# Patient Record
Sex: FEMALE | Race: BLACK OR AFRICAN AMERICAN | Employment: OTHER | URBAN - METROPOLITAN AREA
[De-identification: names, ages, dates, MRNs, and addresses within clinical notes are randomized per-mention and may not be internally consistent; named-entity substitution may affect disease eponyms.]

---

## 2018-11-16 ENCOUNTER — APPOINTMENT (OUTPATIENT)
Dept: CT IMAGING | Age: 83
End: 2018-11-16
Payer: MEDICARE

## 2018-11-16 ENCOUNTER — HOSPITAL ENCOUNTER (EMERGENCY)
Age: 83
Discharge: HOME | End: 2018-11-16
Attending: EMERGENCY MEDICINE
Payer: MEDICARE

## 2018-11-16 VITALS
RESPIRATION RATE: 20 BRPM | BODY MASS INDEX: 35.5 KG/M2 | DIASTOLIC BLOOD PRESSURE: 50 MMHG | OXYGEN SATURATION: 94 % | WEIGHT: 188 LBS | HEIGHT: 61 IN | SYSTOLIC BLOOD PRESSURE: 143 MMHG | HEART RATE: 85 BPM | TEMPERATURE: 97.9 F

## 2018-11-16 DIAGNOSIS — B02.9 HERPES ZOSTER WITHOUT COMPLICATION: ICD-10-CM

## 2018-11-16 DIAGNOSIS — N30.00 ACUTE CYSTITIS WITHOUT HEMATURIA: Primary | ICD-10-CM

## 2018-11-16 LAB
ALBUMIN SERPL-MCNC: 3.7 G/DL (ref 3.5–5.2)
ALP BLD-CCNC: 65 U/L (ref 35–104)
ALT SERPL-CCNC: 23 U/L (ref 0–32)
ANION GAP SERPL CALCULATED.3IONS-SCNC: 13 MMOL/L (ref 7–16)
AST SERPL-CCNC: 36 U/L (ref 0–31)
BACTERIA: ABNORMAL /HPF
BASOPHILS ABSOLUTE: 0.04 E9/L (ref 0–0.2)
BASOPHILS RELATIVE PERCENT: 0.5 % (ref 0–2)
BILIRUB SERPL-MCNC: 0.4 MG/DL (ref 0–1.2)
BILIRUBIN URINE: NEGATIVE
BLOOD, URINE: NEGATIVE
BUN BLDV-MCNC: 11 MG/DL (ref 8–23)
CALCIUM SERPL-MCNC: 9 MG/DL (ref 8.6–10.2)
CHLORIDE BLD-SCNC: 101 MMOL/L (ref 98–107)
CLARITY: CLEAR
CO2: 26 MMOL/L (ref 22–29)
COLOR: YELLOW
CREAT SERPL-MCNC: 1.2 MG/DL (ref 0.5–1)
EOSINOPHILS ABSOLUTE: 0.04 E9/L (ref 0.05–0.5)
EOSINOPHILS RELATIVE PERCENT: 0.5 % (ref 0–6)
GFR AFRICAN AMERICAN: 52
GFR NON-AFRICAN AMERICAN: 52 ML/MIN/1.73
GLUCOSE BLD-MCNC: 113 MG/DL (ref 74–99)
GLUCOSE URINE: NEGATIVE MG/DL
HCT VFR BLD CALC: 40.5 % (ref 34–48)
HEMOGLOBIN: 12.8 G/DL (ref 11.5–15.5)
IMMATURE GRANULOCYTES #: 0.17 E9/L
IMMATURE GRANULOCYTES %: 2.2 % (ref 0–5)
INFLUENZA A BY PCR: NOT DETECTED
INFLUENZA B BY PCR: NOT DETECTED
KETONES, URINE: ABNORMAL MG/DL
LACTIC ACID: 1.8 MMOL/L (ref 0.5–2.2)
LEUKOCYTE ESTERASE, URINE: ABNORMAL
LIPASE: 22 U/L (ref 13–60)
LYMPHOCYTES ABSOLUTE: 2.46 E9/L (ref 1.5–4)
LYMPHOCYTES RELATIVE PERCENT: 32 % (ref 20–42)
MCH RBC QN AUTO: 28.8 PG (ref 26–35)
MCHC RBC AUTO-ENTMCNC: 31.6 % (ref 32–34.5)
MCV RBC AUTO: 91.2 FL (ref 80–99.9)
MONOCYTES ABSOLUTE: 0.77 E9/L (ref 0.1–0.95)
MONOCYTES RELATIVE PERCENT: 10 % (ref 2–12)
NEUTROPHILS ABSOLUTE: 4.2 E9/L (ref 1.8–7.3)
NEUTROPHILS RELATIVE PERCENT: 54.8 % (ref 43–80)
NITRITE, URINE: POSITIVE
PDW BLD-RTO: 15.1 FL (ref 11.5–15)
PH UA: 7 (ref 5–9)
PLATELET # BLD: 240 E9/L (ref 130–450)
PMV BLD AUTO: 9.4 FL (ref 7–12)
POTASSIUM SERPL-SCNC: 4.7 MMOL/L (ref 3.5–5)
PROTEIN UA: NEGATIVE MG/DL
RBC # BLD: 4.44 E12/L (ref 3.5–5.5)
RBC UA: ABNORMAL /HPF (ref 0–2)
SODIUM BLD-SCNC: 140 MMOL/L (ref 132–146)
SPECIFIC GRAVITY UA: 1.01 (ref 1–1.03)
TOTAL PROTEIN: 6.8 G/DL (ref 6.4–8.3)
TROPONIN: <0.01 NG/ML (ref 0–0.03)
UROBILINOGEN, URINE: 0.2 E.U./DL
WBC # BLD: 7.7 E9/L (ref 4.5–11.5)
WBC UA: ABNORMAL /HPF (ref 0–5)

## 2018-11-16 PROCEDURE — 6360000004 HC RX CONTRAST MEDICATION: Performed by: RADIOLOGY

## 2018-11-16 PROCEDURE — 83605 ASSAY OF LACTIC ACID: CPT

## 2018-11-16 PROCEDURE — 2580000003 HC RX 258: Performed by: EMERGENCY MEDICINE

## 2018-11-16 PROCEDURE — 99284 EMERGENCY DEPT VISIT MOD MDM: CPT

## 2018-11-16 PROCEDURE — 80053 COMPREHEN METABOLIC PANEL: CPT

## 2018-11-16 PROCEDURE — 85025 COMPLETE CBC W/AUTO DIFF WBC: CPT

## 2018-11-16 PROCEDURE — 74177 CT ABD & PELVIS W/CONTRAST: CPT

## 2018-11-16 PROCEDURE — 6360000002 HC RX W HCPCS: Performed by: EMERGENCY MEDICINE

## 2018-11-16 PROCEDURE — 96365 THER/PROPH/DIAG IV INF INIT: CPT

## 2018-11-16 PROCEDURE — 83690 ASSAY OF LIPASE: CPT

## 2018-11-16 PROCEDURE — 81001 URINALYSIS AUTO W/SCOPE: CPT

## 2018-11-16 PROCEDURE — 84484 ASSAY OF TROPONIN QUANT: CPT

## 2018-11-16 PROCEDURE — 87502 INFLUENZA DNA AMP PROBE: CPT

## 2018-11-16 PROCEDURE — 96375 TX/PRO/DX INJ NEW DRUG ADDON: CPT

## 2018-11-16 RX ORDER — MORPHINE SULFATE 2 MG/ML
2 INJECTION, SOLUTION INTRAMUSCULAR; INTRAVENOUS ONCE
Status: COMPLETED | OUTPATIENT
Start: 2018-11-16 | End: 2018-11-16

## 2018-11-16 RX ORDER — 0.9 % SODIUM CHLORIDE 0.9 %
1000 INTRAVENOUS SOLUTION INTRAVENOUS ONCE
Status: COMPLETED | OUTPATIENT
Start: 2018-11-16 | End: 2018-11-16

## 2018-11-16 RX ORDER — SODIUM CHLORIDE 0.9 % (FLUSH) 0.9 %
10 SYRINGE (ML) INJECTION PRN
Status: DISCONTINUED | OUTPATIENT
Start: 2018-11-16 | End: 2018-11-16 | Stop reason: HOSPADM

## 2018-11-16 RX ORDER — CEFDINIR 300 MG/1
300 CAPSULE ORAL 2 TIMES DAILY
Qty: 20 CAPSULE | Refills: 0 | Status: SHIPPED | OUTPATIENT
Start: 2018-11-16 | End: 2018-11-26

## 2018-11-16 RX ORDER — ONDANSETRON 4 MG/1
4 TABLET, ORALLY DISINTEGRATING ORAL EVERY 8 HOURS PRN
Qty: 30 TABLET | Refills: 0 | Status: ON HOLD | OUTPATIENT
Start: 2018-11-16 | End: 2020-01-24 | Stop reason: HOSPADM

## 2018-11-16 RX ADMIN — IOPAMIDOL 110 ML: 755 INJECTION, SOLUTION INTRAVENOUS at 09:44

## 2018-11-16 RX ADMIN — CEFTRIAXONE SODIUM 2 G: 2 INJECTION, POWDER, FOR SOLUTION INTRAMUSCULAR; INTRAVENOUS at 11:36

## 2018-11-16 RX ADMIN — MORPHINE SULFATE 2 MG: 2 INJECTION, SOLUTION INTRAMUSCULAR; INTRAVENOUS at 08:35

## 2018-11-16 RX ADMIN — SODIUM CHLORIDE 1000 ML: 9 INJECTION, SOLUTION INTRAVENOUS at 08:34

## 2018-11-16 ASSESSMENT — PAIN SCALES - GENERAL
PAINLEVEL_OUTOF10: 8
PAINLEVEL_OUTOF10: 10

## 2018-11-16 ASSESSMENT — PAIN DESCRIPTION - FREQUENCY: FREQUENCY: CONTINUOUS

## 2018-11-16 ASSESSMENT — ENCOUNTER SYMPTOMS
BACK PAIN: 0
VOMITING: 0
ABDOMINAL PAIN: 1
BLOOD IN STOOL: 0
COUGH: 0
NAUSEA: 0
SHORTNESS OF BREATH: 0

## 2018-11-16 ASSESSMENT — PAIN DESCRIPTION - DESCRIPTORS: DESCRIPTORS: ACHING

## 2018-11-16 ASSESSMENT — PAIN DESCRIPTION - LOCATION: LOCATION: GENERALIZED

## 2018-11-16 ASSESSMENT — PAIN DESCRIPTION - PAIN TYPE: TYPE: ACUTE PAIN

## 2020-01-17 ENCOUNTER — APPOINTMENT (OUTPATIENT)
Dept: CT IMAGING | Age: 85
DRG: 871 | End: 2020-01-17
Payer: MEDICARE

## 2020-01-17 ENCOUNTER — APPOINTMENT (OUTPATIENT)
Dept: GENERAL RADIOLOGY | Age: 85
DRG: 871 | End: 2020-01-17
Payer: MEDICARE

## 2020-01-17 ENCOUNTER — HOSPITAL ENCOUNTER (INPATIENT)
Age: 85
LOS: 7 days | Discharge: HOME HEALTH | DRG: 871 | End: 2020-01-24
Attending: EMERGENCY MEDICINE | Admitting: INTERNAL MEDICINE
Payer: MEDICARE

## 2020-01-17 PROBLEM — K35.80 ACUTE APPENDICITIS: Status: ACTIVE | Noted: 2020-01-17

## 2020-01-17 LAB
ABO/RH: NORMAL
ALBUMIN SERPL-MCNC: 4 G/DL (ref 3.5–5.2)
ALP BLD-CCNC: 79 U/L (ref 35–104)
ALT SERPL-CCNC: 25 U/L (ref 0–32)
AMMONIA: 24.2 UMOL/L (ref 11–51)
ANION GAP SERPL CALCULATED.3IONS-SCNC: 17 MMOL/L (ref 7–16)
ANISOCYTOSIS: ABNORMAL
ANTIBODY SCREEN: NORMAL
AST SERPL-CCNC: 46 U/L (ref 0–31)
BACTERIA: ABNORMAL /HPF
BASOPHILS ABSOLUTE: 0.03 E9/L (ref 0–0.2)
BASOPHILS RELATIVE PERCENT: 0.2 % (ref 0–2)
BILIRUB SERPL-MCNC: 0.4 MG/DL (ref 0–1.2)
BILIRUBIN URINE: NEGATIVE
BLOOD, URINE: ABNORMAL
BUN BLDV-MCNC: 21 MG/DL (ref 8–23)
BURR CELLS: ABNORMAL
CALCIUM SERPL-MCNC: 10 MG/DL (ref 8.6–10.2)
CHLORIDE BLD-SCNC: 94 MMOL/L (ref 98–107)
CLARITY: CLEAR
CO2: 24 MMOL/L (ref 22–29)
COLOR: YELLOW
CREAT SERPL-MCNC: 0.9 MG/DL (ref 0.5–1)
DOHLE BODIES: ABNORMAL
EKG ATRIAL RATE: 127 BPM
EKG Q-T INTERVAL: 438 MS
EKG QRS DURATION: 68 MS
EKG QTC CALCULATION (BAZETT): 611 MS
EKG R AXIS: -23 DEGREES
EKG T AXIS: 33 DEGREES
EKG VENTRICULAR RATE: 117 BPM
EOSINOPHILS ABSOLUTE: 0.05 E9/L (ref 0.05–0.5)
EOSINOPHILS RELATIVE PERCENT: 0.4 % (ref 0–6)
GFR AFRICAN AMERICAN: >60
GFR NON-AFRICAN AMERICAN: >60 ML/MIN/1.73
GLUCOSE BLD-MCNC: 207 MG/DL (ref 74–99)
GLUCOSE URINE: 100 MG/DL
HCT VFR BLD CALC: 42.7 % (ref 34–48)
HEMOGLOBIN: 13.4 G/DL (ref 11.5–15.5)
IMMATURE GRANULOCYTES #: 0.15 E9/L
IMMATURE GRANULOCYTES %: 1.2 % (ref 0–5)
INFLUENZA A BY PCR: NOT DETECTED
INFLUENZA B BY PCR: NOT DETECTED
INR BLD: 1
KETONES, URINE: ABNORMAL MG/DL
LACTIC ACID, SEPSIS: 4.2 MMOL/L (ref 0.5–1.9)
LEUKOCYTE ESTERASE, URINE: NEGATIVE
LIPASE: 28 U/L (ref 13–60)
LYMPHOCYTES ABSOLUTE: 0.51 E9/L (ref 1.5–4)
LYMPHOCYTES RELATIVE PERCENT: 4.2 % (ref 20–42)
MCH RBC QN AUTO: 28.8 PG (ref 26–35)
MCHC RBC AUTO-ENTMCNC: 31.4 % (ref 32–34.5)
MCV RBC AUTO: 91.8 FL (ref 80–99.9)
METER GLUCOSE: 186 MG/DL (ref 74–99)
METER GLUCOSE: 197 MG/DL (ref 74–99)
METER GLUCOSE: 239 MG/DL (ref 74–99)
MONOCYTES ABSOLUTE: 0.42 E9/L (ref 0.1–0.95)
MONOCYTES RELATIVE PERCENT: 3.4 % (ref 2–12)
NEUTROPHILS ABSOLUTE: 11.12 E9/L (ref 1.8–7.3)
NEUTROPHILS RELATIVE PERCENT: 90.6 % (ref 43–80)
NITRITE, URINE: POSITIVE
PDW BLD-RTO: 14.8 FL (ref 11.5–15)
PH UA: 6.5 (ref 5–9)
PLATELET # BLD: 390 E9/L (ref 130–450)
PMV BLD AUTO: 10.4 FL (ref 7–12)
POIKILOCYTES: ABNORMAL
POLYCHROMASIA: ABNORMAL
POTASSIUM REFLEX MAGNESIUM: 5.6 MMOL/L (ref 3.5–5)
POTASSIUM SERPL-SCNC: 3.8 MMOL/L (ref 3.5–5)
PRO-BNP: 673 PG/ML (ref 0–450)
PROTEIN UA: ABNORMAL MG/DL
PROTHROMBIN TIME: 12.2 SEC (ref 9.3–12.4)
RBC # BLD: 4.65 E12/L (ref 3.5–5.5)
RBC UA: ABNORMAL /HPF (ref 0–2)
REASON FOR REJECTION: NORMAL
REJECTED TEST: NORMAL
SCHISTOCYTES: ABNORMAL
SODIUM BLD-SCNC: 135 MMOL/L (ref 132–146)
SPECIFIC GRAVITY UA: 1.01 (ref 1–1.03)
TOTAL PROTEIN: 8.1 G/DL (ref 6.4–8.3)
TOXIC GRANULATION: ABNORMAL
TROPONIN: <0.01 NG/ML (ref 0–0.03)
TSH SERPL DL<=0.05 MIU/L-ACNC: 1.81 UIU/ML (ref 0.27–4.2)
UROBILINOGEN, URINE: 0.2 E.U./DL
VACUOLATED NEUTROPHILS: ABNORMAL
WBC # BLD: 12.3 E9/L (ref 4.5–11.5)
WBC UA: ABNORMAL /HPF (ref 0–5)

## 2020-01-17 PROCEDURE — 80053 COMPREHEN METABOLIC PANEL: CPT

## 2020-01-17 PROCEDURE — 82962 GLUCOSE BLOOD TEST: CPT

## 2020-01-17 PROCEDURE — 2580000003 HC RX 258: Performed by: STUDENT IN AN ORGANIZED HEALTH CARE EDUCATION/TRAINING PROGRAM

## 2020-01-17 PROCEDURE — 99285 EMERGENCY DEPT VISIT HI MDM: CPT

## 2020-01-17 PROCEDURE — 87502 INFLUENZA DNA AMP PROBE: CPT

## 2020-01-17 PROCEDURE — 87040 BLOOD CULTURE FOR BACTERIA: CPT

## 2020-01-17 PROCEDURE — 93005 ELECTROCARDIOGRAM TRACING: CPT | Performed by: STUDENT IN AN ORGANIZED HEALTH CARE EDUCATION/TRAINING PROGRAM

## 2020-01-17 PROCEDURE — 87186 SC STD MICRODIL/AGAR DIL: CPT

## 2020-01-17 PROCEDURE — 85610 PROTHROMBIN TIME: CPT

## 2020-01-17 PROCEDURE — 96365 THER/PROPH/DIAG IV INF INIT: CPT

## 2020-01-17 PROCEDURE — 87088 URINE BACTERIA CULTURE: CPT

## 2020-01-17 PROCEDURE — 83690 ASSAY OF LIPASE: CPT

## 2020-01-17 PROCEDURE — 86850 RBC ANTIBODY SCREEN: CPT

## 2020-01-17 PROCEDURE — 70450 CT HEAD/BRAIN W/O DYE: CPT

## 2020-01-17 PROCEDURE — 86900 BLOOD TYPING SEROLOGIC ABO: CPT

## 2020-01-17 PROCEDURE — 6360000004 HC RX CONTRAST MEDICATION: Performed by: RADIOLOGY

## 2020-01-17 PROCEDURE — 2000000000 HC ICU R&B

## 2020-01-17 PROCEDURE — 84484 ASSAY OF TROPONIN QUANT: CPT

## 2020-01-17 PROCEDURE — 86901 BLOOD TYPING SEROLOGIC RH(D): CPT

## 2020-01-17 PROCEDURE — 2580000003 HC RX 258: Performed by: EMERGENCY MEDICINE

## 2020-01-17 PROCEDURE — 71046 X-RAY EXAM CHEST 2 VIEWS: CPT

## 2020-01-17 PROCEDURE — 6370000000 HC RX 637 (ALT 250 FOR IP): Performed by: EMERGENCY MEDICINE

## 2020-01-17 PROCEDURE — 83880 ASSAY OF NATRIURETIC PEPTIDE: CPT

## 2020-01-17 PROCEDURE — 74177 CT ABD & PELVIS W/CONTRAST: CPT

## 2020-01-17 PROCEDURE — 84132 ASSAY OF SERUM POTASSIUM: CPT

## 2020-01-17 PROCEDURE — 82140 ASSAY OF AMMONIA: CPT

## 2020-01-17 PROCEDURE — 6360000002 HC RX W HCPCS: Performed by: EMERGENCY MEDICINE

## 2020-01-17 PROCEDURE — 83605 ASSAY OF LACTIC ACID: CPT

## 2020-01-17 PROCEDURE — 85025 COMPLETE CBC W/AUTO DIFF WBC: CPT

## 2020-01-17 PROCEDURE — 36415 COLL VENOUS BLD VENIPUNCTURE: CPT

## 2020-01-17 PROCEDURE — 84443 ASSAY THYROID STIM HORMONE: CPT

## 2020-01-17 PROCEDURE — 81001 URINALYSIS AUTO W/SCOPE: CPT

## 2020-01-17 RX ORDER — 0.9 % SODIUM CHLORIDE 0.9 %
600 INTRAVENOUS SOLUTION INTRAVENOUS ONCE
Status: COMPLETED | OUTPATIENT
Start: 2020-01-17 | End: 2020-01-17

## 2020-01-17 RX ORDER — ACETAMINOPHEN 650 MG/1
650 SUPPOSITORY RECTAL ONCE
Status: COMPLETED | OUTPATIENT
Start: 2020-01-17 | End: 2020-01-17

## 2020-01-17 RX ORDER — 0.9 % SODIUM CHLORIDE 0.9 %
500 INTRAVENOUS SOLUTION INTRAVENOUS ONCE
Status: COMPLETED | OUTPATIENT
Start: 2020-01-17 | End: 2020-01-17

## 2020-01-17 RX ORDER — DEXTROSE AND SODIUM CHLORIDE 5; .45 G/100ML; G/100ML
INJECTION, SOLUTION INTRAVENOUS CONTINUOUS
Status: DISCONTINUED | OUTPATIENT
Start: 2020-01-17 | End: 2020-01-22

## 2020-01-17 RX ORDER — 0.9 % SODIUM CHLORIDE 0.9 %
1000 INTRAVENOUS SOLUTION INTRAVENOUS ONCE
Status: COMPLETED | OUTPATIENT
Start: 2020-01-17 | End: 2020-01-17

## 2020-01-17 RX ORDER — 0.9 % SODIUM CHLORIDE 0.9 %
1000 INTRAVENOUS SOLUTION INTRAVENOUS ONCE
Status: DISCONTINUED | OUTPATIENT
Start: 2020-01-17 | End: 2020-01-17

## 2020-01-17 RX ORDER — 0.9 % SODIUM CHLORIDE 0.9 %
1000 INTRAVENOUS SOLUTION INTRAVENOUS ONCE
Status: DISCONTINUED | OUTPATIENT
Start: 2020-01-17 | End: 2020-01-18

## 2020-01-17 RX ORDER — SODIUM CHLORIDE 9 MG/ML
INJECTION, SOLUTION INTRAVENOUS EVERY 8 HOURS
Status: DISCONTINUED | OUTPATIENT
Start: 2020-01-18 | End: 2020-01-24 | Stop reason: HOSPADM

## 2020-01-17 RX ADMIN — SODIUM CHLORIDE 1000 ML: 9 INJECTION, SOLUTION INTRAVENOUS at 17:02

## 2020-01-17 RX ADMIN — SODIUM CHLORIDE 600 ML: 9 INJECTION, SOLUTION INTRAVENOUS at 18:40

## 2020-01-17 RX ADMIN — PIPERACILLIN SODIUM AND TAZOBACTAM SODIUM 4.5 G: 4; .5 INJECTION, POWDER, LYOPHILIZED, FOR SOLUTION INTRAVENOUS at 18:40

## 2020-01-17 RX ADMIN — DEXTROSE AND SODIUM CHLORIDE 50 ML/HR: 5; 450 INJECTION, SOLUTION INTRAVENOUS at 23:00

## 2020-01-17 RX ADMIN — ACETAMINOPHEN 650 MG: 650 SUPPOSITORY RECTAL at 17:03

## 2020-01-17 RX ADMIN — SODIUM CHLORIDE 500 ML: 9 INJECTION, SOLUTION INTRAVENOUS at 15:00

## 2020-01-17 RX ADMIN — IOPAMIDOL 110 ML: 755 INJECTION, SOLUTION INTRAVENOUS at 15:43

## 2020-01-17 RX ADMIN — SODIUM CHLORIDE 1000 ML: 9 INJECTION, SOLUTION INTRAVENOUS at 17:52

## 2020-01-17 ASSESSMENT — PAIN SCALES - GENERAL
PAINLEVEL_OUTOF10: 9
PAINLEVEL_OUTOF10: 8
PAINLEVEL_OUTOF10: 8
PAINLEVEL_OUTOF10: 7

## 2020-01-17 ASSESSMENT — PAIN DESCRIPTION - LOCATION
LOCATION: ABDOMEN
LOCATION: ABDOMEN

## 2020-01-17 ASSESSMENT — PAIN DESCRIPTION - PAIN TYPE
TYPE: ACUTE PAIN
TYPE: ACUTE PAIN

## 2020-01-17 ASSESSMENT — PAIN SCALES - WONG BAKER: WONGBAKER_NUMERICALRESPONSE: 4

## 2020-01-17 ASSESSMENT — PAIN DESCRIPTION - ORIENTATION
ORIENTATION: LOWER
ORIENTATION: LOWER

## 2020-01-17 NOTE — ED PROVIDER NOTES
Heather Hicks is a 80 y.o. female with a PMHx significant for DM, HLD, HTN who presents for evaluation of nausea, vomiting, abdominal pain.  is at the bedside helping to provide history. The patient states that she woke up with some discomfort and had vomiting. Upon further questioning she is not answering questions appropriately.  states that the patient went to sleep at [id number] and that time was her last known well.  also notes that the patient had some diarrhea. The history is provided by the patient, the spouse and medical records. Review of Systems   Unable to perform ROS: Mental status change        Physical Exam  Vitals signs and nursing note reviewed. Constitutional:       Appearance: Normal appearance. She is well-developed. She is obese. HENT:      Head: Normocephalic and atraumatic. Right Ear: External ear normal.      Left Ear: External ear normal.   Eyes:      General:         Right eye: No discharge. Left eye: No discharge. Extraocular Movements: Extraocular movements intact. Conjunctiva/sclera: Conjunctivae normal.   Neck:      Musculoskeletal: Normal range of motion and neck supple. Cardiovascular:      Rate and Rhythm: Normal rate and regular rhythm. Heart sounds: Normal heart sounds. Pulmonary:      Effort: Pulmonary effort is normal.      Breath sounds: Normal breath sounds. Abdominal:      General: Bowel sounds are normal. There is no distension. Palpations: Abdomen is soft. There is no mass. Tenderness: There is tenderness in the right upper quadrant and right lower quadrant. There is no guarding or rebound. Hernia: No hernia is present. Musculoskeletal: Normal range of motion. General: No deformity. Skin:     General: Skin is warm. Findings: No rash. Neurological:      General: No focal deficit present. Mental Status: She is alert. She is disoriented. Cranial Nerves:  No changes; prolonged QT interval  Comparison: changes compared to previous EKG    ED Course as of Jan 17 2005 Fri Jan 17, 2020   1637 Was called to reevaluate the patient. She currently is not acting as she was prior. She is not following commands. [BB]   1640 [facility] [place] [facility] [facility]. Discussed patients presentation and clinical course. [BB]      ED Course User Index  [BB] Francisco Hawkins,        --------------------------------------------- PAST HISTORY ---------------------------------------------  Past Medical History:  has a past medical history of Arthritis, Diabetes mellitus ([address] Hyperlipidemia, and Hypertension. Past Surgical History:  has a past surgical history that includes Cholecystectomy; Hysterectomy; and Carpal tunnel release. Social History:  reports that she has never smoked. She has never used smokeless tobacco. She reports that she does not drink alcohol. Family History: family history is not on file. The patients home medications have been reviewed. Allergies: Patient has no known allergies.     -------------------------------------------------- RESULTS -------------------------------------------------    Lab  Results for orders placed or performed during the hospital encounter of 01/17/20   Rapid influenza A/B antigens   Result Value Ref Range    Influenza A by PCR Not Detected Not Detected    Influenza B by PCR Not Detected Not Detected   CBC Auto Differential   Result Value Ref Range    WBC 12.3 (H) 4.5 - 11.5 E9/L    RBC 4.65 3.50 - 5.50 E12/L    Hemoglobin 13.4 11.5 - 15.5 g/dL    Hematocrit 42.7 34.0 - 48.0 %    MCV 91.8 80.0 - 99.9 fL    MCH 28.8 26.0 - 35.0 pg    MCHC 31.4 (L) 32.0 - 34.5 %    RDW 14.8 11.5 - 15.0 fL    Platelets 253 115 - 987 E9/L    MPV 10.4 7.0 - 12.0 fL    Neutrophils % 90.6 (H) 43.0 - 80.0 %    Immature Granulocytes % 1.2 0.0 - 5.0 %    Lymphocytes % 4.2 (L) 20.0 - 42.0 %    Monocytes % 3.4 2.0 - 12.0 % uIU/mL   SPECIMEN REJECTION   Result Value Ref Range    Rejected Test NH3 LACT     Reason for Rejection see below    Ammonia   Result Value Ref Range    Ammonia 24.2 11.0 - 51.0 umol/L   Lactate, Sepsis   Result Value Ref Range    Lactic Acid, Sepsis 4.2 (HH) 0.5 - 1.9 mmol/L   Potassium   Result Value Ref Range    Potassium 3.8 3.5 - 5.0 mmol/L   SPECIMEN REJECTION   Result Value Ref Range    Rejected Test LACIDS     Reason for Rejection see below    Protime-INR   Result Value Ref Range    Protime 12.2 9.3 - 12.4 sec    INR 1.0    Microscopic Urinalysis   Result Value Ref Range    WBC, UA 0-1 0 - 5 /HPF    RBC, UA NONE 0 - 2 /HPF    Bacteria, UA MANY (A) /HPF   POCT Glucose   Result Value Ref Range    Meter Glucose 197 (H) 74 - 99 mg/dL   POCT Glucose   Result Value Ref Range    Meter Glucose 239 (H) 74 - 99 mg/dL   POCT Glucose   Result Value Ref Range    Meter Glucose 186 (H) 74 - 99 mg/dL   EKG 12 Lead   Result Value Ref Range    Ventricular Rate 117 BPM    Atrial Rate 127 BPM    QRS Duration 68 ms    Q-T Interval 438 ms    QTc Calculation (Bazett) 611 ms    R Axis -23 degrees    T Axis 33 degrees       Radiology  CT Head WO Contrast   Final Result   No indication for an acute intracranial process. CT ABDOMEN PELVIS W IV CONTRAST Additional Contrast? None   Final Result   Dilated fluid-filled small bowel loops likely ileus/enteritis. There   is no bowel obstruction. Somewhat thickened and hazy appearance of the appendix without   significant inflammatory changes in the right lower quadrant. Under   proper clinical settings, early acute appendicitis is considered. The above findings were telephoned to the ED physician. ALERT:  THIS IS AN ABNORMAL REPORT            XR CHEST STANDARD (2 VW)   Final Result   Borderline cardiomegaly.  Nothing else active.              ------------------------- NURSING NOTES AND VITALS REVIEWED ---------------------------  Date / Time Roomed:  1/17/2020  1:17 PM  ED Bed Assignment:  17/17    The nursing notes within the ED encounter and vital signs as below have been reviewed. Patient Vitals for the past 24 hrs:   BP Temp Temp src Pulse Resp SpO2 Height Weight   01/17/20 2000 (!) 98/53 -- -- 100 29 92 % -- --   01/17/20 1915 (!) 123/58 -- -- 107 (!) 34 94 % -- --   01/17/20 1838 (!) 123/58 -- -- 110 23 92 % -- --   01/17/20 1753 -- 100.2 °F (37.9 °C) Rectal -- -- -- -- --   01/17/20 1657 (!) 184/89 104.9 °F (40.5 °C) Rectal 114 30 91 % -- --   01/17/20 1314 (!) 142/92 98.5 °F (36.9 °C) Oral 114 16 91 % 5' 1\" (1.549 m) 188 lb (85.3 kg)       Oxygen Saturation Interpretation: Normal      ------------------------------------------ PROGRESS NOTES ------------------------------------------  Re-evaluation(s):  Time: 3:21 PM.  Patients symptoms show no change  Repeat physical examination is not changed    Time: 5:10 PM.  Patients symptoms show no change  Repeat physical examination is not changed    I have spoken with the patient and discussed todays results, in addition to providing specific details for the plan of care and counseling regarding the diagnosis and prognosis. Their questions are answered at this time and they are agreeable with the plan.      --------------------------------- ADDITIONAL PROVIDER NOTES ---------------------------------  Consultations:  Spoke with Dr. Jessica Hernandez,  They will provide consultation. Dr. Jessica Hernandez saw patient in the ED and does not believe patient is a candidate for surgical intervention at this time. He will continue to follow patient for treatment and changes in her condition. Spoke with Dr. Harry Bowie,  They will provide consultation. Spoke with Dr. Odilia Camacho,  They will admit this patient.     This patient's ED course included: a personal history and physicial examination, multiple bedside re-evaluations, IV medications, cardiac monitoring, continuous pulse oximetry and complex medical decision making and emergency management    This patient has been closely monitored during their ED course. Please note that the withdrawal or failure to initiate urgent interventions for this patient would likely result in a life threatening deterioration or permanent disability. Accordingly this patient received 60 minutes of critical care time, excluding separately billable procedures. Clinical Impression  1. Acute appendicitis, unspecified acute appendicitis type    2. Altered mental status, unspecified altered mental status type    3. Sepsis, due to unspecified organism, unspecified whether acute organ dysfunction present ([facility] [address]    4. Acute cystitis without hematuria          Disposition  Patient's disposition: Admit to CCU/ICU  Patient's condition is serious.          Susanna Fernandes DO  Resident  01/17/20 2589

## 2020-01-17 NOTE — ED NOTES
Bed:  [place] [address]  Expected date:   Expected time:   Means of arrival:   Comments:  Echo Winn RN  01/17/20 7554

## 2020-01-18 LAB
ANION GAP SERPL CALCULATED.3IONS-SCNC: 13 MMOL/L (ref 7–16)
BUN BLDV-MCNC: 19 MG/DL (ref 8–23)
CALCIUM SERPL-MCNC: 8.4 MG/DL (ref 8.6–10.2)
CHLORIDE BLD-SCNC: 103 MMOL/L (ref 98–107)
CO2: 22 MMOL/L (ref 22–29)
CREAT SERPL-MCNC: 1.1 MG/DL (ref 0.5–1)
GFR AFRICAN AMERICAN: 57
GFR NON-AFRICAN AMERICAN: 57 ML/MIN/1.73
GLUCOSE BLD-MCNC: 142 MG/DL (ref 74–99)
HCT VFR BLD CALC: 35 % (ref 34–48)
HEMOGLOBIN: 11 G/DL (ref 11.5–15.5)
LACTIC ACID: 1.7 MMOL/L (ref 0.5–2.2)
MCH RBC QN AUTO: 29.1 PG (ref 26–35)
MCHC RBC AUTO-ENTMCNC: 31.4 % (ref 32–34.5)
MCV RBC AUTO: 92.6 FL (ref 80–99.9)
METER GLUCOSE: 131 MG/DL (ref 74–99)
METER GLUCOSE: 135 MG/DL (ref 74–99)
METER GLUCOSE: 139 MG/DL (ref 74–99)
METER GLUCOSE: 141 MG/DL (ref 74–99)
METER GLUCOSE: 144 MG/DL (ref 74–99)
PDW BLD-RTO: 14.4 FL (ref 11.5–15)
PLATELET # BLD: 247 E9/L (ref 130–450)
PMV BLD AUTO: 9.6 FL (ref 7–12)
POTASSIUM SERPL-SCNC: 3.7 MMOL/L (ref 3.5–5)
RBC # BLD: 3.78 E12/L (ref 3.5–5.5)
SODIUM BLD-SCNC: 138 MMOL/L (ref 132–146)
WBC # BLD: 8.4 E9/L (ref 4.5–11.5)

## 2020-01-18 PROCEDURE — 82962 GLUCOSE BLOOD TEST: CPT

## 2020-01-18 PROCEDURE — 85027 COMPLETE CBC AUTOMATED: CPT

## 2020-01-18 PROCEDURE — 2580000003 HC RX 258: Performed by: SURGERY

## 2020-01-18 PROCEDURE — 6360000002 HC RX W HCPCS: Performed by: SURGERY

## 2020-01-18 PROCEDURE — 6360000002 HC RX W HCPCS: Performed by: INTERNAL MEDICINE

## 2020-01-18 PROCEDURE — 80048 BASIC METABOLIC PNL TOTAL CA: CPT

## 2020-01-18 PROCEDURE — 87081 CULTURE SCREEN ONLY: CPT

## 2020-01-18 PROCEDURE — 36415 COLL VENOUS BLD VENIPUNCTURE: CPT

## 2020-01-18 PROCEDURE — 83605 ASSAY OF LACTIC ACID: CPT

## 2020-01-18 PROCEDURE — 2060000000 HC ICU INTERMEDIATE R&B

## 2020-01-18 PROCEDURE — 2580000003 HC RX 258: Performed by: INTERNAL MEDICINE

## 2020-01-18 PROCEDURE — 6360000002 HC RX W HCPCS: Performed by: STUDENT IN AN ORGANIZED HEALTH CARE EDUCATION/TRAINING PROGRAM

## 2020-01-18 RX ORDER — DEXTROSE MONOHYDRATE 50 MG/ML
100 INJECTION, SOLUTION INTRAVENOUS PRN
Status: DISCONTINUED | OUTPATIENT
Start: 2020-01-18 | End: 2020-01-24 | Stop reason: HOSPADM

## 2020-01-18 RX ORDER — FENTANYL CITRATE 50 UG/ML
25 INJECTION, SOLUTION INTRAMUSCULAR; INTRAVENOUS EVERY 4 HOURS PRN
Status: DISCONTINUED | OUTPATIENT
Start: 2020-01-18 | End: 2020-01-24 | Stop reason: HOSPADM

## 2020-01-18 RX ORDER — DEXTROSE MONOHYDRATE 25 G/50ML
12.5 INJECTION, SOLUTION INTRAVENOUS PRN
Status: DISCONTINUED | OUTPATIENT
Start: 2020-01-18 | End: 2020-01-24 | Stop reason: HOSPADM

## 2020-01-18 RX ORDER — NICOTINE POLACRILEX 4 MG
15 LOZENGE BUCCAL PRN
Status: DISCONTINUED | OUTPATIENT
Start: 2020-01-18 | End: 2020-01-24 | Stop reason: HOSPADM

## 2020-01-18 RX ADMIN — PIPERACILLIN AND TAZOBACTAM 3.38 G: 3; .375 INJECTION, POWDER, FOR SOLUTION INTRAVENOUS at 11:02

## 2020-01-18 RX ADMIN — PIPERACILLIN AND TAZOBACTAM 3.38 G: 3; .375 INJECTION, POWDER, FOR SOLUTION INTRAVENOUS at 18:27

## 2020-01-18 RX ADMIN — SODIUM CHLORIDE 12.5 ML/HR: 9 INJECTION, SOLUTION INTRAVENOUS at 06:49

## 2020-01-18 RX ADMIN — SODIUM CHLORIDE: 9 INJECTION, SOLUTION INTRAVENOUS at 15:58

## 2020-01-18 RX ADMIN — FENTANYL CITRATE 25 MCG: 50 INJECTION, SOLUTION INTRAMUSCULAR; INTRAVENOUS at 00:51

## 2020-01-18 RX ADMIN — SODIUM CHLORIDE: 9 INJECTION, SOLUTION INTRAVENOUS at 23:00

## 2020-01-18 RX ADMIN — PIPERACILLIN AND TAZOBACTAM 3.38 G: 3; .375 INJECTION, POWDER, FOR SOLUTION INTRAVENOUS at 02:37

## 2020-01-18 ASSESSMENT — PAIN SCALES - GENERAL
PAINLEVEL_OUTOF10: 4
PAINLEVEL_OUTOF10: 0
PAINLEVEL_OUTOF10: 8
PAINLEVEL_OUTOF10: 4
PAINLEVEL_OUTOF10: 4
PAINLEVEL_OUTOF10: 5
PAINLEVEL_OUTOF10: 8
PAINLEVEL_OUTOF10: 0

## 2020-01-18 ASSESSMENT — PAIN DESCRIPTION - DESCRIPTORS: DESCRIPTORS: CONSTANT

## 2020-01-18 ASSESSMENT — PAIN DESCRIPTION - PAIN TYPE
TYPE: ACUTE PAIN
TYPE: ACUTE PAIN

## 2020-01-18 ASSESSMENT — PAIN DESCRIPTION - LOCATION
LOCATION: ABDOMEN

## 2020-01-18 ASSESSMENT — PAIN DESCRIPTION - ORIENTATION: ORIENTATION: LOWER

## 2020-01-18 NOTE — PROGRESS NOTES
Pt transferring out of ICu this am;  Pt denies any nausea/emesis and reports pain is less  Not hungry.     VSS Afebrile  Abd- soft/distended/BS+/ No peritoneal findnings  Lungs- CTA  Neuro- improved over last night; answering questions/obeying commands  Labs: 138/3.7            8.4/ 11.o  Imp;  Neuro status change           History of problems with anesthesia           Appendicitis  Plan: continue antibiotics/ clinical exam/ recheck labs

## 2020-01-18 NOTE — CONSULTS
Critical Care Admit/Consult Note         Patient Aida Sanford   MRN -  [id number]   [facility]   - 1932      Date of Admission -  2020  1:17 PM  Date of evaluation -  2020/021-A   Hospital Day - 0            ADMIT/CONSULT DETAILS     Reason for Admit/Consult   Acute appendicitis    Consulting Brenda Cortez MD  Primary Care Physician - Nasir Eid MD         HPI   The patient is a 80 y.o. female with significant past medical history of diabetes mellitus, HLD, HTN that was admitted to the ICU for monitoring of acute appendicitis. Patient had come into the emergency department for new onset nausea, vomiting abdominal pain. Patient's  and stated that the patient woke up and was very confused and disoriented throughout the day. She was complaining of lower abdominal pain and had several episodes of vomiting. Last known well was 11 PM the previous night. In the ED patient was confused and disoriented. CT of the head was unremarkable, CT abdomen and pelvis was consistent with acute appendicitis. WBC 12.3, lactate 4.2. Dental surgery consulted for acute appendicitis. Patient started on Zosyn. Due to patient's borderline hypotension she was admitted to the ICU for monitoring until further decision made on surgery.     Past Medical History         Diagnosis Date    Arthritis     Diabetes mellitus ([address]     Hyperlipidemia     Hypertension         Past Surgical History           Procedure Laterality Date    CARPAL TUNNEL RELEASE      CHOLECYSTECTOMY      HYSTERECTOMY             Current Medications   Current Medications    [START ON 2020] piperacillin-tazobactam  3.375 g Intravenous Q8H    sodium chloride  1,000 mL Intravenous Once       IV Drips/Infusions   [START ON 2020] sodium chloride      dextrose 5 % and 0.45 % NaCl 50 mL/hr (20 2300)     Home Medications  Medications Prior to Admission: ondansetron (ZOFRAN ODT) 4 MG disintegrating tablet, Take 1 tablet by mouth every 8 hours as needed for Nausea or Vomiting  pantoprazole (PROTONIX) 20 MG tablet, Take 1 tablet by mouth daily    Diet/Nutrition   Diet NPO Effective Now    Allergies   Patient has no known allergies. Social History   Tobacco   reports that she has never smoked. She has never used smokeless tobacco.    Alcohol     reports no history of alcohol use. Occupational history :    Family History   No family history on file. ROS     REVIEW OF SYSTEMS:  Review of systems not obtained due to patient factors - mental status    Lines and Devices   Bilateral peripheral IVs    Mechanical Ventilation Data   VENT SETTINGS (Comprehensive)     Additional Respiratory  Assessments  Pulse: 87  Resp: 25  SpO2: 94 %  Oral Care: Other (Comment)(water swab)    ABG  No results found for: PH, PCO2, PO2, HCO3, O2SAT  No results found for: IFIO2, MODE, SETTIDVOL, SETPEEP        Vitals    height is 5' 1\" (1.549 m) and weight is 182 lb 1.6 oz (82.6 kg). Her oral temperature is 99.5 °F (37.5 °C). Her blood pressure is 112/54 (abnormal) and her pulse is 87. Her respiration is 25 and oxygen saturation is 94%.        Temperature Range: Temp: 99.5 °F (37.5 °C) Temp  Av.6 °F (38.1 °C)  Min: 98.5 °F (36.9 °C)  Max: 104.9 °F (40.5 °C)  BP Range:  Systolic (99NOD), OVK:699 , Min:98 , MCT:057     Diastolic (77AFU), RXP:83, Min:53, Max:92    Pulse Range: Pulse  Av.5  Min: 87  Max: 114  Respiration Range: Resp  Av.6  Min: 16  Max: 34  Current Pulse Ox[de-identified]  SpO2: 94 %  24HR Pulse Ox Range:  SpO2  Av.7 %  Min: 91 %  Max: 94 %  Oxygen Amount and Delivery:        I/O (24 Hours)    Patient Vitals for the past 8 hrs:   BP Temp Temp src Pulse Resp SpO2 Height Weight   20 2310 -- -- -- 87 -- -- -- --   20 2300 (!) 112/54 -- -- 89 25 -- -- --   20 (!) 103/54 -- -- 96 27 -- -- --   20 (!) 103/54 -- -- 96 (!) 33 94 % -- --   20 -- -- -- 99 -- -- -- --   01/17/20 2152 131/62 99.5 °F (37.5 °C) Oral 96 20 94 % 5' 1\" (1.549 m) 182 lb 1.6 oz (82.6 kg)   01/17/20 2132 (!) 99/58 -- -- 99 16 94 % -- --   01/17/20 2004 (!) 98/53 99.9 °F (37.7 °C) Rectal 99 18 92 % -- --   01/17/20 2000 (!) 98/53 -- -- 100 29 92 % -- --   01/17/20 1915 (!) 123/58 -- -- 107 (!) 34 94 % -- --   01/17/20 1838 (!) 123/58 -- -- 110 23 92 % -- --   01/17/20 1753 -- 100.2 °F (37.9 °C) Rectal -- -- -- -- --   01/17/20 1657 (!) 184/89 104.9 °F (40.5 °C) Rectal 114 30 91 % -- --       Intake/Output Summary (Last 24 hours) at 1/17/2020 2343  Last data filed at 1/17/2020 2010  Gross per 24 hour   Intake 2700 ml   Output --   Net 2700 ml     I/O last 3 completed shifts:   In: 2700 [IV Piggyback:2700]  Out: -    Date 01/17/20 0000 - 01/17/20 2359   Shift 3748-8205 6465-6422 2354-5918 24 Hour Total   INTAKE   IV Piggyback   2700(32.7) 2700(32.7)   Shift Total(mL/kg)   2875(18.5) 2700(32.7)   OUTPUT   Shift Total(mL/kg)       Weight (kg)  85.3 82.6 82.6     Patient Vitals for the past 96 hrs (Last 3 readings):   Weight   01/17/20 2152 182 lb 1.6 oz (82.6 kg)   01/17/20 1314 188 lb (85.3 kg)         Drains/Tubes Outputs  Ramsey catheter  Exam         PHYSICAL EXAM:  CONSTITUTIONAL: Awake and alert following commands, oriented only to self  EYES: PERRLA, no scleral icterus  ENT:  normocepalic, without obvious abnormality, atraumatic  NECK:  supple, symmetrical, trachea midline and skin normal  LUNGS: Clear to auscultation bilaterally, no rhonchi, rales  CARDIOVASCULAR: Normal sinus rhythm, no murmurs noted  ABDOMEN: Soft, no rigidity noted, moderate tenderness palpation right lower quadrant  MUSCULOSKELETAL:  there is no redness, warmth, or swelling of the joints, no pitting edema lower extremities  NEUROLOGIC: Awake, alert and oriented only to self, no focal deficit  SKIN:  no bruising or bleeding, normal skin color, texture, turgor, no redness, warmth, or swelling and no collapsed colon with a slightly thickened fluid-filled rectum. The appendix is prominent measuring 8 mm with some haziness without significant inflammatory changes in the right lower quadrant. Dilated fluid-filled small bowel loops likely ileus/enteritis. There is no bowel obstruction. Somewhat thickened and hazy appearance of the appendix without significant inflammatory changes in the right lower quadrant. Under proper clinical settings, early acute appendicitis is considered. The above findings were telephoned to the ED physician. ALERT:  THIS IS AN ABNORMAL REPORT       SYSTEMS ASSESSMENT    Neuro   Altered mental status  -Awake and following commands however new change in mental status  -CT head unremarkable, showing no acute change  -Likely secondary to infection  -Monitor    Respiratory   No acute respiratory distress  Satting well on room air   continuous pulse oximetry monitoring  Wean oxygen as tolerated.  Keep O2 sat above 90-92%    Cardiovascular   Hypotension  -Likely secondary to infection  -Improved with fluid  -Started on D5, half-normal saline at 50 cc/h  -Hold home antihypertensive medications    Gastrointestinal   Acute appendicitis  -NPO  -Zosyn every 8 hours  -General surgery following  -Currently not a surgical candidate per Dr. Dino Angela, continuing to follow and monitor for changes  -Lactate elevated at 4.2  -Trend lactate    Renal   Cr normal  Daily BMP  Monitor electrolytes and replete as necessary     Infectious Disease   Acute appendicitis  -CT scan showed acute appendicitis  -Zosyn every 8 h  -Continue to monitor with serial abdominal exams    Hematology/Oncology   H/H stable  Leukocytosis  -Secondary to acute appendicitis  Daily CBC    Endocrine   Monitor blood glucose  Low-dose sliding scale insulin    Social/Spiritual/DNR/Other   Full code    Discussed case with Dr. Farrell Links     Electronically signed by Raymond Bates DO PGY2 on 1/18/2020 at 12:12 AM

## 2020-01-18 NOTE — PLAN OF CARE
Intensive Care Daily Quality Rounding Checklist      ICU Team Members: bedside nurse, charge nurse, respiratory therapist, Kourtney Rogers, [facility]    ICU Day #:  2    Intubation Date: N/A    Ventilator Day #:     Central Line Insertion Date: N/A        Day #:      Arterial Line Insertion Date: N/A      Day #:     DVT Prophylaxis:    GI Prophylaxis:    Ramsey Catheter Insertion Date: Jan 17 2020       Day #: 2      Continued need (if yes, reason documented and discussed with physician):     Skin Issues/ Wounds and ordered treatment discussed on rounds:NA    Goals/ Plans for the Day: wean oxygen

## 2020-01-18 NOTE — CONSULTS
[address] [place] [address] [facility]  Consultation Note     Admit Date: 1/17/2020  1:17 PM    Reason for Consult:   Appendicitis    Attending Physician:  Capo Rock MD    HISTORY OF PRESENT ILLNESS:             The history is obtained from extensive review of available past medical records. The patient is a 80 y.o. female who presents to the ED on 1/17/2019 with nausea, vomiting, diarrhea and abdominal pain. According to her  there was some altered mentation and in the ED she was not answering questions appropriately. She was tachycardic and eventually had a temperature of 104.9 °F.  Blood pressure was elevated and then dropped to 98/53. A CT of the abdomen and pelvis showed a thickened and hazy appearance of the appendix. She was seen in consultation by surgery who suggested she was not a candidate for surgery. Lactic acid was 4.2. White count was 12.3 and normalized. She was treated with Zosyn. The patient was admitted to the ICU treated by pulmonary critical care. Today ID was asked to see her in consultation. The patient was transferred out of the ICU. The patient was able to answer questions and she is says that she lives at home with her  and indeed was having the above symptoms. She says the abdominal pain has actually improved and says she is no longer nauseous.     Past Medical History:        Diagnosis Date    Arthritis     Diabetes mellitus ([address]     Hyperlipidemia     Hypertension      Past Surgical History:        Procedure Laterality Date    CARPAL TUNNEL RELEASE      CHOLECYSTECTOMY      HYSTERECTOMY       Current Medications:   Scheduled Meds:   insulin lispro  0-6 Units Subcutaneous TID WC    insulin lispro  0-3 Units Subcutaneous Nightly    piperacillin-tazobactam  3.375 g Intravenous Q8H     Continuous Infusions:   dextrose      sodium chloride 12.5 mL/hr (01/18/20 0649)    dextrose 5 % and 0.45 % NaCl 50 mL/hr (01/17/20 2300)     PRN Meds:fentanNYL, glucose, dextrose, glucagon (rDNA), dextrose    Allergies:  Patient has no known allergies. Social History:   Social History     Socioeconomic History    Marital status:      Spouse name: Not on file    Number of children: Not on file    Years of education: Not on file    Highest education level: Not on file   Occupational History    Not on file   Social Needs    Financial resource strain: Not on file    Food insecurity:     Worry: Not on file     Inability: Not on file    Transportation needs:     Medical: Not on file     Non-medical: Not on file   Tobacco Use    Smoking status: Never Smoker    Smokeless tobacco: Never Used   Substance and Sexual Activity    Alcohol use: No    Drug use: Not on file    Sexual activity: Not on file   Lifestyle    Physical activity:     Days per week: Not on file     Minutes per session: Not on file    Stress: Not on file   Relationships    Social connections:     Talks on phone: Not on file     Gets together: Not on file     Attends Zoroastrianism service: Not on file     Active member of club or organization: Not on file     Attends meetings of clubs or organizations: Not on file     Relationship status: Not on file    Intimate partner violence:     Fear of current or ex partner: Not on file     Emotionally abused: Not on file     Physically abused: Not on file     Forced sexual activity: Not on file   Other Topics Concern    Not on file   Social History Narrative    Not on file      Pets: Dog  Travel: No  The patient lives at home with her     Family History:   No family history on file. . Otherwise non-pertinent to the chief complaint.     REVIEW OF SYSTEMS:    Constitutional: Negative for fevers, chills, diaphoresis  Neurologic: Altered mentation improved  Psychiatric: Negative  Rheumatologic: No rheumatologic conditions  Endocrine: Negative  Hematologic: No easy bleeding or bruising  Immunologic: Negative  ENT: No rhinorrhea or epistaxis. No sore throat. Dry mouth. Respiratory: Denies dyspnea  Cardiovascular: No chest pain  GI: As in the HPI  : No dysuria  Musculoskeletal: Negative  Skin: No rashes. PHYSICAL EXAM:    Vitals:   BP (!) 143/100   Pulse 87   Temp 99.1 °F (37.3 °C) (Oral)   Resp 26   Ht 5' 1\" (1.549 m)   Wt 182 lb 1.6 oz (82.6 kg)   SpO2 100%   BMI 34.41 kg/m²   Constitutional: The patient was asleep. She is arousable and is able to answer questions. She is oriented to person, place and partly in time  Skin: Warm and dry. No rashes were noted. Multiple seborrheic keratitis noted. HEENT: Eyes show round, and reactive pupils. No jaundice. Moist mucous membranes, no ulcerations, no thrush. Dentures. Neck: Supple to movements. No lymphadenopathy. Chest: No use of accessory muscles to breathe. Symmetrical expansion. Auscultation reveals no wheezing, crackles, or rhonchi. Cardiovascular: S1 and S2 are rhythmic and regular. No murmurs appreciated. Abdomen: Positive bowel sounds to auscultation. Nondistended. Nontympanitic. The abdomen is tender, mostly over the left (yes, left) lower quadrant. No rebound tenderness. Extremities: No clubbing, no cyanosis, no edema.   Lines: peripheral      CBC+dif:  Recent Labs     01/17/20  1416 01/18/20  0550   WBC 12.3* 8.4   HGB 13.4 11.0*   HCT 42.7 35.0   MCV 91.8 92.6    247   NEUTROABS 11.12*  --      No results found for: CRP   No results found for: CRPHS  No results found for: SEDRATE  Lab Results   Component Value Date    ALT 25 01/17/2020    AST 46 (H) 01/17/2020    ALKPHOS 79 01/17/2020    BILITOT 0.4 01/17/2020     Lab Results   Component Value Date     01/18/2020    K 3.7 01/18/2020    K 5.6 01/17/2020     01/18/2020    CO2 22 01/18/2020    BUN 19 01/18/2020    CREATININE 1.1 01/18/2020    GFRAA 57 01/18/2020    LABGLOM 57 01/18/2020    GLUCOSE 142 01/18/2020    PROT 8.1 01/17/2020    LABALBU 4.0 01/17/2020    CALCIUM 8.4 01/18/2020

## 2020-01-18 NOTE — PROGRESS NOTES
Critical Care Team - Daily Progress Note         Date and time: 2020 5:58 PM  Patient's name:  Ramakrishna Ni  Medical Record Number: [id number]  Patient's account/billing number: [de-identified]  Patient's YOB: 1932  Age: 80 y.o. Date of Admission: 2020  1:17 PM  Length of stay during current admission: 1      Primary Care Physician: Chucky Francis MD  ICU Attending Physician: Dr. Herrera December    Code Status: No Order    Reason for ICU admission: Apendicitis       SUBJECTIVE:     OVERNIGHT EVENTS:       Improved with antibiotics and hydration. Now awake and alert. Abdominal symptoms nearly completely gone.       CURRENT VENTILATION STATUS:     [] Ventilator  [] BIPAP  [x] Nasal Cannula [] Room Air      IF INTUBATED, ET TUBE MARKING AT LOWER LIP:       cms    SECRETIONS Amount:  [] Small [] Moderate  [] Large  [x] None  Color:     [] White [] Colored  [] Bloody    SEDATION:  RAAS Score:  [] Propofol gtt  [] Versed gtt  [] Ativan gtt   [x] No Sedation    PARALYZED:  [x] No    [] Yes      VASOPRESSORS:  [x] No    [] Yes    If yes -   [] Levophed       [] Dopamine     [] Vasopressin       [] Dobutamine  [] Phenylephrine         [] Epinephrine    CENTRAL LINES:     [x] No   [] Yes   (Date of Insertion:   )           If yes -     [] Right IJ     [] Left IJ [] Right Femoral [] Left Femoral                   [] Right Subclavian [] Left Subclavian       JONAS'S CATHETER:   [] No   [x] Yes  (Date of Insertion:   )     URINE OUTPUT:            [x] Good   [] Low              [] Anuric      OBJECTIVE:     VITAL SIGNS:  /61   Pulse 73   Temp 98.5 °F (36.9 °C) (Oral)   Resp 18   Ht 5' 1\" (1.549 m)   Wt 182 lb 1.6 oz (82.6 kg)   SpO2 96%   BMI 34.41 kg/m²   Tmax over 24 hours:  Temp (24hrs), Av °F (37.2 °C), Min:98.4 °F (36.9 °C), Max:99.9 °F (37.7 °C)      Patient Vitals for the past 6 hrs:   BP Temp Temp src Pulse Resp SpO2   20 1219 134/61 98.5 °F (36.9 °C) Oral 73 18 96 % 01/17/20  1416 01/18/20  0550   WBC 12.3* 8.4   HGB 13.4 11.0*   HCT 42.7 35.0    247        Last 3 Blood Glucose:   Recent Labs     01/17/20  1416 01/18/20  0550   GLUCOSE 207* 142*        PT/INR:    Lab Results   Component Value Date    PROTIME 12.2 01/17/2020    INR 1.0 01/17/2020     PTT:  No results found for: APTT, PTT    Comprehensive Metabolic Profile:   Recent Labs     01/17/20  1416 01/17/20  1527 01/18/20  0550     --  138   K 5.6* 3.8 3.7   CL 94*  --  103   CO2 24  --  22   BUN 21  --  19   CREATININE 0.9  --  1.1*   GLUCOSE 207*  --  142*   CALCIUM 10.0  --  8.4*   PROT 8.1  --   --    LABALBU 4.0  --   --    BILITOT 0.4  --   --    ALKPHOS 79  --   --    AST 46*  --   --    ALT 25  --   --       Magnesium: No results found for: MG  Phosphorus: No results found for: PHOS  Ionized Calcium: No results found for: CAION     Urinalysis:     Troponin:   Recent Labs     01/17/20 1416   TROPONINI <0.01       Microbiology:    Cultures during this admission:     Blood cultures:                 [] None drawn      [] Negative             []  Positive (Details:  )  Urine Culture:                   [] None drawn      [] Negative             []  Positive (Details:  )  Sputum Culture:               [] None drawn       [] Negative             []  Positive (Details:  )   Endotracheal aspirate:     [] None drawn       [] Negative             []  Positive (Details:  )     Other pertinent Labs:       Radiology/Imaging:     Chest Xray (1/18/2020):        ASSESSMENT:     Active Problems:    Acute appendicitis  Resolved Problems:    * No resolved hospital problems. *      Additional assessment:    ·         PLAN:     WEAN PER PROTOCOL:  [] No   [] Yes  [x] N/A    DISCONTINUE ANY LABS:   [x] No   [] Yes    ICU PROPHYLAXIS:  Stress ulcer:  [x] PPI Agent  [] L8Msmmk [] Sucralfate  [] Other:  VTE:   [x] Enoxaparin  [] Unfract.  Heparin Subcut  [] EPC Cuffs    NUTRITION:  [x] NPO [] Tube Feeding (Specify: ) [] TPN  [] PO (Diet: Diet NPO Effective Now)    HOME MEDICATIONS RECONCILED: [] No  [x] Yes    INSULIN DRIP:   [x] No   [] Yes    CONSULTATION NEEDED:  [x] No   [] Yes    FAMILY UPDATED:    [] No   [x] Yes    TRANSFER OUT OF ICU:   [] No   [x] Yes    ADDITIONAL PLAN:  1. Antibiotics per ID  2. IVF  3. Monitor abdominal exam serially  4. OK for transfer to [facility]    CAITY Pedraza. C. P                  1/18/2020, 5:58 PM

## 2020-01-19 PROBLEM — I10 HYPERTENSION: Status: ACTIVE | Noted: 2020-01-19

## 2020-01-19 LAB
ANION GAP SERPL CALCULATED.3IONS-SCNC: 12 MMOL/L (ref 7–16)
BUN BLDV-MCNC: 14 MG/DL (ref 8–23)
C-REACTIVE PROTEIN: 11.6 MG/DL (ref 0–0.4)
CALCIUM SERPL-MCNC: 8.2 MG/DL (ref 8.6–10.2)
CHLORIDE BLD-SCNC: 102 MMOL/L (ref 98–107)
CO2: 24 MMOL/L (ref 22–29)
CREAT SERPL-MCNC: 1.2 MG/DL (ref 0.5–1)
GFR AFRICAN AMERICAN: 51
GFR NON-AFRICAN AMERICAN: 51 ML/MIN/1.73
GLUCOSE BLD-MCNC: 137 MG/DL (ref 74–99)
METER GLUCOSE: 123 MG/DL (ref 74–99)
METER GLUCOSE: 127 MG/DL (ref 74–99)
METER GLUCOSE: 145 MG/DL (ref 74–99)
METER GLUCOSE: 149 MG/DL (ref 74–99)
MRSA CULTURE ONLY: NORMAL
ORGANISM: ABNORMAL
POTASSIUM SERPL-SCNC: 3.1 MMOL/L (ref 3.5–5)
SEDIMENTATION RATE, ERYTHROCYTE: 44 MM/HR (ref 0–20)
SODIUM BLD-SCNC: 138 MMOL/L (ref 132–146)
URINE CULTURE, ROUTINE: ABNORMAL

## 2020-01-19 PROCEDURE — 6370000000 HC RX 637 (ALT 250 FOR IP): Performed by: INTERNAL MEDICINE

## 2020-01-19 PROCEDURE — 85651 RBC SED RATE NONAUTOMATED: CPT

## 2020-01-19 PROCEDURE — 2060000000 HC ICU INTERMEDIATE R&B

## 2020-01-19 PROCEDURE — 82962 GLUCOSE BLOOD TEST: CPT

## 2020-01-19 PROCEDURE — 6360000002 HC RX W HCPCS: Performed by: INTERNAL MEDICINE

## 2020-01-19 PROCEDURE — 2580000003 HC RX 258: Performed by: INTERNAL MEDICINE

## 2020-01-19 PROCEDURE — 36415 COLL VENOUS BLD VENIPUNCTURE: CPT

## 2020-01-19 PROCEDURE — 86140 C-REACTIVE PROTEIN: CPT

## 2020-01-19 PROCEDURE — 80048 BASIC METABOLIC PNL TOTAL CA: CPT

## 2020-01-19 RX ORDER — POTASSIUM CHLORIDE 20 MEQ/1
20 TABLET, EXTENDED RELEASE ORAL 2 TIMES DAILY WITH MEALS
Status: DISCONTINUED | OUTPATIENT
Start: 2020-01-19 | End: 2020-01-24 | Stop reason: HOSPADM

## 2020-01-19 RX ADMIN — PIPERACILLIN AND TAZOBACTAM 3.38 G: 3; .375 INJECTION, POWDER, FOR SOLUTION INTRAVENOUS at 11:12

## 2020-01-19 RX ADMIN — POTASSIUM CHLORIDE 20 MEQ: 20 TABLET, EXTENDED RELEASE ORAL at 11:15

## 2020-01-19 RX ADMIN — POTASSIUM CHLORIDE 20 MEQ: 20 TABLET, EXTENDED RELEASE ORAL at 18:20

## 2020-01-19 RX ADMIN — SODIUM CHLORIDE: 9 INJECTION, SOLUTION INTRAVENOUS at 07:30

## 2020-01-19 RX ADMIN — SODIUM CHLORIDE: 9 INJECTION, SOLUTION INTRAVENOUS at 15:05

## 2020-01-19 RX ADMIN — PIPERACILLIN AND TAZOBACTAM 3.38 G: 3; .375 INJECTION, POWDER, FOR SOLUTION INTRAVENOUS at 18:20

## 2020-01-19 RX ADMIN — PIPERACILLIN AND TAZOBACTAM 3.38 G: 3; .375 INJECTION, POWDER, FOR SOLUTION INTRAVENOUS at 02:40

## 2020-01-19 RX ADMIN — SODIUM CHLORIDE: 9 INJECTION, SOLUTION INTRAVENOUS at 23:27

## 2020-01-19 RX ADMIN — INSULIN LISPRO 1 UNITS: 100 INJECTION, SOLUTION INTRAVENOUS; SUBCUTANEOUS at 11:54

## 2020-01-19 RX ADMIN — DEXTROSE AND SODIUM CHLORIDE: 5; 450 INJECTION, SOLUTION INTRAVENOUS at 11:53

## 2020-01-19 ASSESSMENT — PAIN SCALES - GENERAL
PAINLEVEL_OUTOF10: 0
PAINLEVEL_OUTOF10: 0

## 2020-01-19 NOTE — PROGRESS NOTES
[facility] Quality Flow/Interdisciplinary Rounds Progress Note        Quality Flow Rounds held on January 19, 2020    Disciplines Attending:  Bedside Nurse, ,  and Nursing Unit Leadership    Sol Moffett was admitted on 1/17/2020  1:17 PM    Anticipated Discharge Date:  Expected Discharge Date: 01/24/20    Disposition:    Bert Score:  Bert Scale Score: 20    Readmission Risk              Risk of Unplanned Readmission:        10           Discussed patient goal for the day, patient clinical progression, and barriers to discharge.   The following Goal(s) of the Day/Commitment(s) have been identified:  Other  Check blood cultures, monitor labs       Lilliam Ruiz  January 19, 2020

## 2020-01-19 NOTE — PROGRESS NOTES
[address] [place] [address] [facility]  Progress Note    SUBJECTIVE:  Chief Complaint   Patient presents with    Emesis     starting this am    Abdominal Pain     lower abdomen     The patient is feeling much better today. She does not recall me seeing her yesterday. She has minimal abdominal pain, mostly in the left lower quadrant. No nausea, vomiting or diarrhea. Tolerating antibiotics. Review of systems:  As stated above in the chief complaint, otherwise negative. Medications:  Scheduled Meds:   potassium chloride  20 mEq Oral BID WC    insulin lispro  0-6 Units Subcutaneous TID WC    insulin lispro  0-3 Units Subcutaneous Nightly    piperacillin-tazobactam  3.375 g Intravenous Q8H     Continuous Infusions:   dextrose      sodium chloride 12.5 mL/hr at 20 0730    dextrose 5 % and 0.45 % NaCl 50 mL/hr at 20 1153     PRN Meds:fentanNYL, glucose, dextrose, glucagon (rDNA), dextrose    OBJECTIVE:  /61   Pulse 85   Temp 98.5 °F (36.9 °C) (Oral)   Resp 18   Ht 5' 1\" (1.549 m)   Wt 186 lb 6.4 oz (84.6 kg)   SpO2 96%   BMI 35.22 kg/m²   Temp  Av °F (37.2 °C)  Min: 98.5 °F (36.9 °C)  Max: 99.5 °F (37.5 °C)  Constitutional: The patient is awake, alert, and much better oriented. No distress. Smiling and very pleasant. Extended family present. Skin: Warm and dry. No rashes were noted. HEENT: Round and reactive pupils. Moist mucous membranes. No ulcerations or thrush. Neck: Supple to movements. Chest: No use of accessory muscles to breathe. Symmetrical expansion. No wheezing, crackles or rhonchi. Cardiovascular: S1 and S2 are rhythmic and regular. No murmurs appreciated. Abdomen: Positive bowel sounds to auscultation. Inconsistent tenderness on palpation. It is soft. There is no rebound tenderness. Extremities: No edema.   Lines: peripheral    Laboratory and Tests Review:  Lab Results   Component Value Date    WBC 8.4 2020    WBC 12.3 (H)

## 2020-01-19 NOTE — PROGRESS NOTES
01/18/2020    MCHC 31.4 01/18/2020    RDW 14.4 01/18/2020    LYMPHOPCT 4.2 01/17/2020    MONOPCT 3.4 01/17/2020    BASOPCT 0.2 01/17/2020    MONOSABS 0.42 01/17/2020    LYMPHSABS 0.51 01/17/2020    EOSABS 0.05 01/17/2020    BASOSABS 0.03 01/17/2020     CMP:    Lab Results   Component Value Date     01/19/2020    K 3.1 01/19/2020    K 5.6 01/17/2020     01/19/2020    CO2 24 01/19/2020    BUN 14 01/19/2020    CREATININE 1.2 01/19/2020    GFRAA 51 01/19/2020    LABGLOM 51 01/19/2020    GLUCOSE 137 01/19/2020    PROT 8.1 01/17/2020    LABALBU 4.0 01/17/2020    CALCIUM 8.2 01/19/2020    BILITOT 0.4 01/17/2020    ALKPHOS 79 01/17/2020    AST 46 01/17/2020    ALT 25 01/17/2020     BMP:  Hepatic Function Panel:  Ionized Calcium:  No results found for: IONCA  Magnesium:  No results found for: MG  Phosphorus:  No results found for: PHOS  LDH:  No results found for: LDH  Uric Acid:  No components found for: URIC  PT/INR:    Lab Results   Component Value Date    PROTIME 12.2 01/17/2020    INR 1.0 01/17/2020     Warfarin PT/INR:  No components found for: PTPATWAR, PTINRWAR  PTT:  No results found for: APTT[APTT  Troponin:    Lab Results   Component Value Date    TROPONINI <0.01 01/17/2020     Last 3 Troponin:    Lab Results   Component Value Date    TROPONINI <0.01 01/17/2020    TROPONINI <0.01 11/16/2018    TROPONINI <0.01 07/17/2017     Urine Culture:  No components found for: CURINE  Blood Culture:  No components found for: CBLOOD, CFUNGUSBL  Blood Culture from [facility]:  No components found for: CBLOODLN  Stool Culture:  No components found for: CSTOOL  Sputum Culture:  No components found for: CSPUTUM  Sputum Culture for AFB:  No components found for: CAFBSM  Throat Culture:  No components found for: CTHROAT    Vancomycin:  Peak:  No components found for: VANCOPOST   Trough:  No components found for: VANCOPRE  Digoxin:  No components found for: DIG  Radiology Review:  none  Other diagnostic procedures: none    Current Inpatient Medications    Current Facility-Administered Medications: insulin lispro (HUMALOG) injection vial 0-6 Units, 0-6 Units, Subcutaneous, TID WC  insulin lispro (HUMALOG) injection vial 0-3 Units, 0-3 Units, Subcutaneous, Nightly  fentaNYL (SUBLIMAZE) injection 25 mcg, 25 mcg, Intravenous, Q4H PRN  glucose (GLUTOSE) 40 % oral gel 15 g, 15 g, Oral, PRN  dextrose 50 % IV solution, 12.5 g, Intravenous, PRN  glucagon (rDNA) injection 1 mg, 1 mg, Intramuscular, PRN  dextrose 5 % solution, 100 mL/hr, Intravenous, PRN  piperacillin-tazobactam (ZOSYN) 3.375 g in dextrose 5 % 50 mL IVPB extended infusion (mini-bag), 3.375 g, Intravenous, Q8H **AND** 0.9 % sodium chloride infusion, , Intravenous, Q8H  dextrose 5 % and 0.45 % sodium chloride infusion, , Intravenous, Continuous    ASSESSMENT AND PLAN    80 y.o. female with mental status change/ problems with anesthesia/ appendicitis. So far successfully treated with antibiotics. Continue same Rx; Begin PO, and encourage ambulation.       Ravinder Herrera 1/19/202010:27 AM

## 2020-01-19 NOTE — PROGRESS NOTES
Examined pt wit dtr at bedside. Subjective: The patient is awake and alert. No problems overnight. Denies chest pain, angina, and dyspnea. Denies abdominal pain. Tolerating diet. No nausea or vomiting. Objective:    BP (!) 143/62   Pulse 88   Temp 99.5 °F (37.5 °C) (Oral)   Resp 18   Ht 5' 1\" (1.549 m)   Wt 186 lb 6.4 oz (84.6 kg)   SpO2 98%   BMI 35.22 kg/m²     Heart:  RRR, no murmurs, gallops, or rubs. Lungs:  CTA bilaterally, no wheeze, rales or rhonchi  Abd: bowel sounds present, minimal tender RLQ, nondistended, no masses  Extrem:  No clubbing, cyanosis, or edema  Neuro:  CNs, Sensory, Cerebellar intact    CBC:   Lab Results   Component Value Date    WBC 8.4 01/18/2020    RBC 3.78 01/18/2020    HGB 11.0 01/18/2020    HCT 35.0 01/18/2020    MCV 92.6 01/18/2020    MCH 29.1 01/18/2020    MCHC 31.4 01/18/2020    RDW 14.4 01/18/2020     01/18/2020    MPV 9.6 01/18/2020     BMP:    Lab Results   Component Value Date     01/18/2020    K 3.7 01/18/2020    K 5.6 01/17/2020     01/18/2020    CO2 22 01/18/2020    BUN 19 01/18/2020    LABALBU 4.0 01/17/2020    CREATININE 1.1 01/18/2020    CALCIUM 8.4 01/18/2020    GFRAA 57 01/18/2020    LABGLOM 57 01/18/2020    GLUCOSE 142 01/18/2020        Assessment:  VEry satisfactory. DM control very good. Patient Active Problem List   Diagnosis    Acute appendicitis       Plan:  Present tx. ATBs continue.           Julia Hoyt  8:58 AM  1/19/2020

## 2020-01-20 PROBLEM — A41.9 SEPSIS ASSOCIATED HYPOTENSION (HCC): Status: ACTIVE | Noted: 2020-01-20

## 2020-01-20 PROBLEM — G93.41 METABOLIC ENCEPHALOPATHY: Status: ACTIVE | Noted: 2020-01-20

## 2020-01-20 PROBLEM — I95.9 SEPSIS ASSOCIATED HYPOTENSION (HCC): Status: ACTIVE | Noted: 2020-01-20

## 2020-01-20 LAB
ANION GAP SERPL CALCULATED.3IONS-SCNC: 12 MMOL/L (ref 7–16)
BUN BLDV-MCNC: 9 MG/DL (ref 8–23)
CALCIUM SERPL-MCNC: 7.9 MG/DL (ref 8.6–10.2)
CHLORIDE BLD-SCNC: 102 MMOL/L (ref 98–107)
CO2: 22 MMOL/L (ref 22–29)
CREAT SERPL-MCNC: 1.1 MG/DL (ref 0.5–1)
GFR AFRICAN AMERICAN: 57
GFR NON-AFRICAN AMERICAN: 57 ML/MIN/1.73
GLUCOSE BLD-MCNC: 136 MG/DL (ref 74–99)
HCT VFR BLD CALC: 31.1 % (ref 34–48)
HEMOGLOBIN: 9.6 G/DL (ref 11.5–15.5)
MCH RBC QN AUTO: 28.7 PG (ref 26–35)
MCHC RBC AUTO-ENTMCNC: 30.9 % (ref 32–34.5)
MCV RBC AUTO: 93.1 FL (ref 80–99.9)
METER GLUCOSE: 124 MG/DL (ref 74–99)
METER GLUCOSE: 131 MG/DL (ref 74–99)
METER GLUCOSE: 136 MG/DL (ref 74–99)
METER GLUCOSE: 163 MG/DL (ref 74–99)
PDW BLD-RTO: 14.1 FL (ref 11.5–15)
PLATELET # BLD: 199 E9/L (ref 130–450)
PMV BLD AUTO: 9.1 FL (ref 7–12)
POTASSIUM SERPL-SCNC: 3.2 MMOL/L (ref 3.5–5)
RBC # BLD: 3.34 E12/L (ref 3.5–5.5)
SODIUM BLD-SCNC: 136 MMOL/L (ref 132–146)
WBC # BLD: 5.8 E9/L (ref 4.5–11.5)

## 2020-01-20 PROCEDURE — 97161 PT EVAL LOW COMPLEX 20 MIN: CPT

## 2020-01-20 PROCEDURE — 2060000000 HC ICU INTERMEDIATE R&B

## 2020-01-20 PROCEDURE — 80048 BASIC METABOLIC PNL TOTAL CA: CPT

## 2020-01-20 PROCEDURE — 6370000000 HC RX 637 (ALT 250 FOR IP): Performed by: INTERNAL MEDICINE

## 2020-01-20 PROCEDURE — 2580000003 HC RX 258: Performed by: INTERNAL MEDICINE

## 2020-01-20 PROCEDURE — 6360000002 HC RX W HCPCS: Performed by: INTERNAL MEDICINE

## 2020-01-20 PROCEDURE — 82962 GLUCOSE BLOOD TEST: CPT

## 2020-01-20 PROCEDURE — 36415 COLL VENOUS BLD VENIPUNCTURE: CPT

## 2020-01-20 PROCEDURE — 97165 OT EVAL LOW COMPLEX 30 MIN: CPT

## 2020-01-20 PROCEDURE — 85027 COMPLETE CBC AUTOMATED: CPT

## 2020-01-20 PROCEDURE — 02HV33Z INSERTION OF INFUSION DEVICE INTO SUPERIOR VENA CAVA, PERCUTANEOUS APPROACH: ICD-10-PCS | Performed by: SPECIALIST

## 2020-01-20 RX ORDER — HEPARIN SODIUM (PORCINE) LOCK FLUSH IV SOLN 100 UNIT/ML 100 UNIT/ML
3 SOLUTION INTRAVENOUS EVERY 12 HOURS SCHEDULED
Status: DISCONTINUED | OUTPATIENT
Start: 2020-01-20 | End: 2020-01-24 | Stop reason: HOSPADM

## 2020-01-20 RX ORDER — HEPARIN SODIUM (PORCINE) LOCK FLUSH IV SOLN 100 UNIT/ML 100 UNIT/ML
3 SOLUTION INTRAVENOUS PRN
Status: DISCONTINUED | OUTPATIENT
Start: 2020-01-20 | End: 2020-01-24 | Stop reason: HOSPADM

## 2020-01-20 RX ORDER — SODIUM CHLORIDE 0.9 % (FLUSH) 0.9 %
10 SYRINGE (ML) INJECTION PRN
Status: DISCONTINUED | OUTPATIENT
Start: 2020-01-20 | End: 2020-01-24 | Stop reason: HOSPADM

## 2020-01-20 RX ORDER — LIDOCAINE HYDROCHLORIDE 10 MG/ML
5 INJECTION, SOLUTION EPIDURAL; INFILTRATION; INTRACAUDAL; PERINEURAL ONCE
Status: COMPLETED | OUTPATIENT
Start: 2020-01-20 | End: 2020-01-21

## 2020-01-20 RX ORDER — PIPERACILLIN SODIUM, TAZOBACTAM SODIUM 3; .375 G/15ML; G/15ML
3.38 INJECTION, POWDER, LYOPHILIZED, FOR SOLUTION INTRAVENOUS EVERY 8 HOURS
Qty: 303.75 G | Refills: 1 | Status: SHIPPED | OUTPATIENT
Start: 2020-01-20 | End: 2020-01-27

## 2020-01-20 RX ADMIN — PIPERACILLIN AND TAZOBACTAM 3.38 G: 3; .375 INJECTION, POWDER, FOR SOLUTION INTRAVENOUS at 02:20

## 2020-01-20 RX ADMIN — PIPERACILLIN AND TAZOBACTAM 3.38 G: 3; .375 INJECTION, POWDER, FOR SOLUTION INTRAVENOUS at 18:06

## 2020-01-20 RX ADMIN — SODIUM CHLORIDE: 9 INJECTION, SOLUTION INTRAVENOUS at 14:30

## 2020-01-20 RX ADMIN — POTASSIUM CHLORIDE 20 MEQ: 20 TABLET, EXTENDED RELEASE ORAL at 10:34

## 2020-01-20 RX ADMIN — ANORECTAL OINTMENT: 15.7; .44; 24; 20.6 OINTMENT TOPICAL at 22:20

## 2020-01-20 RX ADMIN — INSULIN LISPRO 1 UNITS: 100 INJECTION, SOLUTION INTRAVENOUS; SUBCUTANEOUS at 12:32

## 2020-01-20 RX ADMIN — POTASSIUM CHLORIDE 20 MEQ: 20 TABLET, EXTENDED RELEASE ORAL at 18:05

## 2020-01-20 RX ADMIN — SODIUM CHLORIDE: 9 INJECTION, SOLUTION INTRAVENOUS at 22:19

## 2020-01-20 RX ADMIN — PIPERACILLIN AND TAZOBACTAM 3.38 G: 3; .375 INJECTION, POWDER, FOR SOLUTION INTRAVENOUS at 10:35

## 2020-01-20 RX ADMIN — ANORECTAL OINTMENT: 15.7; .44; 24; 20.6 OINTMENT TOPICAL at 18:05

## 2020-01-20 ASSESSMENT — PAIN SCALES - GENERAL: PAINLEVEL_OUTOF10: 0

## 2020-01-20 NOTE — PROGRESS NOTES
[address] [place] [address] [facility]  Progress Note    SUBJECTIVE:  Chief Complaint   Patient presents with    Emesis     starting this am    Abdominal Pain     lower abdomen     The patient is feeling much better today. She does not recall me seeing her yesterday. She has minimal abdominal pain, mostly in the left lower quadrant. No nausea, vomiting or diarrhea. Tolerating antibiotics. Review of systems:  As stated above in the chief complaint, otherwise negative. Medications:  Scheduled Meds:   menthol-zinc oxide   Topical BID    potassium chloride  20 mEq Oral BID WC    insulin lispro  0-6 Units Subcutaneous TID WC    insulin lispro  0-3 Units Subcutaneous Nightly    piperacillin-tazobactam  3.375 g Intravenous Q8H     Continuous Infusions:   dextrose      sodium chloride 12.5 mL/hr at 20 2327    dextrose 5 % and 0.45 % NaCl 50 mL/hr at 20 1153     PRN Meds:fentanNYL, glucose, dextrose, glucagon (rDNA), dextrose    OBJECTIVE:  BP (!) 188/77   Pulse 86   Temp 99.6 °F (37.6 °C) (Oral)   Resp 18   Ht 5' 1\" (1.549 m)   Wt 186 lb 8 oz (84.6 kg)   SpO2 95%   BMI 35.24 kg/m²   Temp  Av.8 °F (37.1 °C)  Min: 98.3 °F (36.8 °C)  Max: 99.6 °F (37.6 °C)  Constitutional: The patient is awake, alert, and much better oriented. No distress. Smiling and very pleasant. Extended family present. Skin: Warm and dry. No rashes were noted. HEENT: Round and reactive pupils. Moist mucous membranes. No ulcerations or thrush. Neck: Supple to movements. Chest: No use of accessory muscles to breathe. Symmetrical expansion. No wheezing, crackles or rhonchi. Cardiovascular: S1 and S2 are rhythmic and regular. No murmurs appreciated. Abdomen: Positive bowel sounds to auscultation. Inconsistent tenderness on palpation. It is soft. There is no rebound tenderness. Extremities: No edema.   Lines: peripheral    Laboratory and Tests Review:  Lab Results   Component Value Date    WBC 5.8 01/20/2020    WBC 8.4 01/18/2020    WBC 12.3 (H) 01/17/2020    HGB 9.6 (L) 01/20/2020    HCT 31.1 (L) 01/20/2020    MCV 93.1 01/20/2020     01/20/2020     Lab Results   Component Value Date    NEUTROABS 11.12 (H) 01/17/2020    NEUTROABS 4.20 11/16/2018    NEUTROABS 5.39 07/17/2017     No results found for: [facility]  Lab Results   Component Value Date    ALT 25 01/17/2020    AST 46 (H) 01/17/2020    ALKPHOS 79 01/17/2020    BILITOT 0.4 01/17/2020     Lab Results   Component Value Date     01/20/2020    K 3.2 01/20/2020    K 5.6 01/17/2020     01/20/2020    CO2 22 01/20/2020    BUN 9 01/20/2020    CREATININE 1.1 01/20/2020    CREATININE 1.2 01/19/2020    CREATININE 1.1 01/18/2020    GFRAA 57 01/20/2020    LABGLOM 57 01/20/2020    GLUCOSE 136 01/20/2020    PROT 8.1 01/17/2020    LABALBU 4.0 01/17/2020    CALCIUM 7.9 01/20/2020    BILITOT 0.4 01/17/2020    ALKPHOS 79 01/17/2020    AST 46 01/17/2020    ALT 25 01/17/2020     Lab Results   Component Value Date    CRP 11.6 (H) 01/19/2020     Lab Results   Component Value Date    SEDRATE 44 (H) 01/19/2020     Radiology:      Microbiology:   Urine culture 1/17/2020 >100K E. coli (resistant to Cefazolin and Ceftriaxone)  Blood cultures 1/17/2020: Negative so far  Nares screen MRSA: Negative  Influenza by PCR: Negative    ASSESSMENT:  · Acute appendicitis, resolving clinically  · Sepsis associated to appendicitis, resolved  · Leukocytosis associated to appendicitis, resolving    PLAN:  · Continue Zosyn for at least another week. Reconciled  · Repeat CRP and ESR  · PICC for IV antibiotics    Informed Consent for PICC:  I explained the risks, benefits, alternative options, and potential complications associated with insertion of Peripherally Inserted Central Catheter (PICC) with the patient prior to the procedure.     Electronically signed by Valente Booker MD on 1/20/2020 at 5:31 PM     Valente Booker  5:31 PM  1/20/2020

## 2020-01-20 NOTE — CONSULTS
[address] [address]                                  CONSULTATION    PATIENT NAME: Lolly Hernandez                       :        1932  MED REC NO:   [id number]                            ROOM:       0617  ACCOUNT NO:   [de-identified]                           ADMIT DATE: 2020  PROVIDER:     Enedelia Mancini MD    CONSULT DATE:  2020    CHIEF COMPLAINT:  \"My abdomen hurts. \"    HISTORY OF PRESENT ILLNESS:  The patient is an 61-year-old female who  presented to the emergency room today with the above complaint. The  above complaint and history are obtained by the family because the  patient is mumbling her words and is unable to be understood at this  point in time. The patient has had a change in neuro status according  to the  and the niece and the niece's . This started  about noon today. She presented to the emergency room with this  complaint as well as abdominal pain. A CAT scan was obtained by the  emergency room which shows questionable early appendicitis with multiple  loops of small bowel containing fluid. A surgical consult was obtained. The family reports that the patient has had difficulties with anesthesia  in the past, and is even coded according to the . The patient  does refuse this story, but says that she has had difficulty in waking  up from the anesthesia. PAST MEDICAL HISTORY:  Significant for diabetes, hyperlipidemia,  hypertension, arthritis. PAST SURGICAL HISTORY:  Includes cholecystectomy, hysterectomy, carpal  tunnel. SOCIAL HISTORY:  The patient has never smoked. She has never used  smokeless tobacco.    FAMILY HISTORY:  Not on file. MEDICATIONS:  Reviewed. ALLERGIES:  No known drug allergies. REVIEW OF SYSTEMS:  Per the  and niece, there has been no nausea,  vomiting, fevers, chills, weight loss, shortness of breath, chest pain.

## 2020-01-20 NOTE — PROGRESS NOTES
Occupational Therapy  OCCUPATIONAL THERAPY INITIAL EVALUATION      Date:2020  Patient Name: Heather Hicks  MRN: [id number]  : 1932  Room: [address] [place] [address]    Evaluating OT: Naomi Amin OTR/L [id number]    AM-PAC Daily Activity Raw Score:     Recommended Adaptive Equipment: TBD    Diagnosis: acute appendicits. Pt presents to ED from home with nausea, vomiting and abdominal pain. Pertinent Medical History: HTN, DM, arthritis   Precautions:  Falls     Home Living: Pt lives with  in a single story home with 2 steps no HR on entry. Bathroom setup: tub/shower combo with shower chair, standard commode     Prior Level of Function: Mod I with toileting, grooming and UB dressing,  assists with LB dressing, bathing and all IADLs; completed functional mobility with no AD in home, cane out of home. Pt's daughter reports pt only goes short distances. Driving: No    Pain Level: abdominal pain and B LE pain with movement    Cognition: A&O: Pt is alert and conversing. Slow processing, confusion observed. Daughter tends to answer for patient.     Problem solving:  Poor   Judgement/safety:  Poor   Direction followin step instruction with min to mod cues for follow through     Functional Assessment:   Initial Eval Status  Date: 20 Treatment session:  Short Term Goals  Treatment frequency: 2-5x/wk PRN x1-3 wks   Feeding Set up     Grooming Mod A  Set up   UB Dressing Mod A  Management of hospital gown  Set up   LB Dressing Dependent  Management of socks  Mod A    Bathing Max A  Min A   Toileting Max A  SBA   Bed Mobility  Supine to sit: NT seated in armchair on entry     Functional Transfers STS: Min A  SBA   Functional Mobility Min A with [employer]  Small steps forward/backward  Unable to complete further mobility due to reported B LE pain  SBA during ADLs   Balance Sitting: fair    Standing: fair minus at [employer]     Activity Tolerance poor  standing héctor x5-6 min with fair balance during self care tasks Strength ROM Additional Info:    RUE  Proximally: 3-/5  Distally: 3+/5 WFL elbow to digits. Shoulder approx 30 degrees flexion PROM to 60 degrees then active resistance from patient fair  and FMC/dexterity noted during ADL tasks     LUE Proximally: 3-/5  Distally: 3+/5 WFL elbow to digits. Shoulder approx 30 degrees flexion PROM to 60 degrees then active resistance from patient fair  and FMC/dexterity noted during ADL tasks         Hearing: [place]  Vision: WFL   Sensation:  No c/o numbness or tingling   Tone: WFL   Edema: B LEs                            Comments/Treatment: Patient educated on adapted techniques for completion of ADL, safe functional transfers and mobility. Patient required cues for follow through with proper hand/foot placement, pacing, safety, attention, sequencing and technique in functional transfers, functional mobility and LB dressing in preparation for maximum independence in all self care tasks. Eval Complexity: Low    Assessment of current deficits   Functional mobility [x]  ADLs [x] Strength [x]  Cognition []  Functional transfers  [x] IADLs [x] Safety Awareness [x]  Endurance [x]  Fine Motor Coordination [] Balance [x] Vision/perception [] Sensation []   Gross Motor Coordination [] ROM [] Delirium []                  Motor Control []    Plan of Care:   ADL retraining [x]   Equipment needs [x]   Neuromuscular re-education [x] Energy Conservation Techniques [x]  Functional Transfer training [x] Patient and/or Family Education [x]  Functional Mobility training [x]  Environmental Modifications [x]  Cognitive re-training []   Compensatory techniques for ADLs [x]  Splinting Needs []   Positioning to improve overall function [x]   Therapeutic Activity [x]  Therapeutic Exercise  [x]  Visual/Perceptual: []    Delirium prevention/treatment  []   Other:  []    Rehab Potential: Good for established goals     Patient / Family Goal: To get better.       Patient and/or family were instructed on functional diagnosis, prognosis/goals and OT plan of care. Pt and daughter verbalized good understanding. Upon arrival, patient seated in armchair. At end of session, patient seated in armchair with call light and phone within reach, all lines and tubes intact. Pt would benefit from continued skilled OT to increase safety and independence with completion of ADL/IADL tasks for functional independence and quality of life.      Low Evaluation + 0 timed treatment minutes    Evaluation time includes thorough review of current medical information, gathering information on past medical history/social history and prior level of function, completion of standardized testing/informal observation of tasks, assessment of data, and development of POC/Goals    Jeferson Welch OTR/L  [id number]

## 2020-01-20 NOTE — PROGRESS NOTES
GENERAL SURGERY  DAILY PROGRESS NOTE  1/20/2020    Subjective:  Feeling a little better today. Having mild nausea without vomiting. Tolerated full liquid diet. Passing flatus, had loose BM overnight.     Objective:  /64   Pulse 85   Temp 98.3 °F (36.8 °C) (Oral)   Resp 18   Ht 5' 1\" (1.549 m)   Wt 186 lb 8 oz (84.6 kg)   SpO2 98%   BMI 35.24 kg/m²     GENERAL:  Laying in bed, awake, alert, cooperative, no apparent distress  HEAD: Normocephalic, atraumatic  EYES: No sclera icterus, pupils equal  LUNGS:  No increased work of breathing  CARDIOVASCULAR:  Regular rate  ABDOMEN:  Soft, mild RLQ TTP non-distended  EXTREMITIES: No edema or swelling  SKIN: Warm and dry    Assessment/Plan:  80 y.o. female with appendicitis, mental status change/ problems with anesthesia    She is clinically improving  ID following, on Zosyn  Final cultures pending  Encourage OOB and ambulation    Electronically signed by Dyana Mcneal DO on 1/20/2020 at 6:07 AM

## 2020-01-20 NOTE — PROGRESS NOTES
Physical Therapy    Facility/Department: [facility]  Initial Assessment    NAME: Katherine Fermin  : 1932  MRN: [id number]    Date of Service: 2020            Patient Diagnosis(es): The primary encounter diagnosis was Acute appendicitis, unspecified acute appendicitis type. Diagnoses of Altered mental status, unspecified altered mental status type, Sepsis, due to unspecified organism, unspecified whether acute organ dysfunction present [facility]), and Acute cystitis without hematuria were also pertinent to this visit. has a past medical history of Arthritis, Diabetes mellitus ([address] Hyperlipidemia, and Hypertension. has a past surgical history that includes Cholecystectomy; Hysterectomy; and Carpal tunnel release. Evaluating Therapist: Jerry Sewell PT        Room #:  440   DIAGNOSIS:  Acute appendicitis   PRECAUTIONS: falls     Social:  Pt lives with spouse  in a  1 floor plan  2  steps and no rails to enter. Prior to admission pt walked with no AD, short distances only. Pt has a cane      Initial Evaluation  Date:  2020 Treatment      Short Term/ Long Term   Goals   Was pt agreeable to Eval/treatment? yes      Does pt have pain?  abd and B LE pain      Bed Mobility  Rolling:  NT   Supine to sit:  NT   Sit to supine:  NT    Scooting: min assist in sit  Pt in recliner upon arrival    SBA    Transfers Sit to stand:  Min assist   Stand to sit: min assist   Stand pivot: NT    SBA    Ambulation     4  feet forward and backward with ww min assist    25 feet with  ww  with  SBA        Stair negotiation: ascended and descended NT   2  steps with  0-1  rail with  SBA    LE ROM  WFL      LE strength  3+ to 4-/ 5      AM- PAC RAW score  14/ 24            Pt is alert .  Some confusion noted     Balance:  CGA/Min assist , fall risk   Endurance: poor -pt  limited by reported by pain   Chair alarm:  No, daughter present      ASSESSMENT  Pt displays functional ability as noted in the objective portion of this evaluation. Comments/Treatment:   Pt unable to walk further than 4 feet and required encouragement to do that. Pt left in recliner with LEs elevated, call light in reach, and daughter present        Examination of body systems Decreased   Functional mobility  x   ROM    Strength x   Safety Awareness x   Cognition    Endurance x   Sensation    Balance x   Vision/Visual Deficits    Coordination x       Patient education  Pt educated on  Fall risk, importance of mobility     Patient response to education:   Pt verbalized understanding Pt demonstrated skill Pt requires further education in this area   x  x     Rehab potential is Good for reaching above PT goals. Pts/ family goals   1. None stated     Patient and or family understand(s) diagnosis, prognosis, and plan of care. -  Yes ( daughter)     PLAN  PT care will be provided in accordance with the objectives noted above. Whenever appropriate, clear delegation orders will be provided for nursing staff. Exercises and functional mobility practice will be used as well as appropriate assistive devices or modalities to obtain goals. Patient and family education will also be administered as needed. Frequency of treatments will be 2-5x/week x  5 days.     Time in: 0905  Time out: [address] [facility] number:  PT [id number]

## 2020-01-21 LAB
ANION GAP SERPL CALCULATED.3IONS-SCNC: 13 MMOL/L (ref 7–16)
BUN BLDV-MCNC: 4 MG/DL (ref 8–23)
C-REACTIVE PROTEIN: 6.9 MG/DL (ref 0–0.4)
CALCIUM SERPL-MCNC: 8.2 MG/DL (ref 8.6–10.2)
CHLORIDE BLD-SCNC: 103 MMOL/L (ref 98–107)
CO2: 23 MMOL/L (ref 22–29)
CREAT SERPL-MCNC: 1 MG/DL (ref 0.5–1)
GFR AFRICAN AMERICAN: >60
GFR NON-AFRICAN AMERICAN: >60 ML/MIN/1.73
GLUCOSE BLD-MCNC: 128 MG/DL (ref 74–99)
HCT VFR BLD CALC: 31.8 % (ref 34–48)
HEMOGLOBIN: 9.9 G/DL (ref 11.5–15.5)
MCH RBC QN AUTO: 28.8 PG (ref 26–35)
MCHC RBC AUTO-ENTMCNC: 31.1 % (ref 32–34.5)
MCV RBC AUTO: 92.4 FL (ref 80–99.9)
METER GLUCOSE: 107 MG/DL (ref 74–99)
METER GLUCOSE: 111 MG/DL (ref 74–99)
METER GLUCOSE: 114 MG/DL (ref 74–99)
METER GLUCOSE: 124 MG/DL (ref 74–99)
PDW BLD-RTO: 14.3 FL (ref 11.5–15)
PLATELET # BLD: 214 E9/L (ref 130–450)
PMV BLD AUTO: 9.6 FL (ref 7–12)
POTASSIUM SERPL-SCNC: 3.5 MMOL/L (ref 3.5–5)
RBC # BLD: 3.44 E12/L (ref 3.5–5.5)
SODIUM BLD-SCNC: 139 MMOL/L (ref 132–146)
WBC # BLD: 5.2 E9/L (ref 4.5–11.5)

## 2020-01-21 PROCEDURE — C1751 CATH, INF, PER/CENT/MIDLINE: HCPCS

## 2020-01-21 PROCEDURE — 6370000000 HC RX 637 (ALT 250 FOR IP): Performed by: INTERNAL MEDICINE

## 2020-01-21 PROCEDURE — 36415 COLL VENOUS BLD VENIPUNCTURE: CPT

## 2020-01-21 PROCEDURE — 36569 INSJ PICC 5 YR+ W/O IMAGING: CPT

## 2020-01-21 PROCEDURE — 85027 COMPLETE CBC AUTOMATED: CPT

## 2020-01-21 PROCEDURE — 2060000000 HC ICU INTERMEDIATE R&B

## 2020-01-21 PROCEDURE — 2580000003 HC RX 258: Performed by: INTERNAL MEDICINE

## 2020-01-21 PROCEDURE — 86140 C-REACTIVE PROTEIN: CPT

## 2020-01-21 PROCEDURE — 80048 BASIC METABOLIC PNL TOTAL CA: CPT

## 2020-01-21 PROCEDURE — 2500000003 HC RX 250 WO HCPCS: Performed by: SPECIALIST

## 2020-01-21 PROCEDURE — 6360000002 HC RX W HCPCS: Performed by: INTERNAL MEDICINE

## 2020-01-21 PROCEDURE — 6360000002 HC RX W HCPCS: Performed by: SPECIALIST

## 2020-01-21 PROCEDURE — 76937 US GUIDE VASCULAR ACCESS: CPT

## 2020-01-21 PROCEDURE — 82962 GLUCOSE BLOOD TEST: CPT

## 2020-01-21 PROCEDURE — 97530 THERAPEUTIC ACTIVITIES: CPT

## 2020-01-21 RX ORDER — METOPROLOL SUCCINATE 25 MG/1
25 TABLET, EXTENDED RELEASE ORAL DAILY
Qty: 30 TABLET | Refills: 3 | Status: CANCELLED | OUTPATIENT
Start: 2020-01-22

## 2020-01-21 RX ORDER — METOPROLOL SUCCINATE 25 MG/1
25 TABLET, EXTENDED RELEASE ORAL DAILY
Status: DISCONTINUED | OUTPATIENT
Start: 2020-01-21 | End: 2020-01-24 | Stop reason: HOSPADM

## 2020-01-21 RX ADMIN — SODIUM CHLORIDE: 9 INJECTION, SOLUTION INTRAVENOUS at 23:58

## 2020-01-21 RX ADMIN — SODIUM CHLORIDE: 9 INJECTION, SOLUTION INTRAVENOUS at 14:45

## 2020-01-21 RX ADMIN — PIPERACILLIN AND TAZOBACTAM 3.38 G: 3; .375 INJECTION, POWDER, FOR SOLUTION INTRAVENOUS at 18:07

## 2020-01-21 RX ADMIN — LIDOCAINE HYDROCHLORIDE 2.5 ML: 10 INJECTION, SOLUTION EPIDURAL; INFILTRATION; INTRACAUDAL; PERINEURAL at 12:20

## 2020-01-21 RX ADMIN — ANORECTAL OINTMENT: 15.7; .44; 24; 20.6 OINTMENT TOPICAL at 09:32

## 2020-01-21 RX ADMIN — DEXTROSE AND SODIUM CHLORIDE: 5; 450 INJECTION, SOLUTION INTRAVENOUS at 17:10

## 2020-01-21 RX ADMIN — ANORECTAL OINTMENT: 15.7; .44; 24; 20.6 OINTMENT TOPICAL at 21:28

## 2020-01-21 RX ADMIN — PIPERACILLIN AND TAZOBACTAM 3.38 G: 3; .375 INJECTION, POWDER, FOR SOLUTION INTRAVENOUS at 02:10

## 2020-01-21 RX ADMIN — PIPERACILLIN AND TAZOBACTAM 3.38 G: 3; .375 INJECTION, POWDER, FOR SOLUTION INTRAVENOUS at 10:26

## 2020-01-21 RX ADMIN — SODIUM CHLORIDE: 9 INJECTION, SOLUTION INTRAVENOUS at 05:50

## 2020-01-21 RX ADMIN — POTASSIUM CHLORIDE 20 MEQ: 20 TABLET, EXTENDED RELEASE ORAL at 16:34

## 2020-01-21 RX ADMIN — Medication 300 UNITS: at 21:29

## 2020-01-21 RX ADMIN — POTASSIUM CHLORIDE 20 MEQ: 20 TABLET, EXTENDED RELEASE ORAL at 09:31

## 2020-01-21 RX ADMIN — FENTANYL CITRATE 25 MCG: 50 INJECTION, SOLUTION INTRAMUSCULAR; INTRAVENOUS at 21:27

## 2020-01-21 RX ADMIN — METOPROLOL SUCCINATE 25 MG: 25 TABLET, FILM COATED, EXTENDED RELEASE ORAL at 09:32

## 2020-01-21 ASSESSMENT — PAIN SCALES - GENERAL
PAINLEVEL_OUTOF10: 0
PAINLEVEL_OUTOF10: 7
PAINLEVEL_OUTOF10: 0
PAINLEVEL_OUTOF10: 0

## 2020-01-21 ASSESSMENT — PAIN SCALES - WONG BAKER: WONGBAKER_NUMERICALRESPONSE: 0

## 2020-01-21 NOTE — CARE COORDINATION
BETTIE & JOYCE met with pt & daughter Melisa Loya) at bedside to discuss transition of care. Per QFR---plan is PICC placement & IV zosyn for at least another week. Daughter & pt decline consideration of rehab stay. Home care list provided by JOYCE & daughter chose [facility]. BETTIE spoke with Maximus Jackson @ [facility]. Provided information. Maximus Jackson stated they will run benefit for cost to pt & return call to CM. Await call back. The Plan for Transition of Care is related to the following treatment goals: [facility]    The Patient and/or patient representative (daughter) was provided with a choice of provider and agrees   with the discharge plan. [x] Yes [] No    Freedom of choice list was provided with basic dialogue that supports the patient's individualized plan of care/goals, treatment preferences and shares the quality data associated with the providers.  [x] Yes [] No

## 2020-01-21 NOTE — PROGRESS NOTES
GENERAL SURGERY  DAILY PROGRESS NOTE  1/21/2020    Subjective:  Feeling better today. No nausea or vomiting. Tolerated low fiber diet. Passing flatus, had BM x2 yesterday    Objective:  BP (!) 142/65   Pulse 84   Temp 98.3 °F (36.8 °C) (Oral)   Resp 18   Ht 5' 1\" (1.549 m)   Wt 190 lb 3.2 oz (86.3 kg)   SpO2 95%   BMI 35.94 kg/m²     GENERAL:  Laying in bed, awake, alert, cooperative, no apparent distress  HEAD: Normocephalic, atraumatic  EYES: No sclera icterus, pupils equal  LUNGS:  No increased work of breathing  CARDIOVASCULAR:  Regular rate  ABDOMEN:  Soft, mild RLQ TTP non-distended  EXTREMITIES: No edema or swelling  SKIN: Warm and dry    Assessment/Plan:  80 y.o. female with appendicitis, mental status change/ problems with anesthesia    She is clinically improving  ID following, on Zosyn, rec continue x at least 1 week. Needs PICC  Final cultures pending  Low fiber diet  Encourage OOB and ambulation    Electronically signed by Dyana Mcneal DO on 1/21/2020 at 10:22 AM    Attending Note:    Patient seen/examined 1/21/2020. Agree with above assessment and plan. Pain improving. Tolerating diet. Cont abx.

## 2020-01-21 NOTE — PROGRESS NOTES
[address] [place] [address] [facility]  Progress Note    SUBJECTIVE:  Chief Complaint   Patient presents with    Emesis     starting this am    Abdominal Pain     lower abdomen     The patient has minimal pain over the right lower quadrant. Tolerating antibiotics. Eating well. Review of systems:  As stated above in the chief complaint, otherwise negative. Medications:  Scheduled Meds:   metoprolol succinate  25 mg Oral Daily    menthol-zinc oxide   Topical BID    heparin flush  3 mL Intravenous 2 times per day    potassium chloride  20 mEq Oral BID WC    insulin lispro  0-6 Units Subcutaneous TID WC    insulin lispro  0-3 Units Subcutaneous Nightly    piperacillin-tazobactam  3.375 g Intravenous Q8H     Continuous Infusions:   dextrose      sodium chloride 12.5 mL/hr at 20 1445    dextrose 5 % and 0.45 % NaCl 50 mL/hr at 20 1710     PRN Meds:sodium chloride flush, heparin flush, fentanNYL, glucose, dextrose, glucagon (rDNA), dextrose    OBJECTIVE:  BP (!) 145/71   Pulse 80   Temp 99.2 °F (37.3 °C) (Oral)   Resp 18   Ht 5' 1\" (1.549 m)   Wt 190 lb 3.2 oz (86.3 kg)   SpO2 95%   BMI 35.94 kg/m²   Temp  Av.1 °F (37.3 °C)  Min: 98.3 °F (36.8 °C)  Max: 99.9 °F (37.7 °C)  Constitutional: The patient is awake, alert and lying in bed. Daughter present. Skin: Warm and dry. No rashes were noted. HEENT: Round and reactive pupils. Moist mucous membranes. No ulcerations or thrush. Neck: Supple to movements. Chest: Good breath sounds. No crackles. Cardiovascular: Heart sounds are rhythmic and regular. Abdomen: Positive bowel sounds to auscultation. Inconsistent tenderness on palpation. It is soft. There is no rebound tenderness. Extremities: No edema.   Lines: peripheral    Laboratory and Tests Review:  Lab Results   Component Value Date    WBC 5.2 2020    WBC 5.8 2020    WBC 8.4 2020    HGB 9.9 (L) 2020    HCT 31.8 (L) 2020 MCV 92.4 01/21/2020     01/21/2020     Lab Results   Component Value Date    NEUTROABS 11.12 (H) 01/17/2020    NEUTROABS 4.20 11/16/2018    NEUTROABS 5.39 07/17/2017     No results found for: CRP  Lab Results   Component Value Date    ALT 25 01/17/2020    AST 46 (H) 01/17/2020    ALKPHOS 79 01/17/2020    BILITOT 0.4 01/17/2020     Lab Results   Component Value Date     01/21/2020    K 3.5 01/21/2020    K 5.6 01/17/2020     01/21/2020    CO2 23 01/21/2020    BUN 4 01/21/2020    CREATININE 1.0 01/21/2020    CREATININE 1.1 01/20/2020    CREATININE 1.2 01/19/2020    GFRAA >60 01/21/2020    LABGLOM >60 01/21/2020    GLUCOSE 128 01/21/2020    PROT 8.1 01/17/2020    LABALBU 4.0 01/17/2020    CALCIUM 8.2 01/21/2020    BILITOT 0.4 01/17/2020    ALKPHOS 79 01/17/2020    AST 46 01/17/2020    ALT 25 01/17/2020     Lab Results   Component Value Date    CRP 6.9 (H) 01/21/2020    CRP 11.6 (H) 01/19/2020     Lab Results   Component Value Date    SEDRATE 44 (H) 01/19/2020     Radiology:      Microbiology:   Urine culture 1/17/2020 >100K E. coli (resistant to Cefazolin and Ceftriaxone)  Blood cultures 1/17/2020: Negative so far  Nares screen MRSA: Negative  Influenza by PCR: Negative    ASSESSMENT:  · Acute appendicitis, resolving clinically  · Sepsis associated to appendicitis, resolved  · Leukocytosis associated to appendicitis, resolving. CRP down    PLAN:  · Continue Zosyn for at least another week. Reconciled  · The patient can be discharged from ID standpoint. Spoke with daughter.   Instructed to call the office and make a follow-up appointment as soon as they get home    Marlyse Keepers  6:50 PM  1/21/2020

## 2020-01-21 NOTE — HOME CARE
[id number] [facility] IV PRICING:  ORDERED THERAPY AT TIME OF QUOTE: Myles Camacho 3.375GM IV Q8H  COVERAGE: COPAY FOR DRUG; NO COVERAGE FOR PER BAMBI  TOTAL PT RESPONSIBILITY: $387.56 PER WEEK       Eugene Butterfield LPN, [facility]

## 2020-01-22 ENCOUNTER — APPOINTMENT (OUTPATIENT)
Dept: CT IMAGING | Age: 85
DRG: 871 | End: 2020-01-22
Payer: MEDICARE

## 2020-01-22 LAB
ANION GAP SERPL CALCULATED.3IONS-SCNC: 10 MMOL/L (ref 7–16)
BLOOD CULTURE, ROUTINE: NORMAL
BUN BLDV-MCNC: 6 MG/DL (ref 8–23)
CALCIUM SERPL-MCNC: 7.9 MG/DL (ref 8.6–10.2)
CHLORIDE BLD-SCNC: 105 MMOL/L (ref 98–107)
CO2: 20 MMOL/L (ref 22–29)
CREAT SERPL-MCNC: 1 MG/DL (ref 0.5–1)
CULTURE, BLOOD 2: NORMAL
GFR AFRICAN AMERICAN: >60
GFR NON-AFRICAN AMERICAN: >60 ML/MIN/1.73
GLUCOSE BLD-MCNC: 112 MG/DL (ref 74–99)
HCT VFR BLD CALC: 29.6 % (ref 34–48)
HEMOGLOBIN: 9.2 G/DL (ref 11.5–15.5)
MCH RBC QN AUTO: 28.6 PG (ref 26–35)
MCHC RBC AUTO-ENTMCNC: 31.1 % (ref 32–34.5)
MCV RBC AUTO: 91.9 FL (ref 80–99.9)
METER GLUCOSE: 129 MG/DL (ref 74–99)
METER GLUCOSE: 130 MG/DL (ref 74–99)
PDW BLD-RTO: 14 FL (ref 11.5–15)
PLATELET # BLD: 200 E9/L (ref 130–450)
PMV BLD AUTO: 9.5 FL (ref 7–12)
POTASSIUM SERPL-SCNC: 4.1 MMOL/L (ref 3.5–5)
RBC # BLD: 3.22 E12/L (ref 3.5–5.5)
SEDIMENTATION RATE, ERYTHROCYTE: 70 MM/HR (ref 0–20)
SODIUM BLD-SCNC: 135 MMOL/L (ref 132–146)
WBC # BLD: 6.1 E9/L (ref 4.5–11.5)

## 2020-01-22 PROCEDURE — 6360000002 HC RX W HCPCS: Performed by: INTERNAL MEDICINE

## 2020-01-22 PROCEDURE — 6360000004 HC RX CONTRAST MEDICATION: Performed by: RADIOLOGY

## 2020-01-22 PROCEDURE — 80048 BASIC METABOLIC PNL TOTAL CA: CPT

## 2020-01-22 PROCEDURE — 36415 COLL VENOUS BLD VENIPUNCTURE: CPT

## 2020-01-22 PROCEDURE — 2580000003 HC RX 258: Performed by: INTERNAL MEDICINE

## 2020-01-22 PROCEDURE — 85651 RBC SED RATE NONAUTOMATED: CPT

## 2020-01-22 PROCEDURE — 85027 COMPLETE CBC AUTOMATED: CPT

## 2020-01-22 PROCEDURE — 74177 CT ABD & PELVIS W/CONTRAST: CPT

## 2020-01-22 PROCEDURE — 82962 GLUCOSE BLOOD TEST: CPT

## 2020-01-22 PROCEDURE — 6370000000 HC RX 637 (ALT 250 FOR IP): Performed by: INTERNAL MEDICINE

## 2020-01-22 PROCEDURE — 6360000002 HC RX W HCPCS: Performed by: SPECIALIST

## 2020-01-22 PROCEDURE — 2060000000 HC ICU INTERMEDIATE R&B

## 2020-01-22 RX ORDER — ONDANSETRON 2 MG/ML
4 INJECTION INTRAMUSCULAR; INTRAVENOUS EVERY 6 HOURS PRN
Status: DISCONTINUED | OUTPATIENT
Start: 2020-01-22 | End: 2020-01-24 | Stop reason: HOSPADM

## 2020-01-22 RX ORDER — ACETAMINOPHEN 325 MG/1
650 TABLET ORAL EVERY 4 HOURS PRN
Status: DISCONTINUED | OUTPATIENT
Start: 2020-01-22 | End: 2020-01-24 | Stop reason: HOSPADM

## 2020-01-22 RX ORDER — ONDANSETRON 4 MG/1
4 TABLET, ORALLY DISINTEGRATING ORAL EVERY 8 HOURS PRN
Status: DISCONTINUED | OUTPATIENT
Start: 2020-01-22 | End: 2020-01-24 | Stop reason: HOSPADM

## 2020-01-22 RX ADMIN — POTASSIUM CHLORIDE 20 MEQ: 20 TABLET, EXTENDED RELEASE ORAL at 16:46

## 2020-01-22 RX ADMIN — METOPROLOL SUCCINATE 25 MG: 25 TABLET, FILM COATED, EXTENDED RELEASE ORAL at 08:04

## 2020-01-22 RX ADMIN — POTASSIUM CHLORIDE 20 MEQ: 20 TABLET, EXTENDED RELEASE ORAL at 08:04

## 2020-01-22 RX ADMIN — ACETAMINOPHEN 650 MG: 325 TABLET ORAL at 17:53

## 2020-01-22 RX ADMIN — ACETAMINOPHEN 650 MG: 325 TABLET ORAL at 01:12

## 2020-01-22 RX ADMIN — SODIUM CHLORIDE: 9 INJECTION, SOLUTION INTRAVENOUS at 07:34

## 2020-01-22 RX ADMIN — SODIUM CHLORIDE: 9 INJECTION, SOLUTION INTRAVENOUS at 15:11

## 2020-01-22 RX ADMIN — ANORECTAL OINTMENT: 15.7; .44; 24; 20.6 OINTMENT TOPICAL at 21:53

## 2020-01-22 RX ADMIN — PIPERACILLIN AND TAZOBACTAM 3.38 G: 3; .375 INJECTION, POWDER, FOR SOLUTION INTRAVENOUS at 11:04

## 2020-01-22 RX ADMIN — PIPERACILLIN AND TAZOBACTAM 3.38 G: 3; .375 INJECTION, POWDER, FOR SOLUTION INTRAVENOUS at 02:08

## 2020-01-22 RX ADMIN — Medication 300 UNITS: at 21:52

## 2020-01-22 RX ADMIN — PIPERACILLIN AND TAZOBACTAM 3.38 G: 3; .375 INJECTION, POWDER, FOR SOLUTION INTRAVENOUS at 17:53

## 2020-01-22 RX ADMIN — SODIUM CHLORIDE: 9 INJECTION, SOLUTION INTRAVENOUS at 21:59

## 2020-01-22 RX ADMIN — IOPAMIDOL 110 ML: 755 INJECTION, SOLUTION INTRAVENOUS at 13:40

## 2020-01-22 RX ADMIN — ANORECTAL OINTMENT: 15.7; .44; 24; 20.6 OINTMENT TOPICAL at 08:05

## 2020-01-22 RX ADMIN — ONDANSETRON 4 MG: 2 INJECTION INTRAMUSCULAR; INTRAVENOUS at 01:12

## 2020-01-22 ASSESSMENT — PAIN SCALES - GENERAL
PAINLEVEL_OUTOF10: 3
PAINLEVEL_OUTOF10: 0

## 2020-01-22 ASSESSMENT — PAIN DESCRIPTION - DESCRIPTORS: DESCRIPTORS: ACHING;DISCOMFORT

## 2020-01-22 ASSESSMENT — PAIN DESCRIPTION - ORIENTATION: ORIENTATION: RIGHT;LOWER

## 2020-01-22 ASSESSMENT — PAIN DESCRIPTION - PAIN TYPE: TYPE: ACUTE PAIN

## 2020-01-22 ASSESSMENT — PAIN - FUNCTIONAL ASSESSMENT: PAIN_FUNCTIONAL_ASSESSMENT: ACTIVITIES ARE NOT PREVENTED

## 2020-01-22 ASSESSMENT — PAIN DESCRIPTION - FREQUENCY: FREQUENCY: INTERMITTENT

## 2020-01-22 ASSESSMENT — PAIN DESCRIPTION - ONSET: ONSET: GRADUAL

## 2020-01-22 ASSESSMENT — PAIN DESCRIPTION - LOCATION: LOCATION: ABDOMEN

## 2020-01-22 NOTE — PROGRESS NOTES
Call placed to Dr. Trudy Mane regarding 2nd reading of elevated temp after tylenol po and ice packs applied. Daughter at bedside, reporting that pt is disoriented. Pt easy to arouse.

## 2020-01-22 NOTE — PROGRESS NOTES
Physical Therapy    Pt refused rx this PM, states too fatigued from testing. Will follow on another date/time.     Shin Pratt PTA [id number]

## 2020-01-22 NOTE — PROGRESS NOTES
Subjective: The patient is awake and alert. Denies chest pain, angina, and dyspnea. Denies abdominal pain. Tolerating diet--requesting Ensure. Nausea last PM.    Objective:  PICC line in.    BP (!) 146/67   Pulse 78   Temp 99.3 °F (37.4 °C) (Oral)   Resp 18   Ht 5' 1\" (1.549 m)   Wt 191 lb 9.6 oz (86.9 kg)   SpO2 95%   BMI 36.20 kg/m²     Heart:  RRR, no murmurs, gallops, or rubs. Lungs:  CTA bilaterally, no wheeze, rales or rhonchi  Abd: bowel sounds present, nontender, nondistended, no masses  Extrem:  No clubbing, cyanosis, or edema  Oral mucosa nl  Trach midline    CBC:   Lab Results   Component Value Date    WBC 6.1 01/22/2020    RBC 3.22 01/22/2020    HGB 9.2 01/22/2020    HCT 29.6 01/22/2020    MCV 91.9 01/22/2020    MCH 28.6 01/22/2020    MCHC 31.1 01/22/2020    RDW 14.0 01/22/2020     01/22/2020    MPV 9.5 01/22/2020     BMP:    Lab Results   Component Value Date     01/22/2020    K 4.1 01/22/2020    K 5.6 01/17/2020     01/22/2020    CO2 20 01/22/2020    BUN 6 01/22/2020    LABALBU 4.0 01/17/2020    CREATININE 1.0 01/22/2020    CALCIUM 7.9 01/22/2020    GFRAA >60 01/22/2020    LABGLOM >60 01/22/2020    GLUCOSE 112 01/22/2020        Assessment:  Temp spike last PM.  Relieved by Tylenol. Patient Active Problem List   Diagnosis    Acute appendicitis    Hypertension    Type II diabetes mellitus, uncontrolled ([address]    Metabolic encephalopathy    Sepsis associated hypotension ([address]       Plan: Will check with ID if they want any other eval re brief temp spike early this AM before I SHER [place]  6:32 AM  1/22/2020

## 2020-01-22 NOTE — PROGRESS NOTES
Call placed to Dr. Genny Hunt to report overnight spike in temp of 102.2. Awaiting return call.   Electronically signed by Ty Liriano RN on 1/22/2020 at 8:13 AM

## 2020-01-23 LAB
ANION GAP SERPL CALCULATED.3IONS-SCNC: 13 MMOL/L (ref 7–16)
BUN BLDV-MCNC: 6 MG/DL (ref 8–23)
CALCIUM SERPL-MCNC: 8.3 MG/DL (ref 8.6–10.2)
CHLORIDE BLD-SCNC: 102 MMOL/L (ref 98–107)
CO2: 21 MMOL/L (ref 22–29)
CREAT SERPL-MCNC: 1 MG/DL (ref 0.5–1)
GFR AFRICAN AMERICAN: >60
GFR NON-AFRICAN AMERICAN: >60 ML/MIN/1.73
GLUCOSE BLD-MCNC: 101 MG/DL (ref 74–99)
HCT VFR BLD CALC: 32.2 % (ref 34–48)
HEMOGLOBIN: 10 G/DL (ref 11.5–15.5)
MCH RBC QN AUTO: 28.5 PG (ref 26–35)
MCHC RBC AUTO-ENTMCNC: 31.1 % (ref 32–34.5)
MCV RBC AUTO: 91.7 FL (ref 80–99.9)
METER GLUCOSE: 115 MG/DL (ref 74–99)
METER GLUCOSE: 121 MG/DL (ref 74–99)
PDW BLD-RTO: 14.2 FL (ref 11.5–15)
PLATELET # BLD: 238 E9/L (ref 130–450)
PMV BLD AUTO: 9.8 FL (ref 7–12)
POTASSIUM SERPL-SCNC: 4.4 MMOL/L (ref 3.5–5)
RBC # BLD: 3.51 E12/L (ref 3.5–5.5)
SODIUM BLD-SCNC: 136 MMOL/L (ref 132–146)
WBC # BLD: 8.7 E9/L (ref 4.5–11.5)

## 2020-01-23 PROCEDURE — 2060000000 HC ICU INTERMEDIATE R&B

## 2020-01-23 PROCEDURE — 80048 BASIC METABOLIC PNL TOTAL CA: CPT

## 2020-01-23 PROCEDURE — 85027 COMPLETE CBC AUTOMATED: CPT

## 2020-01-23 PROCEDURE — 36415 COLL VENOUS BLD VENIPUNCTURE: CPT

## 2020-01-23 PROCEDURE — 6360000002 HC RX W HCPCS: Performed by: INTERNAL MEDICINE

## 2020-01-23 PROCEDURE — 82962 GLUCOSE BLOOD TEST: CPT

## 2020-01-23 PROCEDURE — 6370000000 HC RX 637 (ALT 250 FOR IP): Performed by: INTERNAL MEDICINE

## 2020-01-23 PROCEDURE — 2580000003 HC RX 258: Performed by: INTERNAL MEDICINE

## 2020-01-23 PROCEDURE — 97530 THERAPEUTIC ACTIVITIES: CPT

## 2020-01-23 PROCEDURE — 6360000002 HC RX W HCPCS: Performed by: SPECIALIST

## 2020-01-23 RX ADMIN — PIPERACILLIN AND TAZOBACTAM 3.38 G: 3; .375 INJECTION, POWDER, FOR SOLUTION INTRAVENOUS at 17:45

## 2020-01-23 RX ADMIN — SODIUM CHLORIDE: 9 INJECTION, SOLUTION INTRAVENOUS at 23:49

## 2020-01-23 RX ADMIN — METOPROLOL SUCCINATE 25 MG: 25 TABLET, FILM COATED, EXTENDED RELEASE ORAL at 08:57

## 2020-01-23 RX ADMIN — Medication 300 UNITS: at 21:28

## 2020-01-23 RX ADMIN — ANORECTAL OINTMENT: 15.7; .44; 24; 20.6 OINTMENT TOPICAL at 08:58

## 2020-01-23 RX ADMIN — ACETAMINOPHEN 650 MG: 325 TABLET ORAL at 21:36

## 2020-01-23 RX ADMIN — SODIUM CHLORIDE: 9 INJECTION, SOLUTION INTRAVENOUS at 16:14

## 2020-01-23 RX ADMIN — POTASSIUM CHLORIDE 20 MEQ: 20 TABLET, EXTENDED RELEASE ORAL at 16:27

## 2020-01-23 RX ADMIN — PIPERACILLIN AND TAZOBACTAM 3.38 G: 3; .375 INJECTION, POWDER, FOR SOLUTION INTRAVENOUS at 02:40

## 2020-01-23 RX ADMIN — PIPERACILLIN AND TAZOBACTAM 3.38 G: 3; .375 INJECTION, POWDER, FOR SOLUTION INTRAVENOUS at 11:25

## 2020-01-23 RX ADMIN — SODIUM CHLORIDE: 9 INJECTION, SOLUTION INTRAVENOUS at 06:40

## 2020-01-23 RX ADMIN — POTASSIUM CHLORIDE 20 MEQ: 20 TABLET, EXTENDED RELEASE ORAL at 08:57

## 2020-01-23 RX ADMIN — ANORECTAL OINTMENT: 15.7; .44; 24; 20.6 OINTMENT TOPICAL at 21:29

## 2020-01-23 RX ADMIN — Medication 300 UNITS: at 08:57

## 2020-01-23 RX ADMIN — ACETAMINOPHEN 650 MG: 325 TABLET ORAL at 05:50

## 2020-01-23 ASSESSMENT — PAIN SCALES - GENERAL
PAINLEVEL_OUTOF10: 0
PAINLEVEL_OUTOF10: 5
PAINLEVEL_OUTOF10: 0

## 2020-01-23 ASSESSMENT — PAIN DESCRIPTION - PAIN TYPE: TYPE: ACUTE PAIN

## 2020-01-23 ASSESSMENT — PAIN DESCRIPTION - ONSET: ONSET: GRADUAL

## 2020-01-23 ASSESSMENT — PAIN - FUNCTIONAL ASSESSMENT: PAIN_FUNCTIONAL_ASSESSMENT: PREVENTS OR INTERFERES SOME ACTIVE ACTIVITIES AND ADLS

## 2020-01-23 ASSESSMENT — PAIN DESCRIPTION - FREQUENCY: FREQUENCY: INTERMITTENT

## 2020-01-23 ASSESSMENT — PAIN DESCRIPTION - DESCRIPTORS: DESCRIPTORS: ACHING;DISCOMFORT;SORE

## 2020-01-23 ASSESSMENT — PAIN DESCRIPTION - ORIENTATION: ORIENTATION: RIGHT;LEFT;LOWER

## 2020-01-23 ASSESSMENT — PAIN DESCRIPTION - LOCATION: LOCATION: GENERALIZED

## 2020-01-23 ASSESSMENT — PAIN DESCRIPTION - PROGRESSION: CLINICAL_PROGRESSION: NOT CHANGED

## 2020-01-23 NOTE — PROGRESS NOTES
Examined pt with dtr at bedside. Subjective: The patient is awake and alert. .  Denies chest pain, angina, and dyspnea. Denies abdominal pain. Tolerating diet. No nausea or vomiting. Objective:  Temp spike last PM with associated debilitation. Now, feels better. Yesterday's CT MUCH improved. BP (!) 159/58   Pulse 70   Temp 99.6 °F (37.6 °C) (Oral)   Resp 20   Ht 5' 1\" (1.549 m)   Wt 186 lb 3.2 oz (84.5 kg)   SpO2 94%   BMI 35.18 kg/m²     Heart:  RRR, no murmurs, gallops, or rubs.   Lungs:  CTA bilaterally, no wheeze, rales or rhonchi  Abd: bowel sounds present, nontender, nondistended, no masses  Extrem:  No clubbing, cyanosis, or edema  Oral mucosa nl  Trach mdiline    CBC:   Lab Results   Component Value Date    WBC 8.7 01/23/2020    RBC 3.51 01/23/2020    HGB 10.0 01/23/2020    HCT 32.2 01/23/2020    MCV 91.7 01/23/2020    MCH 28.5 01/23/2020    MCHC 31.1 01/23/2020    RDW 14.2 01/23/2020     01/23/2020    MPV 9.8 01/23/2020     CMP:    Lab Results   Component Value Date     01/22/2020    K 4.1 01/22/2020    K 5.6 01/17/2020     01/22/2020    CO2 20 01/22/2020    BUN 6 01/22/2020    CREATININE 1.0 01/22/2020    GFRAA >60 01/22/2020    LABGLOM >60 01/22/2020    GLUCOSE 112 01/22/2020    PROT 8.1 01/17/2020    LABALBU 4.0 01/17/2020    CALCIUM 7.9 01/22/2020    BILITOT 0.4 01/17/2020    ALKPHOS 79 01/17/2020    AST 46 01/17/2020    ALT 25 01/17/2020     BMP:    Lab Results   Component Value Date     01/22/2020    K 4.1 01/22/2020    K 5.6 01/17/2020     01/22/2020    CO2 20 01/22/2020    BUN 6 01/22/2020    LABALBU 4.0 01/17/2020    CREATININE 1.0 01/22/2020    CALCIUM 7.9 01/22/2020    GFRAA >60 01/22/2020    LABGLOM >60 01/22/2020    GLUCOSE 112 01/22/2020        Assessment:    Patient Active Problem List   Diagnosis    Acute appendicitis    Hypertension    Type II diabetes mellitus, uncontrolled ([facility] [address] [facility]    Metabolic encephalopathy    Sepsis associated

## 2020-01-23 NOTE — PROGRESS NOTES
Physical Therapy    [facility]  [facility]    NAME: Brina Holder  : 1932  MRN: [id number]    Date of Service: 2020            Patient Diagnosis(es): The primary encounter diagnosis was Acute appendicitis, unspecified acute appendicitis type. Diagnoses of Altered mental status, unspecified altered mental status type, Sepsis, due to unspecified organism, unspecified whether acute organ dysfunction present [facility]), and Acute cystitis without hematuria were also pertinent to this visit. has a past medical history of Arthritis, Diabetes mellitus ([address] Hyperlipidemia, and Hypertension. has a past surgical history that includes Cholecystectomy; Hysterectomy; Carpal tunnel release; and Insert Picc Cath, 5/> Yrs (2020). Evaluating Therapist: Danilo Hernandez PT        Room #:  006   DIAGNOSIS:  Acute appendicitis   PRECAUTIONS: falls     Social:  Pt lives with spouse  in a  1 floor plan  2  steps and no rails to enter. Prior to admission pt walked with no AD, short distances only. Pt has a cane      Initial Evaluation  Date:  2020 Treatment  2020    Short Term/ Long Term   Goals   Was pt agreeable to Eval/treatment? yes  yes    Does pt have pain?  abd and B LE pain  No complaints    Bed Mobility  Rolling:  NT   Supine to sit:  NT   Sit to supine:  NT    Scooting: min assist in sit  Pt in recliner upon arrival  Rolling: Mod A  Supine to sit: Mod A  Scooting:  Mod A to EOB  SBA    Transfers Sit to stand:  Min assist   Stand to sit: min assist   Stand pivot: NT  Sit to stand: Min A  Stand to sit: Min A  Stand Pivot: NT  SBA    Ambulation     4  feet forward and backward with ww min assist  20 feet x 1 using [facility] for support Min A for balance  25 feet with  ww  with  SBA        Stair negotiation: ascended and descended NT  NT 2  steps with  0-1  rail with  SBA    LE ROM  WFL      LE strength  3+ to 4-/ 5      AM- PAC RAW score            Balance: poor

## 2020-01-23 NOTE — PROGRESS NOTES
GENERAL SURGERY  DAILY PROGRESS NOTE  1/23/2020    Subjective:   No nausea or vomiting. Tolerated low fiber diet. Passing flatus. Afebrile overnight. Objective:  BP (!) 132/50   Pulse 71   Temp 98.2 °F (36.8 °C) (Oral)   Resp 20   Ht 5' 1\" (1.549 m)   Wt 186 lb 3.2 oz (84.5 kg)   SpO2 95%   BMI 35.18 kg/m²     GENERAL:  Laying in bed, awake, alert, cooperative, no apparent distress  HEAD: Normocephalic, atraumatic  EYES: No sclera icterus, pupils equal  LUNGS:  No increased work of breathing  CARDIOVASCULAR:  Regular rate  ABDOMEN:  Soft, non-distended, non-tender  EXTREMITIES: No edema or swelling  SKIN: Warm and dry    Assessment/Plan:  80 y.o. female with appendicitis, mental status change/ problems with anesthesia    Benign abdominal exam  Repeat CT improved and showing bladder thickening  Suspect fever 2/2 UTI. Low fiber diet  Encourage OOB and ambulation  Ok for discharge from surgery standpoint when ok for DC by primary and other consultants    Electronically signed by Arnoldo Hamlin on 1/23/2020 at 11:17 AM     Attending Note:    Patient seen/examined 1/23/2020. Agree with above assessment and plan. Appendicitis resolved on repeat CT. Will sign off, available as needed.

## 2020-01-23 NOTE — PROGRESS NOTES
Occupational Therapy  OT BEDSIDE TREATMENT NOTE      Date:2020  Patient Name: Saint Goon  MRN: [id number]  : 1932  Room: [address] [place], [place] [address]     Evaluating OT: Junito Zuleta OTR/L [id number]     AM-PAC Daily Activity Raw Score: 15/24     Recommended Adaptive Equipment: TBD     Diagnosis: acute appendicits.  Pt presents to ED from home with nausea, vomiting and abdominal pain.     Pertinent Medical History: HTN, DM, arthritis   [address] Living: Pt lives with  in a single story home with 2 steps no HR on entry. Bathroom setup: tub/shower combo with shower chair, standard commode      Prior Level of Function: Mod I with toileting, grooming and UB dressing,  assists with LB dressing, bathing and all IADLs; completed functional mobility with no AD in home, cane out of home. Pt's daughter reports pt only goes short distances.   Driving: No     Pain Level: No c/o pain     Cognition: A&O: Pt alert and conversing with fair ability to follow commands.     Functional Assessment:    Initial Eval Status  Date: 20 Treatment session: 20 Short Term Goals  Treatment frequency: 2-5x/wk PRN x1-3 wks   Feeding Set up       Grooming Mod A   Set up   UB Dressing Mod A  Management of hospital gown   Set up   LB Dressing Dependent  Management of socks Max A to don brief Mod A    Bathing Max A   Min A   Toileting Max A   SBA   Bed Mobility  Supine to sit: NT seated in armchair on entry Supine to sit: Mod A     Functional Transfers STS: Min A STS: Min A from EOB  SBA   Functional Mobility Min A with [employer]  Small steps forward/backward  Unable to complete further mobility due to reported B LE pain Min A using ww in room SBA during ADLs   Balance Sitting: fair     Standing: fair minus at [employer] Sitting: SBA     Standing: Min A      Activity Tolerance poor Poor+ standing héctor x5-6 min with fair balance during self care tasks         B UE were [facility] during tasks.     Comments: Upon arrival pt was supine

## 2020-01-24 VITALS
WEIGHT: 184.9 LBS | TEMPERATURE: 98.6 F | DIASTOLIC BLOOD PRESSURE: 70 MMHG | HEART RATE: 71 BPM | OXYGEN SATURATION: 97 % | SYSTOLIC BLOOD PRESSURE: 160 MMHG | BODY MASS INDEX: 34.91 KG/M2 | HEIGHT: 61 IN | RESPIRATION RATE: 18 BRPM

## 2020-01-24 LAB
ANION GAP SERPL CALCULATED.3IONS-SCNC: 9 MMOL/L (ref 7–16)
BUN BLDV-MCNC: 7 MG/DL (ref 8–23)
C-REACTIVE PROTEIN: 11.5 MG/DL (ref 0–0.4)
CALCIUM SERPL-MCNC: 8.6 MG/DL (ref 8.6–10.2)
CHLORIDE BLD-SCNC: 105 MMOL/L (ref 98–107)
CO2: 23 MMOL/L (ref 22–29)
CREAT SERPL-MCNC: 1.1 MG/DL (ref 0.5–1)
GFR AFRICAN AMERICAN: 57
GFR NON-AFRICAN AMERICAN: 57 ML/MIN/1.73
GLUCOSE BLD-MCNC: 88 MG/DL (ref 74–99)
HCT VFR BLD CALC: 30.9 % (ref 34–48)
HEMOGLOBIN: 9.5 G/DL (ref 11.5–15.5)
MCH RBC QN AUTO: 28.3 PG (ref 26–35)
MCHC RBC AUTO-ENTMCNC: 30.7 % (ref 32–34.5)
MCV RBC AUTO: 92 FL (ref 80–99.9)
METER GLUCOSE: 103 MG/DL (ref 74–99)
PDW BLD-RTO: 14.4 FL (ref 11.5–15)
PLATELET # BLD: 265 E9/L (ref 130–450)
PMV BLD AUTO: 9.3 FL (ref 7–12)
POTASSIUM SERPL-SCNC: 4.6 MMOL/L (ref 3.5–5)
RBC # BLD: 3.36 E12/L (ref 3.5–5.5)
SEDIMENTATION RATE, ERYTHROCYTE: 87 MM/HR (ref 0–20)
SODIUM BLD-SCNC: 137 MMOL/L (ref 132–146)
WBC # BLD: 7.1 E9/L (ref 4.5–11.5)

## 2020-01-24 PROCEDURE — 36415 COLL VENOUS BLD VENIPUNCTURE: CPT

## 2020-01-24 PROCEDURE — 80048 BASIC METABOLIC PNL TOTAL CA: CPT

## 2020-01-24 PROCEDURE — 2580000003 HC RX 258: Performed by: SPECIALIST

## 2020-01-24 PROCEDURE — 82962 GLUCOSE BLOOD TEST: CPT

## 2020-01-24 PROCEDURE — 6360000002 HC RX W HCPCS: Performed by: INTERNAL MEDICINE

## 2020-01-24 PROCEDURE — 2580000003 HC RX 258: Performed by: INTERNAL MEDICINE

## 2020-01-24 PROCEDURE — 86140 C-REACTIVE PROTEIN: CPT

## 2020-01-24 PROCEDURE — 6370000000 HC RX 637 (ALT 250 FOR IP): Performed by: INTERNAL MEDICINE

## 2020-01-24 PROCEDURE — 6360000002 HC RX W HCPCS: Performed by: SPECIALIST

## 2020-01-24 PROCEDURE — 85027 COMPLETE CBC AUTOMATED: CPT

## 2020-01-24 PROCEDURE — 85651 RBC SED RATE NONAUTOMATED: CPT

## 2020-01-24 RX ORDER — METOPROLOL SUCCINATE 25 MG/1
25 TABLET, EXTENDED RELEASE ORAL DAILY
Qty: 30 TABLET | Refills: 3 | Status: SHIPPED | OUTPATIENT
Start: 2020-01-25 | End: 2020-01-24

## 2020-01-24 RX ORDER — METOPROLOL SUCCINATE 25 MG/1
25 TABLET, EXTENDED RELEASE ORAL DAILY
Qty: 30 TABLET | Refills: 3 | Status: ON HOLD | OUTPATIENT
Start: 2020-01-25 | End: 2020-09-11 | Stop reason: HOSPADM

## 2020-01-24 RX ADMIN — Medication 10 ML: at 09:11

## 2020-01-24 RX ADMIN — POTASSIUM CHLORIDE 20 MEQ: 20 TABLET, EXTENDED RELEASE ORAL at 09:11

## 2020-01-24 RX ADMIN — PIPERACILLIN AND TAZOBACTAM 3.38 G: 3; .375 INJECTION, POWDER, FOR SOLUTION INTRAVENOUS at 01:22

## 2020-01-24 RX ADMIN — SODIUM CHLORIDE: 9 INJECTION, SOLUTION INTRAVENOUS at 14:39

## 2020-01-24 RX ADMIN — POTASSIUM CHLORIDE 20 MEQ: 20 TABLET, EXTENDED RELEASE ORAL at 16:55

## 2020-01-24 RX ADMIN — Medication 300 UNITS: at 17:45

## 2020-01-24 RX ADMIN — ANORECTAL OINTMENT: 15.7; .44; 24; 20.6 OINTMENT TOPICAL at 09:14

## 2020-01-24 RX ADMIN — SODIUM CHLORIDE: 9 INJECTION, SOLUTION INTRAVENOUS at 04:48

## 2020-01-24 RX ADMIN — Medication 10 ML: at 11:12

## 2020-01-24 RX ADMIN — PIPERACILLIN AND TAZOBACTAM 3.38 G: 3; .375 INJECTION, POWDER, FOR SOLUTION INTRAVENOUS at 11:00

## 2020-01-24 RX ADMIN — Medication 300 UNITS: at 09:12

## 2020-01-24 RX ADMIN — ONDANSETRON 4 MG: 2 INJECTION INTRAMUSCULAR; INTRAVENOUS at 03:21

## 2020-01-24 RX ADMIN — Medication 10 ML: at 16:55

## 2020-01-24 RX ADMIN — METOPROLOL SUCCINATE 25 MG: 25 TABLET, FILM COATED, EXTENDED RELEASE ORAL at 09:11

## 2020-01-24 RX ADMIN — PIPERACILLIN AND TAZOBACTAM 3.38 G: 3; .375 INJECTION, POWDER, FOR SOLUTION INTRAVENOUS at 17:00

## 2020-01-24 ASSESSMENT — PAIN SCALES - GENERAL
PAINLEVEL_OUTOF10: 0

## 2020-01-24 NOTE — PROGRESS NOTES
P Quality Flow/Interdisciplinary Rounds Progress Note        Quality Flow Rounds held on January 24, 2020    Disciplines Attending:  Bedside Nurse, ,  and Nursing Unit Leadership    Hong Pryor was admitted on 1/17/2020  1:17 PM    Anticipated Discharge Date:  Expected Discharge Date: 01/24/20    Disposition:    Bert Score:  Bert Scale Score: 17    Readmission Risk              Risk of Unplanned Readmission:        12           Discussed patient goal for the day, patient clinical progression, and barriers to discharge. The following Goal(s) of the Day/Commitment(s) have been identified:  Monitor temps-check ID plan-transfer to [facility].       Moises Lewis  January 24, 2020

## 2020-01-24 NOTE — CARE COORDINATION
Social Work discharge planning   Per Jennifer with [facility], they can not see pt until tomorrow am.  JOYCE notified charge RN Alyssia Cortes and pt's RN [facility]. Joyce faxed IV ATB rx to Jennifer with [facility]. Jennifer with [facility] aware pt will stay here for her 6p dose and then pt/family is aware [facility] will call them to schedule first home dose.   Electronically signed by Wes Cline on 1/24/2020 at 3:29 PM

## 2020-01-24 NOTE — PROGRESS NOTES
Type and Reason for Visit: Initial, RD Nutrition Re-Screen(LOS Assessment, RD Re-Screen Negative)    Nutrition Screen:   · Have you recently lost weight without trying? - 0 to 1 pound (0 points)   · Have you been eating poorly because of a decreased appetite? - No (0 points)   · Malnutrition Screening Tool Score - 0    Dietitian Assessment of Nutrition Re-Screen: Pt assessed per LOS protocol. Chart reviewed. Pt currently eating ~75% of most meals and w/ no other significant nutritional issues noted at this time. Will follow-up per policy. Please consult if RD needed.           Electronically signed by Bharat Donovan, BUBBA, LD on 1/24/20 at 10:12 AM    Contact Number: ext [phone]

## 2020-01-24 NOTE — PROGRESS NOTES
01/17/2020    NEUTROABS 4.20 11/16/2018    NEUTROABS 5.39 07/17/2017     No results found for: CRPHS  Lab Results   Component Value Date    ALT 25 01/17/2020    AST 46 (H) 01/17/2020    ALKPHOS 79 01/17/2020    BILITOT 0.4 01/17/2020     Lab Results   Component Value Date     01/24/2020    K 4.6 01/24/2020    K 5.6 01/17/2020     01/24/2020    CO2 23 01/24/2020    BUN 7 01/24/2020    CREATININE 1.1 01/24/2020    CREATININE 1.0 01/23/2020    CREATININE 1.0 01/22/2020    GFRAA 57 01/24/2020    LABGLOM 57 01/24/2020    GLUCOSE 88 01/24/2020    PROT 8.1 01/17/2020    LABALBU 4.0 01/17/2020    CALCIUM 8.6 01/24/2020    BILITOT 0.4 01/17/2020    ALKPHOS 79 01/17/2020    AST 46 01/17/2020    ALT 25 01/17/2020     Lab Results   Component Value Date    CRP 11.5 (H) 01/24/2020    CRP 6.9 (H) 01/21/2020    CRP 11.6 (H) 01/19/2020     Lab Results   Component Value Date    SEDRATE 87 (H) 01/24/2020    SEDRATE 70 (H) 01/22/2020    SEDRATE 44 (H) 01/19/2020     Radiology:  CT of the abdomen and pelvis reviewed    Microbiology:   Urine culture 1/17/2020 >100K E. coli (resistant to Cefazolin and Ceftriaxone)  Blood cultures 1/17/2020: Negative so far  Nares screen MRSA: Negative  Influenza by PCR: Negative    ASSESSMENT:  · Acute appendicitis, resolving clinically  · Sepsis associated to appendicitis, resolved  · Leukocytosis associated to appendicitis, resolving. CRP down  · Fever. Etiology unclear. I do not think this is a urinary tract infection since the E. coli found on admission was fairly sensitive to Zosyn. A CT of the abdomen and pelvis did not show any new abscess  of unknown etiology. It has resolved. A repeat CT of the abdomen and pelvis did not show an abscess    PLAN:  · Continue Zosyn for at least another week. Reconciled  · The patient can be discharged from ID standpoint. Instructed to call the office and make a follow-up appointment.     Kojo Servin  12:10 PM  1/24/2020

## 2020-01-25 NOTE — CARE COORDINATION
Verified on 1/25/20 that patient has a non-qualifying BPCI-A DRG of [phone] for hospital encounter admission date 1/17/20.

## 2020-01-28 ENCOUNTER — HOSPITAL ENCOUNTER (OUTPATIENT)
Age: 85
Discharge: HOME | End: 2020-01-30
Payer: MEDICARE

## 2020-01-28 LAB
ALBUMIN SERPL-MCNC: 3.1 G/DL (ref 3.5–5.2)
ALP BLD-CCNC: 68 U/L (ref 35–104)
ALT SERPL-CCNC: 26 U/L (ref 0–32)
ANION GAP SERPL CALCULATED.3IONS-SCNC: 16 MMOL/L (ref 7–16)
AST SERPL-CCNC: 36 U/L (ref 0–31)
BASOPHILS ABSOLUTE: 0.05 E9/L (ref 0–0.2)
BASOPHILS RELATIVE PERCENT: 0.9 % (ref 0–2)
BILIRUB SERPL-MCNC: 0.3 MG/DL (ref 0–1.2)
BUN BLDV-MCNC: 4 MG/DL (ref 8–23)
C-REACTIVE PROTEIN: 6 MG/DL (ref 0–0.4)
CALCIUM SERPL-MCNC: 8.8 MG/DL (ref 8.6–10.2)
CHLORIDE BLD-SCNC: 100 MMOL/L (ref 98–107)
CO2: 20 MMOL/L (ref 22–29)
CREAT SERPL-MCNC: 1 MG/DL (ref 0.5–1)
EOSINOPHILS ABSOLUTE: 0.32 E9/L (ref 0.05–0.5)
EOSINOPHILS RELATIVE PERCENT: 5.8 % (ref 0–6)
GFR AFRICAN AMERICAN: >60
GFR NON-AFRICAN AMERICAN: >60 ML/MIN/1.73
GLUCOSE BLD-MCNC: 124 MG/DL (ref 74–99)
HCT VFR BLD CALC: 31.3 % (ref 34–48)
HEMOGLOBIN: 9.6 G/DL (ref 11.5–15.5)
IMMATURE GRANULOCYTES #: 0.11 E9/L
IMMATURE GRANULOCYTES %: 2 % (ref 0–5)
LYMPHOCYTES ABSOLUTE: 1.7 E9/L (ref 1.5–4)
LYMPHOCYTES RELATIVE PERCENT: 31 % (ref 20–42)
MCH RBC QN AUTO: 28.3 PG (ref 26–35)
MCHC RBC AUTO-ENTMCNC: 30.7 % (ref 32–34.5)
MCV RBC AUTO: 92.3 FL (ref 80–99.9)
MONOCYTES ABSOLUTE: 0.85 E9/L (ref 0.1–0.95)
MONOCYTES RELATIVE PERCENT: 15.5 % (ref 2–12)
NEUTROPHILS ABSOLUTE: 2.46 E9/L (ref 1.8–7.3)
NEUTROPHILS RELATIVE PERCENT: 44.8 % (ref 43–80)
PDW BLD-RTO: 14.8 FL (ref 11.5–15)
PLATELET # BLD: 356 E9/L (ref 130–450)
PMV BLD AUTO: 9.4 FL (ref 7–12)
POTASSIUM SERPL-SCNC: 3.7 MMOL/L (ref 3.5–5)
RBC # BLD: 3.39 E12/L (ref 3.5–5.5)
SEDIMENTATION RATE, ERYTHROCYTE: 73 MM/HR (ref 0–20)
SODIUM BLD-SCNC: 136 MMOL/L (ref 132–146)
TOTAL PROTEIN: 6.3 G/DL (ref 6.4–8.3)
WBC # BLD: 5.5 E9/L (ref 4.5–11.5)

## 2020-01-28 PROCEDURE — 86140 C-REACTIVE PROTEIN: CPT

## 2020-01-28 PROCEDURE — 36415 COLL VENOUS BLD VENIPUNCTURE: CPT

## 2020-01-28 PROCEDURE — 85025 COMPLETE CBC W/AUTO DIFF WBC: CPT

## 2020-01-28 PROCEDURE — 80053 COMPREHEN METABOLIC PANEL: CPT

## 2020-01-28 PROCEDURE — 85651 RBC SED RATE NONAUTOMATED: CPT

## 2020-01-31 ENCOUNTER — HOSPITAL ENCOUNTER (OUTPATIENT)
Age: 85
Discharge: HOME | End: 2020-02-02
Payer: MEDICARE

## 2020-01-31 LAB
ALBUMIN SERPL-MCNC: 2.7 G/DL (ref 3.5–5.2)
ALP BLD-CCNC: 70 U/L (ref 35–104)
ALT SERPL-CCNC: 20 U/L (ref 0–32)
ANION GAP SERPL CALCULATED.3IONS-SCNC: 17 MMOL/L (ref 7–16)
ANISOCYTOSIS: ABNORMAL
AST SERPL-CCNC: 41 U/L (ref 0–31)
ATYPICAL LYMPHOCYTE RELATIVE PERCENT: 1.8 % (ref 0–4)
BASOPHILS ABSOLUTE: 0 E9/L (ref 0–0.2)
BASOPHILS RELATIVE PERCENT: 1.2 % (ref 0–2)
BILIRUB SERPL-MCNC: 0.4 MG/DL (ref 0–1.2)
BUN BLDV-MCNC: 4 MG/DL (ref 8–23)
CALCIUM SERPL-MCNC: 9.2 MG/DL (ref 8.6–10.2)
CHLORIDE BLD-SCNC: 97 MMOL/L (ref 98–107)
CO2: 20 MMOL/L (ref 22–29)
CREAT SERPL-MCNC: 1 MG/DL (ref 0.5–1)
EOSINOPHILS ABSOLUTE: 0.14 E9/L (ref 0.05–0.5)
EOSINOPHILS RELATIVE PERCENT: 2.6 % (ref 0–6)
GFR AFRICAN AMERICAN: >60
GFR NON-AFRICAN AMERICAN: >60 ML/MIN/1.73
GLUCOSE BLD-MCNC: 143 MG/DL (ref 74–99)
HCT VFR BLD CALC: 34.1 % (ref 34–48)
HEMOGLOBIN: 10.3 G/DL (ref 11.5–15.5)
LYMPHOCYTES ABSOLUTE: 1.09 E9/L (ref 1.5–4)
LYMPHOCYTES RELATIVE PERCENT: 19.3 % (ref 20–42)
MCH RBC QN AUTO: 27.9 PG (ref 26–35)
MCHC RBC AUTO-ENTMCNC: 30.2 % (ref 32–34.5)
MCV RBC AUTO: 92.4 FL (ref 80–99.9)
METAMYELOCYTES RELATIVE PERCENT: 0.9 % (ref 0–1)
MONOCYTES ABSOLUTE: 0.26 E9/L (ref 0.1–0.95)
MONOCYTES RELATIVE PERCENT: 5.3 % (ref 2–12)
NEUTROPHILS ABSOLUTE: 3.69 E9/L (ref 1.8–7.3)
NEUTROPHILS RELATIVE PERCENT: 70.2 % (ref 43–80)
NUCLEATED RED BLOOD CELLS: 0.9 /100 WBC
PDW BLD-RTO: 15.1 FL (ref 11.5–15)
PLATELET # BLD: 356 E9/L (ref 130–450)
PMV BLD AUTO: 9.4 FL (ref 7–12)
POLYCHROMASIA: ABNORMAL
POTASSIUM SERPL-SCNC: 4.4 MMOL/L (ref 3.5–5)
RBC # BLD: 3.69 E12/L (ref 3.5–5.5)
SODIUM BLD-SCNC: 134 MMOL/L (ref 132–146)
TOTAL PROTEIN: 6.5 G/DL (ref 6.4–8.3)
WBC # BLD: 5.2 E9/L (ref 4.5–11.5)

## 2020-01-31 PROCEDURE — 36415 COLL VENOUS BLD VENIPUNCTURE: CPT

## 2020-01-31 PROCEDURE — 80053 COMPREHEN METABOLIC PANEL: CPT

## 2020-01-31 PROCEDURE — 85025 COMPLETE CBC W/AUTO DIFF WBC: CPT

## 2020-02-04 ENCOUNTER — APPOINTMENT (OUTPATIENT)
Dept: GENERAL RADIOLOGY | Age: 85
End: 2020-02-04
Payer: MEDICARE

## 2020-02-04 ENCOUNTER — APPOINTMENT (OUTPATIENT)
Dept: CT IMAGING | Age: 85
End: 2020-02-04
Payer: MEDICARE

## 2020-02-04 ENCOUNTER — HOSPITAL ENCOUNTER (OUTPATIENT)
Age: 85
Discharge: HOME | End: 2020-02-06
Payer: MEDICARE

## 2020-02-04 ENCOUNTER — HOSPITAL ENCOUNTER (EMERGENCY)
Age: 85
Discharge: HOME | End: 2020-02-04
Attending: EMERGENCY MEDICINE
Payer: MEDICARE

## 2020-02-04 VITALS
RESPIRATION RATE: 14 BRPM | WEIGHT: 184 LBS | HEIGHT: 60 IN | OXYGEN SATURATION: 95 % | BODY MASS INDEX: 36.12 KG/M2 | SYSTOLIC BLOOD PRESSURE: 184 MMHG | HEART RATE: 90 BPM | TEMPERATURE: 97.8 F | DIASTOLIC BLOOD PRESSURE: 95 MMHG

## 2020-02-04 LAB
ALBUMIN SERPL-MCNC: 3.3 G/DL (ref 3.5–5.2)
ALP BLD-CCNC: 71 U/L (ref 35–104)
ALT SERPL-CCNC: 31 U/L (ref 0–32)
ANION GAP SERPL CALCULATED.3IONS-SCNC: 16 MMOL/L (ref 7–16)
ANION GAP SERPL CALCULATED.3IONS-SCNC: 8 MMOL/L (ref 7–16)
AST SERPL-CCNC: 52 U/L (ref 0–31)
BACTERIA: ABNORMAL /HPF
BASOPHILS ABSOLUTE: 0.05 E9/L (ref 0–0.2)
BASOPHILS ABSOLUTE: 0.08 E9/L (ref 0–0.2)
BASOPHILS RELATIVE PERCENT: 0.7 % (ref 0–2)
BASOPHILS RELATIVE PERCENT: 1.1 % (ref 0–2)
BILIRUB SERPL-MCNC: 0.3 MG/DL (ref 0–1.2)
BILIRUBIN URINE: NEGATIVE
BLOOD, URINE: NEGATIVE
BUN BLDV-MCNC: 12 MG/DL (ref 8–23)
BUN BLDV-MCNC: 12 MG/DL (ref 8–23)
C-REACTIVE PROTEIN: 0.5 MG/DL (ref 0–0.4)
CALCIUM SERPL-MCNC: 8.9 MG/DL (ref 8.6–10.2)
CALCIUM SERPL-MCNC: 9 MG/DL (ref 8.6–10.2)
CHLORIDE BLD-SCNC: 105 MMOL/L (ref 98–107)
CHLORIDE BLD-SCNC: 105 MMOL/L (ref 98–107)
CLARITY: CLEAR
CO2: 21 MMOL/L (ref 22–29)
CO2: 26 MMOL/L (ref 22–29)
COLOR: YELLOW
CREAT SERPL-MCNC: 1 MG/DL (ref 0.5–1)
CREAT SERPL-MCNC: 1.1 MG/DL (ref 0.5–1)
EOSINOPHILS ABSOLUTE: 0.04 E9/L (ref 0.05–0.5)
EOSINOPHILS ABSOLUTE: 0.04 E9/L (ref 0.05–0.5)
EOSINOPHILS RELATIVE PERCENT: 0.5 % (ref 0–6)
EOSINOPHILS RELATIVE PERCENT: 0.6 % (ref 0–6)
GFR AFRICAN AMERICAN: 57
GFR AFRICAN AMERICAN: >60
GFR NON-AFRICAN AMERICAN: 57 ML/MIN/1.73
GFR NON-AFRICAN AMERICAN: >60 ML/MIN/1.73
GLUCOSE BLD-MCNC: 123 MG/DL (ref 74–99)
GLUCOSE BLD-MCNC: 168 MG/DL (ref 74–99)
GLUCOSE URINE: NEGATIVE MG/DL
HCT VFR BLD CALC: 33.7 % (ref 34–48)
HCT VFR BLD CALC: 34.2 % (ref 34–48)
HEMOGLOBIN: 10 G/DL (ref 11.5–15.5)
HEMOGLOBIN: 10.2 G/DL (ref 11.5–15.5)
IMMATURE GRANULOCYTES #: 0.22 E9/L
IMMATURE GRANULOCYTES #: 0.31 E9/L
IMMATURE GRANULOCYTES %: 3.3 % (ref 0–5)
IMMATURE GRANULOCYTES %: 4.1 % (ref 0–5)
KETONES, URINE: NEGATIVE MG/DL
LEUKOCYTE ESTERASE, URINE: NEGATIVE
LYMPHOCYTES ABSOLUTE: 1.94 E9/L (ref 1.5–4)
LYMPHOCYTES ABSOLUTE: 2.46 E9/L (ref 1.5–4)
LYMPHOCYTES RELATIVE PERCENT: 29 % (ref 20–42)
LYMPHOCYTES RELATIVE PERCENT: 32.7 % (ref 20–42)
MCH RBC QN AUTO: 28 PG (ref 26–35)
MCH RBC QN AUTO: 28.7 PG (ref 26–35)
MCHC RBC AUTO-ENTMCNC: 29.2 % (ref 32–34.5)
MCHC RBC AUTO-ENTMCNC: 30.3 % (ref 32–34.5)
MCV RBC AUTO: 94.9 FL (ref 80–99.9)
MCV RBC AUTO: 95.8 FL (ref 80–99.9)
MONOCYTES ABSOLUTE: 0.71 E9/L (ref 0.1–0.95)
MONOCYTES ABSOLUTE: 0.89 E9/L (ref 0.1–0.95)
MONOCYTES RELATIVE PERCENT: 10.6 % (ref 2–12)
MONOCYTES RELATIVE PERCENT: 11.8 % (ref 2–12)
NEUTROPHILS ABSOLUTE: 3.74 E9/L (ref 1.8–7.3)
NEUTROPHILS ABSOLUTE: 3.75 E9/L (ref 1.8–7.3)
NEUTROPHILS RELATIVE PERCENT: 49.8 % (ref 43–80)
NEUTROPHILS RELATIVE PERCENT: 55.8 % (ref 43–80)
NITRITE, URINE: NEGATIVE
PDW BLD-RTO: 15.9 FL (ref 11.5–15)
PDW BLD-RTO: 16 FL (ref 11.5–15)
PH UA: 6 (ref 5–9)
PLATELET # BLD: 325 E9/L (ref 130–450)
PLATELET # BLD: 346 E9/L (ref 130–450)
PMV BLD AUTO: 9.2 FL (ref 7–12)
PMV BLD AUTO: 9.5 FL (ref 7–12)
POTASSIUM SERPL-SCNC: 4 MMOL/L (ref 3.5–5)
POTASSIUM SERPL-SCNC: 4.3 MMOL/L (ref 3.5–5)
PROTEIN UA: NEGATIVE MG/DL
RBC # BLD: 3.55 E12/L (ref 3.5–5.5)
RBC # BLD: 3.57 E12/L (ref 3.5–5.5)
RBC UA: ABNORMAL /HPF (ref 0–2)
SEDIMENTATION RATE, ERYTHROCYTE: 44 MM/HR (ref 0–20)
SODIUM BLD-SCNC: 139 MMOL/L (ref 132–146)
SODIUM BLD-SCNC: 142 MMOL/L (ref 132–146)
SPECIFIC GRAVITY UA: 1.01 (ref 1–1.03)
TOTAL PROTEIN: 6.4 G/DL (ref 6.4–8.3)
UROBILINOGEN, URINE: 0.2 E.U./DL
WBC # BLD: 6.7 E9/L (ref 4.5–11.5)
WBC # BLD: 7.5 E9/L (ref 4.5–11.5)
WBC UA: ABNORMAL /HPF (ref 0–5)
YEAST: ABNORMAL

## 2020-02-04 PROCEDURE — 36415 COLL VENOUS BLD VENIPUNCTURE: CPT

## 2020-02-04 PROCEDURE — 81001 URINALYSIS AUTO W/SCOPE: CPT

## 2020-02-04 PROCEDURE — 6370000000 HC RX 637 (ALT 250 FOR IP): Performed by: EMERGENCY MEDICINE

## 2020-02-04 PROCEDURE — 70450 CT HEAD/BRAIN W/O DYE: CPT

## 2020-02-04 PROCEDURE — 86140 C-REACTIVE PROTEIN: CPT

## 2020-02-04 PROCEDURE — 96376 TX/PRO/DX INJ SAME DRUG ADON: CPT

## 2020-02-04 PROCEDURE — 99285 EMERGENCY DEPT VISIT HI MDM: CPT

## 2020-02-04 PROCEDURE — 80048 BASIC METABOLIC PNL TOTAL CA: CPT

## 2020-02-04 PROCEDURE — 85651 RBC SED RATE NONAUTOMATED: CPT

## 2020-02-04 PROCEDURE — 71046 X-RAY EXAM CHEST 2 VIEWS: CPT

## 2020-02-04 PROCEDURE — 85025 COMPLETE CBC W/AUTO DIFF WBC: CPT

## 2020-02-04 PROCEDURE — 93005 ELECTROCARDIOGRAM TRACING: CPT | Performed by: EMERGENCY MEDICINE

## 2020-02-04 PROCEDURE — 6360000002 HC RX W HCPCS: Performed by: EMERGENCY MEDICINE

## 2020-02-04 PROCEDURE — 80053 COMPREHEN METABOLIC PANEL: CPT

## 2020-02-04 PROCEDURE — 96374 THER/PROPH/DIAG INJ IV PUSH: CPT

## 2020-02-04 RX ORDER — HYDRALAZINE HYDROCHLORIDE 20 MG/ML
5 INJECTION INTRAMUSCULAR; INTRAVENOUS ONCE
Status: COMPLETED | OUTPATIENT
Start: 2020-02-04 | End: 2020-02-04

## 2020-02-04 RX ORDER — METOPROLOL SUCCINATE 25 MG/1
25 TABLET, EXTENDED RELEASE ORAL DAILY
Status: DISCONTINUED | OUTPATIENT
Start: 2020-02-04 | End: 2020-02-04 | Stop reason: HOSPADM

## 2020-02-04 RX ADMIN — HYDRALAZINE HYDROCHLORIDE 5 MG: 20 INJECTION, SOLUTION INTRAMUSCULAR; INTRAVENOUS at 16:22

## 2020-02-04 RX ADMIN — METOPROLOL SUCCINATE 25 MG: 25 TABLET, FILM COATED, EXTENDED RELEASE ORAL at 16:22

## 2020-02-04 RX ADMIN — HYDRALAZINE HYDROCHLORIDE 5 MG: 20 INJECTION, SOLUTION INTRAMUSCULAR; INTRAVENOUS at 17:05

## 2020-02-04 ASSESSMENT — ENCOUNTER SYMPTOMS
EYE DISCHARGE: 0
EYE PAIN: 0
EYE REDNESS: 0
SORE THROAT: 0
DIARRHEA: 0
WHEEZING: 0
SHORTNESS OF BREATH: 0
ABDOMINAL DISTENTION: 0
VOMITING: 0
BACK PAIN: 0
NAUSEA: 0
COUGH: 0
SINUS PRESSURE: 0

## 2020-02-04 NOTE — ED PROVIDER NOTES
Patient states she has parkinson's and is on amantadine 2x daily.  States she has been constipated and has been taking dulcolax every 4 days to help.    Would like to discuss if constipation could be from her diagnosis or medication. Would also like to know if okay to take laxative.    Please call patient. Ok to leave a detailed message.   Immature Granulocytes % 4.1 0.0 - 5.0 %    Lymphocytes % 32.7 20.0 - 42.0 %    Monocytes % 11.8 2.0 - 12.0 %    Eosinophils % 0.5 0.0 - 6.0 %    Basophils % 1.1 0.0 - 2.0 %    Neutrophils Absolute 3.75 1.80 - 7.30 E9/L    Immature Granulocytes # 0.31 E9/L    Lymphocytes Absolute 2.46 1.50 - 4.00 E9/L    Monocytes Absolute 0.89 0.10 - 0.95 E9/L    Eosinophils Absolute 0.04 (L) 0.05 - 0.50 E9/L    Basophils Absolute 0.08 0.00 - 0.20 P1/T   Basic Metabolic Panel   Result Value Ref Range    Sodium 139 132 - 146 mmol/L    Potassium 4.0 3.5 - 5.0 mmol/L    Chloride 105 98 - 107 mmol/L    CO2 26 22 - 29 mmol/L    Anion Gap 8 7 - 16 mmol/L    Glucose 123 (H) 74 - 99 mg/dL    BUN 12 8 - 23 mg/dL    CREATININE 1.1 (H) 0.5 - 1.0 mg/dL    GFR Non-African American 57 >=60 mL/min/1.73    GFR African American 57     Calcium 8.9 8.6 - 10.2 mg/dL   Urinalysis with Microscopic   Result Value Ref Range    Color, UA Yellow Straw/Yellow    Clarity, UA Clear Clear    Glucose, Ur Negative Negative mg/dL    Bilirubin Urine Negative Negative    Ketones, Urine Negative Negative mg/dL    Specific Gravity, UA 1.010 1.005 - 1.030    Blood, Urine Negative Negative    pH, UA 6.0 5.0 - 9.0    Protein, UA Negative Negative mg/dL    Urobilinogen, Urine 0.2 <2.0 E.U./dL    Nitrite, Urine Negative Negative    Leukocyte Esterase, Urine Negative Negative   EKG 12 Lead   Result Value Ref Range    Ventricular Rate 75 BPM    Atrial Rate 75 BPM    P-R Interval 182 ms    QRS Duration 76 ms    Q-T Interval 446 ms    QTc Calculation (Bazett) 498 ms    P Axis 48 degrees    R Axis 26 degrees    T Axis 48 degrees       Radiology:  XR CHEST STANDARD (2 VW)   Final Result   NO ACUTE CARDIOPULMONARY PROCESS          CT Head WO Contrast   Final Result   Stable atrophy and chronic microvascular ischemic disease. EKG: This EKG is signed by emergency department physician.     Rate: 75  Rhythm: Sinus and occasional PACs  Interpretation: prolonged QT for immediate return here for re evaluation. They will followup with their primary care physician by calling their office tomorrow. --------------------------------- IMPRESSION AND DISPOSITION ---------------------------------    IMPRESSION  1. Essential hypertension        DISPOSITION  Disposition: Discharge to home  Patient condition is stable    New Prescriptions    No medications on file       NOTE: This report was transcribed using voice recognition software.  Every effort was made to ensure accuracy; however, inadvertent computerized transcription errors may be present           Mandi Pino DO  Resident  02/04/20 [id number]

## 2020-02-04 NOTE — ED NOTES
Bed:  KRUGER-  Expected date:   Expected time:   Means of arrival:   Comments:  Hattie Calderon, AZALEA  02/04/20 9899

## 2020-02-05 LAB
EKG ATRIAL RATE: 75 BPM
EKG P AXIS: 48 DEGREES
EKG P-R INTERVAL: 182 MS
EKG Q-T INTERVAL: 446 MS
EKG QRS DURATION: 76 MS
EKG QTC CALCULATION (BAZETT): 498 MS
EKG R AXIS: 26 DEGREES
EKG T AXIS: 48 DEGREES
EKG VENTRICULAR RATE: 75 BPM

## 2020-02-05 PROCEDURE — 93010 ELECTROCARDIOGRAM REPORT: CPT | Performed by: INTERNAL MEDICINE

## 2020-03-05 ENCOUNTER — HOSPITAL ENCOUNTER (INPATIENT)
Age: 85
LOS: 9 days | Discharge: HOME HEALTH | DRG: 871 | End: 2020-03-14
Attending: EMERGENCY MEDICINE | Admitting: INTERNAL MEDICINE
Payer: MEDICARE

## 2020-03-05 ENCOUNTER — APPOINTMENT (OUTPATIENT)
Dept: CT IMAGING | Age: 85
DRG: 871 | End: 2020-03-05
Payer: MEDICARE

## 2020-03-05 PROBLEM — A41.9 SEPSIS (HCC): Status: ACTIVE | Noted: 2020-03-05

## 2020-03-05 LAB
ALBUMIN SERPL-MCNC: 3.7 G/DL (ref 3.5–5.2)
ALP BLD-CCNC: 77 U/L (ref 35–104)
ALT SERPL-CCNC: 27 U/L (ref 0–32)
AMMONIA: 28.3 UMOL/L (ref 11–51)
ANION GAP SERPL CALCULATED.3IONS-SCNC: 15 MMOL/L (ref 7–16)
AST SERPL-CCNC: 35 U/L (ref 0–31)
BACTERIA: ABNORMAL /HPF
BASOPHILS ABSOLUTE: 0.05 E9/L (ref 0–0.2)
BASOPHILS RELATIVE PERCENT: 0.6 % (ref 0–2)
BILIRUB SERPL-MCNC: 0.6 MG/DL (ref 0–1.2)
BILIRUBIN URINE: NEGATIVE
BLOOD, URINE: ABNORMAL
BUN BLDV-MCNC: 5 MG/DL (ref 8–23)
CALCIUM SERPL-MCNC: 9.7 MG/DL (ref 8.6–10.2)
CHLORIDE BLD-SCNC: 93 MMOL/L (ref 98–107)
CLARITY: ABNORMAL
CO2: 24 MMOL/L (ref 22–29)
COLOR: YELLOW
CREAT SERPL-MCNC: 0.9 MG/DL (ref 0.5–1)
EOSINOPHILS ABSOLUTE: 0.09 E9/L (ref 0.05–0.5)
EOSINOPHILS RELATIVE PERCENT: 1 % (ref 0–6)
GFR AFRICAN AMERICAN: >60
GFR NON-AFRICAN AMERICAN: >60 ML/MIN/1.73
GLUCOSE BLD-MCNC: 185 MG/DL (ref 74–99)
GLUCOSE URINE: NEGATIVE MG/DL
HCT VFR BLD CALC: 40.2 % (ref 34–48)
HEMOGLOBIN: 12.5 G/DL (ref 11.5–15.5)
IMMATURE GRANULOCYTES #: 0.08 E9/L
IMMATURE GRANULOCYTES %: 0.9 % (ref 0–5)
KETONES, URINE: 15 MG/DL
LACTIC ACID, SEPSIS: 1.2 MMOL/L (ref 0.5–1.9)
LACTIC ACID, SEPSIS: 2.3 MMOL/L (ref 0.5–1.9)
LEUKOCYTE ESTERASE, URINE: ABNORMAL
LYMPHOCYTES ABSOLUTE: 1.54 E9/L (ref 1.5–4)
LYMPHOCYTES RELATIVE PERCENT: 17.2 % (ref 20–42)
MCH RBC QN AUTO: 28 PG (ref 26–35)
MCHC RBC AUTO-ENTMCNC: 31.1 % (ref 32–34.5)
MCV RBC AUTO: 89.9 FL (ref 80–99.9)
MONOCYTES ABSOLUTE: 0.91 E9/L (ref 0.1–0.95)
MONOCYTES RELATIVE PERCENT: 10.2 % (ref 2–12)
NEUTROPHILS ABSOLUTE: 6.27 E9/L (ref 1.8–7.3)
NEUTROPHILS RELATIVE PERCENT: 70.1 % (ref 43–80)
NITRITE, URINE: POSITIVE
PDW BLD-RTO: 15.3 FL (ref 11.5–15)
PH UA: 5.5 (ref 5–9)
PLATELET # BLD: 238 E9/L (ref 130–450)
PMV BLD AUTO: 9.7 FL (ref 7–12)
POTASSIUM REFLEX MAGNESIUM: 3.9 MMOL/L (ref 3.5–5)
PRO-BNP: 872 PG/ML (ref 0–450)
PROTEIN UA: 30 MG/DL
RBC # BLD: 4.47 E12/L (ref 3.5–5.5)
RBC UA: ABNORMAL /HPF (ref 0–2)
SODIUM BLD-SCNC: 132 MMOL/L (ref 132–146)
SPECIFIC GRAVITY UA: >=1.03 (ref 1–1.03)
TOTAL PROTEIN: 7.6 G/DL (ref 6.4–8.3)
TROPONIN: <0.01 NG/ML (ref 0–0.03)
UROBILINOGEN, URINE: 0.2 E.U./DL
WBC # BLD: 8.9 E9/L (ref 4.5–11.5)
WBC UA: >20 /HPF (ref 0–5)

## 2020-03-05 PROCEDURE — 6360000002 HC RX W HCPCS: Performed by: STUDENT IN AN ORGANIZED HEALTH CARE EDUCATION/TRAINING PROGRAM

## 2020-03-05 PROCEDURE — 6360000004 HC RX CONTRAST MEDICATION: Performed by: RADIOLOGY

## 2020-03-05 PROCEDURE — 87150 DNA/RNA AMPLIFIED PROBE: CPT

## 2020-03-05 PROCEDURE — 99285 EMERGENCY DEPT VISIT HI MDM: CPT

## 2020-03-05 PROCEDURE — 96375 TX/PRO/DX INJ NEW DRUG ADDON: CPT

## 2020-03-05 PROCEDURE — 93005 ELECTROCARDIOGRAM TRACING: CPT | Performed by: STUDENT IN AN ORGANIZED HEALTH CARE EDUCATION/TRAINING PROGRAM

## 2020-03-05 PROCEDURE — 85025 COMPLETE CBC W/AUTO DIFF WBC: CPT

## 2020-03-05 PROCEDURE — 83880 ASSAY OF NATRIURETIC PEPTIDE: CPT

## 2020-03-05 PROCEDURE — 87186 SC STD MICRODIL/AGAR DIL: CPT

## 2020-03-05 PROCEDURE — 87088 URINE BACTERIA CULTURE: CPT

## 2020-03-05 PROCEDURE — 36415 COLL VENOUS BLD VENIPUNCTURE: CPT

## 2020-03-05 PROCEDURE — 84484 ASSAY OF TROPONIN QUANT: CPT

## 2020-03-05 PROCEDURE — 2060000000 HC ICU INTERMEDIATE R&B

## 2020-03-05 PROCEDURE — 2500000003 HC RX 250 WO HCPCS: Performed by: STUDENT IN AN ORGANIZED HEALTH CARE EDUCATION/TRAINING PROGRAM

## 2020-03-05 PROCEDURE — 80053 COMPREHEN METABOLIC PANEL: CPT

## 2020-03-05 PROCEDURE — 83605 ASSAY OF LACTIC ACID: CPT

## 2020-03-05 PROCEDURE — 82140 ASSAY OF AMMONIA: CPT

## 2020-03-05 PROCEDURE — 2580000003 HC RX 258: Performed by: STUDENT IN AN ORGANIZED HEALTH CARE EDUCATION/TRAINING PROGRAM

## 2020-03-05 PROCEDURE — 70450 CT HEAD/BRAIN W/O DYE: CPT

## 2020-03-05 PROCEDURE — 6370000000 HC RX 637 (ALT 250 FOR IP): Performed by: STUDENT IN AN ORGANIZED HEALTH CARE EDUCATION/TRAINING PROGRAM

## 2020-03-05 PROCEDURE — 74177 CT ABD & PELVIS W/CONTRAST: CPT

## 2020-03-05 PROCEDURE — 96365 THER/PROPH/DIAG IV INF INIT: CPT

## 2020-03-05 PROCEDURE — 94760 N-INVAS EAR/PLS OXIMETRY 1: CPT

## 2020-03-05 PROCEDURE — 81001 URINALYSIS AUTO W/SCOPE: CPT

## 2020-03-05 PROCEDURE — 87040 BLOOD CULTURE FOR BACTERIA: CPT

## 2020-03-05 RX ORDER — METOPROLOL SUCCINATE 25 MG/1
25 TABLET, EXTENDED RELEASE ORAL DAILY
Status: DISCONTINUED | OUTPATIENT
Start: 2020-03-06 | End: 2020-03-13

## 2020-03-05 RX ORDER — ACETAMINOPHEN 325 MG/1
650 TABLET ORAL ONCE
Status: COMPLETED | OUTPATIENT
Start: 2020-03-05 | End: 2020-03-05

## 2020-03-05 RX ORDER — 0.9 % SODIUM CHLORIDE 0.9 %
1000 INTRAVENOUS SOLUTION INTRAVENOUS ONCE
Status: COMPLETED | OUTPATIENT
Start: 2020-03-05 | End: 2020-03-05

## 2020-03-05 RX ORDER — SODIUM CHLORIDE 0.9 % (FLUSH) 0.9 %
10 SYRINGE (ML) INJECTION EVERY 12 HOURS SCHEDULED
Status: DISCONTINUED | OUTPATIENT
Start: 2020-03-05 | End: 2020-03-14 | Stop reason: HOSPADM

## 2020-03-05 RX ORDER — PANTOPRAZOLE SODIUM 20 MG/1
20 TABLET, DELAYED RELEASE ORAL DAILY
Status: DISCONTINUED | OUTPATIENT
Start: 2020-03-06 | End: 2020-03-14 | Stop reason: HOSPADM

## 2020-03-05 RX ORDER — SODIUM CHLORIDE 0.9 % (FLUSH) 0.9 %
10 SYRINGE (ML) INJECTION PRN
Status: DISCONTINUED | OUTPATIENT
Start: 2020-03-05 | End: 2020-03-14 | Stop reason: HOSPADM

## 2020-03-05 RX ORDER — ACETAMINOPHEN 325 MG/1
650 TABLET ORAL EVERY 4 HOURS PRN
Status: DISCONTINUED | OUTPATIENT
Start: 2020-03-05 | End: 2020-03-14 | Stop reason: HOSPADM

## 2020-03-05 RX ADMIN — IOPAMIDOL 110 ML: 755 INJECTION, SOLUTION INTRAVENOUS at 18:17

## 2020-03-05 RX ADMIN — METRONIDAZOLE 500 MG: 500 INJECTION, SOLUTION INTRAVENOUS at 19:22

## 2020-03-05 RX ADMIN — SODIUM CHLORIDE 1000 ML: 9 INJECTION, SOLUTION INTRAVENOUS at 19:46

## 2020-03-05 RX ADMIN — CEFTRIAXONE 2 G: 2 INJECTION, POWDER, FOR SOLUTION INTRAMUSCULAR; INTRAVENOUS at 19:18

## 2020-03-05 RX ADMIN — ACETAMINOPHEN 650 MG: 325 TABLET ORAL at 19:29

## 2020-03-05 RX ADMIN — Medication 10 ML: at 22:50

## 2020-03-05 ASSESSMENT — ENCOUNTER SYMPTOMS
NAUSEA: 1
VOMITING: 0
ABDOMINAL PAIN: 1

## 2020-03-05 ASSESSMENT — PAIN SCALES - GENERAL
PAINLEVEL_OUTOF10: 0
PAINLEVEL_OUTOF10: 0
PAINLEVEL_OUTOF10: 8
PAINLEVEL_OUTOF10: 0

## 2020-03-05 ASSESSMENT — PAIN DESCRIPTION - PAIN TYPE: TYPE: ACUTE PAIN

## 2020-03-05 ASSESSMENT — PAIN DESCRIPTION - LOCATION: LOCATION: ABDOMEN

## 2020-03-05 NOTE — ED PROVIDER NOTES
Musculoskeletal: Normal range of motion and neck supple. Cardiovascular:      Rate and Rhythm: Normal rate and regular rhythm. Pulmonary:      Effort: Pulmonary effort is normal.      Breath sounds: Normal breath sounds. Abdominal:      General: Bowel sounds are normal. There is distension. Palpations: Abdomen is soft. Tenderness: There is abdominal tenderness. There is guarding. There is no right CVA tenderness, left CVA tenderness or rebound. Skin:     General: Skin is warm and dry. Capillary Refill: Capillary refill takes 2 to 3 seconds. Coloration: Skin is not pale. Findings: No erythema or rash. Neurological:      Mental Status: She is easily aroused. She is disoriented. Comments: Limited due to acuity of patient and patient is unable to participate in exam. Patient is moving all extremities   Psychiatric:      Comments: Unable to evaluate          Procedures     MDM  Number of Diagnoses or Management Options  Acute cystitis with hematuria:   Altered mental status, unspecified altered mental status type:   Appendicitis, unspecified appendicitis type:   Ileus ([address]):   Metabolic encephalopathy:   Prolonged Q-T interval on ECG:   Diagnosis management comments: Oren Claude is an 80year old female who presented to ED with abdominal pain. Patient found to be febrile with UTI. CT abdomen was concerning for early appendicitis per radiology interpretation. Patient's abdomen is distended and tender. Possible patient's symptoms due to abdominal process versus sepsis due to UTI. Treated with Rocephin and Flagyl in ED. discussed with Dr. Nicola Walters, he will follow patient and recommended changing antibiotics to Zosyn, antibiotics were already given in the ED but discussed changing antibiotics with admitting physician. Patient stable while in ED. Patient more alert at time of evaluation.    Patient given 1L IVF, rocephin, flagyl in ED  Discussed results and plan with patient's

## 2020-03-06 PROBLEM — K36: Status: ACTIVE | Noted: 2020-03-06

## 2020-03-06 LAB
ANION GAP SERPL CALCULATED.3IONS-SCNC: 13 MMOL/L (ref 7–16)
BUN BLDV-MCNC: 6 MG/DL (ref 8–23)
CALCIUM SERPL-MCNC: 9 MG/DL (ref 8.6–10.2)
CHLORIDE BLD-SCNC: 95 MMOL/L (ref 98–107)
CO2: 23 MMOL/L (ref 22–29)
CREAT SERPL-MCNC: 1 MG/DL (ref 0.5–1)
EKG ATRIAL RATE: 96 BPM
EKG P AXIS: 21 DEGREES
EKG P-R INTERVAL: 190 MS
EKG Q-T INTERVAL: 418 MS
EKG QRS DURATION: 74 MS
EKG QTC CALCULATION (BAZETT): 528 MS
EKG R AXIS: -33 DEGREES
EKG T AXIS: 19 DEGREES
EKG VENTRICULAR RATE: 96 BPM
GFR AFRICAN AMERICAN: >60
GFR NON-AFRICAN AMERICAN: >60 ML/MIN/1.73
GLUCOSE BLD-MCNC: 138 MG/DL (ref 74–99)
HCT VFR BLD CALC: 38.1 % (ref 34–48)
HEMOGLOBIN: 11.7 G/DL (ref 11.5–15.5)
MCH RBC QN AUTO: 28.1 PG (ref 26–35)
MCHC RBC AUTO-ENTMCNC: 30.7 % (ref 32–34.5)
MCV RBC AUTO: 91.4 FL (ref 80–99.9)
METER GLUCOSE: 129 MG/DL (ref 74–99)
METER GLUCOSE: 139 MG/DL (ref 74–99)
METER GLUCOSE: 149 MG/DL (ref 74–99)
METER GLUCOSE: 150 MG/DL (ref 74–99)
PDW BLD-RTO: 15.6 FL (ref 11.5–15)
PLATELET # BLD: 211 E9/L (ref 130–450)
PMV BLD AUTO: 10.5 FL (ref 7–12)
POTASSIUM SERPL-SCNC: 3.7 MMOL/L (ref 3.5–5)
RBC # BLD: 4.17 E12/L (ref 3.5–5.5)
SODIUM BLD-SCNC: 131 MMOL/L (ref 132–146)
WBC # BLD: 7.6 E9/L (ref 4.5–11.5)

## 2020-03-06 PROCEDURE — 2060000000 HC ICU INTERMEDIATE R&B

## 2020-03-06 PROCEDURE — 82962 GLUCOSE BLOOD TEST: CPT

## 2020-03-06 PROCEDURE — 6370000000 HC RX 637 (ALT 250 FOR IP): Performed by: INTERNAL MEDICINE

## 2020-03-06 PROCEDURE — 6370000000 HC RX 637 (ALT 250 FOR IP): Performed by: STUDENT IN AN ORGANIZED HEALTH CARE EDUCATION/TRAINING PROGRAM

## 2020-03-06 PROCEDURE — 80048 BASIC METABOLIC PNL TOTAL CA: CPT

## 2020-03-06 PROCEDURE — 36415 COLL VENOUS BLD VENIPUNCTURE: CPT

## 2020-03-06 PROCEDURE — 2580000003 HC RX 258: Performed by: STUDENT IN AN ORGANIZED HEALTH CARE EDUCATION/TRAINING PROGRAM

## 2020-03-06 PROCEDURE — 2580000003 HC RX 258: Performed by: INTERNAL MEDICINE

## 2020-03-06 PROCEDURE — 85027 COMPLETE CBC AUTOMATED: CPT

## 2020-03-06 RX ORDER — DEXTROSE MONOHYDRATE 25 G/50ML
12.5 INJECTION, SOLUTION INTRAVENOUS PRN
Status: DISCONTINUED | OUTPATIENT
Start: 2020-03-06 | End: 2020-03-14 | Stop reason: HOSPADM

## 2020-03-06 RX ORDER — NICOTINE POLACRILEX 4 MG
15 LOZENGE BUCCAL PRN
Status: DISCONTINUED | OUTPATIENT
Start: 2020-03-06 | End: 2020-03-14 | Stop reason: HOSPADM

## 2020-03-06 RX ORDER — SODIUM CHLORIDE 9 MG/ML
INJECTION, SOLUTION INTRAVENOUS CONTINUOUS
Status: DISCONTINUED | OUTPATIENT
Start: 2020-03-06 | End: 2020-03-13

## 2020-03-06 RX ORDER — DEXTROSE MONOHYDRATE 50 MG/ML
100 INJECTION, SOLUTION INTRAVENOUS PRN
Status: DISCONTINUED | OUTPATIENT
Start: 2020-03-06 | End: 2020-03-14 | Stop reason: HOSPADM

## 2020-03-06 RX ADMIN — METOPROLOL SUCCINATE 25 MG: 25 TABLET, EXTENDED RELEASE ORAL at 13:18

## 2020-03-06 RX ADMIN — Medication 10 ML: at 09:01

## 2020-03-06 RX ADMIN — INSULIN LISPRO 1 UNITS: 100 INJECTION, SOLUTION INTRAVENOUS; SUBCUTANEOUS at 22:05

## 2020-03-06 RX ADMIN — SODIUM CHLORIDE: 9 INJECTION, SOLUTION INTRAVENOUS at 08:59

## 2020-03-06 RX ADMIN — ACETAMINOPHEN 650 MG: 325 TABLET ORAL at 11:43

## 2020-03-06 ASSESSMENT — PAIN SCALES - GENERAL
PAINLEVEL_OUTOF10: 0

## 2020-03-06 NOTE — CONSULTS
GENERAL SURGERY  CONSULT NOTE  3/6/2020    Physician Consulted: Dr. Angelique Mathews  Reason for Consult: appendicitis  Referring Physician: Dr. Paula Monte    Chief Complaint   Patient presents with    Abdominal Pain     RLQ pain, EMS states that pt has been seen for this issue, dx with appendicitis, treated with abx    Nausea     HPI  Karthikeyan Sanches is a 80 y.o. female who presents for evaluation of RLQ pain. In January was admitted here for appendicitis nonoperatively with antibiotics. She was felt to be a high risk surgical candidate so whe was discharged home with a PICC and antibiotics. Per PCP was doing well last week. In the ED Has had nausea and vomiting. No appetite for days. She does not want surgery because she coded when she had general anesthetic in the past.    Past Medical History:   Diagnosis Date    Arthritis     Diabetes mellitus ([facility]     Hyperlipidemia     Hypertension        Past Surgical History:   Procedure Laterality Date    CARPAL TUNNEL RELEASE      CHOLECYSTECTOMY      HC INSERT PICC CATH, 5/> YRS  1/21/2020         HYSTERECTOMY         Medications Prior to Admission:    Prior to Admission medications    Medication Sig Start Date End Date Taking? Authorizing Provider   metoprolol succinate (TOPROL XL) 25 MG extended release tablet Take 1 tablet by mouth daily 1/25/20   Laura Powell MD   pantoprazole (PROTONIX) 20 MG tablet Take 1 tablet by mouth daily 7/17/17   Amberly Casillas,        No Known Allergies    History reviewed. No pertinent family history.     Social History     Tobacco Use    Smoking status: Never Smoker    Smokeless tobacco: Never Used   Substance Use Topics    Alcohol use: No    Drug use: Not on file     Review of Systems   General ROS: positive for  - fever and low appetite  Hematological and Lymphatic ROS: negative for - bleeding problems or blood clots  Respiratory ROS: negative  Cardiovascular ROS: negative  Gastrointestinal ROS: as above  Genito-Urinary ROS: negative  Musculoskeletal ROS: negative      PHYSICAL EXAM:    Vitals:    03/06/20 0430   BP: 138/62   Pulse: 92   Resp: 18   Temp: 98 °F (36.7 °C)   SpO2: 100%     General appearance: alert, fatigued but in no acute distress. Eyes: grossly normal  Lungs: nonlabored breathign on room air  Heart: regular rate  Abdomen: obese, soft, minimally distended, mild diffuse tenderness to palpation without rigidity or guarding    LABS:  CBC  Recent Labs     03/06/20  0427   WBC 7.6   HGB 11.7   HCT 38.1        BMP  Recent Labs     03/06/20  0427   *   K 3.7   CL 95*   CO2 23   BUN 6*   CREATININE 1.0   CALCIUM 9.0     Liver Function  Recent Labs     03/05/20  1707   BILITOT 0.6   AST 35*   ALT 27   ALKPHOS 77   PROT 7.6   LABALBU 3.7     Lab Results   Component Value Date    LACTA 1.7 01/18/2020      RADIOLOGY  Ct Abdomen Pelvis W Iv Contrast Additional Contrast? None    Result Date: 3/5/2020    Upper normal appendix with mild haziness. Under proper clinical setting, early acute appendicitis is considered. Small bowel ileus. Above findings were telephoned to the ED physician.  ALERT:  THIS IS AN ABNORMAL REPORT     ASSESSMENT:  80 y.o. female with abdominal pain, history of appendicitis treated nonoperatively    PLAN:  She does not want surgery and she remains a high risk surgical candidate  Pain control as able  Agree with ID consult for antibiotic recommendations    Electronically signed by Valentin Mina MD on 3/6/20 at 6:10 AM

## 2020-03-06 NOTE — PROGRESS NOTES
General Surgery    Full consult to follow. Patient seen/examined in ER, no acute abdomen at this time. CT shows minimal haziness around appendix but contrast in lumen. Minimal TTP RLQ on exam. Patient adamantly refusing any surgical intervention.  Recommend IV abx and bowel rest.

## 2020-03-07 ENCOUNTER — APPOINTMENT (OUTPATIENT)
Dept: CT IMAGING | Age: 85
DRG: 871 | End: 2020-03-07
Payer: MEDICARE

## 2020-03-07 LAB
ANION GAP SERPL CALCULATED.3IONS-SCNC: 10 MMOL/L (ref 7–16)
BUN BLDV-MCNC: 7 MG/DL (ref 8–23)
CALCIUM SERPL-MCNC: 8.3 MG/DL (ref 8.6–10.2)
CHLORIDE BLD-SCNC: 98 MMOL/L (ref 98–107)
CO2: 22 MMOL/L (ref 22–29)
CREAT SERPL-MCNC: 0.9 MG/DL (ref 0.5–1)
GFR AFRICAN AMERICAN: >60
GFR NON-AFRICAN AMERICAN: >60 ML/MIN/1.73
GLUCOSE BLD-MCNC: 150 MG/DL (ref 74–99)
HCT VFR BLD CALC: 33.2 % (ref 34–48)
HEMOGLOBIN: 10.3 G/DL (ref 11.5–15.5)
MCH RBC QN AUTO: 28 PG (ref 26–35)
MCHC RBC AUTO-ENTMCNC: 31 % (ref 32–34.5)
MCV RBC AUTO: 90.2 FL (ref 80–99.9)
METER GLUCOSE: 107 MG/DL (ref 74–99)
METER GLUCOSE: 121 MG/DL (ref 74–99)
METER GLUCOSE: 129 MG/DL (ref 74–99)
METER GLUCOSE: 136 MG/DL (ref 74–99)
ORGANISM: ABNORMAL
PDW BLD-RTO: 15.4 FL (ref 11.5–15)
PLATELET # BLD: 164 E9/L (ref 130–450)
PMV BLD AUTO: 9.9 FL (ref 7–12)
POTASSIUM SERPL-SCNC: 3.4 MMOL/L (ref 3.5–5)
RBC # BLD: 3.68 E12/L (ref 3.5–5.5)
SODIUM BLD-SCNC: 130 MMOL/L (ref 132–146)
URINE CULTURE, ROUTINE: ABNORMAL
WBC # BLD: 9.1 E9/L (ref 4.5–11.5)

## 2020-03-07 PROCEDURE — 74177 CT ABD & PELVIS W/CONTRAST: CPT

## 2020-03-07 PROCEDURE — 6360000002 HC RX W HCPCS: Performed by: INTERNAL MEDICINE

## 2020-03-07 PROCEDURE — 6360000004 HC RX CONTRAST MEDICATION: Performed by: RADIOLOGY

## 2020-03-07 PROCEDURE — 6370000000 HC RX 637 (ALT 250 FOR IP): Performed by: STUDENT IN AN ORGANIZED HEALTH CARE EDUCATION/TRAINING PROGRAM

## 2020-03-07 PROCEDURE — 80048 BASIC METABOLIC PNL TOTAL CA: CPT

## 2020-03-07 PROCEDURE — 82962 GLUCOSE BLOOD TEST: CPT

## 2020-03-07 PROCEDURE — 6360000002 HC RX W HCPCS: Performed by: SPECIALIST

## 2020-03-07 PROCEDURE — 6370000000 HC RX 637 (ALT 250 FOR IP): Performed by: SPECIALIST

## 2020-03-07 PROCEDURE — 2580000003 HC RX 258: Performed by: RADIOLOGY

## 2020-03-07 PROCEDURE — 2060000000 HC ICU INTERMEDIATE R&B

## 2020-03-07 PROCEDURE — 97161 PT EVAL LOW COMPLEX 20 MIN: CPT

## 2020-03-07 PROCEDURE — 2580000003 HC RX 258: Performed by: STUDENT IN AN ORGANIZED HEALTH CARE EDUCATION/TRAINING PROGRAM

## 2020-03-07 PROCEDURE — 2580000003 HC RX 258: Performed by: SPECIALIST

## 2020-03-07 PROCEDURE — 6370000000 HC RX 637 (ALT 250 FOR IP): Performed by: INTERNAL MEDICINE

## 2020-03-07 PROCEDURE — 36415 COLL VENOUS BLD VENIPUNCTURE: CPT

## 2020-03-07 PROCEDURE — 2580000003 HC RX 258: Performed by: INTERNAL MEDICINE

## 2020-03-07 PROCEDURE — 99223 1ST HOSP IP/OBS HIGH 75: CPT | Performed by: SURGERY

## 2020-03-07 PROCEDURE — 71275 CT ANGIOGRAPHY CHEST: CPT

## 2020-03-07 PROCEDURE — 93005 ELECTROCARDIOGRAM TRACING: CPT | Performed by: INTERNAL MEDICINE

## 2020-03-07 PROCEDURE — 6360000002 HC RX W HCPCS: Performed by: STUDENT IN AN ORGANIZED HEALTH CARE EDUCATION/TRAINING PROGRAM

## 2020-03-07 PROCEDURE — 93308 TTE F-UP OR LMTD: CPT

## 2020-03-07 PROCEDURE — 85027 COMPLETE CBC AUTOMATED: CPT

## 2020-03-07 RX ORDER — SODIUM CHLORIDE 0.9 % (FLUSH) 0.9 %
10 SYRINGE (ML) INJECTION PRN
Status: DISCONTINUED | OUTPATIENT
Start: 2020-03-07 | End: 2020-03-14 | Stop reason: HOSPADM

## 2020-03-07 RX ORDER — SODIUM CHLORIDE 9 MG/ML
INJECTION, SOLUTION INTRAVENOUS EVERY 8 HOURS
Status: DISCONTINUED | OUTPATIENT
Start: 2020-03-07 | End: 2020-03-08

## 2020-03-07 RX ORDER — SODIUM CHLORIDE 9 MG/ML
INJECTION, SOLUTION INTRAVENOUS EVERY 8 HOURS
Status: DISCONTINUED | OUTPATIENT
Start: 2020-03-07 | End: 2020-03-07

## 2020-03-07 RX ADMIN — PIPERACILLIN AND TAZOBACTAM 3.38 G: 3; .375 INJECTION, POWDER, FOR SOLUTION INTRAVENOUS at 08:50

## 2020-03-07 RX ADMIN — SODIUM CHLORIDE: 9 INJECTION, SOLUTION INTRAVENOUS at 17:53

## 2020-03-07 RX ADMIN — MEROPENEM 1 G: 1 INJECTION, POWDER, FOR SOLUTION INTRAVENOUS at 14:51

## 2020-03-07 RX ADMIN — PANTOPRAZOLE SODIUM 20 MG: 20 TABLET, DELAYED RELEASE ORAL at 08:50

## 2020-03-07 RX ADMIN — SODIUM CHLORIDE, PRESERVATIVE FREE 10 ML: 5 INJECTION INTRAVENOUS at 16:41

## 2020-03-07 RX ADMIN — ACETAMINOPHEN 650 MG: 325 TABLET ORAL at 08:50

## 2020-03-07 RX ADMIN — IOPAMIDOL 70 ML: 755 INJECTION, SOLUTION INTRAVENOUS at 16:44

## 2020-03-07 RX ADMIN — TRIMETHOBENZAMIDE HYDROCHLORIDE 200 MG: 100 INJECTION INTRAMUSCULAR at 20:57

## 2020-03-07 RX ADMIN — SODIUM CHLORIDE: 9 INJECTION, SOLUTION INTRAVENOUS at 16:08

## 2020-03-07 RX ADMIN — SODIUM CHLORIDE, PRESERVATIVE FREE 10 ML: 5 INJECTION INTRAVENOUS at 11:58

## 2020-03-07 RX ADMIN — FLUCONAZOLE 400 MG: 150 TABLET ORAL at 16:07

## 2020-03-07 RX ADMIN — IOPAMIDOL 110 ML: 755 INJECTION, SOLUTION INTRAVENOUS at 11:59

## 2020-03-07 RX ADMIN — SODIUM CHLORIDE, PRESERVATIVE FREE 10 ML: 5 INJECTION INTRAVENOUS at 15:41

## 2020-03-07 RX ADMIN — MEROPENEM 1 G: 1 INJECTION, POWDER, FOR SOLUTION INTRAVENOUS at 21:59

## 2020-03-07 RX ADMIN — SODIUM CHLORIDE: 9 INJECTION, SOLUTION INTRAVENOUS at 13:51

## 2020-03-07 RX ADMIN — ENOXAPARIN SODIUM 80 MG: 80 INJECTION SUBCUTANEOUS at 14:50

## 2020-03-07 RX ADMIN — METOPROLOL SUCCINATE 25 MG: 25 TABLET, EXTENDED RELEASE ORAL at 08:50

## 2020-03-07 RX ADMIN — Medication 10 ML: at 21:32

## 2020-03-07 RX ADMIN — SODIUM CHLORIDE: 9 INJECTION, SOLUTION INTRAVENOUS at 03:20

## 2020-03-07 ASSESSMENT — PAIN SCALES - GENERAL
PAINLEVEL_OUTOF10: 0

## 2020-03-07 NOTE — CONSULTS
Sanchez King MD        glucagon (rDNA) injection 1 mg  1 mg Intramuscular PRN Sanchez King MD        dextrose 5 % solution  100 mL/hr Intravenous PRN Sanchez King MD        insulin lispro (HUMALOG) injection vial 0-12 Units  0-12 Units Subcutaneous TID WC Sanchez King MD        insulin lispro (HUMALOG) injection vial 0-6 Units  0-6 Units Subcutaneous Nightly Sanchez King MD   1 Units at 03/06/20 2205    0.9 % sodium chloride infusion   Intravenous Continuous Sanchez King MD 75 mL/hr at 03/07/20 0320      trimethobenzamide (TIGAN) injection 200 mg  200 mg Intramuscular Q6H PRN Erica Lowery MD        sodium chloride flush 0.9 % injection 10 mL  10 mL Intravenous 2 times per day Erica Lowery MD   10 mL at 03/06/20 0901    sodium chloride flush 0.9 % injection 10 mL  10 mL Intravenous PRN Erica Lowery MD   10 mL at 03/07/20 1158    acetaminophen (TYLENOL) tablet 650 mg  650 mg Oral Q4H PRN Erica Lowery MD   650 mg at 03/07/20 0850    metoprolol succinate (TOPROL XL) extended release tablet 25 mg  25 mg Oral Daily Sanchez King MD   25 mg at 03/07/20 0850    pantoprazole (PROTONIX) tablet 20 mg  20 mg Oral Daily Sanchez King MD   20 mg at 03/07/20 0496        has a past medical history of Arthritis, Diabetes mellitus ([address] Hyperlipidemia, and Hypertension. has a past surgical history that includes Cholecystectomy; Hysterectomy; Carpal tunnel release; and Insert Picc Cath, 5/> Yrs (1/21/2020). reports that she has never smoked. She has never used smokeless tobacco. She reports that she does not drink alcohol. (smoked only briefly as teenager)    No family history of clots or spontaneous miscarriage     Allergies Patient has no known allergies. ROS:  General. Very poor appetite.  + fever (103.1 on admission)  Neuro: no focal weakness, light headedness, or visual changes  Integumentary: no itching or rash  Respiratory: no complaint of cough, chest pain, wheezing, shortness of breath  Cardiovascular: No chest pain, palpitations, edema  GI: nausea has resolved. No diarrhea  : + urinary frequency, - dysuria  Endocrine: + Type II DM  Musculosckelatal: c/o arthritis knees, ankles  Psychiatric: no complaint of anxiety or depression    Vitals:    03/07/20 1501   BP: (!) 119/56   Pulse: 89   Resp: 18   Temp: 99 °F (37.2 °C)   SpO2: 96%       Intake/Output Summary (Last 24 hours) at 3/7/2020 1525  Last data filed at 3/7/2020 1503  Gross per 24 hour   Intake 360 ml   Output 150 ml   Net 210 ml       Exam/Objective:   WD overweight female in no acute distress resting in bed and breathing ambient air  Skin: warm, dry, without rash  HEENT: PERRL, face symmetric, normal moist oral mucosa, edentulous no neck mass, adenopathy, or thyromegaly  Chest: symmetric with equal air entry. normal resonance to percussion. Lungs: without rales, rhonchi, or wheezes  Cardiac: PMI not palpable, S1, S2 normal.  No murmur or ria. No thrills or heave  Abdomen: Obese, soft, non-tender, active bowel sounds. No mass or hepatosplenomegaly  Extremities: No clubbing, cyanosis, or edema. No cords although there is some mild tenderness in the upper left calf   neuro: CN II - XII grossly intact. Alert, moves all extremities symmetrically.  Oriented to person, time place  Psych:  Normal affect and insight    CBC with Differential:    Lab Results   Component Value Date    WBC 9.1 03/07/2020    RBC 3.68 03/07/2020    HGB 10.3 03/07/2020    HCT 33.2 03/07/2020     03/07/2020    MCV 90.2 03/07/2020    MCH 28.0 03/07/2020    MCHC 31.0 03/07/2020    RDW 15.4 03/07/2020    NRBC 0.9 01/31/2020    METASPCT 0.9 01/31/2020    LYMPHOPCT 17.2 03/05/2020    MONOPCT 10.2 03/05/2020    BASOPCT 0.6 03/05/2020    MONOSABS 0.91 03/05/2020    LYMPHSABS 1.54 03/05/2020    EOSABS 0.09 03/05/2020    BASOSABS 0.05 03/05/2020     CMP:    Lab Results   Component Value Date     03/07/2020    K 3.4 03/07/2020    K 3.9 03/05/2020 CL 98 03/07/2020    CO2 22 03/07/2020    BUN 7 03/07/2020    CREATININE 0.9 03/07/2020    GFRAA >60 03/07/2020    LABGLOM >60 03/07/2020    GLUCOSE 150 03/07/2020    PROT 7.6 03/05/2020    LABALBU 3.7 03/05/2020    CALCIUM 8.3 03/07/2020    BILITOT 0.6 03/05/2020    ALKPHOS 77 03/05/2020    AST 35 03/05/2020    ALT 27 03/05/2020             IMPRESSION:  1. Saddle embolism does not appear to be occlusive and is hemodynamically stable at this time  2. Small right pleural effusion with associated minor atelectasis  3. Probable appendicitis improving with antibiotic therapy  4. History hypertension, type 2 diabetes        Plan:   1. Agree with Lovenox at this time -likely with transition to 3 months of Eliquis   2. Urgent echo as per PE protocol  3. Clinically does not appear to need thrombolytic therapy / EKOS -final decision after echo results available. Family at bedside and findings and plans were discussed with them in detail. Thank you for allowing us to see Mrs. Kaleigh Rich in consultation with you.     Vanda Aden MD, FACP, FCCP, FAASM

## 2020-03-07 NOTE — PROGRESS NOTES
Dr. Jonnathan Martínez came to see pt, explained to pt about the need of oxygen if it continues to drop and if echo is bad, will notify pul when echo back

## 2020-03-07 NOTE — PROGRESS NOTES
Patient took oxygen off, pulse ox 90% @RA, explained to pt that she needs oxygen to keep oxygen level greater than 92%, pt absolutely refuses it and starts to get verbally aggressive toward RN, education given to pt, pt verbalizes understanding,  and daughter at bedside    Instructed pt to call staff if she has trouble breathing, pt and family verbalize understanding

## 2020-03-07 NOTE — PROGRESS NOTES
Dr. Reena Camacho notified via perfect serve regarding CTA results, pt's pulse ox and refusing oxygen; and cardiology notified to read echo; ? Need to transfer to ICU. Await for further instructions.

## 2020-03-07 NOTE — PROGRESS NOTES
Fabiana Mendez. Jesus Alberto Aleman is notified that echo report is back via perfect serve. Responded, no new orders for now.

## 2020-03-07 NOTE — PROGRESS NOTES
Called clinical nurse manager regarding STAT echo which must be done today per Dr. Terryann Hatchet.  Per CNM, need a cardiologist to read report, consult out for Dr. Mindy Mas to read echo, went to JAMES Mcghee, via perfect serve, she responded will update Dr. Rajiv Sheffield CNM updated and will call echo

## 2020-03-07 NOTE — PROGRESS NOTES
Physical Therapy    Facility/Department: [address] INTERMEDIATE 1  Initial Assessment    NAME: Cristian Estrada  : 1932  MRN: [id number]    Date of Service: 3/7/2020      Patient Diagnosis(es): The primary encounter diagnosis was Altered mental status, unspecified altered mental status type. Diagnoses of Appendicitis, unspecified appendicitis type, Acute cystitis with hematuria, Ileus ([address] Metabolic encephalopathy, and Prolonged Q-T interval on ECG were also pertinent to this visit. has a past medical history of Arthritis, Diabetes mellitus ([address] Hyperlipidemia, and Hypertension. has a past surgical history that includes Cholecystectomy; Hysterectomy; Carpal tunnel release; and Insert Picc Cath, 5/> Yrs (2020). Referring Provider:  Tamar Zafar MD    Evaluating Therapist: Genetta Severs PT      Room #:442  DIAGNOSIS: Sepsis  PRECAUTIONS: falls, alarm    Social:  Pt unable to report. Per family pt lives with spouse in a 1 floor plan. States she is independent, unsure is she uses walker or not. Initial Evaluation  Date: 3/7/20 Treatment      Short Term/ Long Term   Goals   Was pt agreeable to Eval/treatment? With encouragement     Does pt have pain? No c/o pain     Bed Mobility  Rolling: dependent  Supine to sit: dependent  Sit to supine: NT  Scooting: dependent  Mod assist   Transfers Low pivot bed to chair max assist, pt unable to fully stand  Mod assist   Ambulation    NT   15 feet with ww with mod assist   Stair Negotiation  Ascended and descended  NT      LE strength     Pt make no attempt to move LE's    3/5   balance      poor     AM-PAC Raw score                        Pt is alert minimally verbal.  Answers yes/no with head nod. LE ROM: WFL  Sensation: NT  Edema: none  Endurance: poor  Chair alarm: yes     ASSESSMENT  Pt displays functional ability as noted in the objective portion of this evaluation.       Patient education  Pt educated on importance of out of bed

## 2020-03-07 NOTE — PROGRESS NOTES
Accessed pt, vs taken, sat 95% RA, pt denies chest pain and difficulty breathing, continue to monitor while await for  To call back

## 2020-03-07 NOTE — CONSULTS
Current Medications:   Scheduled Meds:   piperacillin-tazobactam  3.375 g Intravenous Q8H    insulin lispro  0-12 Units Subcutaneous TID WC    insulin lispro  0-6 Units Subcutaneous Nightly    sodium chloride flush  10 mL Intravenous 2 times per day    metoprolol succinate  25 mg Oral Daily    pantoprazole  20 mg Oral Daily     Continuous Infusions:   sodium chloride      dextrose      sodium chloride 75 mL/hr at 03/07/20 0320     PRN Meds:glucose, dextrose, glucagon (rDNA), dextrose, trimethobenzamide, sodium chloride flush, acetaminophen    Allergies:  Patient has no known allergies. Social History:   Social History     Socioeconomic History    Marital status:      Spouse name: None    Number of children: None    Years of education: None    Highest education level: None   Occupational History    None   Social Needs    Financial resource strain: None    Food insecurity     Worry: None     Inability: None    Transportation needs     Medical: None     Non-medical: None   Tobacco Use    Smoking status: Never Smoker    Smokeless tobacco: Never Used   Substance and Sexual Activity    Alcohol use: No    Drug use: None    Sexual activity: None   Lifestyle    Physical activity     Days per week: None     Minutes per session: None    Stress: None   Relationships    Social connections     Talks on phone: None     Gets together: None     Attends Jainism service: None     Active member of club or organization: None     Attends meetings of clubs or organizations: None     Relationship status: None    Intimate partner violence     Fear of current or ex partner: None     Emotionally abused: None     Physically abused: None     Forced sexual activity: None   Other Topics Concern    None   Social History Narrative    None      Pets: Dog  Travel: No  The patient lives at home with her     Family History:   History reviewed. No pertinent family history. . Otherwise non-pertinent to the chief complaint. REVIEW OF SYSTEMS:    Constitutional: Positive for fevers, chills, diaphoresis  Neurologic: Negative   Psychiatric: Negative  Rheumatologic: Negative   Endocrine: Negative  Hematologic: Negative  Immunologic: Negative  ENT: Negative  Respiratory: Negative   Cardiovascular: Negative  GI: As in the HPI. Patient reports poor appetite. Family confirms this  : Negative  Musculoskeletal: Negative  Skin: No rashes. PHYSICAL EXAM:    Vitals:   BP (!) 142/67   Pulse 99   Temp 102.4 °F (39.1 °C) (Axillary)   Resp 20   Ht 5' (1.524 m)   Wt 183 lb 6.4 oz (83.2 kg)   SpO2 92%   BMI 35.82 kg/m²   Constitutional: The patient is awake, alert, and oriented. Lying in bed. No distress. Family present. Skin: Warm and dry. No rashes were noted. HEENT: Eyes show round, and reactive pupils. No jaundice. Moist mucous membranes, no ulcerations, no thrush. Neck: Supple to movements. No lymphadenopathy. Chest: No use of accessory muscles to breathe. Symmetrical expansion. Auscultation reveals no wheezing, crackles, or rhonchi. Cardiovascular: S1 and S2 are rhythmic and regular. No murmurs appreciated. Abdomen: Positive bowel sounds to auscultation. Minimal tenderness to palpation right lower quadrant. Extremities: No clubbing, no cyanosis, no edema.   Lines: peripheral      CBC+dif:  Recent Labs     03/05/20  1707 03/06/20  0427 03/07/20  0546   WBC 8.9 7.6 9.1   HGB 12.5 11.7 10.3*   HCT 40.2 38.1 33.2*   MCV 89.9 91.4 90.2    211 164   NEUTROABS 6.27  --   --      Lab Results   Component Value Date    CRP 0.5 (H) 02/04/2020    CRP 6.0 (H) 01/28/2020    CRP 11.5 (H) 01/24/2020      No results found for: CRPHS  Lab Results   Component Value Date    SEDRATE 44 (H) 02/04/2020    SEDRATE 73 (H) 01/28/2020    SEDRATE 87 (H) 01/24/2020     Lab Results   Component Value Date    ALT 27 03/05/2020    AST 35 (H) 03/05/2020    ALKPHOS 77 03/05/2020    BILITOT 0.6 03/05/2020     Lab Results

## 2020-03-07 NOTE — PROGRESS NOTES
Examined pt with dtr at bedside. Subjective: The patient is awake and alert. Denies chest pain, angina, and dyspnea. Denies abdominal pain.    + nausea. No emesis. Stil c/o abdominal pain    Objective:    BP (!) 175/80   Pulse 97   Temp 100 °F (37.8 °C) (Axillary)   Resp 20   Ht 5' (1.524 m)   Wt 183 lb 6.4 oz (83.2 kg)   SpO2 92%   BMI 35.82 kg/m²     Heart:  RRR, no murmurs, gallops, or rubs. Lungs:  CTA bilaterally, no wheeze, rales or rhonchi  Abd: bowel sounds present, less tenderness RLQ, nondistended, no masses  Extrem:  No clubbing, cyanosis, or edema    CBC:   Lab Results   Component Value Date    WBC 9.1 03/07/2020    RBC 3.68 03/07/2020    HGB 10.3 03/07/2020    HCT 33.2 03/07/2020    MCV 90.2 03/07/2020    MCH 28.0 03/07/2020    MCHC 31.0 03/07/2020    RDW 15.4 03/07/2020     03/07/2020    MPV 9.9 03/07/2020     CMP:    Lab Results   Component Value Date     03/07/2020    K 3.4 03/07/2020    K 3.9 03/05/2020    CL 98 03/07/2020    CO2 22 03/07/2020    BUN 7 03/07/2020    CREATININE 0.9 03/07/2020    GFRAA >60 03/07/2020    LABGLOM >60 03/07/2020    GLUCOSE 150 03/07/2020    PROT 7.6 03/05/2020    LABALBU 3.7 03/05/2020    CALCIUM 8.3 03/07/2020    BILITOT 0.6 03/05/2020    ALKPHOS 77 03/05/2020    AST 35 03/05/2020    ALT 27 03/05/2020     BMP:    Lab Results   Component Value Date     03/07/2020    K 3.4 03/07/2020    K 3.9 03/05/2020    CL 98 03/07/2020    CO2 22 03/07/2020    BUN 7 03/07/2020    LABALBU 3.7 03/05/2020    CREATININE 0.9 03/07/2020    CALCIUM 8.3 03/07/2020    GFRAA >60 03/07/2020    LABGLOM >60 03/07/2020    GLUCOSE 150 03/07/2020        Assessment:    Patient Active Problem List   Diagnosis    Acute appendicitis    Hypertension    Type II diabetes mellitus, uncontrolled ([facility]    Metabolic encephalopathy    Sepsis associated hypotension ([facility])    Sepsis ([facility]    Relapsing appendicitis       Plan:  IV ATBs.   Surgery to address further

## 2020-03-08 ENCOUNTER — APPOINTMENT (OUTPATIENT)
Dept: ULTRASOUND IMAGING | Age: 85
DRG: 871 | End: 2020-03-08
Payer: MEDICARE

## 2020-03-08 PROBLEM — I26.92 SADDLE EMBOLUS OF PULMONARY ARTERY (HCC): Status: ACTIVE | Noted: 2020-03-08

## 2020-03-08 LAB
ANION GAP SERPL CALCULATED.3IONS-SCNC: 8 MMOL/L (ref 7–16)
BUN BLDV-MCNC: 8 MG/DL (ref 8–23)
C-REACTIVE PROTEIN: 31.7 MG/DL (ref 0–0.4)
CALCIUM SERPL-MCNC: 8 MG/DL (ref 8.6–10.2)
CHLORIDE BLD-SCNC: 102 MMOL/L (ref 98–107)
CO2: 23 MMOL/L (ref 22–29)
CREAT SERPL-MCNC: 1.1 MG/DL (ref 0.5–1)
GFR AFRICAN AMERICAN: 57
GFR NON-AFRICAN AMERICAN: 57 ML/MIN/1.73
GLUCOSE BLD-MCNC: 149 MG/DL (ref 74–99)
HCT VFR BLD CALC: 32.3 % (ref 34–48)
HEMOGLOBIN: 10 G/DL (ref 11.5–15.5)
MCH RBC QN AUTO: 27.8 PG (ref 26–35)
MCHC RBC AUTO-ENTMCNC: 31 % (ref 32–34.5)
MCV RBC AUTO: 89.7 FL (ref 80–99.9)
METER GLUCOSE: 107 MG/DL (ref 74–99)
METER GLUCOSE: 139 MG/DL (ref 74–99)
METER GLUCOSE: 145 MG/DL (ref 74–99)
METER GLUCOSE: 157 MG/DL (ref 74–99)
METER GLUCOSE: 97 MG/DL (ref 74–99)
PDW BLD-RTO: 15.8 FL (ref 11.5–15)
PLATELET # BLD: 177 E9/L (ref 130–450)
PMV BLD AUTO: 10.5 FL (ref 7–12)
POTASSIUM SERPL-SCNC: 3.7 MMOL/L (ref 3.5–5)
RBC # BLD: 3.6 E12/L (ref 3.5–5.5)
SEDIMENTATION RATE, ERYTHROCYTE: 69 MM/HR (ref 0–20)
SODIUM BLD-SCNC: 133 MMOL/L (ref 132–146)
WBC # BLD: 11.1 E9/L (ref 4.5–11.5)

## 2020-03-08 PROCEDURE — 2060000000 HC ICU INTERMEDIATE R&B

## 2020-03-08 PROCEDURE — 6370000000 HC RX 637 (ALT 250 FOR IP): Performed by: SPECIALIST

## 2020-03-08 PROCEDURE — 86140 C-REACTIVE PROTEIN: CPT

## 2020-03-08 PROCEDURE — 93970 EXTREMITY STUDY: CPT

## 2020-03-08 PROCEDURE — 6360000002 HC RX W HCPCS: Performed by: STUDENT IN AN ORGANIZED HEALTH CARE EDUCATION/TRAINING PROGRAM

## 2020-03-08 PROCEDURE — 2580000003 HC RX 258: Performed by: STUDENT IN AN ORGANIZED HEALTH CARE EDUCATION/TRAINING PROGRAM

## 2020-03-08 PROCEDURE — 6370000000 HC RX 637 (ALT 250 FOR IP): Performed by: STUDENT IN AN ORGANIZED HEALTH CARE EDUCATION/TRAINING PROGRAM

## 2020-03-08 PROCEDURE — 6360000002 HC RX W HCPCS: Performed by: INTERNAL MEDICINE

## 2020-03-08 PROCEDURE — 36415 COLL VENOUS BLD VENIPUNCTURE: CPT

## 2020-03-08 PROCEDURE — 85027 COMPLETE CBC AUTOMATED: CPT

## 2020-03-08 PROCEDURE — 80048 BASIC METABOLIC PNL TOTAL CA: CPT

## 2020-03-08 PROCEDURE — 6360000002 HC RX W HCPCS: Performed by: SPECIALIST

## 2020-03-08 PROCEDURE — 2580000003 HC RX 258: Performed by: SPECIALIST

## 2020-03-08 PROCEDURE — 85651 RBC SED RATE NONAUTOMATED: CPT

## 2020-03-08 PROCEDURE — 2580000003 HC RX 258: Performed by: INTERNAL MEDICINE

## 2020-03-08 PROCEDURE — 6370000000 HC RX 637 (ALT 250 FOR IP): Performed by: INTERNAL MEDICINE

## 2020-03-08 PROCEDURE — 82962 GLUCOSE BLOOD TEST: CPT

## 2020-03-08 RX ORDER — CEFDINIR 300 MG/1
300 CAPSULE ORAL EVERY 12 HOURS SCHEDULED
Status: DISCONTINUED | OUTPATIENT
Start: 2020-03-08 | End: 2020-03-14 | Stop reason: HOSPADM

## 2020-03-08 RX ADMIN — INSULIN LISPRO 2 UNITS: 100 INJECTION, SOLUTION INTRAVENOUS; SUBCUTANEOUS at 11:34

## 2020-03-08 RX ADMIN — METOPROLOL SUCCINATE 25 MG: 25 TABLET, EXTENDED RELEASE ORAL at 10:06

## 2020-03-08 RX ADMIN — MEROPENEM 1 G: 1 INJECTION, POWDER, FOR SOLUTION INTRAVENOUS at 14:47

## 2020-03-08 RX ADMIN — ENOXAPARIN SODIUM 80 MG: 80 INJECTION SUBCUTANEOUS at 03:39

## 2020-03-08 RX ADMIN — FLUCONAZOLE 400 MG: 150 TABLET ORAL at 10:05

## 2020-03-08 RX ADMIN — Medication 10 ML: at 10:07

## 2020-03-08 RX ADMIN — PANTOPRAZOLE SODIUM 20 MG: 20 TABLET, DELAYED RELEASE ORAL at 10:06

## 2020-03-08 RX ADMIN — TRIMETHOBENZAMIDE HYDROCHLORIDE 200 MG: 100 INJECTION INTRAMUSCULAR at 08:36

## 2020-03-08 RX ADMIN — SODIUM CHLORIDE: 9 INJECTION, SOLUTION INTRAVENOUS at 17:37

## 2020-03-08 RX ADMIN — MEROPENEM 1 G: 1 INJECTION, POWDER, FOR SOLUTION INTRAVENOUS at 06:35

## 2020-03-08 RX ADMIN — Medication 10 ML: at 21:19

## 2020-03-08 RX ADMIN — ACETAMINOPHEN 650 MG: 325 TABLET ORAL at 19:05

## 2020-03-08 RX ADMIN — ENOXAPARIN SODIUM 80 MG: 80 INJECTION SUBCUTANEOUS at 14:47

## 2020-03-08 RX ADMIN — CEFDINIR 300 MG: 300 CAPSULE ORAL at 21:15

## 2020-03-08 RX ADMIN — SODIUM CHLORIDE: 9 INJECTION, SOLUTION INTRAVENOUS at 01:00

## 2020-03-08 RX ADMIN — TRIMETHOBENZAMIDE HYDROCHLORIDE 200 MG: 100 INJECTION INTRAMUSCULAR at 19:04

## 2020-03-08 RX ADMIN — ACETAMINOPHEN 650 MG: 325 TABLET ORAL at 04:34

## 2020-03-08 ASSESSMENT — PAIN SCALES - GENERAL
PAINLEVEL_OUTOF10: 0

## 2020-03-08 NOTE — PROGRESS NOTES
Notified Dr. Gray Nava of US BLE results. No further orders received, pt on lovenox currently already.    Electronically signed by Amrita Barreto RN on 3/8/2020 at 12:49 PM

## 2020-03-08 NOTE — PROGRESS NOTES
[address] [place] [address] [facility]  Progress Note    SUBJECTIVE:  Chief Complaint   Patient presents with    Abdominal Pain     RLQ pain, EMS states that pt has been seen for this issue, dx with appendicitis, treated with abx    Nausea     The patient reports that she was having some frequency on urination. Daughter was questioning that she has been given pills for both \"making her urinate and not making her urinate\". I explained to her that he also had different mechanisms. She is tolerating antibiotics. Minimal abdominal discomfort. No fever. Review of systems:  As stated above in the chief complaint, otherwise negative. Medications:  Scheduled Meds:   fluconazole  400 mg Oral Daily    meropenem  1 g Intravenous Q8H    enoxaparin  1 mg/kg Subcutaneous BID    insulin lispro  0-12 Units Subcutaneous TID WC    insulin lispro  0-6 Units Subcutaneous Nightly    sodium chloride flush  10 mL Intravenous 2 times per day    metoprolol succinate  25 mg Oral Daily    pantoprazole  20 mg Oral Daily     Continuous Infusions:   sodium chloride 33.3 mL/hr at 20 0100    dextrose      sodium chloride 75 mL/hr at 20 1608     PRN Meds:sodium chloride flush, perflutren lipid microspheres, sodium chloride flush, glucose, dextrose, glucagon (rDNA), dextrose, trimethobenzamide, sodium chloride flush, acetaminophen    OBJECTIVE:  BP (!) 125/58   Pulse 78   Temp 98 °F (36.7 °C) (Oral)   Resp 20   Ht 5' (1.524 m)   Wt 183 lb 6.4 oz (83.2 kg)   SpO2 95%   BMI 35.82 kg/m²   Temp  Av.3 °F (37.4 °C)  Min: 98 °F (36.7 °C)  Max: 103 °F (39.4 °C)  Constitutional: The patient is awake, alert, and oriented. Lying in bed. No distress. Daughter present. Skin: Warm and dry. No rashes were noted. HEENT: Round and reactive pupils. Moist mucous membranes. No ulcerations or thrush. Neck: Supple to movements. Chest: No use of accessory muscles to breathe. Symmetrical expansion. No wheezing, crackles or rhonchi. Cardiovascular: Heart sounds rhythmic and regular. Abdomen: Positive bowel sounds to auscultation. Benign to palpation. Extremities: No clubbing, no cyanosis, no edema. Lines: peripheral    Laboratory and Tests Review:  Lab Results   Component Value Date    WBC 11.1 03/08/2020    WBC 9.1 03/07/2020    WBC 7.6 03/06/2020    HGB 10.0 (L) 03/08/2020    HCT 32.3 (L) 03/08/2020    MCV 89.7 03/08/2020     03/08/2020     Lab Results   Component Value Date    NEUTROABS 6.27 03/05/2020    NEUTROABS 3.75 02/04/2020    NEUTROABS 3.74 02/04/2020     No results found for: [facility]  Lab Results   Component Value Date    ALT 27 03/05/2020    AST 35 (H) 03/05/2020    ALKPHOS 77 03/05/2020    BILITOT 0.6 03/05/2020     Lab Results   Component Value Date     03/08/2020    K 3.7 03/08/2020    K 3.9 03/05/2020     03/08/2020    CO2 23 03/08/2020    BUN 8 03/08/2020    CREATININE 1.1 03/08/2020    CREATININE 0.9 03/07/2020    CREATININE 1.0 03/06/2020    GFRAA 57 03/08/2020    LABGLOM 57 03/08/2020    GLUCOSE 149 03/08/2020    PROT 7.6 03/05/2020    LABALBU 3.7 03/05/2020    CALCIUM 8.0 03/08/2020    BILITOT 0.6 03/05/2020    ALKPHOS 77 03/05/2020    AST 35 03/05/2020    ALT 27 03/05/2020     Lab Results   Component Value Date    CRP 31.7 (H) 03/08/2020    CRP 0.5 (H) 02/04/2020    CRP 6.0 (H) 01/28/2020     Lab Results   Component Value Date    SEDRATE 69 (H) 03/08/2020    SEDRATE 44 (H) 02/04/2020    SEDRATE 73 (H) 01/28/2020     Radiology:  CT of the abdomen and pelvis reviewed    Microbiology:   Urine cultures 3/5/2020 >100 K E. coli (resistant to Zosyn and Cefazolin)  Blood cultures 3/5/2020: Negative so    ASSESSMENT:  · Complicated UTI with sepsis secondary to E. coli  · History of perforated appendicitis. Repeat CT of the abdomen and pelvis is now showing any inflammatory process or abscess    PLAN:  · Stop Meropenem after next dose  · Stop Fluconazole  · Start Cefdinir. If she continues to improve she may be discharged tomorrow on 7 days of this antibiotic    Gabe Peña  11:23 AM  3/8/2020

## 2020-03-08 NOTE — CONSULTS
Final Result   Upper normal appendix with mild haziness. Under proper clinical   setting, early acute appendicitis is considered. Small bowel ileus. Above findings were telephoned to the ED physician. 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 At this point she really has no chest pain or no shortness of breath. She is positive for Mercado sign on echo. She is currently on anticoagulation. I have personally reviewed the CT scan findings I have also talked with Dr. Toshia Smith with pulmonary medicine. Although there is a saddle PE. There is still good flow. I discussed the options with the patient and family including anticoagulation alone, anticoagulation with catheter directed thrombolysis. I also discussed the risk associated with both. At this point the patient and the family do not wish to proceed with catheter directed thrombolysis and wished to proceed with anticoagulation alone. I think this is a reasonable option secondary to the patient's current age. The fact that she is hemodynamically stable satting in the 90s with minimal oxygen     We will defer to the medical team and pulmonary medicine for continuing management. They can call there is any questions or concerns or issues.   Thank you    Electronically signed by Tolu Asif MD on 3/7/2020 at 7:58 PM

## 2020-03-09 PROBLEM — I82.411 DVT OF DEEP FEMORAL VEIN, RIGHT (HCC): Status: ACTIVE | Noted: 2020-03-09

## 2020-03-09 LAB
ANION GAP SERPL CALCULATED.3IONS-SCNC: 11 MMOL/L (ref 7–16)
BUN BLDV-MCNC: 10 MG/DL (ref 8–23)
C DIFF TOXIN/ANTIGEN: NORMAL
C. DIFFICILE TOXIN MOLECULAR: NORMAL
CALCIUM SERPL-MCNC: 7.7 MG/DL (ref 8.6–10.2)
CHLORIDE BLD-SCNC: 100 MMOL/L (ref 98–107)
CO2: 21 MMOL/L (ref 22–29)
CREAT SERPL-MCNC: 1 MG/DL (ref 0.5–1)
GFR AFRICAN AMERICAN: >60
GFR NON-AFRICAN AMERICAN: >60 ML/MIN/1.73
GLUCOSE BLD-MCNC: 98 MG/DL (ref 74–99)
HCT VFR BLD CALC: 32.1 % (ref 34–48)
HEMOGLOBIN: 9.5 G/DL (ref 11.5–15.5)
MCH RBC QN AUTO: 27.4 PG (ref 26–35)
MCHC RBC AUTO-ENTMCNC: 29.6 % (ref 32–34.5)
MCV RBC AUTO: 92.5 FL (ref 80–99.9)
METER GLUCOSE: 109 MG/DL (ref 74–99)
METER GLUCOSE: 114 MG/DL (ref 74–99)
METER GLUCOSE: 90 MG/DL (ref 74–99)
METER GLUCOSE: 92 MG/DL (ref 74–99)
PDW BLD-RTO: 16 FL (ref 11.5–15)
PLATELET # BLD: 174 E9/L (ref 130–450)
PMV BLD AUTO: 10.4 FL (ref 7–12)
POTASSIUM SERPL-SCNC: 3.5 MMOL/L (ref 3.5–5)
RBC # BLD: 3.47 E12/L (ref 3.5–5.5)
SODIUM BLD-SCNC: 132 MMOL/L (ref 132–146)
WBC # BLD: 10 E9/L (ref 4.5–11.5)

## 2020-03-09 PROCEDURE — 2580000003 HC RX 258: Performed by: STUDENT IN AN ORGANIZED HEALTH CARE EDUCATION/TRAINING PROGRAM

## 2020-03-09 PROCEDURE — 97530 THERAPEUTIC ACTIVITIES: CPT

## 2020-03-09 PROCEDURE — 2580000003 HC RX 258: Performed by: INTERNAL MEDICINE

## 2020-03-09 PROCEDURE — 80048 BASIC METABOLIC PNL TOTAL CA: CPT

## 2020-03-09 PROCEDURE — 36415 COLL VENOUS BLD VENIPUNCTURE: CPT

## 2020-03-09 PROCEDURE — 6360000002 HC RX W HCPCS: Performed by: INTERNAL MEDICINE

## 2020-03-09 PROCEDURE — 97165 OT EVAL LOW COMPLEX 30 MIN: CPT

## 2020-03-09 PROCEDURE — 2060000000 HC ICU INTERMEDIATE R&B

## 2020-03-09 PROCEDURE — 82962 GLUCOSE BLOOD TEST: CPT

## 2020-03-09 PROCEDURE — 87493 C DIFF AMPLIFIED PROBE: CPT

## 2020-03-09 PROCEDURE — 85027 COMPLETE CBC AUTOMATED: CPT

## 2020-03-09 PROCEDURE — 97535 SELF CARE MNGMENT TRAINING: CPT

## 2020-03-09 PROCEDURE — 6360000002 HC RX W HCPCS: Performed by: STUDENT IN AN ORGANIZED HEALTH CARE EDUCATION/TRAINING PROGRAM

## 2020-03-09 PROCEDURE — 87449 NOS EACH ORGANISM AG IA: CPT

## 2020-03-09 PROCEDURE — 87324 CLOSTRIDIUM AG IA: CPT

## 2020-03-09 PROCEDURE — 6370000000 HC RX 637 (ALT 250 FOR IP): Performed by: STUDENT IN AN ORGANIZED HEALTH CARE EDUCATION/TRAINING PROGRAM

## 2020-03-09 PROCEDURE — 6370000000 HC RX 637 (ALT 250 FOR IP): Performed by: INTERNAL MEDICINE

## 2020-03-09 PROCEDURE — 6370000000 HC RX 637 (ALT 250 FOR IP): Performed by: SPECIALIST

## 2020-03-09 RX ORDER — CEFDINIR 300 MG/1
300 CAPSULE ORAL EVERY 12 HOURS SCHEDULED
Qty: 20 CAPSULE | Refills: 0 | Status: SHIPPED | OUTPATIENT
Start: 2020-03-09 | End: 2020-03-11

## 2020-03-09 RX ADMIN — TRIMETHOBENZAMIDE HYDROCHLORIDE 200 MG: 100 INJECTION INTRAMUSCULAR at 15:52

## 2020-03-09 RX ADMIN — ACETAMINOPHEN 650 MG: 325 TABLET ORAL at 20:03

## 2020-03-09 RX ADMIN — ACETAMINOPHEN 650 MG: 325 TABLET ORAL at 03:02

## 2020-03-09 RX ADMIN — Medication 10 ML: at 09:22

## 2020-03-09 RX ADMIN — METOPROLOL SUCCINATE 25 MG: 25 TABLET, EXTENDED RELEASE ORAL at 09:21

## 2020-03-09 RX ADMIN — PETROLATUM: 420 OINTMENT TOPICAL at 15:52

## 2020-03-09 RX ADMIN — Medication 10 ML: at 20:10

## 2020-03-09 RX ADMIN — PETROLATUM: 420 OINTMENT TOPICAL at 20:10

## 2020-03-09 RX ADMIN — CEFDINIR 300 MG: 300 CAPSULE ORAL at 09:21

## 2020-03-09 RX ADMIN — ENOXAPARIN SODIUM 80 MG: 80 INJECTION SUBCUTANEOUS at 03:02

## 2020-03-09 RX ADMIN — SODIUM CHLORIDE: 9 INJECTION, SOLUTION INTRAVENOUS at 20:10

## 2020-03-09 RX ADMIN — ENOXAPARIN SODIUM 80 MG: 80 INJECTION SUBCUTANEOUS at 15:52

## 2020-03-09 RX ADMIN — PANTOPRAZOLE SODIUM 20 MG: 20 TABLET, DELAYED RELEASE ORAL at 09:21

## 2020-03-09 RX ADMIN — TRIMETHOBENZAMIDE HYDROCHLORIDE 200 MG: 100 INJECTION INTRAMUSCULAR at 03:30

## 2020-03-09 RX ADMIN — CEFDINIR 300 MG: 300 CAPSULE ORAL at 20:03

## 2020-03-09 ASSESSMENT — PAIN SCALES - GENERAL
PAINLEVEL_OUTOF10: 0
PAINLEVEL_OUTOF10: 2
PAINLEVEL_OUTOF10: 0

## 2020-03-09 NOTE — PLAN OF CARE
Problem: Physical Regulation:  Goal: Ability to maintain a body temperature in the normal range will improve  Description: Ability to maintain a body temperature in the normal range will improve  Outcome: Ongoing

## 2020-03-09 NOTE — PROGRESS NOTES
Subjective: The patient is awake and alert. No problems overnight. Denies chest pain, angina, and dyspnea. Denies abdominal pain. Tolerating diet. No nausea or vomiting. Objective: Still febrile. ATB regimen per ID. US demonstrates DVT. /63   Pulse 80   Temp 100 °F (37.8 °C) (Axillary)   Resp 18   Ht 5' (1.524 m)   Wt 180 lb (81.6 kg)   SpO2 97%   BMI 35.15 kg/m²     Heart:  IRRR, no murmurs, gallops, or rubs. Lungs:  CTA bilaterally, no wheeze, rales or rhonchi  Abd: bowel sounds present, nontender, nondistended, no masses  Extrem:  No clubbing, cyanosis, or edema    CBC:   Lab Results   Component Value Date    WBC 10.0 03/09/2020    RBC 3.47 03/09/2020    HGB 9.5 03/09/2020    HCT 32.1 03/09/2020    MCV 92.5 03/09/2020    MCH 27.4 03/09/2020    MCHC 29.6 03/09/2020    RDW 16.0 03/09/2020     03/09/2020    MPV 10.4 03/09/2020     BMP:    Lab Results   Component Value Date     03/09/2020    K 3.5 03/09/2020    K 3.9 03/05/2020     03/09/2020    CO2 21 03/09/2020    BUN 10 03/09/2020    LABALBU 3.7 03/05/2020    CREATININE 1.0 03/09/2020    CALCIUM 7.7 03/09/2020    GFRAA >60 03/09/2020    LABGLOM >60 03/09/2020    GLUCOSE 98 03/09/2020        Assessment:  Anticoag continues. ATBs continue. Still febrile. Patient Active Problem List   Diagnosis    Acute appendicitis    Hypertension    Type II diabetes mellitus, uncontrolled ([address]    Metabolic encephalopathy    Sepsis associated hypotension ([address]    Sepsis ([address]    Relapsing appendicitis    Saddle embolus of pulmonary artery ([address]       Plan:  Present shakir Alvarez  7:17 AM  3/9/2020

## 2020-03-09 NOTE — PROGRESS NOTES
PCO2, HCO3, BE, O2SAT in the last 72 hours. Films:  Ct Head Wo Contrast    Result Date: 3/5/2020  Patient MRN:  [id number] : 1932 Age: 80 years Gender: Female Order Date:  3/5/2020 5:00 PM TECHNIQUE/NUMBER OF IMAGES/COMPARISON/CLINICAL HISTORY: CT brain Axial images obtained sagittal and coronal reconstructions Comparison 2020. The 49-year-old female patient with altered mental status. FINDINGS: Peripheral CSF space and ventricular system are in accordance with age group. The some areas of hypodensity in the deep periventricular white matter correlate with chronic small vessel changes. No significant interval changes since the previous examination. No evidence for a sizable area of an acute or recent insult in progression to the brain parenchyma. Midline structures have unremarkable appearance. Images with bone window settings demonstrate no significant findings. No indication for an acute intracranial process. Ct Abdomen Pelvis W Iv Contrast Additional Contrast? None    Result Date: 3/5/2020  Patient MRN:  [id number] : 1932 Age: 80 years Gender: Female Order Date:  3/5/2020 5:00 PM EXAM: CT ABDOMEN PELVIS W IV CONTRAST Number of images 397. Contrast. Isovue-370, 110 mL intravenously Technique: Low-dose CT  acquisition technique included one of following options; 1 . Automated exposure control, 2. Adjustment of MA and or KV according to patient's size or 3. Use of iterative reconstruction. INDICATION:  dist. abdom. with diffuse tenderness hx of appendicitis dist. abdom. with diffuse tenderness hx of appendicitis COMPARISON: 2020 FINDINGS: The lung bases demonstrate scarring and fibrosis. The liver is of normal architecture. Gallbladder is absent. The spleen, pancreas, the adrenals and the kidneys are normal. There is significant calcification of aorta and degenerative changes in the lumbar spine. Dilated fluid-filled small bowel loops are identified likely ileus. Pelvis.

## 2020-03-09 NOTE — PLAN OF CARE
Problem: Falls - Risk of:  Goal: Will remain free from falls  Description: Will remain free from falls  Outcome: Met This Shift     Problem: Falls - Risk of:  Goal: Absence of physical injury  Description: Absence of physical injury  Outcome: Met This Shift

## 2020-03-09 NOTE — CARE COORDINATION
CASE MANAGEMENT. ... Patient resting in bed with o2 on 2lnc-nursing weaning as tolerated. Feeling nauseated. Nursing aware. Spoke with patients  and daughter Jesus Finley from [facility]. We discussed patients hospital stay and discharge needs. PT/OT nieves reviewed Family is not interested in [facility]. Their plan is for patient to return home with her . They live in a 1 story home and Mrs James Marquis has a cane to use for ambulation. Mrs James Marquis has a history with [facility] and family is requesting their services as they have used them in the past. Referral made to Romana Brownie with [facility] ([phone] is needed. Noted that patient is on po cefdnir and will cont it at discharge. Currently, she is on lovenox. Per pulm, likely discharge home on eliquis-will cont to follow. Mrs. James Marquis follows with Dr Agustina Mchugh and uses [facility] on [address] [place] [address]. CM/SS will cont to follow along and assist with needs accordingly. The Plan for Transition of Care is related to the following treatment goals: DISCHARGE PLANNING    MR Vanessa Alfonso provided with a choice of provider and agrees   with the discharge plan. [x] Yes [] No    Freedom of choice list was provided with basic dialogue that supports the patient's individualized plan of care/goals, treatment preferences and shares the quality data associated with the providers.  [x] Yes [] No

## 2020-03-09 NOTE — PROGRESS NOTES
Physical Therapy  Facility/Department: [address] INTERMEDIATE 1  Daily Treatment Note  NAME: Madalyn Grubbs  : 1932  MRN: [id number]    Date of Service: 3/9/2020  Patient Diagnosis(es): The primary encounter diagnosis was Altered mental status, unspecified altered mental status type. Diagnoses of Appendicitis, unspecified appendicitis type, Acute cystitis with hematuria, Ileus ([address] Metabolic encephalopathy, and Prolonged Q-T interval on ECG were also pertinent to this visit. has a past medical history of Arthritis, Diabetes mellitus ([address] Hyperlipidemia, and Hypertension. has a past surgical history that includes Cholecystectomy; Hysterectomy; Carpal tunnel release; and Insert Picc Cath, 5/> Yrs (2020).    Referring Provider:  Mal Rich MD     Evaluating Therapist: Raven Lomas PT        Room #:442  DIAGNOSIS: Sepsis  PRECAUTIONS: falls, alarm     Social:  Pt unable to report. Per family pt lives with spouse in a 1 floor plan. States she is independent, unsure is she uses walker or not.        Initial Evaluation  Date: 3/7/20 Treatment  3/9/20    Short Term/ Long Term   Goals   Was pt agreeable to Eval/treatment? With encouragement With encouragement      Does pt have pain?  No c/o pain  no c/o pain     Bed Mobility  Rolling: dependent  Supine to sit: dependent  Sit to supine: NT  Scooting: dependent  supine to sit max assist  Rolling max assist  Scooting max assist Mod assist   Transfers Low pivot bed to chair max assist, pt unable to fully stand  sit <> stand max assist  Pivot to chair max assist Mod assist   Ambulation    NT    15 feet with ww with mod assist   Stair Negotiation  Ascended and descended  NT       LE strength     Pt make no attempt to move LE's     3/5   balance      poor       AM-PAC Raw score               7/24  10/24          Patient education  Pt was educated on importance of mobility    Patient response to education:   Pt verbalized understanding Pt demonstrated skill Pt

## 2020-03-10 ENCOUNTER — APPOINTMENT (OUTPATIENT)
Dept: CT IMAGING | Age: 85
DRG: 871 | End: 2020-03-10
Payer: MEDICARE

## 2020-03-10 ENCOUNTER — APPOINTMENT (OUTPATIENT)
Dept: GENERAL RADIOLOGY | Age: 85
DRG: 871 | End: 2020-03-10
Payer: MEDICARE

## 2020-03-10 LAB
ACINETOBACTER BAUMANNII BY PCR: NOT DETECTED
ANION GAP SERPL CALCULATED.3IONS-SCNC: 11 MMOL/L (ref 7–16)
BLOOD CULTURE, ROUTINE: NORMAL
BOTTLE TYPE: NORMAL
BUN BLDV-MCNC: 8 MG/DL (ref 8–23)
CALCIUM SERPL-MCNC: 7.7 MG/DL (ref 8.6–10.2)
CANDIDA ALBICANS BY PCR: NOT DETECTED
CANDIDA GLABRATA BY PCR: NOT DETECTED
CANDIDA KRUSEI BY PCR: NOT DETECTED
CANDIDA PARAPSILOSIS BY PCR: NOT DETECTED
CANDIDA TROPICALIS BY PCR: NOT DETECTED
CHLORIDE BLD-SCNC: 100 MMOL/L (ref 98–107)
CO2: 19 MMOL/L (ref 22–29)
CREAT SERPL-MCNC: 0.8 MG/DL (ref 0.5–1)
EKG ATRIAL RATE: 97 BPM
EKG P AXIS: 75 DEGREES
EKG P-R INTERVAL: 212 MS
EKG Q-T INTERVAL: 386 MS
EKG QRS DURATION: 84 MS
EKG QTC CALCULATION (BAZETT): 490 MS
EKG R AXIS: -9 DEGREES
EKG T AXIS: -2 DEGREES
EKG VENTRICULAR RATE: 97 BPM
ENTEROBACTER CLOACAE COMPLEX BY PCR: NOT DETECTED
ENTEROBACTERALES BY PCR: NOT DETECTED
ENTEROCOCCUS BY PCR: NOT DETECTED
ESCHERICHIA COLI BY PCR: NOT DETECTED
GFR AFRICAN AMERICAN: >60
GFR NON-AFRICAN AMERICAN: >60 ML/MIN/1.73
GLUCOSE BLD-MCNC: 111 MG/DL (ref 74–99)
HAEMOPHILUS INFLUENZAE BY PCR: NOT DETECTED
HCT VFR BLD CALC: 29.9 % (ref 34–48)
HEMOGLOBIN: 9.2 G/DL (ref 11.5–15.5)
KLEBSIELLA OXYTOCA BY PCR: NOT DETECTED
KLEBSIELLA PNEUMONIAE GROUP BY PCR: NOT DETECTED
LISTERIA MONOCYTOGENES BY PCR: NOT DETECTED
MCH RBC QN AUTO: 27.7 PG (ref 26–35)
MCHC RBC AUTO-ENTMCNC: 30.8 % (ref 32–34.5)
MCV RBC AUTO: 90.1 FL (ref 80–99.9)
METER GLUCOSE: 110 MG/DL (ref 74–99)
METER GLUCOSE: 111 MG/DL (ref 74–99)
METER GLUCOSE: 92 MG/DL (ref 74–99)
METER GLUCOSE: 94 MG/DL (ref 74–99)
NEISSERIA MENINGITIDIS BY PCR: NOT DETECTED
ORDER NUMBER: NORMAL
PDW BLD-RTO: 16 FL (ref 11.5–15)
PLATELET # BLD: 226 E9/L (ref 130–450)
PMV BLD AUTO: 10.9 FL (ref 7–12)
POTASSIUM SERPL-SCNC: 3.3 MMOL/L (ref 3.5–5)
PROTEUS BY PCR: NOT DETECTED
PSEUDOMONAS AERUGINOSA BY PCR: NOT DETECTED
RBC # BLD: 3.32 E12/L (ref 3.5–5.5)
SERRATIA MARCESCENS BY PCR: NOT DETECTED
SODIUM BLD-SCNC: 130 MMOL/L (ref 132–146)
SOURCE OF BLOOD CULTURE: NORMAL
STAPHYLOCOCCUS AUREUS BY PCR: NOT DETECTED
STAPHYLOCOCCUS SPECIES BY PCR: NOT DETECTED
STREPTOCOCCUS AGALACTIAE BY PCR: NOT DETECTED
STREPTOCOCCUS PNEUMONIAE BY PCR: NOT DETECTED
STREPTOCOCCUS PYOGENES  BY PCR: NOT DETECTED
STREPTOCOCCUS SPECIES BY PCR: NOT DETECTED
WBC # BLD: 8.6 E9/L (ref 4.5–11.5)

## 2020-03-10 PROCEDURE — 2580000003 HC RX 258: Performed by: STUDENT IN AN ORGANIZED HEALTH CARE EDUCATION/TRAINING PROGRAM

## 2020-03-10 PROCEDURE — 6360000002 HC RX W HCPCS: Performed by: INTERNAL MEDICINE

## 2020-03-10 PROCEDURE — 87040 BLOOD CULTURE FOR BACTERIA: CPT

## 2020-03-10 PROCEDURE — 6370000000 HC RX 637 (ALT 250 FOR IP): Performed by: INTERNAL MEDICINE

## 2020-03-10 PROCEDURE — 82962 GLUCOSE BLOOD TEST: CPT

## 2020-03-10 PROCEDURE — 71046 X-RAY EXAM CHEST 2 VIEWS: CPT

## 2020-03-10 PROCEDURE — 6370000000 HC RX 637 (ALT 250 FOR IP): Performed by: SPECIALIST

## 2020-03-10 PROCEDURE — 6370000000 HC RX 637 (ALT 250 FOR IP): Performed by: STUDENT IN AN ORGANIZED HEALTH CARE EDUCATION/TRAINING PROGRAM

## 2020-03-10 PROCEDURE — 93010 ELECTROCARDIOGRAM REPORT: CPT | Performed by: INTERNAL MEDICINE

## 2020-03-10 PROCEDURE — 6360000002 HC RX W HCPCS: Performed by: STUDENT IN AN ORGANIZED HEALTH CARE EDUCATION/TRAINING PROGRAM

## 2020-03-10 PROCEDURE — 2580000003 HC RX 258: Performed by: INTERNAL MEDICINE

## 2020-03-10 PROCEDURE — 2060000000 HC ICU INTERMEDIATE R&B

## 2020-03-10 PROCEDURE — 6360000004 HC RX CONTRAST MEDICATION: Performed by: RADIOLOGY

## 2020-03-10 PROCEDURE — 80048 BASIC METABOLIC PNL TOTAL CA: CPT

## 2020-03-10 PROCEDURE — 74177 CT ABD & PELVIS W/CONTRAST: CPT

## 2020-03-10 PROCEDURE — 36415 COLL VENOUS BLD VENIPUNCTURE: CPT

## 2020-03-10 PROCEDURE — 85027 COMPLETE CBC AUTOMATED: CPT

## 2020-03-10 RX ORDER — ONDANSETRON 2 MG/ML
4 INJECTION INTRAMUSCULAR; INTRAVENOUS EVERY 6 HOURS PRN
Status: DISCONTINUED | OUTPATIENT
Start: 2020-03-10 | End: 2020-03-14 | Stop reason: HOSPADM

## 2020-03-10 RX ORDER — SODIUM CHLORIDE 0.9 % (FLUSH) 0.9 %
10 SYRINGE (ML) INJECTION ONCE
Status: DISCONTINUED | OUTPATIENT
Start: 2020-03-10 | End: 2020-03-14 | Stop reason: HOSPADM

## 2020-03-10 RX ORDER — POTASSIUM CHLORIDE 7.45 MG/ML
10 INJECTION INTRAVENOUS
Status: COMPLETED | OUTPATIENT
Start: 2020-03-10 | End: 2020-03-10

## 2020-03-10 RX ADMIN — METOPROLOL SUCCINATE 25 MG: 25 TABLET, EXTENDED RELEASE ORAL at 09:45

## 2020-03-10 RX ADMIN — POTASSIUM CHLORIDE 10 MEQ: 10 INJECTION, SOLUTION INTRAVENOUS at 19:41

## 2020-03-10 RX ADMIN — SODIUM CHLORIDE: 9 INJECTION, SOLUTION INTRAVENOUS at 09:47

## 2020-03-10 RX ADMIN — CEFDINIR 300 MG: 300 CAPSULE ORAL at 20:35

## 2020-03-10 RX ADMIN — ENOXAPARIN SODIUM 80 MG: 80 INJECTION SUBCUTANEOUS at 15:38

## 2020-03-10 RX ADMIN — TRIMETHOBENZAMIDE HYDROCHLORIDE 200 MG: 100 INJECTION INTRAMUSCULAR at 12:09

## 2020-03-10 RX ADMIN — POTASSIUM CHLORIDE 10 MEQ: 10 INJECTION, SOLUTION INTRAVENOUS at 17:59

## 2020-03-10 RX ADMIN — ENOXAPARIN SODIUM 80 MG: 80 INJECTION SUBCUTANEOUS at 03:47

## 2020-03-10 RX ADMIN — ACETAMINOPHEN 650 MG: 325 TABLET ORAL at 03:47

## 2020-03-10 RX ADMIN — Medication 10 ML: at 09:47

## 2020-03-10 RX ADMIN — POTASSIUM CHLORIDE 10 MEQ: 10 INJECTION, SOLUTION INTRAVENOUS at 21:13

## 2020-03-10 RX ADMIN — IOPAMIDOL 110 ML: 755 INJECTION, SOLUTION INTRAVENOUS at 16:31

## 2020-03-10 RX ADMIN — IOHEXOL 50 ML: 240 INJECTION, SOLUTION INTRATHECAL; INTRAVASCULAR; INTRAVENOUS; ORAL at 16:31

## 2020-03-10 RX ADMIN — ONDANSETRON 4 MG: 2 INJECTION INTRAMUSCULAR; INTRAVENOUS at 17:59

## 2020-03-10 RX ADMIN — PETROLATUM: 420 OINTMENT TOPICAL at 09:45

## 2020-03-10 RX ADMIN — PETROLATUM: 420 OINTMENT TOPICAL at 20:35

## 2020-03-10 RX ADMIN — ACETAMINOPHEN 650 MG: 325 TABLET ORAL at 15:38

## 2020-03-10 RX ADMIN — CEFDINIR 300 MG: 300 CAPSULE ORAL at 09:45

## 2020-03-10 RX ADMIN — Medication 10 ML: at 20:35

## 2020-03-10 RX ADMIN — PANTOPRAZOLE SODIUM 20 MG: 20 TABLET, DELAYED RELEASE ORAL at 09:45

## 2020-03-10 ASSESSMENT — PAIN SCALES - GENERAL
PAINLEVEL_OUTOF10: 0
PAINLEVEL_OUTOF10: 0

## 2020-03-10 NOTE — PROGRESS NOTES
03/10/20  4:20 AM   Result Value Ref Range    Sodium 130 (L) 132 - 146 mmol/L    Potassium 3.3 (L) 3.5 - 5.0 mmol/L    Chloride 100 98 - 107 mmol/L    CO2 19 (L) 22 - 29 mmol/L    Anion Gap 11 7 - 16 mmol/L    Glucose 111 (H) 74 - 99 mg/dL    BUN 8 8 - 23 mg/dL    CREATININE 0.8 0.5 - 1.0 mg/dL    GFR Non-African American >60 >=60 mL/min/1.73    GFR African American >60     Calcium 7.7 (L) 8.6 - 10.2 mg/dL     Assessment:     Active Problems:    Acute appendicitis    Hypertension    Type II diabetes mellitus, uncontrolled (HCC)    Relapsing appendicitis    Saddle embolus of pulmonary artery (HCC)    DVT of deep femoral vein, right (HCC)  Resolved Problems:    * No resolved hospital problems.  *      Plan:   Daughter states pt not eating - she states gags as soon as she puts food in her mouth  Sadie Dawson  3/10/2020  6:44 AM

## 2020-03-10 NOTE — FLOWSHEET NOTE
I visited the patient and her  to discuss advance care planning. The patient has advance directives and her family Is aware of her wishes. The patient is of the [address] [place] [address] [place] and there is nothing regarding her beliefs that need made known to the medical staff.

## 2020-03-10 NOTE — PROGRESS NOTES
Pulmonary Progress Note    Admit Date: 3/5/2020                            PCP: Champ Oconnor MD  Active Problems:    Acute appendicitis    Hypertension    Type II diabetes mellitus, uncontrolled ([facility]    Relapsing appendicitis    Saddle embolus of pulmonary artery ([facility]    DVT of deep femoral vein, right ([facility]  Resolved Problems:    * No resolved hospital problems. *      Subjective:  Resting in bed on room air. Much nausea despite IM Tigan. Poor appetite on clear liquids. Denies dyspnea and cough. Medications:   dextrose      sodium chloride 75 mL/hr at 03/10/20 0947        white petrolatum   Topical BID    cefdinir  300 mg Oral 2 times per day    enoxaparin  1 mg/kg Subcutaneous BID    insulin lispro  0-12 Units Subcutaneous TID WC    insulin lispro  0-6 Units Subcutaneous Nightly    sodium chloride flush  10 mL Intravenous 2 times per day    metoprolol succinate  25 mg Oral Daily    pantoprazole  20 mg Oral Daily       Vitals:  VITALS:  BP (!) 153/78   Pulse 86   Temp 97.9 °F (36.6 °C) (Oral)   Resp 18   Ht 5' (1.524 m)   Wt 193 lb 7 oz (87.7 kg)   SpO2 94%   BMI 37.78 kg/m²   24HR INTAKE/OUTPUT:      Intake/Output Summary (Last 24 hours) at 3/10/2020 1236  Last data filed at 3/10/2020 1012  Gross per 24 hour   Intake 960 ml   Output 200 ml   Net 760 ml     CURRENT PULSE OXIMETRY:  SpO2: 94 %  24HR PULSE OXIMETRY RANGE:  SpO2  Av.7 %  Min: 94 %  Max: 95 %  CVP:    VENT SETTINGS:      Additional Respiratory  Assessments  Pulse: 86  Resp: 18  SpO2: 94 %      EXAM:  General: Alert, in NAD  ENT: No discharge. Pharynx clear. membranes moist   Neck: Supple, Trachea midline. Resp: No accessory muscle use. Non-labored. Lungs diminished with crackles/wheezing/rhonchi. CV: Regular rate. Regular rhythm. No murmur . Thick legs, no pitting edema   ABD: tender. Non-distended. Soft, round . Normal bowel sounds. Skin: Warm and dry. M/S: OBANDO   Neuro: Awake. Follows commands.  O x 3, GISELLA  Psych:  calm and interactive     I/O: I/O last 3 completed shifts: In: 960 [P.O.:960]  Out: 200 [Urine:200]  I/O this shift:  In: 240 [P.O.:240]  Out: -      Results:  CBC:   Recent Labs     03/08/20 0439 03/09/20 0335 03/10/20  0420   WBC 11.1 10.0 8.6   HGB 10.0* 9.5* 9.2*   HCT 32.3* 32.1* 29.9*   MCV 89.7 92.5 90.1    174 226     BMP:   Recent Labs     03/08/20 0439 03/09/20  0335 03/10/20  0420    132 130*   K 3.7 3.5 3.3*    100 100   CO2 23 21* 19*   BUN 8 10 8   CREATININE 1.1* 1.0 0.8     LFT: No results for input(s): ALKPHOS, ALT, AST, PROT, BILITOT, BILIDIR, LABALBU in the last 72 hours. PT/INR: No results for input(s): PROTIME, INR in the last 72 hours. Procalcitonin: No results for input(s): PROCAL in the last 72 hours. Cultures:  Organism Abnormal  03/05/2020  5:49 PM [facility]   Escherichia coli    Urine Culture, Routine 03/05/2020  5:49 PM [facility]   >100,000 CFU/ml    Testing Performed By     Lab - [facility]. Name Director Address Valid Date Range   [facility] [address]  [address] [facility] Ronaldo Baugh MD [address] [place] [address].   Tamia Slade [address] 12/03/19 1502-Present   Narrative   Performed by: [facility]   Source: URINE       Site: Urine Clean Catch             Susceptibility     Escherichia coli (1)     Antibiotic Interpretation NIGHAT Status    ampicillin Resistant >=^32 mcg/mL     ceFAZolin Resistant >=^64 mcg/mL     cefepime Sensitive ^0.5 mcg/mL     cefTRIAXone Resistant >=^64 mcg/mL     Confirmatory Extended Spectrum Beta-Lactamase  Neg mcg/mL     ertapenem Sensitive <=^0.12 mcg/mL     gentamicin Sensitive <=^1 mcg/mL     levofloxacin Sensitive <=^0.12 mcg/mL     nitrofurantoin Sensitive <=^16 mcg/mL     piperacillin-tazobactam Resistant >=^128 mcg/mL     trimethoprim-sulfamethoxazole Sensitive <=^20 mcg/mL     Lab and Collection           ABG:   No results for input(s): PH, PO2, PCO2, HCO3, BE, O2SAT in the last 72 hours. Films:  Ct Head Wo Contrast    Result Date: 3/5/2020  Patient MRN:  [id number] : 1932 Age: 80 years Gender: Female Order Date:  3/5/2020 5:00 PM TECHNIQUE/NUMBER OF IMAGES/COMPARISON/CLINICAL HISTORY: CT brain Axial images obtained sagittal and coronal reconstructions Comparison 2020. The 49-year-old female patient with altered mental status. FINDINGS: Peripheral CSF space and ventricular system are in accordance with age group. The some areas of hypodensity in the deep periventricular white matter correlate with chronic small vessel changes. No significant interval changes since the previous examination. No evidence for a sizable area of an acute or recent insult in progression to the brain parenchyma. Midline structures have unremarkable appearance. Images with bone window settings demonstrate no significant findings. No indication for an acute intracranial process. Ct Abdomen Pelvis W Iv Contrast Additional Contrast? None    Result Date: 3/5/2020  Patient MRN:  [id number] : 1932 Age: 80 years Gender: Female Order Date:  3/5/2020 5:00 PM EXAM: CT ABDOMEN PELVIS W IV CONTRAST Number of images 397. Contrast. Isovue-370, 110 mL intravenously Technique: Low-dose CT  acquisition technique included one of following options; 1 . Automated exposure control, 2. Adjustment of MA and or KV according to patient's size or 3. Use of iterative reconstruction. INDICATION:  dist. abdom. with diffuse tenderness hx of appendicitis dist. abdom. with diffuse tenderness hx of appendicitis COMPARISON: 2020 FINDINGS: The lung bases demonstrate scarring and fibrosis. The liver is of normal architecture. Gallbladder is absent. The spleen, pancreas, the adrenals and the kidneys are normal. There is significant calcification of aorta and degenerative changes in the lumbar spine.  Dilated fluid-filled small bowel loops are identified

## 2020-03-11 LAB
ADENOVIRUS BY PCR: NOT DETECTED
AMYLASE: 38 U/L (ref 20–100)
ANION GAP SERPL CALCULATED.3IONS-SCNC: 14 MMOL/L (ref 7–16)
BORDETELLA PARAPERTUSSIS BY PCR: NOT DETECTED
BORDETELLA PERTUSSIS BY PCR: NOT DETECTED
BUN BLDV-MCNC: 5 MG/DL (ref 8–23)
CALCIUM SERPL-MCNC: 7.9 MG/DL (ref 8.6–10.2)
CHLAMYDOPHILIA PNEUMONIAE BY PCR: NOT DETECTED
CHLORIDE BLD-SCNC: 102 MMOL/L (ref 98–107)
CO2: 19 MMOL/L (ref 22–29)
CORONAVIRUS 229E BY PCR: NOT DETECTED
CORONAVIRUS HKU1 BY PCR: NOT DETECTED
CORONAVIRUS NL63 BY PCR: NOT DETECTED
CORONAVIRUS OC43 BY PCR: NOT DETECTED
CREAT SERPL-MCNC: 0.7 MG/DL (ref 0.5–1)
GFR AFRICAN AMERICAN: >60
GFR NON-AFRICAN AMERICAN: >60 ML/MIN/1.73
GLUCOSE BLD-MCNC: 83 MG/DL (ref 74–99)
HCT VFR BLD CALC: 31.7 % (ref 34–48)
HEMOGLOBIN: 9.9 G/DL (ref 11.5–15.5)
HUMAN METAPNEUMOVIRUS BY PCR: NOT DETECTED
HUMAN RHINOVIRUS/ENTEROVIRUS BY PCR: NOT DETECTED
INFLUENZA A BY PCR: NOT DETECTED
INFLUENZA B BY PCR: NOT DETECTED
LIPASE: 20 U/L (ref 13–60)
MCH RBC QN AUTO: 27.7 PG (ref 26–35)
MCHC RBC AUTO-ENTMCNC: 31.2 % (ref 32–34.5)
MCV RBC AUTO: 88.5 FL (ref 80–99.9)
METER GLUCOSE: 110 MG/DL (ref 74–99)
METER GLUCOSE: 164 MG/DL (ref 74–99)
METER GLUCOSE: 171 MG/DL (ref 74–99)
METER GLUCOSE: 88 MG/DL (ref 74–99)
MYCOPLASMA PNEUMONIAE BY PCR: NOT DETECTED
PARAINFLUENZA VIRUS 1 BY PCR: NOT DETECTED
PARAINFLUENZA VIRUS 2 BY PCR: NOT DETECTED
PARAINFLUENZA VIRUS 3 BY PCR: NOT DETECTED
PARAINFLUENZA VIRUS 4 BY PCR: NOT DETECTED
PDW BLD-RTO: 15.9 FL (ref 11.5–15)
PLATELET # BLD: 308 E9/L (ref 130–450)
PMV BLD AUTO: 10 FL (ref 7–12)
POTASSIUM SERPL-SCNC: 3.4 MMOL/L (ref 3.5–5)
RBC # BLD: 3.58 E12/L (ref 3.5–5.5)
RESPIRATORY SYNCYTIAL VIRUS BY PCR: NOT DETECTED
SODIUM BLD-SCNC: 135 MMOL/L (ref 132–146)
WBC # BLD: 7 E9/L (ref 4.5–11.5)

## 2020-03-11 PROCEDURE — 6360000002 HC RX W HCPCS: Performed by: INTERNAL MEDICINE

## 2020-03-11 PROCEDURE — 94640 AIRWAY INHALATION TREATMENT: CPT

## 2020-03-11 PROCEDURE — 82150 ASSAY OF AMYLASE: CPT

## 2020-03-11 PROCEDURE — 85027 COMPLETE CBC AUTOMATED: CPT

## 2020-03-11 PROCEDURE — 6370000000 HC RX 637 (ALT 250 FOR IP): Performed by: INTERNAL MEDICINE

## 2020-03-11 PROCEDURE — 36415 COLL VENOUS BLD VENIPUNCTURE: CPT

## 2020-03-11 PROCEDURE — 6370000000 HC RX 637 (ALT 250 FOR IP): Performed by: STUDENT IN AN ORGANIZED HEALTH CARE EDUCATION/TRAINING PROGRAM

## 2020-03-11 PROCEDURE — 0100U HC RESPIRPTHGN MULT REV TRANS & AMP PRB TECH 21 TRGT: CPT

## 2020-03-11 PROCEDURE — 6370000000 HC RX 637 (ALT 250 FOR IP): Performed by: SPECIALIST

## 2020-03-11 PROCEDURE — 2580000003 HC RX 258

## 2020-03-11 PROCEDURE — 80048 BASIC METABOLIC PNL TOTAL CA: CPT

## 2020-03-11 PROCEDURE — 83690 ASSAY OF LIPASE: CPT

## 2020-03-11 PROCEDURE — 2580000003 HC RX 258: Performed by: INTERNAL MEDICINE

## 2020-03-11 PROCEDURE — 2060000000 HC ICU INTERMEDIATE R&B

## 2020-03-11 PROCEDURE — 94664 DEMO&/EVAL PT USE INHALER: CPT

## 2020-03-11 PROCEDURE — 82962 GLUCOSE BLOOD TEST: CPT

## 2020-03-11 RX ORDER — METHYLPREDNISOLONE SODIUM SUCCINATE 40 MG/ML
40 INJECTION, POWDER, LYOPHILIZED, FOR SOLUTION INTRAMUSCULAR; INTRAVENOUS 2 TIMES DAILY
Status: DISCONTINUED | OUTPATIENT
Start: 2020-03-11 | End: 2020-03-14

## 2020-03-11 RX ORDER — BENZONATATE 100 MG/1
200 CAPSULE ORAL 3 TIMES DAILY PRN
Status: DISCONTINUED | OUTPATIENT
Start: 2020-03-11 | End: 2020-03-14 | Stop reason: HOSPADM

## 2020-03-11 RX ORDER — CEFDINIR 300 MG/1
300 CAPSULE ORAL EVERY 12 HOURS SCHEDULED
Qty: 10 CAPSULE | Refills: 0 | Status: SHIPPED | OUTPATIENT
Start: 2020-03-11 | End: 2020-03-16

## 2020-03-11 RX ORDER — IPRATROPIUM BROMIDE AND ALBUTEROL SULFATE 2.5; .5 MG/3ML; MG/3ML
1 SOLUTION RESPIRATORY (INHALATION) EVERY 6 HOURS
Status: DISCONTINUED | OUTPATIENT
Start: 2020-03-11 | End: 2020-03-14 | Stop reason: HOSPADM

## 2020-03-11 RX ADMIN — PETROLATUM: 420 OINTMENT TOPICAL at 08:12

## 2020-03-11 RX ADMIN — ENOXAPARIN SODIUM 80 MG: 80 INJECTION SUBCUTANEOUS at 03:33

## 2020-03-11 RX ADMIN — INSULIN LISPRO 1 UNITS: 100 INJECTION, SOLUTION INTRAVENOUS; SUBCUTANEOUS at 20:58

## 2020-03-11 RX ADMIN — PETROLATUM: 420 OINTMENT TOPICAL at 20:55

## 2020-03-11 RX ADMIN — METHYLPREDNISOLONE SODIUM SUCCINATE 40 MG: 40 INJECTION, POWDER, LYOPHILIZED, FOR SOLUTION INTRAMUSCULAR; INTRAVENOUS at 09:18

## 2020-03-11 RX ADMIN — INSULIN LISPRO 2 UNITS: 100 INJECTION, SOLUTION INTRAVENOUS; SUBCUTANEOUS at 16:32

## 2020-03-11 RX ADMIN — IPRATROPIUM BROMIDE AND ALBUTEROL SULFATE 1 AMPULE: .5; 3 SOLUTION RESPIRATORY (INHALATION) at 20:34

## 2020-03-11 RX ADMIN — IPRATROPIUM BROMIDE AND ALBUTEROL SULFATE 1 AMPULE: .5; 3 SOLUTION RESPIRATORY (INHALATION) at 12:28

## 2020-03-11 RX ADMIN — ENOXAPARIN SODIUM 80 MG: 80 INJECTION SUBCUTANEOUS at 14:47

## 2020-03-11 RX ADMIN — PANTOPRAZOLE SODIUM 20 MG: 20 TABLET, DELAYED RELEASE ORAL at 08:12

## 2020-03-11 RX ADMIN — SODIUM CHLORIDE: 9 INJECTION, SOLUTION INTRAVENOUS at 14:48

## 2020-03-11 RX ADMIN — CEFDINIR 300 MG: 300 CAPSULE ORAL at 20:55

## 2020-03-11 RX ADMIN — BENZONATATE 200 MG: 100 CAPSULE ORAL at 08:12

## 2020-03-11 RX ADMIN — WATER 10 ML: 1 INJECTION INTRAMUSCULAR; INTRAVENOUS; SUBCUTANEOUS at 20:56

## 2020-03-11 RX ADMIN — CEFDINIR 300 MG: 300 CAPSULE ORAL at 08:12

## 2020-03-11 RX ADMIN — SODIUM CHLORIDE: 9 INJECTION, SOLUTION INTRAVENOUS at 00:24

## 2020-03-11 RX ADMIN — METHYLPREDNISOLONE SODIUM SUCCINATE 40 MG: 40 INJECTION, POWDER, LYOPHILIZED, FOR SOLUTION INTRAMUSCULAR; INTRAVENOUS at 20:56

## 2020-03-11 RX ADMIN — ACETAMINOPHEN 650 MG: 325 TABLET ORAL at 08:12

## 2020-03-11 RX ADMIN — IPRATROPIUM BROMIDE AND ALBUTEROL SULFATE 1 AMPULE: .5; 3 SOLUTION RESPIRATORY (INHALATION) at 09:52

## 2020-03-11 RX ADMIN — ONDANSETRON 4 MG: 2 INJECTION INTRAMUSCULAR; INTRAVENOUS at 08:12

## 2020-03-11 RX ADMIN — METOPROLOL SUCCINATE 25 MG: 25 TABLET, EXTENDED RELEASE ORAL at 08:12

## 2020-03-11 ASSESSMENT — PAIN SCALES - GENERAL
PAINLEVEL_OUTOF10: 0
PAINLEVEL_OUTOF10: 0

## 2020-03-11 NOTE — PROGRESS NOTES
ABD: tender. Non-distended. Soft, round . Normal bowel sounds. Skin: Warm and dry. M/S: OBANDO   Neuro: Awake. Follows commands. O x 3, OBANDO  Psych:  calm and interactive     I/O: I/O last 3 completed shifts: In: 0   Out: 3100 [Urine:3100]  No intake/output data recorded. Results:  CBC:   Recent Labs     03/09/20  0335 03/10/20  0420 03/11/20  0345   WBC 10.0 8.6 7.0   HGB 9.5* 9.2* 9.9*   HCT 32.1* 29.9* 31.7*   MCV 92.5 90.1 88.5    226 308     BMP:   Recent Labs     03/09/20  0335 03/10/20  0420 03/11/20  0345    130* 135   K 3.5 3.3* 3.4*    100 102   CO2 21* 19* 19*   BUN 10 8 5*   CREATININE 1.0 0.8 0.7     LFT: No results for input(s): ALKPHOS, ALT, AST, PROT, BILITOT, BILIDIR, LABALBU in the last 72 hours. PT/INR: No results for input(s): PROTIME, INR in the last 72 hours. Procalcitonin: No results for input(s): PROCAL in the last 72 hours. Cultures:  Organism Abnormal  03/05/2020  5:49 PM 1151 [facility]   Escherichia coli    Urine Culture, Routine 03/05/2020  5:49 PM 1151 [facility]   >100,000 CFU/ml    Testing Performed By     Lab - [facility]. Name Director Address Valid Date Range   49-[facility] [address]   [address] [facility] Angel De La O MD [address] [place] [address].   [facility] [place] [id number] 12/03/19 1502-Present   Narrative   Performed by:  [facility]   Source: URINE       Site: Urine Clean Catch             Susceptibility     Escherichia coli (1)     Antibiotic Interpretation NIGHAT Status    ampicillin Resistant >=^32 mcg/mL     ceFAZolin Resistant >=^64 mcg/mL     cefepime Sensitive ^0.5 mcg/mL     cefTRIAXone Resistant >=^64 mcg/mL     Confirmatory Extended Spectrum Beta-Lactamase  Neg mcg/mL     ertapenem Sensitive <=^0.12 mcg/mL     gentamicin Sensitive <=^1 mcg/mL     levofloxacin Sensitive <=^0.12 mcg/mL     nitrofurantoin Sensitive <=^16 mcg/mL     piperacillin-tazobactam Resistant >=^128 calcification of aorta and degenerative changes in the lumbar spine. Dilated fluid-filled small bowel loops are identified likely ileus. Pelvis. Bladder is collapsed. Scattered diverticulosis of the colon are noted. The appendix is upper normal measuring up to 8 mm with mildly haziness. There is no significant inflammatory changes in the right lower quadrant. Upper normal appendix with mild haziness. Under proper clinical setting, early acute appendicitis is considered. Small bowel ileus. Above findings were telephoned to the ED physician. ALERT:  THIS IS AN ABNORMAL REPORT       CTA  3/7/20:  1. Acute extensive pulmonary emboli with a large saddle pulmonary   embolus across the midline and the bifurcation of the main pulmonary   artery causing significant partial occlusion/for occlusion of branches   of the right and left pulmonary arterial circulation.       2. Presently there is no increasing the RV/LV ratio. Findings to be   correlate clinically.       3. Mild to moderate the right side pleural effusion.       4. Compression atelectasis in the right lobe. Additional areas of   atelectasis seen in both bases.       5. Patchy infiltrate in the right middle lobe. The right midlobe   pulmonary arterial circulation is occluded by the embolus. This can be   development of areas of pulmonary infarct versus pneumonia.            Echo 3/7/2020   Normal left ventricle size and systolic function. Ejection fraction is visually estimated at 55-60%. No regional wall motion abnormalities seen. Mildly dilated right ventricle. Right ventricle global systolic function is moderately reduced . TAPSE 11   mm. Positive Mercado's sign. Right Atrium is not clearly visualized. Individual aortic valve leaflets are not clearly visualized. Physiologic and/or trace tricuspid regurgitation. Physiologic and/or trace tricuspid regurgitation. RVSP is 56 mmHg. Pulmonary hypertension is moderate .    No evidence for hemodynamically significant pericardial effusion. No previous echo for comparison. Assessment:  1. Saddle embolism does not appear to be occlusive and is hemodynamically stable at this time  2. Small right pleural effusion with associated minor atelectasis  3. Probable appendicitis improving with antibiotic therapy - repeat CT stable  4. History hypertension, type 2 diabetes  5. Complicated UTI w/sepsis, resolved  6. Positive blood cultures, ID re-consulted.        Plan:  · On Lovenox at this time - likely with transition to 3 months of Eliquis  · Echo completed and results above  · Vascular surgery was consulted - decision made with family for anticoagulation only at this time   · Back to room air    - IV steroids BID  - respiratory panel negative   - duoneb q 6 hrs  - patient and family updated with regards to direction of care     Electronically signed by Nathalie Christina MD on 3/11/2020 at 5:03 PM

## 2020-03-11 NOTE — PROGRESS NOTES
Physical Therapy  Facility/Department: [facility]  NAME: Conrad Mccurdy  : 1932  MRN: [id number]     Chart reviewed and PT treatment attempted this date. Pt declined at this time stating she had just gotten back into bed. Will continue to follow.      Dilan Dent, [address]

## 2020-03-11 NOTE — PROGRESS NOTES
Internal Medicine  Progress Note  Porfirio Palumbo MD     Subjective:     Patient is alert. Patient reports nausea a little better cough bothering significantly. Tolerating diet well no other c/o. Scheduled Meds:   sodium chloride flush  10 mL Intravenous Once    white petrolatum   Topical BID    cefdinir  300 mg Oral 2 times per day    enoxaparin  1 mg/kg Subcutaneous BID    insulin lispro  0-12 Units Subcutaneous TID WC    insulin lispro  0-6 Units Subcutaneous Nightly    sodium chloride flush  10 mL Intravenous 2 times per day    metoprolol succinate  25 mg Oral Daily    pantoprazole  20 mg Oral Daily     Continuous Infusions:   dextrose      sodium chloride 75 mL/hr at 03/11/20 0024     PRN Meds:ondansetron, sodium chloride flush, perflutren lipid microspheres, sodium chloride flush, glucose, dextrose, glucagon (rDNA), dextrose, sodium chloride flush, acetaminophen    Objective:      Physical Exam:  Vitals:   /62   Pulse 91   Temp 99.9 °F (37.7 °C) (Oral)   Resp 20   Ht 5' (1.524 m)   Wt 192 lb 3.2 oz (87.2 kg)   SpO2 96%   BMI 37.54 kg/m²   I/O's    Intake/Output Summary (Last 24 hours) at 3/11/2020 0641  Last data filed at 3/10/2020 2005  Gross per 24 hour   Intake 480 ml   Output 2100 ml   Net -1620 ml     Exam:  General appearance: alert, appears stated age and cooperative  Head: Normocephalic, without obvious abnormality, atraumatic  Eyes: conjunctivae/corneas clear. PERRL, EOM's intact. Fundi benign. Throat: lips, mucosa, and tongue normal; teeth and gums normal  Neck: no adenopathy, no carotid bruit, no JVD, supple, symmetrical, trachea midline and thyroid not enlarged, symmetric, no tenderness/mass/nodules  Back: symmetric, no curvature. ROM normal. No CVA tenderness.   Lungs: diminished breath sounds RLL  Chest wall: no tenderness  Heart: regular rate and rhythm  Abdomen: soft, non-tender; bowel sounds normal; no masses,  no organomegaly  Extremities: extremities normal, atraumatic, no cyanosis or edema  Pulses: 2+ and symmetric  Skin: Skin color, texture, turgor normal. No rashes or lesions  Lymph nodes: Cervical, supraclavicular, and axillary nodes normal.  Neurologic: Grossly normal      Imaging     Chest  Xray:  Results for orders placed during the hospital encounter of 20   XR CHEST STANDARD (2 VW)    Narrative Patient MRN:  [id number]  : 1932  Age: 80 years  Gender: Female    Order Date:  3/10/2020 4:45 PM        TECHNIQUE/NUMBER OF IMAGES/COMPARISON/CLINICAL HISTORY:    Chest and lateral views. Comparison with . History difficult breathing. Reviewed the images of the lower lung bases from CT abdomen and pelvis  performed same day. The    Findings are: There is a moderate right-sided pleural effusion. There  is no left-sided pleural effusion. Compression atelectasis in the  right base. Heart has a upper borderline size. There is no perihilar vascular  congestion. Impression Moderate right-sided pleural effusion causing compression  atelectasis in the right lower lobe. No results found for this or any previous visit.     Additional Imaging:  none    Lab Review   Recent Results (from the past 24 hour(s))   POCT Glucose    Collection Time: 03/10/20  6:53 AM   Result Value Ref Range    Meter Glucose 94 74 - 99 mg/dL   POCT Glucose    Collection Time: 03/10/20 11:03 AM   Result Value Ref Range    Meter Glucose 111 (H) 74 - 99 mg/dL   POCT Glucose    Collection Time: 03/10/20  5:24 PM   Result Value Ref Range    Meter Glucose 110 (H) 74 - 99 mg/dL   POCT Glucose    Collection Time: 03/10/20  8:37 PM   Result Value Ref Range    Meter Glucose 92 74 - 99 mg/dL   CBC    Collection Time: 20  3:45 AM   Result Value Ref Range    WBC 7.0 4.5 - 11.5 E9/L    RBC 3.58 3.50 - 5.50 E12/L    Hemoglobin 9.9 (L) 11.5 - 15.5 g/dL    Hematocrit 31.7 (L) 34.0 - 48.0 %    MCV 88.5 80.0 - 99.9 fL    MCH 27.7 26.0 - 35.0 pg    MCHC 31.2 (L) 32.0 - 34.5 %

## 2020-03-11 NOTE — PROGRESS NOTES
Occupational Therapy      Occupational Therapy attempt x2 this date  AM - patient declined, had just returned to bed from sitting up in chair   PM - still feeling tired and wanting to rest

## 2020-03-11 NOTE — PROGRESS NOTES
Message surgical resident, Dr. Little Lainez, re: CT results and patient unable to tolerate clear liquid diet and continued complaints of nausea.   [address]  10:41 AM

## 2020-03-11 NOTE — PROGRESS NOTES
[address] [place] [address] [facility]  [facility]  Progress Note    SUBJECTIVE:  Chief Complaint   Patient presents with    Abdominal Pain     RLQ pain, EMS states that pt has been seen for this issue, dx with appendicitis, treated with abx    Nausea     The patient is feeling better. She has not had any more fevers. Denies any dyspnea or chest pain. She is on Lovenox. Tolerating antibiotics. Wants to go home. Review of systems:  As stated above in the chief complaint, otherwise negative. Medications:  Scheduled Meds:   ipratropium-albuterol  1 ampule Inhalation Q6H    methylPREDNISolone  40 mg Intravenous BID    sodium chloride flush  10 mL Intravenous Once    white petrolatum   Topical BID    cefdinir  300 mg Oral 2 times per day    enoxaparin  1 mg/kg Subcutaneous BID    insulin lispro  0-12 Units Subcutaneous TID WC    insulin lispro  0-6 Units Subcutaneous Nightly    sodium chloride flush  10 mL Intravenous 2 times per day    metoprolol succinate  25 mg Oral Daily    pantoprazole  20 mg Oral Daily     Continuous Infusions:   dextrose      sodium chloride 75 mL/hr at 20 0024     PRN Meds:benzonatate, ondansetron, sodium chloride flush, perflutren lipid microspheres, sodium chloride flush, glucose, dextrose, glucagon (rDNA), dextrose, sodium chloride flush, acetaminophen    OBJECTIVE:  BP (!) 154/68   Pulse 95   Temp 98.2 °F (36.8 °C) (Oral)   Resp 20   Ht 5' (1.524 m)   Wt 192 lb 3.2 oz (87.2 kg)   SpO2 99%   BMI 37.54 kg/m²   Temp  Av.6 °F (37.6 °C)  Min: 97.4 °F (36.3 °C)  Max: 102.3 °F (39.1 °C)  Constitutional: The patient is awake, alert, and oriented. Lying in bed. No distress.  present. Skin: Warm and dry. No rashes were noted. HEENT: Round and reactive pupils. Moist mucous membranes. No ulcerations or thrush. Neck: Supple to movements. Chest: Good breath sounds bilaterally. No crackles.   Cardiovascular: Heart sounds rhythmic and from ID standpoint    Abdelrahman Hearing  12:05 PM  3/11/2020

## 2020-03-12 LAB
AMYLASE: 31 U/L (ref 20–100)
ANION GAP SERPL CALCULATED.3IONS-SCNC: 15 MMOL/L (ref 7–16)
BUN BLDV-MCNC: 4 MG/DL (ref 8–23)
CALCIUM SERPL-MCNC: 8.2 MG/DL (ref 8.6–10.2)
CHLORIDE BLD-SCNC: 105 MMOL/L (ref 98–107)
CO2: 18 MMOL/L (ref 22–29)
CREAT SERPL-MCNC: 0.7 MG/DL (ref 0.5–1)
FERRITIN: 543 NG/ML
FOLATE: 10.2 NG/ML (ref 4.8–24.2)
GFR AFRICAN AMERICAN: >60
GFR NON-AFRICAN AMERICAN: >60 ML/MIN/1.73
GLUCOSE BLD-MCNC: 170 MG/DL (ref 74–99)
HCT VFR BLD CALC: 29.2 % (ref 34–48)
HCT VFR BLD CALC: 31.3 % (ref 34–48)
HEMOCCULT STL QL: NEGATIVE
HEMOCCULT STL QL: NORMAL
HEMOCCULT STL QL: NORMAL
HEMOGLOBIN: 9.2 G/DL (ref 11.5–15.5)
IMMATURE RETIC FRACT: 21.6 % (ref 3–15.9)
IRON SATURATION: 11 % (ref 15–50)
IRON: 16 MCG/DL (ref 37–145)
LIPASE: 13 U/L (ref 13–60)
MCH RBC QN AUTO: 27.9 PG (ref 26–35)
MCHC RBC AUTO-ENTMCNC: 31.5 % (ref 32–34.5)
MCV RBC AUTO: 88.5 FL (ref 80–99.9)
METER GLUCOSE: 162 MG/DL (ref 74–99)
METER GLUCOSE: 167 MG/DL (ref 74–99)
METER GLUCOSE: 170 MG/DL (ref 74–99)
METER GLUCOSE: 203 MG/DL (ref 74–99)
ORGANISM: ABNORMAL
PDW BLD-RTO: 15.9 FL (ref 11.5–15)
PLATELET # BLD: 347 E9/L (ref 130–450)
PMV BLD AUTO: 10.3 FL (ref 7–12)
POTASSIUM SERPL-SCNC: 3.6 MMOL/L (ref 3.5–5)
RBC # BLD: 3.3 E12/L (ref 3.5–5.5)
RETIC HGB EQUIVALENT: 28.5 PG (ref 28.2–36.6)
RETICULOCYTE ABSOLUTE COUNT: 0.03 E12/L
RETICULOCYTE COUNT PCT: 0.8 % (ref 0.4–1.9)
SODIUM BLD-SCNC: 138 MMOL/L (ref 132–146)
TOTAL IRON BINDING CAPACITY: 142 MCG/DL (ref 250–450)
VITAMIN B-12: 721 PG/ML (ref 211–946)
WBC # BLD: 5.2 E9/L (ref 4.5–11.5)

## 2020-03-12 PROCEDURE — 82746 ASSAY OF FOLIC ACID SERUM: CPT

## 2020-03-12 PROCEDURE — 6370000000 HC RX 637 (ALT 250 FOR IP): Performed by: INTERNAL MEDICINE

## 2020-03-12 PROCEDURE — 6370000000 HC RX 637 (ALT 250 FOR IP): Performed by: SPECIALIST

## 2020-03-12 PROCEDURE — 83540 ASSAY OF IRON: CPT

## 2020-03-12 PROCEDURE — 85045 AUTOMATED RETICULOCYTE COUNT: CPT

## 2020-03-12 PROCEDURE — 36415 COLL VENOUS BLD VENIPUNCTURE: CPT

## 2020-03-12 PROCEDURE — 85027 COMPLETE CBC AUTOMATED: CPT

## 2020-03-12 PROCEDURE — 6360000002 HC RX W HCPCS: Performed by: INTERNAL MEDICINE

## 2020-03-12 PROCEDURE — 94640 AIRWAY INHALATION TREATMENT: CPT

## 2020-03-12 PROCEDURE — 83690 ASSAY OF LIPASE: CPT

## 2020-03-12 PROCEDURE — 82728 ASSAY OF FERRITIN: CPT

## 2020-03-12 PROCEDURE — 2060000000 HC ICU INTERMEDIATE R&B

## 2020-03-12 PROCEDURE — 2580000003 HC RX 258: Performed by: INTERNAL MEDICINE

## 2020-03-12 PROCEDURE — 82607 VITAMIN B-12: CPT

## 2020-03-12 PROCEDURE — 82150 ASSAY OF AMYLASE: CPT

## 2020-03-12 PROCEDURE — 80048 BASIC METABOLIC PNL TOTAL CA: CPT

## 2020-03-12 PROCEDURE — 82962 GLUCOSE BLOOD TEST: CPT

## 2020-03-12 PROCEDURE — 83550 IRON BINDING TEST: CPT

## 2020-03-12 RX ORDER — GUAIFENESIN/DEXTROMETHORPHAN 100-10MG/5
10 SYRUP ORAL EVERY 6 HOURS PRN
Status: DISCONTINUED | OUTPATIENT
Start: 2020-03-12 | End: 2020-03-14 | Stop reason: HOSPADM

## 2020-03-12 RX ADMIN — ENOXAPARIN SODIUM 80 MG: 80 INJECTION SUBCUTANEOUS at 03:24

## 2020-03-12 RX ADMIN — PANTOPRAZOLE SODIUM 20 MG: 20 TABLET, DELAYED RELEASE ORAL at 08:10

## 2020-03-12 RX ADMIN — CEFDINIR 300 MG: 300 CAPSULE ORAL at 08:11

## 2020-03-12 RX ADMIN — SODIUM CHLORIDE: 9 INJECTION, SOLUTION INTRAVENOUS at 16:47

## 2020-03-12 RX ADMIN — IPRATROPIUM BROMIDE AND ALBUTEROL SULFATE 1 AMPULE: .5; 3 SOLUTION RESPIRATORY (INHALATION) at 13:19

## 2020-03-12 RX ADMIN — ENOXAPARIN SODIUM 80 MG: 80 INJECTION SUBCUTANEOUS at 15:52

## 2020-03-12 RX ADMIN — INSULIN LISPRO 2 UNITS: 100 INJECTION, SOLUTION INTRAVENOUS; SUBCUTANEOUS at 11:16

## 2020-03-12 RX ADMIN — IPRATROPIUM BROMIDE AND ALBUTEROL SULFATE 1 AMPULE: .5; 3 SOLUTION RESPIRATORY (INHALATION) at 01:53

## 2020-03-12 RX ADMIN — INSULIN LISPRO 2 UNITS: 100 INJECTION, SOLUTION INTRAVENOUS; SUBCUTANEOUS at 16:44

## 2020-03-12 RX ADMIN — INSULIN LISPRO 2 UNITS: 100 INJECTION, SOLUTION INTRAVENOUS; SUBCUTANEOUS at 21:01

## 2020-03-12 RX ADMIN — PETROLATUM: 420 OINTMENT TOPICAL at 09:43

## 2020-03-12 RX ADMIN — METOPROLOL SUCCINATE 25 MG: 25 TABLET, EXTENDED RELEASE ORAL at 08:11

## 2020-03-12 RX ADMIN — BENZONATATE 200 MG: 100 CAPSULE ORAL at 15:47

## 2020-03-12 RX ADMIN — CEFDINIR 300 MG: 300 CAPSULE ORAL at 19:47

## 2020-03-12 RX ADMIN — METHYLPREDNISOLONE SODIUM SUCCINATE 40 MG: 40 INJECTION, POWDER, LYOPHILIZED, FOR SOLUTION INTRAMUSCULAR; INTRAVENOUS at 08:10

## 2020-03-12 RX ADMIN — INSULIN LISPRO 2 UNITS: 100 INJECTION, SOLUTION INTRAVENOUS; SUBCUTANEOUS at 06:07

## 2020-03-12 RX ADMIN — METHYLPREDNISOLONE SODIUM SUCCINATE 40 MG: 40 INJECTION, POWDER, LYOPHILIZED, FOR SOLUTION INTRAMUSCULAR; INTRAVENOUS at 19:48

## 2020-03-12 RX ADMIN — PETROLATUM: 420 OINTMENT TOPICAL at 19:47

## 2020-03-12 RX ADMIN — IPRATROPIUM BROMIDE AND ALBUTEROL SULFATE 1 AMPULE: .5; 3 SOLUTION RESPIRATORY (INHALATION) at 09:36

## 2020-03-12 RX ADMIN — IPRATROPIUM BROMIDE AND ALBUTEROL SULFATE 1 AMPULE: .5; 3 SOLUTION RESPIRATORY (INHALATION) at 20:24

## 2020-03-12 RX ADMIN — GUAIFENESIN AND DEXTROMETHORPHAN 10 ML: 100; 10 SYRUP ORAL at 19:47

## 2020-03-12 ASSESSMENT — PAIN SCALES - GENERAL
PAINLEVEL_OUTOF10: 0
PAINLEVEL_OUTOF10: 2
PAINLEVEL_OUTOF10: 0

## 2020-03-12 NOTE — PROGRESS NOTES
[address] [place] [address] [facility]  [facility]  Progress Note    SUBJECTIVE:  Chief Complaint   Patient presents with    Abdominal Pain     RLQ pain, EMS states that pt has been seen for this issue, dx with appendicitis, treated with abx    Nausea     New complaints today. Tolerating antibiotics. She has a dry cough. No pleuritic chest pain. No nausea, vomiting or diarrhea. Appetite is good. Review of systems:  As stated above in the chief complaint, otherwise negative. Medications:  Scheduled Meds:   ipratropium-albuterol  1 ampule Inhalation Q6H    methylPREDNISolone  40 mg Intravenous BID    sodium chloride flush  10 mL Intravenous Once    white petrolatum   Topical BID    cefdinir  300 mg Oral 2 times per day    enoxaparin  1 mg/kg Subcutaneous BID    insulin lispro  0-12 Units Subcutaneous TID WC    insulin lispro  0-6 Units Subcutaneous Nightly    sodium chloride flush  10 mL Intravenous 2 times per day    metoprolol succinate  25 mg Oral Daily    pantoprazole  20 mg Oral Daily     Continuous Infusions:   dextrose      sodium chloride 75 mL/hr at 20 0606     PRN Meds:benzonatate, ondansetron, sodium chloride flush, perflutren lipid microspheres, sodium chloride flush, glucose, dextrose, glucagon (rDNA), dextrose, sodium chloride flush, acetaminophen    OBJECTIVE:  BP (!) 161/77   Pulse 102   Temp 97.8 °F (36.6 °C) (Oral)   Resp 20   Ht 5' (1.524 m)   Wt 196 lb 9 oz (89.2 kg)   SpO2 96%   BMI 38.39 kg/m²   Temp  Av.9 °F (36.6 °C)  Min: 97.7 °F (36.5 °C)  Max: 98.3 °F (36.8 °C)  Constitutional: The patient is awake, alert, and oriented. Lying in bed. No distress.  present. Skin: Warm and dry. No rashes were noted. HEENT: Round and reactive pupils. Moist mucous membranes. No ulcerations or thrush. Neck: Supple to movements. Chest: Good breath sounds bilaterally. No crackles. Cardiovascular: Heart sounds rhythmic and regular.   Abdomen:

## 2020-03-12 NOTE — PROGRESS NOTES
Occupational Therapy      Occupational Therapy attempt this date. Upon entry patient lying in bed.  present  Therapist explained reason for visit and benefits of OOB activity. Patient stated \"oh no not now I was already up in chair and got washed up\". Patient wanted to remain in bed to eat her soup.

## 2020-03-12 NOTE — PROGRESS NOTES
Internal Medicine  Progress Note  Surinder Perdomo MD     Subjective:     Patient is alert. Patient reports still not eating well still on liquids. Tolerating diet well no other c/o. Scheduled Meds:   ipratropium-albuterol  1 ampule Inhalation Q6H    methylPREDNISolone  40 mg Intravenous BID    sodium chloride flush  10 mL Intravenous Once    white petrolatum   Topical BID    cefdinir  300 mg Oral 2 times per day    enoxaparin  1 mg/kg Subcutaneous BID    insulin lispro  0-12 Units Subcutaneous TID WC    insulin lispro  0-6 Units Subcutaneous Nightly    sodium chloride flush  10 mL Intravenous 2 times per day    metoprolol succinate  25 mg Oral Daily    pantoprazole  20 mg Oral Daily     Continuous Infusions:   dextrose      sodium chloride 75 mL/hr at 03/12/20 0606     PRN Meds:benzonatate, ondansetron, sodium chloride flush, perflutren lipid microspheres, sodium chloride flush, glucose, dextrose, glucagon (rDNA), dextrose, sodium chloride flush, acetaminophen    Objective:      Physical Exam:  Vitals:   BP (!) 169/88   Pulse 89   Temp 98.3 °F (36.8 °C) (Axillary)   Resp 20   Ht 5' (1.524 m)   Wt 196 lb 9 oz (89.2 kg)   SpO2 97%   BMI 38.39 kg/m²   I/O's    Intake/Output Summary (Last 24 hours) at 3/12/2020 0648  Last data filed at 3/12/2020 2806  Gross per 24 hour   Intake 715 ml   Output 1200 ml   Net -485 ml     Exam:  General appearance: alert, appears stated age and cooperative  Head: Normocephalic, without obvious abnormality, atraumatic  Eyes: conjunctivae/corneas clear. PERRL, EOM's intact. Fundi benign.   Throat: lips, mucosa, and tongue normal; teeth and gums normal  Neck: no adenopathy, no carotid bruit, no JVD and supple, symmetrical, trachea midline  Back: negative  Lungs: wheezes  Chest wall: no tenderness  Heart: regular rate and rhythm  Abdomen: soft, non-tender; bowel sounds normal; no masses,  no organomegaly  Extremities: extremities normal, atraumatic, no cyanosis or pneumoniae by PCR Not Detected Not Detected    Parainfluenza Virus 1 by PCR Not Detected Not Detected    Parainfluenza Virus 2 by PCR Not Detected Not Detected    Parainfluenza Virus 3 by PCR Not Detected Not Detected    Parainfluenza Virus 4 by PCR Not Detected Not Detected    Respiratory Syncytial Virus by PCR Not Detected Not Detected   POCT Glucose    Collection Time: 03/11/20 11:00 AM   Result Value Ref Range    Meter Glucose 110 (H) 74 - 99 mg/dL   POCT Glucose    Collection Time: 03/11/20  4:13 PM   Result Value Ref Range    Meter Glucose 164 (H) 74 - 99 mg/dL   POCT Glucose    Collection Time: 03/11/20  8:54 PM   Result Value Ref Range    Meter Glucose 171 (H) 74 - 99 mg/dL   CBC    Collection Time: 03/12/20  3:30 AM   Result Value Ref Range    WBC 5.2 4.5 - 11.5 E9/L    RBC 3.30 (L) 3.50 - 5.50 E12/L    Hemoglobin 9.2 (L) 11.5 - 15.5 g/dL    Hematocrit 29.2 (L) 34.0 - 48.0 %    MCV 88.5 80.0 - 99.9 fL    MCH 27.9 26.0 - 35.0 pg    MCHC 31.5 (L) 32.0 - 34.5 %    RDW 15.9 (H) 11.5 - 15.0 fL    Platelets 075 372 - 878 E9/L    MPV 10.3 7.0 - 12.0 fL   Basic Metabolic Panel    Collection Time: 03/12/20  3:30 AM   Result Value Ref Range    Sodium 138 132 - 146 mmol/L    Potassium 3.6 3.5 - 5.0 mmol/L    Chloride 105 98 - 107 mmol/L    CO2 18 (L) 22 - 29 mmol/L    Anion Gap 15 7 - 16 mmol/L    Glucose 170 (H) 74 - 99 mg/dL    BUN 4 (L) 8 - 23 mg/dL    CREATININE 0.7 0.5 - 1.0 mg/dL    GFR Non-African American >60 >=60 mL/min/1.73    GFR African American >60     Calcium 8.2 (L) 8.6 - 10.2 mg/dL   Amylase    Collection Time: 03/12/20  3:30 AM   Result Value Ref Range    Amylase 31 20 - 100 U/L   Lipase    Collection Time: 03/12/20  3:30 AM   Result Value Ref Range    Lipase 13 13 - 60 U/L   POCT Glucose    Collection Time: 03/12/20  6:05 AM   Result Value Ref Range    Meter Glucose 167 (H) 74 - 99 mg/dL     Assessment:     Active Problems:    Acute appendicitis    Hypertension    Type II diabetes mellitus, uncontrolled ([address]    Relapsing appendicitis    Saddle embolus of pulmonary artery (HCC)    DVT of deep femoral vein, right (HCC)  Resolved Problems:    * No resolved hospital problems. *      Plan:   Where did 3 gm of Hgb go in a week - macrocytic but still. .. Should be able to advance diet. ..clarify with surgery  Lillian Henderson  3/12/2020  6:48 AM

## 2020-03-12 NOTE — PROGRESS NOTES
tender. Non-distended. Soft, round . Normal bowel sounds. Skin: Warm and dry. M/S: OBANDO   Neuro: Awake. Follows commands. O x 3, OBANDO  Psych:  calm and interactive     I/O: I/O last 3 completed shifts: In: 715 [I.V.:715]  Out: 1200 [Urine:1200]  No intake/output data recorded. Results:  CBC:   Recent Labs     03/10/20  0420 03/11/20  0345 03/12/20  0330   WBC 8.6 7.0 5.2   HGB 9.2* 9.9* 9.2*   HCT 29.9* 31.7* 29.2*   MCV 90.1 88.5 88.5    308 347     BMP:   Recent Labs     03/10/20  0420 03/11/20  0345 03/12/20  0330   * 135 138   K 3.3* 3.4* 3.6    102 105   CO2 19* 19* 18*   BUN 8 5* 4*   CREATININE 0.8 0.7 0.7     LFT: No results for input(s): ALKPHOS, ALT, AST, PROT, BILITOT, BILIDIR, LABALBU in the last 72 hours. PT/INR: No results for input(s): PROTIME, INR in the last 72 hours. Procalcitonin: No results for input(s): PROCAL in the last 72 hours. Cultures:  Organism Abnormal  03/05/2020  5:49 PM 1151 [facility]   Escherichia coli    Urine Culture, Routine 03/05/2020  5:49 PM [address] [place] [address] Lab   >100,000 CFU/ml    Testing Performed By     Lab - [place] Name Director Address Valid Date Range   49-[facility] [address] [facility] Milton Bernard MD [address] [place] [address].   Nagi Nolan [address] 12/03/19 1502-Present   Narrative   Performed by: [facility]   Source: URINE       Site: Urine Clean Catch             Susceptibility     Escherichia coli (1)     Antibiotic Interpretation NIGHAT Status    ampicillin Resistant >=^32 mcg/mL     ceFAZolin Resistant >=^64 mcg/mL     cefepime Sensitive ^0.5 mcg/mL     cefTRIAXone Resistant >=^64 mcg/mL     Confirmatory Extended Spectrum Beta-Lactamase  Neg mcg/mL     ertapenem Sensitive <=^0.12 mcg/mL     gentamicin Sensitive <=^1 mcg/mL     levofloxacin Sensitive <=^0.12 mcg/mL     nitrofurantoin Sensitive <=^16 mcg/mL     piperacillin-tazobactam Resistant

## 2020-03-12 NOTE — PLAN OF CARE
Problem: Falls - Risk of:  Goal: Will remain free from falls  Description: Will remain free from falls  Outcome: Met This Shift     Problem: Risk for Impaired Skin Integrity  Goal: Tissue integrity - skin and mucous membranes  Description: Structural intactness and normal physiological function of skin and  mucous membranes.   Outcome: Met This Shift

## 2020-03-13 LAB
ANION GAP SERPL CALCULATED.3IONS-SCNC: 14 MMOL/L (ref 7–16)
BUN BLDV-MCNC: 6 MG/DL (ref 8–23)
CALCIUM SERPL-MCNC: 8.6 MG/DL (ref 8.6–10.2)
CHLORIDE BLD-SCNC: 104 MMOL/L (ref 98–107)
CO2: 18 MMOL/L (ref 22–29)
CREAT SERPL-MCNC: 0.7 MG/DL (ref 0.5–1)
GFR AFRICAN AMERICAN: >60
GFR NON-AFRICAN AMERICAN: >60 ML/MIN/1.73
GLUCOSE BLD-MCNC: 216 MG/DL (ref 74–99)
METER GLUCOSE: 199 MG/DL (ref 74–99)
METER GLUCOSE: 206 MG/DL (ref 74–99)
METER GLUCOSE: 216 MG/DL (ref 74–99)
METER GLUCOSE: 224 MG/DL (ref 74–99)
POTASSIUM SERPL-SCNC: 3.7 MMOL/L (ref 3.5–5)
SODIUM BLD-SCNC: 136 MMOL/L (ref 132–146)

## 2020-03-13 PROCEDURE — 80048 BASIC METABOLIC PNL TOTAL CA: CPT

## 2020-03-13 PROCEDURE — 36415 COLL VENOUS BLD VENIPUNCTURE: CPT

## 2020-03-13 PROCEDURE — 2060000000 HC ICU INTERMEDIATE R&B

## 2020-03-13 PROCEDURE — 6370000000 HC RX 637 (ALT 250 FOR IP): Performed by: SPECIALIST

## 2020-03-13 PROCEDURE — 6360000002 HC RX W HCPCS: Performed by: INTERNAL MEDICINE

## 2020-03-13 PROCEDURE — 6370000000 HC RX 637 (ALT 250 FOR IP): Performed by: INTERNAL MEDICINE

## 2020-03-13 PROCEDURE — 94640 AIRWAY INHALATION TREATMENT: CPT

## 2020-03-13 PROCEDURE — 2580000003 HC RX 258: Performed by: STUDENT IN AN ORGANIZED HEALTH CARE EDUCATION/TRAINING PROGRAM

## 2020-03-13 PROCEDURE — 82962 GLUCOSE BLOOD TEST: CPT

## 2020-03-13 RX ORDER — METOPROLOL SUCCINATE 25 MG/1
25 TABLET, EXTENDED RELEASE ORAL 2 TIMES DAILY
Status: DISCONTINUED | OUTPATIENT
Start: 2020-03-14 | End: 2020-03-14 | Stop reason: HOSPADM

## 2020-03-13 RX ORDER — METOPROLOL SUCCINATE 25 MG/1
25 TABLET, EXTENDED RELEASE ORAL ONCE
Status: COMPLETED | OUTPATIENT
Start: 2020-03-13 | End: 2020-03-13

## 2020-03-13 RX ADMIN — PETROLATUM: 420 OINTMENT TOPICAL at 20:58

## 2020-03-13 RX ADMIN — CEFDINIR 300 MG: 300 CAPSULE ORAL at 08:22

## 2020-03-13 RX ADMIN — METHYLPREDNISOLONE SODIUM SUCCINATE 40 MG: 40 INJECTION, POWDER, LYOPHILIZED, FOR SOLUTION INTRAMUSCULAR; INTRAVENOUS at 20:57

## 2020-03-13 RX ADMIN — IPRATROPIUM BROMIDE AND ALBUTEROL SULFATE 1 AMPULE: .5; 3 SOLUTION RESPIRATORY (INHALATION) at 01:23

## 2020-03-13 RX ADMIN — INSULIN LISPRO 2 UNITS: 100 INJECTION, SOLUTION INTRAVENOUS; SUBCUTANEOUS at 16:19

## 2020-03-13 RX ADMIN — PANTOPRAZOLE SODIUM 20 MG: 20 TABLET, DELAYED RELEASE ORAL at 08:22

## 2020-03-13 RX ADMIN — Medication 10 ML: at 20:58

## 2020-03-13 RX ADMIN — INSULIN LISPRO 4 UNITS: 100 INJECTION, SOLUTION INTRAVENOUS; SUBCUTANEOUS at 11:49

## 2020-03-13 RX ADMIN — IPRATROPIUM BROMIDE AND ALBUTEROL SULFATE 1 AMPULE: .5; 3 SOLUTION RESPIRATORY (INHALATION) at 08:43

## 2020-03-13 RX ADMIN — APIXABAN 10 MG: 5 TABLET, FILM COATED ORAL at 20:57

## 2020-03-13 RX ADMIN — METOPROLOL SUCCINATE 25 MG: 25 TABLET, EXTENDED RELEASE ORAL at 19:05

## 2020-03-13 RX ADMIN — ENOXAPARIN SODIUM 80 MG: 80 INJECTION SUBCUTANEOUS at 15:56

## 2020-03-13 RX ADMIN — METOPROLOL SUCCINATE 25 MG: 25 TABLET, EXTENDED RELEASE ORAL at 08:22

## 2020-03-13 RX ADMIN — CEFDINIR 300 MG: 300 CAPSULE ORAL at 20:57

## 2020-03-13 RX ADMIN — METHYLPREDNISOLONE SODIUM SUCCINATE 40 MG: 40 INJECTION, POWDER, LYOPHILIZED, FOR SOLUTION INTRAMUSCULAR; INTRAVENOUS at 08:22

## 2020-03-13 RX ADMIN — INSULIN LISPRO 4 UNITS: 100 INJECTION, SOLUTION INTRAVENOUS; SUBCUTANEOUS at 06:32

## 2020-03-13 RX ADMIN — GUAIFENESIN AND DEXTROMETHORPHAN 10 ML: 100; 10 SYRUP ORAL at 18:01

## 2020-03-13 RX ADMIN — Medication 10 ML: at 08:22

## 2020-03-13 RX ADMIN — ENOXAPARIN SODIUM 80 MG: 80 INJECTION SUBCUTANEOUS at 03:35

## 2020-03-13 RX ADMIN — BENZONATATE 200 MG: 100 CAPSULE ORAL at 08:22

## 2020-03-13 RX ADMIN — IPRATROPIUM BROMIDE AND ALBUTEROL SULFATE 1 AMPULE: .5; 3 SOLUTION RESPIRATORY (INHALATION) at 13:13

## 2020-03-13 RX ADMIN — INSULIN LISPRO 2 UNITS: 100 INJECTION, SOLUTION INTRAVENOUS; SUBCUTANEOUS at 21:01

## 2020-03-13 RX ADMIN — PETROLATUM: 420 OINTMENT TOPICAL at 08:22

## 2020-03-13 RX ADMIN — GUAIFENESIN AND DEXTROMETHORPHAN 10 ML: 100; 10 SYRUP ORAL at 10:30

## 2020-03-13 ASSESSMENT — PAIN SCALES - GENERAL: PAINLEVEL_OUTOF10: 0

## 2020-03-13 NOTE — PLAN OF CARE
Problem: Falls - Risk of:  Goal: Will remain free from falls  Description: Will remain free from falls  Outcome: Met This Shift     Problem: Risk for Impaired Skin Integrity  Goal: Tissue integrity - skin and mucous membranes  Description: Structural intactness and normal physiological function of skin and  mucous membranes.   Outcome: Met This Shift     Problem: Physical Regulation:  Goal: Ability to maintain a body temperature in the normal range will improve  Description: Ability to maintain a body temperature in the normal range will improve  Outcome: Met This Shift

## 2020-03-13 NOTE — PROGRESS NOTES
Pulmonary Progress Note    Admit Date: 3/5/2020                            PCP: Bhavik Lind MD  Active Problems:    Acute appendicitis    Hypertension    Type II diabetes mellitus, uncontrolled ([facility]    Relapsing appendicitis    Saddle embolus of pulmonary artery ([facility] [address]    DVT of deep femoral vein, right ([facility] [address]  Resolved Problems:    * No resolved hospital problems. *    Subjective:  Resting in bed on room air. She is doing well with no increased work of breathing, fevers, chills noted. Medications:   dextrose          ipratropium-albuterol  1 ampule Inhalation Q6H    methylPREDNISolone  40 mg Intravenous BID    sodium chloride flush  10 mL Intravenous Once    white petrolatum   Topical BID    cefdinir  300 mg Oral 2 times per day    enoxaparin  1 mg/kg Subcutaneous BID    insulin lispro  0-12 Units Subcutaneous TID WC    insulin lispro  0-6 Units Subcutaneous Nightly    sodium chloride flush  10 mL Intravenous 2 times per day    metoprolol succinate  25 mg Oral Daily    pantoprazole  20 mg Oral Daily       Vitals:  VITALS:  BP (!) 179/85   Pulse 108   Temp 97.8 °F (36.6 °C) (Oral)   Resp 20   Ht 5' (1.524 m)   Wt 197 lb (89.4 kg)   SpO2 96%   BMI 38.47 kg/m²   24HR INTAKE/OUTPUT:      Intake/Output Summary (Last 24 hours) at 3/13/2020 1659  Last data filed at 3/13/2020 1255  Gross per 24 hour   Intake 1628 ml   Output 950 ml   Net 678 ml     CURRENT PULSE OXIMETRY:  SpO2: 96 %  24HR PULSE OXIMETRY RANGE:  SpO2  Av %  Min: 96 %  Max: 96 %  CVP:    VENT SETTINGS:      Additional Respiratory  Assessments  Pulse: 108  Resp: 20  SpO2: 96 %      EXAM:  General: Alert, in NAD  ENT: No discharge. Pharynx clear. membranes moist   Neck: Supple, Trachea midline. Resp: No accessory muscle use. Non-labored. Lungs diminished with crackles/wheezing/rhonchi. CV: Regular rate. Regular rhythm. No murmur . Thick legs, no pitting edema   ABD: tender. Non-distended. Soft, round . Normal bowel sounds. Skin: Warm and dry. M/S: OBANDO   Neuro: Awake. Follows commands. O x 3, OBANDO  Psych:  calm and interactive  Extr: swelling upper extremities B/L     I/O: I/O last 3 completed shifts: In: 8447 [P.O.:1020; I.V.:728]  Out: 1950 [Urine:1950]  No intake/output data recorded. Results:  CBC:   Recent Labs     03/11/20 0345 03/12/20 0330 03/12/20  0830   WBC 7.0 5.2  --    HGB 9.9* 9.2*  --    HCT 31.7* 29.2* 31.3*   MCV 88.5 88.5  --     347  --      BMP:   Recent Labs     03/11/20 0345 03/12/20 0330 03/13/20  1220    138 136   K 3.4* 3.6 3.7    105 104   CO2 19* 18* 18*   BUN 5* 4* 6*   CREATININE 0.7 0.7 0.7     LFT: No results for input(s): ALKPHOS, ALT, AST, PROT, BILITOT, BILIDIR, LABALBU in the last 72 hours. PT/INR: No results for input(s): PROTIME, INR in the last 72 hours. Procalcitonin: No results for input(s): PROCAL in the last 72 hours. Cultures:  Organism Abnormal  03/05/2020  5:49 PM [facility]   Escherichia coli    Urine Culture, Routine 03/05/2020  5:49 PM [facility] [facility]   >100,000 CFU/ml    Testing Performed By     Lab - 10 [facility]. Name Director Address Valid Date Range   49- [facility] [address] [facility] Meena Alfredo MD [address] [place] [address].   [address] [place] [address] 12/03/19 1502-Present   Narrative   Performed by: [facility]   Source: URINE       Site: Urine Clean Catch             Susceptibility     Escherichia coli (1)     Antibiotic Interpretation NIGHAT Status    ampicillin Resistant >=^32 mcg/mL     ceFAZolin Resistant >=^64 mcg/mL     cefepime Sensitive ^0.5 mcg/mL     cefTRIAXone Resistant >=^64 mcg/mL     Confirmatory Extended Spectrum Beta-Lactamase  Neg mcg/mL     ertapenem Sensitive <=^0.12 mcg/mL     gentamicin Sensitive <=^1 mcg/mL     levofloxacin Sensitive <=^0.12 mcg/mL     nitrofurantoin Sensitive <=^16 mcg/mL     piperacillin-tazobactam Resistant >=^128 calcification of aorta and degenerative changes in the lumbar spine. Dilated fluid-filled small bowel loops are identified likely ileus. Pelvis. Bladder is collapsed. Scattered diverticulosis of the colon are noted. The appendix is upper normal measuring up to 8 mm with mildly haziness. There is no significant inflammatory changes in the right lower quadrant. Upper normal appendix with mild haziness. Under proper clinical setting, early acute appendicitis is considered. Small bowel ileus. Above findings were telephoned to the ED physician. ALERT:  THIS IS AN ABNORMAL REPORT       CTA  3/7/20:  1. Acute extensive pulmonary emboli with a large saddle pulmonary   embolus across the midline and the bifurcation of the main pulmonary   artery causing significant partial occlusion/for occlusion of branches   of the right and left pulmonary arterial circulation.       2. Presently there is no increasing the RV/LV ratio. Findings to be   correlate clinically.       3. Mild to moderate the right side pleural effusion.       4. Compression atelectasis in the right lobe. Additional areas of   atelectasis seen in both bases.       5. Patchy infiltrate in the right middle lobe. The right midlobe   pulmonary arterial circulation is occluded by the embolus. This can be   development of areas of pulmonary infarct versus pneumonia.            Echo 3/7/2020   Normal left ventricle size and systolic function. Ejection fraction is visually estimated at 55-60%. No regional wall motion abnormalities seen. Mildly dilated right ventricle. Right ventricle global systolic function is moderately reduced . TAPSE 11   mm. Positive Mercado's sign. Right Atrium is not clearly visualized. Individual aortic valve leaflets are not clearly visualized. Physiologic and/or trace tricuspid regurgitation. Physiologic and/or trace tricuspid regurgitation. RVSP is 56 mmHg. Pulmonary hypertension is moderate .    No evidence for

## 2020-03-14 VITALS
HEART RATE: 70 BPM | HEIGHT: 60 IN | SYSTOLIC BLOOD PRESSURE: 139 MMHG | DIASTOLIC BLOOD PRESSURE: 70 MMHG | WEIGHT: 198.4 LBS | BODY MASS INDEX: 38.95 KG/M2 | TEMPERATURE: 97.7 F | RESPIRATION RATE: 20 BRPM | OXYGEN SATURATION: 96 %

## 2020-03-14 LAB
ANION GAP SERPL CALCULATED.3IONS-SCNC: 11 MMOL/L (ref 7–16)
BUN BLDV-MCNC: 8 MG/DL (ref 8–23)
CALCIUM SERPL-MCNC: 8.7 MG/DL (ref 8.6–10.2)
CHLORIDE BLD-SCNC: 105 MMOL/L (ref 98–107)
CO2: 22 MMOL/L (ref 22–29)
CREAT SERPL-MCNC: 0.7 MG/DL (ref 0.5–1)
GFR AFRICAN AMERICAN: >60
GFR NON-AFRICAN AMERICAN: >60 ML/MIN/1.73
GLUCOSE BLD-MCNC: 197 MG/DL (ref 74–99)
METER GLUCOSE: 176 MG/DL (ref 74–99)
METER GLUCOSE: 193 MG/DL (ref 74–99)
POTASSIUM SERPL-SCNC: 3.4 MMOL/L (ref 3.5–5)
SODIUM BLD-SCNC: 138 MMOL/L (ref 132–146)

## 2020-03-14 PROCEDURE — 36415 COLL VENOUS BLD VENIPUNCTURE: CPT

## 2020-03-14 PROCEDURE — 94640 AIRWAY INHALATION TREATMENT: CPT

## 2020-03-14 PROCEDURE — 82962 GLUCOSE BLOOD TEST: CPT

## 2020-03-14 PROCEDURE — 6370000000 HC RX 637 (ALT 250 FOR IP): Performed by: SPECIALIST

## 2020-03-14 PROCEDURE — 6370000000 HC RX 637 (ALT 250 FOR IP): Performed by: INTERNAL MEDICINE

## 2020-03-14 PROCEDURE — 6360000002 HC RX W HCPCS: Performed by: INTERNAL MEDICINE

## 2020-03-14 PROCEDURE — 80048 BASIC METABOLIC PNL TOTAL CA: CPT

## 2020-03-14 PROCEDURE — 2580000003 HC RX 258: Performed by: STUDENT IN AN ORGANIZED HEALTH CARE EDUCATION/TRAINING PROGRAM

## 2020-03-14 RX ORDER — IPRATROPIUM BROMIDE AND ALBUTEROL SULFATE 2.5; .5 MG/3ML; MG/3ML
3 SOLUTION RESPIRATORY (INHALATION) EVERY 6 HOURS
Qty: 360 ML | Refills: 1 | Status: SHIPPED | OUTPATIENT
Start: 2020-03-14 | End: 2020-09-08

## 2020-03-14 RX ORDER — PREDNISONE 10 MG/1
30 TABLET ORAL DAILY
Qty: 30 TABLET | Refills: 0 | Status: SHIPPED | OUTPATIENT
Start: 2020-03-18 | End: 2020-03-28

## 2020-03-14 RX ORDER — PREDNISONE 20 MG/1
40 TABLET ORAL DAILY
Qty: 20 TABLET | Refills: 0 | Status: SHIPPED | OUTPATIENT
Start: 2020-03-15 | End: 2020-03-25

## 2020-03-14 RX ORDER — BENZONATATE 200 MG/1
200 CAPSULE ORAL 3 TIMES DAILY PRN
Qty: 21 CAPSULE | Refills: 0 | Status: SHIPPED | OUTPATIENT
Start: 2020-03-14 | End: 2020-03-21

## 2020-03-14 RX ORDER — PREDNISONE 20 MG/1
20 TABLET ORAL DAILY
Qty: 10 TABLET | Refills: 0 | Status: SHIPPED | OUTPATIENT
Start: 2020-03-21 | End: 2020-03-31

## 2020-03-14 RX ORDER — PREDNISONE 10 MG/1
10 TABLET ORAL DAILY
Qty: 10 TABLET | Refills: 0 | Status: SHIPPED | OUTPATIENT
Start: 2020-03-24 | End: 2020-04-03

## 2020-03-14 RX ORDER — PREDNISONE 1 MG/1
10 TABLET ORAL DAILY
Status: DISCONTINUED | OUTPATIENT
Start: 2020-03-24 | End: 2020-03-14 | Stop reason: HOSPADM

## 2020-03-14 RX ORDER — PREDNISONE 20 MG/1
20 TABLET ORAL DAILY
Status: DISCONTINUED | OUTPATIENT
Start: 2020-03-21 | End: 2020-03-14 | Stop reason: HOSPADM

## 2020-03-14 RX ORDER — PREDNISONE 20 MG/1
20 TABLET ORAL DAILY
Qty: 10 TABLET | Refills: 0 | Status: SHIPPED | OUTPATIENT
Start: 2020-03-21 | End: 2020-03-14

## 2020-03-14 RX ORDER — PREDNISONE 20 MG/1
40 TABLET ORAL DAILY
Status: DISCONTINUED | OUTPATIENT
Start: 2020-03-15 | End: 2020-03-14 | Stop reason: HOSPADM

## 2020-03-14 RX ADMIN — CEFDINIR 300 MG: 300 CAPSULE ORAL at 09:34

## 2020-03-14 RX ADMIN — INSULIN LISPRO 2 UNITS: 100 INJECTION, SOLUTION INTRAVENOUS; SUBCUTANEOUS at 06:22

## 2020-03-14 RX ADMIN — PETROLATUM: 420 OINTMENT TOPICAL at 09:35

## 2020-03-14 RX ADMIN — Medication 10 ML: at 09:35

## 2020-03-14 RX ADMIN — APIXABAN 10 MG: 5 TABLET, FILM COATED ORAL at 09:34

## 2020-03-14 RX ADMIN — METOPROLOL SUCCINATE 25 MG: 25 TABLET, EXTENDED RELEASE ORAL at 09:35

## 2020-03-14 RX ADMIN — IPRATROPIUM BROMIDE AND ALBUTEROL SULFATE 1 AMPULE: .5; 3 SOLUTION RESPIRATORY (INHALATION) at 08:36

## 2020-03-14 RX ADMIN — INSULIN LISPRO 2 UNITS: 100 INJECTION, SOLUTION INTRAVENOUS; SUBCUTANEOUS at 11:43

## 2020-03-14 RX ADMIN — SODIUM CHLORIDE, PRESERVATIVE FREE 10 ML: 5 INJECTION INTRAVENOUS at 09:36

## 2020-03-14 RX ADMIN — METHYLPREDNISOLONE SODIUM SUCCINATE 40 MG: 40 INJECTION, POWDER, LYOPHILIZED, FOR SOLUTION INTRAMUSCULAR; INTRAVENOUS at 09:35

## 2020-03-14 RX ADMIN — PANTOPRAZOLE SODIUM 20 MG: 20 TABLET, DELAYED RELEASE ORAL at 09:34

## 2020-03-14 ASSESSMENT — PAIN SCALES - GENERAL
PAINLEVEL_OUTOF10: 0
PAINLEVEL_OUTOF10: 0

## 2020-03-14 NOTE — PROGRESS NOTES
Pulmonary Progress Note    Admit Date: 3/5/2020                            PCP: Toya Burciaga MD  Active Problems:    Acute appendicitis    Hypertension    Type II diabetes mellitus, uncontrolled ([facility] [address]    Relapsing appendicitis    Saddle embolus of pulmonary artery ([facility] [address]    DVT of deep femoral vein, right ([facility] [address]  Resolved Problems:    * No resolved hospital problems. *    Subjective:  Resting in bed on room air. Feeling much better. Awaiting dc     Medications:   dextrose          apixaban  10 mg Oral BID    metoprolol succinate  25 mg Oral BID    ipratropium-albuterol  1 ampule Inhalation Q6H    methylPREDNISolone  40 mg Intravenous BID    sodium chloride flush  10 mL Intravenous Once    white petrolatum   Topical BID    cefdinir  300 mg Oral 2 times per day    insulin lispro  0-12 Units Subcutaneous TID WC    insulin lispro  0-6 Units Subcutaneous Nightly    sodium chloride flush  10 mL Intravenous 2 times per day    pantoprazole  20 mg Oral Daily       Vitals:  VITALS:  /70   Pulse 70   Temp 97.7 °F (36.5 °C) (Oral)   Resp 20   Ht 5' (1.524 m)   Wt 198 lb 6.4 oz (90 kg)   SpO2 96%   BMI 38.75 kg/m²   24HR INTAKE/OUTPUT:      Intake/Output Summary (Last 24 hours) at 3/14/2020 1020  Last data filed at 3/14/2020 1016  Gross per 24 hour   Intake 195 ml   Output 900 ml   Net -705 ml     CURRENT PULSE OXIMETRY:  SpO2: 96 %  24HR PULSE OXIMETRY RANGE:  SpO2  Av %  Min: 96 %  Max: 96 %  CVP:    VENT SETTINGS:      Additional Respiratory  Assessments  Pulse: 70  Resp: 20  SpO2: 96 %      EXAM:  General: Alert, in NAD  ENT: No discharge. Pharynx clear. membranes moist   Neck: Supple, Trachea midline. Resp: No accessory muscle use. Non-labored. Lungs diminished with no crackles/wheezing/rhonchi. CV: Regular rate. Regular rhythm. No murmur . Thick legs, no pitting edema   ABD: tender. Non-distended. Soft, round . Normal bowel sounds. Skin: Warm and dry.    M/S: OBANDO , no deformity

## 2020-03-14 NOTE — PROGRESS NOTES
organomegaly  Extremities: extremities normal, atraumatic, no cyanosis or edema  Pulses: 2+ and symmetric  Skin: Skin color, texture, turgor normal. No rashes or lesions  Lymph nodes: Cervical, supraclavicular, and axillary nodes normal.  Neurologic: Grossly normal      Imaging     Chest  Xray:  Results for orders placed during the hospital encounter of 20   XR CHEST STANDARD (2 VW)    Narrative Patient MRN:  [id number]  : 1932  Age: 80 years  Gender: Female    Order Date:  3/10/2020 4:45 PM        TECHNIQUE/NUMBER OF IMAGES/COMPARISON/CLINICAL HISTORY:    Chest and lateral views. Comparison with . History difficult breathing. Reviewed the images of the lower lung bases from CT abdomen and pelvis  performed same day. The    Findings are: There is a moderate right-sided pleural effusion. There  is no left-sided pleural effusion. Compression atelectasis in the  right base. Heart has a upper borderline size. There is no perihilar vascular  congestion. Impression Moderate right-sided pleural effusion causing compression  atelectasis in the right lower lobe. No results found for this or any previous visit.     Additional Imaging:  none    Lab Review   Recent Results (from the past 24 hour(s))   POCT Glucose    Collection Time: 20 10:49 AM   Result Value Ref Range    Meter Glucose 216 (H) 74 - 99 mg/dL   Basic Metabolic Panel    Collection Time: 20 12:20 PM   Result Value Ref Range    Sodium 136 132 - 146 mmol/L    Potassium 3.7 3.5 - 5.0 mmol/L    Chloride 104 98 - 107 mmol/L    CO2 18 (L) 22 - 29 mmol/L    Anion Gap 14 7 - 16 mmol/L    Glucose 216 (H) 74 - 99 mg/dL    BUN 6 (L) 8 - 23 mg/dL    CREATININE 0.7 0.5 - 1.0 mg/dL    GFR Non-African American >60 >=60 mL/min/1.73    GFR African American >60     Calcium 8.6 8.6 - 10.2 mg/dL   POCT Glucose    Collection Time: 20  4:18 PM   Result Value Ref Range    Meter Glucose 199 (H) 74 - 99 mg/dL   POCT Glucose Collection Time: 03/13/20  9:01 PM   Result Value Ref Range    Meter Glucose 224 (H) 74 - 99 mg/dL   Basic Metabolic Panel    Collection Time: 03/14/20  3:55 AM   Result Value Ref Range    Sodium 138 132 - 146 mmol/L    Potassium 3.4 (L) 3.5 - 5.0 mmol/L    Chloride 105 98 - 107 mmol/L    CO2 22 22 - 29 mmol/L    Anion Gap 11 7 - 16 mmol/L    Glucose 197 (H) 74 - 99 mg/dL    BUN 8 8 - 23 mg/dL    CREATININE 0.7 0.5 - 1.0 mg/dL    GFR Non-African American >60 >=60 mL/min/1.73    GFR African American >60     Calcium 8.7 8.6 - 10.2 mg/dL   POCT Glucose    Collection Time: 03/14/20  6:20 AM   Result Value Ref Range    Meter Glucose 193 (H) 74 - 99 mg/dL     Assessment:     Active Problems:    Acute appendicitis    Hypertension    Type II diabetes mellitus, uncontrolled (HCC)    Relapsing appendicitis    Saddle embolus of pulmonary artery (HCC)    DVT of deep femoral vein, right (HCC)  Resolved Problems:    * No resolved hospital problems.  *      Plan:   Discharge  Jaime Langston  3/14/2020  7:30 AM

## 2020-03-15 LAB
BLOOD CULTURE, ROUTINE: NORMAL
CULTURE, BLOOD 2: NORMAL

## 2020-03-15 NOTE — PROGRESS NOTES
This occurred on 3/15 at 11am Received a phone call from the patients daughter regarding the nebulizer. She had asked me if they were supposed to pick it up somewhere or if it was going to be delivered. Noted in the chart that [employer] was to supply the nebulizer, then called and spoke to neal who requested more information. The information was on the chart and faxed to [employer]. Awaiting a return phone call from [employer] regarding the nebulizer.

## 2020-04-19 NOTE — DISCHARGE SUMMARY
Patient ID:  Vinita Mcqueen  [id number]  89 y.o.  12/24/1932    Admit date: 3/5/2020    Discharge date and time: 3/14/2020  2:37 PM     Admission Diagnoses: Sepsis ([address] [A41.9]  Sepsis ([address] [A41.9]    Discharge Diagnoses:   Patient Active Problem List   Diagnosis    Acute appendicitis    Hypertension    Type II diabetes mellitus, uncontrolled ([facility] [address]    Metabolic encephalopathy    Sepsis associated hypotension ([address]    Sepsis ([facility] [address]    Relapsing appendicitis    Saddle embolus of pulmonary artery ([facility] [address]    DVT of deep femoral vein, right (HCC)       Consults: pulmonary/intensive care, ID and general surgery    Procedures:   Appendicitis      Hospital Course:  Pt admitted with acute appendicitis that resolved - post-op complicated by the fact that of saddle PE - late in hosptial course the COVID 19 pandemic blossomed eliminating visitors and we tried to expedite her discharge witout compromising care. Discharge Exam:  See progress note from today    Disposition: home    Condition at Discharge:  fair    Patient Instructions:   Discharge Medication List as of 3/14/2020  1:27 PM      START taking these medications    Details   ipratropium-albuterol (DUONEB) 0.5-2.5 (3) MG/3ML SOLN nebulizer solution Inhale 3 mLs into the lungs every 6 hours, Disp-360 mL, R-1Normal      apixaban (ELIQUIS) 5 MG TABS tablet Take 2 tablets by mouth 2 times daily for 7 days DO NOT STOP AFTER THIS DOSING [facility], Disp-60 tablet, R-0Normal      benzonatate (TESSALON) 200 MG capsule Take 1 capsule by mouth 3 times daily as needed for Cough, Disp-21 capsule, R-0Normal      Respiratory Therapy Supplies (FULL KIT NEBULIZER SET) MISC Disp-1 each, R-0, PrintUse as directed with nebulized medication.       !! predniSONE (DELTASONE) 20 MG tablet Take 2 tablets by mouth daily for 10 days, Disp-20 tablet, R-0Normal      !! predniSONE (DELTASONE) 10 MG tablet Take 3 tablets by mouth daily for 10 days, Disp-30

## 2020-09-08 ENCOUNTER — APPOINTMENT (OUTPATIENT)
Dept: CT IMAGING | Age: 85
DRG: 884 | End: 2020-09-08
Payer: MEDICARE

## 2020-09-08 ENCOUNTER — HOSPITAL ENCOUNTER (INPATIENT)
Age: 85
LOS: 4 days | Discharge: HOME HEALTH | DRG: 884 | End: 2020-09-12
Attending: EMERGENCY MEDICINE | Admitting: INTERNAL MEDICINE
Payer: MEDICARE

## 2020-09-08 PROBLEM — N39.0 URINARY TRACT INFECTION: Status: ACTIVE | Noted: 2020-09-08

## 2020-09-08 LAB
ALBUMIN SERPL-MCNC: 3.3 G/DL (ref 3.5–5.2)
ALP BLD-CCNC: 94 U/L (ref 35–104)
ALT SERPL-CCNC: 17 U/L (ref 0–32)
AMMONIA: <10 UMOL/L (ref 11–51)
ANION GAP SERPL CALCULATED.3IONS-SCNC: 13 MMOL/L (ref 7–16)
APTT: 27.5 SEC (ref 24.5–35.1)
AST SERPL-CCNC: 23 U/L (ref 0–31)
BACTERIA: ABNORMAL /HPF
BASOPHILS ABSOLUTE: 0.09 E9/L (ref 0–0.2)
BASOPHILS RELATIVE PERCENT: 0.9 % (ref 0–2)
BILIRUB SERPL-MCNC: 0.3 MG/DL (ref 0–1.2)
BILIRUBIN URINE: NEGATIVE
BLOOD, URINE: ABNORMAL
BUN BLDV-MCNC: 21 MG/DL (ref 8–23)
CALCIUM SERPL-MCNC: 9.9 MG/DL (ref 8.6–10.2)
CHLORIDE BLD-SCNC: 99 MMOL/L (ref 98–107)
CLARITY: CLEAR
CO2: 25 MMOL/L (ref 22–29)
COLOR: YELLOW
CREAT SERPL-MCNC: 1.1 MG/DL (ref 0.5–1)
EOSINOPHILS ABSOLUTE: 0.12 E9/L (ref 0.05–0.5)
EOSINOPHILS RELATIVE PERCENT: 1.2 % (ref 0–6)
GFR AFRICAN AMERICAN: 57
GFR NON-AFRICAN AMERICAN: 57 ML/MIN/1.73
GLUCOSE BLD-MCNC: 138 MG/DL (ref 74–99)
GLUCOSE URINE: NEGATIVE MG/DL
HBA1C MFR BLD: 8.8 % (ref 4–5.6)
HCT VFR BLD CALC: 42.5 % (ref 34–48)
HEMOGLOBIN: 13.3 G/DL (ref 11.5–15.5)
IMMATURE GRANULOCYTES #: 0.41 E9/L
IMMATURE GRANULOCYTES %: 4.2 % (ref 0–5)
INR BLD: 1.1
KETONES, URINE: NEGATIVE MG/DL
LACTIC ACID, SEPSIS: 1.6 MMOL/L (ref 0.5–1.9)
LEUKOCYTE ESTERASE, URINE: ABNORMAL
LYMPHOCYTES ABSOLUTE: 1.76 E9/L (ref 1.5–4)
LYMPHOCYTES RELATIVE PERCENT: 17.8 % (ref 20–42)
MCH RBC QN AUTO: 27.7 PG (ref 26–35)
MCHC RBC AUTO-ENTMCNC: 31.3 % (ref 32–34.5)
MCV RBC AUTO: 88.5 FL (ref 80–99.9)
METER GLUCOSE: 145 MG/DL (ref 74–99)
METER GLUCOSE: 171 MG/DL (ref 74–99)
MONOCYTES ABSOLUTE: 0.87 E9/L (ref 0.1–0.95)
MONOCYTES RELATIVE PERCENT: 8.8 % (ref 2–12)
NEUTROPHILS ABSOLUTE: 6.62 E9/L (ref 1.8–7.3)
NEUTROPHILS RELATIVE PERCENT: 67.1 % (ref 43–80)
NITRITE, URINE: POSITIVE
PDW BLD-RTO: 17 FL (ref 11.5–15)
PH UA: 5 (ref 5–9)
PLATELET # BLD: 277 E9/L (ref 130–450)
PMV BLD AUTO: 9.8 FL (ref 7–12)
POTASSIUM REFLEX MAGNESIUM: 3.8 MMOL/L (ref 3.5–5)
PROTEIN UA: 30 MG/DL
PROTHROMBIN TIME: 12.8 SEC (ref 9.3–12.4)
RBC # BLD: 4.8 E12/L (ref 3.5–5.5)
RBC UA: ABNORMAL /HPF (ref 0–2)
SODIUM BLD-SCNC: 137 MMOL/L (ref 132–146)
SPECIFIC GRAVITY UA: 1.02 (ref 1–1.03)
TOTAL CK: 63 U/L (ref 20–180)
TOTAL PROTEIN: 6.8 G/DL (ref 6.4–8.3)
TROPONIN: 0.01 NG/ML (ref 0–0.03)
UROBILINOGEN, URINE: 0.2 E.U./DL
WBC # BLD: 9.9 E9/L (ref 4.5–11.5)
WBC UA: ABNORMAL /HPF (ref 0–5)

## 2020-09-08 PROCEDURE — 84484 ASSAY OF TROPONIN QUANT: CPT

## 2020-09-08 PROCEDURE — 85025 COMPLETE CBC W/AUTO DIFF WBC: CPT

## 2020-09-08 PROCEDURE — 87077 CULTURE AEROBIC IDENTIFY: CPT

## 2020-09-08 PROCEDURE — 87186 SC STD MICRODIL/AGAR DIL: CPT

## 2020-09-08 PROCEDURE — 2580000003 HC RX 258: Performed by: EMERGENCY MEDICINE

## 2020-09-08 PROCEDURE — 99285 EMERGENCY DEPT VISIT HI MDM: CPT

## 2020-09-08 PROCEDURE — 6360000002 HC RX W HCPCS: Performed by: EMERGENCY MEDICINE

## 2020-09-08 PROCEDURE — 2580000003 HC RX 258: Performed by: INTERNAL MEDICINE

## 2020-09-08 PROCEDURE — 82550 ASSAY OF CK (CPK): CPT

## 2020-09-08 PROCEDURE — 93005 ELECTROCARDIOGRAM TRACING: CPT | Performed by: EMERGENCY MEDICINE

## 2020-09-08 PROCEDURE — 81001 URINALYSIS AUTO W/SCOPE: CPT

## 2020-09-08 PROCEDURE — 6370000000 HC RX 637 (ALT 250 FOR IP): Performed by: INTERNAL MEDICINE

## 2020-09-08 PROCEDURE — 83605 ASSAY OF LACTIC ACID: CPT

## 2020-09-08 PROCEDURE — 1200000000 HC SEMI PRIVATE

## 2020-09-08 PROCEDURE — 83036 HEMOGLOBIN GLYCOSYLATED A1C: CPT

## 2020-09-08 PROCEDURE — 70450 CT HEAD/BRAIN W/O DYE: CPT

## 2020-09-08 PROCEDURE — 96361 HYDRATE IV INFUSION ADD-ON: CPT

## 2020-09-08 PROCEDURE — 85730 THROMBOPLASTIN TIME PARTIAL: CPT

## 2020-09-08 PROCEDURE — 96374 THER/PROPH/DIAG INJ IV PUSH: CPT

## 2020-09-08 PROCEDURE — 85610 PROTHROMBIN TIME: CPT

## 2020-09-08 PROCEDURE — 82962 GLUCOSE BLOOD TEST: CPT

## 2020-09-08 PROCEDURE — 99284 EMERGENCY DEPT VISIT MOD MDM: CPT

## 2020-09-08 PROCEDURE — 87088 URINE BACTERIA CULTURE: CPT

## 2020-09-08 PROCEDURE — 82140 ASSAY OF AMMONIA: CPT

## 2020-09-08 PROCEDURE — 80053 COMPREHEN METABOLIC PANEL: CPT

## 2020-09-08 PROCEDURE — 36415 COLL VENOUS BLD VENIPUNCTURE: CPT

## 2020-09-08 RX ORDER — 0.9 % SODIUM CHLORIDE 0.9 %
500 INTRAVENOUS SOLUTION INTRAVENOUS ONCE
Status: COMPLETED | OUTPATIENT
Start: 2020-09-08 | End: 2020-09-08

## 2020-09-08 RX ORDER — DEXTROSE MONOHYDRATE 50 MG/ML
100 INJECTION, SOLUTION INTRAVENOUS PRN
Status: DISCONTINUED | OUTPATIENT
Start: 2020-09-08 | End: 2020-09-12 | Stop reason: HOSPADM

## 2020-09-08 RX ORDER — PREDNISONE 1 MG/1
5 TABLET ORAL 2 TIMES DAILY
Status: DISCONTINUED | OUTPATIENT
Start: 2020-09-08 | End: 2020-09-12 | Stop reason: HOSPADM

## 2020-09-08 RX ORDER — LEVOTHYROXINE SODIUM 0.07 MG/1
75 TABLET ORAL DAILY
Status: DISCONTINUED | OUTPATIENT
Start: 2020-09-08 | End: 2020-09-12 | Stop reason: HOSPADM

## 2020-09-08 RX ORDER — PANTOPRAZOLE SODIUM 20 MG/1
20 TABLET, DELAYED RELEASE ORAL DAILY
Status: DISCONTINUED | OUTPATIENT
Start: 2020-09-08 | End: 2020-09-12 | Stop reason: HOSPADM

## 2020-09-08 RX ORDER — METFORMIN HYDROCHLORIDE 500 MG/1
500 TABLET, EXTENDED RELEASE ORAL
COMMUNITY

## 2020-09-08 RX ORDER — POLYETHYLENE GLYCOL 3350 17 G/17G
17 POWDER, FOR SOLUTION ORAL DAILY PRN
Status: DISCONTINUED | OUTPATIENT
Start: 2020-09-08 | End: 2020-09-12 | Stop reason: HOSPADM

## 2020-09-08 RX ORDER — ACETAMINOPHEN 325 MG/1
650 TABLET ORAL EVERY 6 HOURS PRN
Status: DISCONTINUED | OUTPATIENT
Start: 2020-09-08 | End: 2020-09-12 | Stop reason: HOSPADM

## 2020-09-08 RX ORDER — SODIUM CHLORIDE 0.9 % (FLUSH) 0.9 %
10 SYRINGE (ML) INJECTION EVERY 12 HOURS SCHEDULED
Status: DISCONTINUED | OUTPATIENT
Start: 2020-09-08 | End: 2020-09-12 | Stop reason: HOSPADM

## 2020-09-08 RX ORDER — SODIUM CHLORIDE 0.9 % (FLUSH) 0.9 %
10 SYRINGE (ML) INJECTION PRN
Status: DISCONTINUED | OUTPATIENT
Start: 2020-09-08 | End: 2020-09-12 | Stop reason: HOSPADM

## 2020-09-08 RX ORDER — PROMETHAZINE HYDROCHLORIDE 25 MG/1
12.5 TABLET ORAL EVERY 6 HOURS PRN
Status: DISCONTINUED | OUTPATIENT
Start: 2020-09-08 | End: 2020-09-12 | Stop reason: HOSPADM

## 2020-09-08 RX ORDER — LEVOTHYROXINE SODIUM 0.07 MG/1
75 TABLET ORAL DAILY
COMMUNITY

## 2020-09-08 RX ORDER — METFORMIN HYDROCHLORIDE 500 MG/1
500 TABLET, EXTENDED RELEASE ORAL
Status: DISCONTINUED | OUTPATIENT
Start: 2020-09-09 | End: 2020-09-12 | Stop reason: HOSPADM

## 2020-09-08 RX ORDER — PREDNISONE 1 MG/1
5 TABLET ORAL 2 TIMES DAILY
COMMUNITY

## 2020-09-08 RX ORDER — METOPROLOL SUCCINATE 25 MG/1
25 TABLET, EXTENDED RELEASE ORAL DAILY
Status: DISCONTINUED | OUTPATIENT
Start: 2020-09-08 | End: 2020-09-10

## 2020-09-08 RX ORDER — IPRATROPIUM BROMIDE AND ALBUTEROL SULFATE 2.5; .5 MG/3ML; MG/3ML
1 SOLUTION RESPIRATORY (INHALATION) EVERY 6 HOURS PRN
COMMUNITY

## 2020-09-08 RX ORDER — OXYBUTYNIN CHLORIDE 5 MG/1
5 TABLET, EXTENDED RELEASE ORAL NIGHTLY PRN
COMMUNITY

## 2020-09-08 RX ORDER — NICOTINE POLACRILEX 4 MG
15 LOZENGE BUCCAL PRN
Status: DISCONTINUED | OUTPATIENT
Start: 2020-09-08 | End: 2020-09-12 | Stop reason: HOSPADM

## 2020-09-08 RX ORDER — ACETAMINOPHEN 650 MG/1
650 SUPPOSITORY RECTAL EVERY 6 HOURS PRN
Status: DISCONTINUED | OUTPATIENT
Start: 2020-09-08 | End: 2020-09-12 | Stop reason: HOSPADM

## 2020-09-08 RX ORDER — ONDANSETRON 2 MG/ML
4 INJECTION INTRAMUSCULAR; INTRAVENOUS EVERY 6 HOURS PRN
Status: DISCONTINUED | OUTPATIENT
Start: 2020-09-08 | End: 2020-09-12 | Stop reason: HOSPADM

## 2020-09-08 RX ORDER — DEXTROSE MONOHYDRATE 25 G/50ML
12.5 INJECTION, SOLUTION INTRAVENOUS PRN
Status: DISCONTINUED | OUTPATIENT
Start: 2020-09-08 | End: 2020-09-12 | Stop reason: HOSPADM

## 2020-09-08 RX ORDER — IPRATROPIUM BROMIDE AND ALBUTEROL SULFATE 2.5; .5 MG/3ML; MG/3ML
1 SOLUTION RESPIRATORY (INHALATION) EVERY 6 HOURS PRN
Status: DISCONTINUED | OUTPATIENT
Start: 2020-09-08 | End: 2020-09-12 | Stop reason: HOSPADM

## 2020-09-08 RX ADMIN — INSULIN LISPRO 1 UNITS: 100 INJECTION, SOLUTION INTRAVENOUS; SUBCUTANEOUS at 18:23

## 2020-09-08 RX ADMIN — METOPROLOL SUCCINATE 25 MG: 25 TABLET, EXTENDED RELEASE ORAL at 15:25

## 2020-09-08 RX ADMIN — PREDNISONE 5 MG: 5 TABLET ORAL at 20:42

## 2020-09-08 RX ADMIN — SODIUM CHLORIDE 500 ML: 9 INJECTION, SOLUTION INTRAVENOUS at 12:33

## 2020-09-08 RX ADMIN — CEFTRIAXONE SODIUM 1 G: 1 INJECTION, POWDER, FOR SOLUTION INTRAMUSCULAR; INTRAVENOUS at 12:22

## 2020-09-08 RX ADMIN — INSULIN LISPRO 1 UNITS: 100 INJECTION, SOLUTION INTRAVENOUS; SUBCUTANEOUS at 20:42

## 2020-09-08 RX ADMIN — APIXABAN 5 MG: 5 TABLET, FILM COATED ORAL at 20:42

## 2020-09-08 RX ADMIN — Medication 10 ML: at 20:42

## 2020-09-08 RX ADMIN — PANTOPRAZOLE SODIUM 20 MG: 20 TABLET, DELAYED RELEASE ORAL at 18:23

## 2020-09-08 ASSESSMENT — PAIN SCALES - GENERAL
PAINLEVEL_OUTOF10: 0
PAINLEVEL_OUTOF10: 0

## 2020-09-08 NOTE — ED PROVIDER NOTES
EXAM--------------------------------------    Constitutional/General: Alert and oriented  Head: Normocephalic and atraumatic  Eyes: PERRL, EOMI, sclera non icteric  Mouth: Oropharynx clear, handling secretions  Neck: Supple, full ROM, no stridor, no meningeal signs  Respiratory: Lungs clear to auscultation bilaterally, no wheezes, rales, or rhonchi. Not in respiratory distress  Cardiovascular:  Regular rate. Regular rhythm. No murmurs, no aortic murmurs, no gallops, no rubs. 2+ distal pulses. Equal extremity pulses. Chest: No chest wall tenderness  Gastrointestinal:  Abdomen Soft, Non tender, Non distended. No rebound, guarding, or rigidity. No pulsatile masses. Musculoskeletal: Moves all extremities x 4. Warm and well perfused, no clubbing, no cyanosis, no edema. Capillary refill <3 seconds  Skin: skin warm and dry. No rashes. Neurologic: GCS 15, no unilateral deficits, able to plantarflex and dorsiflex normally on both feet, unable to hold up either leg secondary to generalized weakness and deconditioning. Able to hold up both arms. Normal speech.          -------------------------------------------------- RESULTS -------------------------------------------------  I have personally reviewed all laboratory and imaging results for this patient. Results are listed below.      LABS: (Lab results interpreted by me)  Results for orders placed or performed during the hospital encounter of 09/08/20   Comprehensive Metabolic Panel w/ Reflex to MG   Result Value Ref Range    Sodium 137 132 - 146 mmol/L    Potassium reflex Magnesium 3.8 3.5 - 5.0 mmol/L    Chloride 99 98 - 107 mmol/L    CO2 25 22 - 29 mmol/L    Anion Gap 13 7 - 16 mmol/L    Glucose 138 (H) 74 - 99 mg/dL    BUN 21 8 - 23 mg/dL    CREATININE 1.1 (H) 0.5 - 1.0 mg/dL    GFR Non-African American 57 >=60 mL/min/1.73    GFR African American 57     Calcium 9.9 8.6 - 10.2 mg/dL    Total Protein 6.8 6.4 - 8.3 g/dL    Alb 3.3 (L) 3.5 - 5.2 g/dL    Total Bilirubin 0.3 0.0 - 1.2 mg/dL    Alkaline Phosphatase 94 35 - 104 U/L    ALT 17 0 - 32 U/L    AST 23 0 - 31 U/L   CBC Auto Differential   Result Value Ref Range    WBC 9.9 4.5 - 11.5 E9/L    RBC 4.80 3.50 - 5.50 E12/L    Hemoglobin 13.3 11.5 - 15.5 g/dL    Hematocrit 42.5 34.0 - 48.0 %    MCV 88.5 80.0 - 99.9 fL    MCH 27.7 26.0 - 35.0 pg    MCHC 31.3 (L) 32.0 - 34.5 %    RDW 17.0 (H) 11.5 - 15.0 fL    Platelets 077 668 - 000 E9/L    MPV 9.8 7.0 - 12.0 fL    Neutrophils % 67.1 43.0 - 80.0 %    Immature Granulocytes % 4.2 0.0 - 5.0 %    Lymphocytes % 17.8 (L) 20.0 - 42.0 %    Monocytes % 8.8 2.0 - 12.0 %    Eosinophils % 1.2 0.0 - 6.0 %    Basophils % 0.9 0.0 - 2.0 %    Neutrophils Absolute 6.62 1.80 - 7.30 E9/L    Immature Granulocytes # 0.41 E9/L    Lymphocytes Absolute 1.76 1.50 - 4.00 E9/L    Monocytes Absolute 0.87 0.10 - 0.95 E9/L    Eosinophils Absolute 0.12 0.05 - 0.50 E9/L    Basophils Absolute 0.09 0.00 - 0.20 E9/L   Troponin   Result Value Ref Range    Troponin 0.01 0.00 - 0.03 ng/mL   Lactate, Sepsis   Result Value Ref Range    Lactic Acid, Sepsis 1.6 0.5 - 1.9 mmol/L   Urinalysis   Result Value Ref Range    Color, UA Yellow Straw/Yellow    Clarity, UA Clear Clear    Glucose, Ur Negative Negative mg/dL    Bilirubin Urine Negative Negative    Ketones, Urine Negative Negative mg/dL    Specific Gravity, UA 1.025 1.005 - 1.030    Blood, Urine TRACE-INTACT Negative    pH, UA 5.0 5.0 - 9.0    Protein, UA 30 (A) Negative mg/dL    Urobilinogen, Urine 0.2 <2.0 E.U./dL    Nitrite, Urine POSITIVE (A) Negative    Leukocyte Esterase, Urine SMALL (A) Negative   CK   Result Value Ref Range    Total CK 63 20 - 180 U/L   APTT   Result Value Ref Range    aPTT 27.5 24.5 - 35.1 sec   Protime-INR   Result Value Ref Range    Protime 12.8 (H) 9.3 - 12.4 sec    INR 1.1    Ammonia   Result Value Ref Range    Ammonia <10.0 (L) 11.0 - 51.0 umol/L   Microscopic Urinalysis   Result Value Ref Range    WBC, UA 2-5 0 - 5 /HPF    RBC, UA 1-3 0 - 2 /HPF    Bacteria, UA MODERATE (A) None Seen /HPF   EKG 12 Lead   Result Value Ref Range    Ventricular Rate 88 BPM    Atrial Rate 88 BPM    P-R Interval 152 ms    QRS Duration 76 ms    Q-T Interval 390 ms    QTc Calculation (Bazett) 471 ms    R Axis -35 degrees    T Axis 72 degrees   ,       RADIOLOGY:  Interpreted by Radiologist unless otherwise specified  CT HEAD WO CONTRAST   Final Result   No acute intracranial process               EKG Interpretation  Interpreted by emergency department physician, Dr. Bin Mera     Date of EK20  Time: 1054    Rhythm: normal sinus   Rate: normal  Axis: left  Conduction: normal  ST Segments: no acute change  T Waves: no acute change    Clinical Impression: Sinus rhythm, no acute ischemic changes    Comparison to prior EKG: stable as compared to patient's most recent EKG      ------------------------- NURSING NOTES AND VITALS REVIEWED ---------------------------   The nursing notes within the ED encounter and vital signs as below have been reviewed by myself  BP (!) 141/84   Pulse 85   Temp 97.5 °F (36.4 °C) (Temporal)   Resp 18   Ht 5' (1.524 m)   Wt 198 lb (89.8 kg)   SpO2 95%   BMI 38.67 kg/m²     Oxygen Saturation Interpretation: Normal    The patients available past medical records and past encounters were reviewed. ------------------------------ ED COURSE/MEDICAL DECISION MAKING----------------------  Medications   cefTRIAXone (ROCEPHIN) 1 g in sterile water 10 mL IV syringe (0 g Intravenous Stopped 20 1233)   0.9 % sodium chloride bolus (0 mLs Intravenous Stopped 20 1352)               I, Dr. Bin Mera, am the primary provider of record    Medical Decision Making:   Dehydration, also with acute kidney injury and concerns for possible cystitis. Unable to ambulate. No signs of stroke on CT. Dr. Paresh Arguelles consulted for admission. NO COVID SYMPTOMS. No respiratory symptoms  Oxygen Saturation Interpretation: 95 % on ra.   Normal      Re-Evaluations:       No change      This patient's ED course included: a personal history and physicial examination, re-evaluation prior to disposition, multiple bedside re-evaluations, IV medications, cardiac monitoring, continuous pulse oximetry and complex medical decision making and emergency management    This patient has remained hemodynamically stable during their ED course. Consultations:  Dr. Maritza Hills    Counseling: The emergency provider has spoken with the patient and discussed todays results, in addition to providing specific details for the plan of care and counseling regarding the diagnosis and prognosis. Questions are answered at this time and they are agreeable with the plan.       --------------------------------- IMPRESSION AND DISPOSITION ---------------------------------    IMPRESSION  1. Dehydration    2. Acute cystitis without hematuria    3. Generalized weakness        DISPOSITION  Disposition: Admit to telemetry  Patient condition is stable        NOTE: This report was transcribed using voice recognition software.  Every effort was made to ensure accuracy; however, inadvertent computerized transcription errors may be present     Luis Fernando Capps MD  09/08/20 [id number]

## 2020-09-09 ENCOUNTER — APPOINTMENT (OUTPATIENT)
Dept: ULTRASOUND IMAGING | Age: 85
DRG: 884 | End: 2020-09-09
Payer: MEDICARE

## 2020-09-09 ENCOUNTER — APPOINTMENT (OUTPATIENT)
Dept: GENERAL RADIOLOGY | Age: 85
DRG: 884 | End: 2020-09-09
Payer: MEDICARE

## 2020-09-09 PROBLEM — Z86.711 HISTORY OF PULMONARY EMBOLISM: Status: ACTIVE | Noted: 2020-09-09

## 2020-09-09 PROBLEM — R82.90 ABNORMAL URINALYSIS: Status: ACTIVE | Noted: 2020-09-08

## 2020-09-09 PROBLEM — R53.1 GENERALIZED WEAKNESS: Status: ACTIVE | Noted: 2020-09-09

## 2020-09-09 PROBLEM — N17.9 ACUTE KIDNEY INJURY (HCC): Status: ACTIVE | Noted: 2020-09-09

## 2020-09-09 PROBLEM — M25.562 LEFT KNEE PAIN: Status: ACTIVE | Noted: 2020-09-09

## 2020-09-09 LAB
ANION GAP SERPL CALCULATED.3IONS-SCNC: 11 MMOL/L (ref 7–16)
BUN BLDV-MCNC: 14 MG/DL (ref 8–23)
CALCIUM SERPL-MCNC: 9.3 MG/DL (ref 8.6–10.2)
CHLORIDE BLD-SCNC: 99 MMOL/L (ref 98–107)
CO2: 25 MMOL/L (ref 22–29)
CREAT SERPL-MCNC: 1.1 MG/DL (ref 0.5–1)
EKG ATRIAL RATE: 88 BPM
EKG P-R INTERVAL: 152 MS
EKG Q-T INTERVAL: 390 MS
EKG QRS DURATION: 76 MS
EKG QTC CALCULATION (BAZETT): 471 MS
EKG R AXIS: -35 DEGREES
EKG T AXIS: 72 DEGREES
EKG VENTRICULAR RATE: 88 BPM
GFR AFRICAN AMERICAN: 57
GFR NON-AFRICAN AMERICAN: 57 ML/MIN/1.73
GLUCOSE BLD-MCNC: 152 MG/DL (ref 74–99)
METER GLUCOSE: 166 MG/DL (ref 74–99)
METER GLUCOSE: 169 MG/DL (ref 74–99)
METER GLUCOSE: 194 MG/DL (ref 74–99)
METER GLUCOSE: 204 MG/DL (ref 74–99)
POTASSIUM SERPL-SCNC: 4 MMOL/L (ref 3.5–5)
SODIUM BLD-SCNC: 135 MMOL/L (ref 132–146)

## 2020-09-09 PROCEDURE — 93971 EXTREMITY STUDY: CPT

## 2020-09-09 PROCEDURE — 2580000003 HC RX 258: Performed by: INTERNAL MEDICINE

## 2020-09-09 PROCEDURE — 72100 X-RAY EXAM L-S SPINE 2/3 VWS: CPT

## 2020-09-09 PROCEDURE — 1200000000 HC SEMI PRIVATE

## 2020-09-09 PROCEDURE — 73562 X-RAY EXAM OF KNEE 3: CPT

## 2020-09-09 PROCEDURE — 80048 BASIC METABOLIC PNL TOTAL CA: CPT

## 2020-09-09 PROCEDURE — 97530 THERAPEUTIC ACTIVITIES: CPT

## 2020-09-09 PROCEDURE — 97535 SELF CARE MNGMENT TRAINING: CPT

## 2020-09-09 PROCEDURE — 93010 ELECTROCARDIOGRAM REPORT: CPT | Performed by: INTERNAL MEDICINE

## 2020-09-09 PROCEDURE — 72072 X-RAY EXAM THORAC SPINE 3VWS: CPT

## 2020-09-09 PROCEDURE — 82962 GLUCOSE BLOOD TEST: CPT

## 2020-09-09 PROCEDURE — 97166 OT EVAL MOD COMPLEX 45 MIN: CPT

## 2020-09-09 PROCEDURE — 71045 X-RAY EXAM CHEST 1 VIEW: CPT

## 2020-09-09 PROCEDURE — 36415 COLL VENOUS BLD VENIPUNCTURE: CPT

## 2020-09-09 PROCEDURE — 97162 PT EVAL MOD COMPLEX 30 MIN: CPT

## 2020-09-09 PROCEDURE — 6370000000 HC RX 637 (ALT 250 FOR IP): Performed by: INTERNAL MEDICINE

## 2020-09-09 RX ORDER — SODIUM CHLORIDE, SODIUM LACTATE, POTASSIUM CHLORIDE, CALCIUM CHLORIDE 600; 310; 30; 20 MG/100ML; MG/100ML; MG/100ML; MG/100ML
INJECTION, SOLUTION INTRAVENOUS CONTINUOUS
Status: DISCONTINUED | OUTPATIENT
Start: 2020-09-09 | End: 2020-09-11

## 2020-09-09 RX ORDER — SODIUM CHLORIDE, SODIUM LACTATE, POTASSIUM CHLORIDE, AND CALCIUM CHLORIDE .6; .31; .03; .02 G/100ML; G/100ML; G/100ML; G/100ML
500 INJECTION, SOLUTION INTRAVENOUS ONCE
Status: COMPLETED | OUTPATIENT
Start: 2020-09-09 | End: 2020-09-09

## 2020-09-09 RX ADMIN — INSULIN LISPRO 1 UNITS: 100 INJECTION, SOLUTION INTRAVENOUS; SUBCUTANEOUS at 13:24

## 2020-09-09 RX ADMIN — METOPROLOL SUCCINATE 25 MG: 25 TABLET, EXTENDED RELEASE ORAL at 08:37

## 2020-09-09 RX ADMIN — PREDNISONE 5 MG: 5 TABLET ORAL at 08:37

## 2020-09-09 RX ADMIN — SODIUM CHLORIDE, POTASSIUM CHLORIDE, SODIUM LACTATE AND CALCIUM CHLORIDE: 600; 310; 30; 20 INJECTION, SOLUTION INTRAVENOUS at 13:17

## 2020-09-09 RX ADMIN — APIXABAN 5 MG: 5 TABLET, FILM COATED ORAL at 08:37

## 2020-09-09 RX ADMIN — METFORMIN HYDROCHLORIDE 500 MG: 500 TABLET, EXTENDED RELEASE ORAL at 08:37

## 2020-09-09 RX ADMIN — PANTOPRAZOLE SODIUM 20 MG: 20 TABLET, DELAYED RELEASE ORAL at 08:37

## 2020-09-09 RX ADMIN — INSULIN LISPRO 1 UNITS: 100 INJECTION, SOLUTION INTRAVENOUS; SUBCUTANEOUS at 18:22

## 2020-09-09 RX ADMIN — LEVOTHYROXINE SODIUM 75 MCG: 0.07 TABLET ORAL at 05:52

## 2020-09-09 RX ADMIN — PREDNISONE 5 MG: 5 TABLET ORAL at 20:42

## 2020-09-09 RX ADMIN — INSULIN LISPRO 1 UNITS: 100 INJECTION, SOLUTION INTRAVENOUS; SUBCUTANEOUS at 08:38

## 2020-09-09 RX ADMIN — SODIUM CHLORIDE, POTASSIUM CHLORIDE, SODIUM LACTATE AND CALCIUM CHLORIDE 500 ML: 600; 310; 30; 20 INJECTION, SOLUTION INTRAVENOUS at 07:28

## 2020-09-09 RX ADMIN — INSULIN LISPRO 1 UNITS: 100 INJECTION, SOLUTION INTRAVENOUS; SUBCUTANEOUS at 20:46

## 2020-09-09 RX ADMIN — APIXABAN 5 MG: 5 TABLET, FILM COATED ORAL at 20:42

## 2020-09-09 ASSESSMENT — PAIN SCALES - GENERAL
PAINLEVEL_OUTOF10: 0
PAINLEVEL_OUTOF10: 0

## 2020-09-09 NOTE — PLAN OF CARE
Problem: Falls - Risk of:  Goal: Will remain free from falls  Description: Will remain free from falls  9/9/2020 0900 by Yakov Trevino RN  Outcome: Met This Shift

## 2020-09-09 NOTE — H&P
[address] [facility]  History and Physical      CHIEF COMPLAINT:    Chief Complaint   Patient presents with    Fatigue     family states she has not been able to walk for a few days, hx dementia        Patient of Africa Riley MD presents with:  Generalized weakness    History of Present Illness: Both the patient and her  are very very challenging historians. The patient has no acute complaints at all. She complains of left knee pain for many years, and even getting this complaint was like pulling teeth. It was very difficult to isolate her complaint until finally I was able to ascertain that her knee seems to bother her. She denies any dysuria, although she did have an abnormal urinalysis on presentation. No dizziness or lightheadedness. No chest pain or shortness of breath. Her  just states Nestor Kearney has various problems\", but really could not elaborate much. It does seem like perhaps she has had more difficulty walking the last 2 or 3 days. The patient also endorsed some chronic mid back pain, which does not radiate. Her legs seem weak but I was never able to get her to cooperate with the full neurologic exam.  Unfortunately that was the extent of the history I was able to obtain.          Past Medical History:   Diagnosis Date    Arthritis     Diabetes mellitus ([facility] [address]     Hyperlipidemia     Hypertension          Past Surgical History:   Procedure Laterality Date    CARPAL TUNNEL RELEASE      CHOLECYSTECTOMY      HC INSERT PICC CATH, 5/> YRS  1/21/2020         HYSTERECTOMY         Medications Prior to Admission:    Medications Prior to Admission: levothyroxine (SYNTHROID) 75 MCG tablet, Take 75 mcg by mouth Daily  predniSONE (DELTASONE) 5 MG tablet, Take 5 mg by mouth 2 times daily  metFORMIN (GLUCOPHAGE-XR) 500 MG extended release tablet, Take 500 mg by mouth daily (with breakfast)  oxybutynin (DITROPAN-XL) 5 MG extended release tablet, Take 5 mg by mouth nightly as needed  apixaban (ELIQUIS) 5 MG TABS tablet, Take 5 mg by mouth 2 times daily  metoprolol succinate (TOPROL XL) 25 MG extended release tablet, Take 1 tablet by mouth daily  pantoprazole (PROTONIX) 20 MG tablet, Take 1 tablet by mouth daily  ipratropium-albuterol (DUONEB) 0.5-2.5 (3) MG/3ML SOLN nebulizer solution, Inhale 1 vial into the lungs every 6 hours as needed for Shortness of Breath    Note that the patient's home medications were reviewed and the above list is accurate to the best of my knowledge at the time of the exam.    Allergies:    Patient has no known allergies. Social History:    reports that she has never smoked. She has never used smokeless tobacco. She reports that she does not drink alcohol. Family History:   family history is not on file. REVIEW OF SYSTEMS:  As above in the HPI, otherwise negative    PHYSICAL EXAM:    Vitals:  BP (!) 145/70   Pulse 89   Temp 97.3 °F (36.3 °C) (Temporal)   Resp 20   Ht 5' (1.524 m)   Wt 198 lb (89.8 kg)   SpO2 93%   BMI 38.67 kg/m²     General appearance: NAD, conversant  HEENT: AT/NC, MMM  Neck: FROM, supple  Lungs: Clear to auscultation  CV: RRR, no MRGs  Abdomen: Soft, non-tender; no masses or HSM, +BS  Extremities: No peripheral edema or digital cyanosis. Severe pain left knee with ROM, but no obvious effusion or synovitis. No palpable cord. FROM left hip. Skin: no rash, lesions or ulcers  Back: Mild tenderness lower thoracic/upper lumbar spine  Psych: Calm and cooperative  Neuro: A&Ox3. She cannot even attempt to left her legs off the bed with hip flexors, but very unclear if it's a cooperation issue. Very poor effort on foot plantarflexors b/l but does have movement. She seems to have some difficulty following simple commands. Sensation to light touch intact in BLE's    LABS:  All labs reviewed.   Of note:  CBC:   Lab Results   Component Value Date    WBC 9.9 09/08/2020    RBC 4.80 09/08/2020    HGB 13.3 09/08/2020    HCT 42.5 09/08/2020    MCV 88.5 09/08/2020    MCH 27.7 09/08/2020    MCHC 31.3 09/08/2020    RDW 17.0 09/08/2020     09/08/2020    MPV 9.8 09/08/2020     BMP:    Lab Results   Component Value Date     09/08/2020    K 3.8 09/08/2020    CL 99 09/08/2020    CO2 25 09/08/2020    BUN 21 09/08/2020    LABALBU 3.3 09/08/2020    CREATININE 1.1 09/08/2020    CALCIUM 9.9 09/08/2020    GFRAA 57 09/08/2020    LABGLOM 57 09/08/2020    GLUCOSE 138 09/08/2020     U/A:    Lab Results   Component Value Date    COLORU Yellow 09/08/2020    PROTEINU 30 09/08/2020    PHUR 5.0 09/08/2020    WBCUA 2-5 09/08/2020    RBCUA 1-3 09/08/2020    YEAST FEW 02/04/2020    BACTERIA MODERATE 09/08/2020    CLARITYU Clear 09/08/2020    SPECGRAV 1.025 09/08/2020    LEUKOCYTESUR SMALL 09/08/2020    UROBILINOGEN 0.2 09/08/2020    BILIRUBINUR Negative 09/08/2020    BLOODU TRACE-INTACT 09/08/2020    GLUCOSEU Negative 09/08/2020       Imaging:  I've personally reviewed the patient's CT head  No acute intracranial process     EKG:  I've personally reviewed the patient's EKG:  NSR no acute ischemic changes     ASSESSMENT/PLAN:  Principal Problem:    Generalized weakness  Active Problems:    Hypertension    Type II diabetes mellitus, uncontrolled (HCC)    Abnormal urinalysis    History of pulmonary embolism    Acute kidney injury ([facility] [address]    Left knee pain  Resolved Problems:    * No resolved hospital problems. *      Doubt UTI - patient asymptomatic and minimal WBC count. D/c abx. Check CXR to complete the \"nonspecific fatigue\" workup    XR T/L spine and L knee    PT, OT    Will re-attempt neurologic exam tomorrow. She is not paraplegic from what I can tell, she just doesn't seem to lift her legs to command.     Glucose well controlled    Mild MERYL - continue IV fluids    Continue apixaban for history of PE    Code status: Full  Requires inpatient level of care  Lavell Riley    6:21 AM  9/9/2020  Cell: [phone]

## 2020-09-09 NOTE — CARE COORDINATION
I went in and spoke to the patients  as she is off the unit for a test. The patient lives in a 1 story home with with her  and there are 2 steps to get into the home. The patient has a WC and a WW at home as far a dme. The pharmacy is [facility]. The patient has not been walking for a while. Per the . PT saw the patient today Stew Lopes is 9/24 and [facility] is 10/24. I asked the patients  if he would like her to go to Skilled care upon discharge and he said no he wants her to return home. He wants [facility] on discharge as he has used them before we will need all new orders ref made to [facility] . The patient has a nebulizer at home that was delivered by Nataly Gallagher before this admission. I will follow plan is home.

## 2020-09-09 NOTE — PROGRESS NOTES
OCCUPATIONAL THERAPY INITIAL EVALUATION      Date:2020  Patient Name: Marcia Gutierrez  MRN: [id number]  : 1932  Room: [facility] OTR/L #[id number]    AM-PAC Daily Activity Raw Score: 10/24  Recommended Adaptive Equipment: TBD     Diagnosis: Urinary tract infection [N39.0]  Urinary tract infection [N39.0]     Referring physician: Aria Jacome MD     Pertinent Medical History: arthritis, DM, HTN    Precautions:  Falls, bed alarm, incontinent, cognition     Home Living: Pt lives with spouse and daughter in 1 floor home. 3 LACY, 1 handrail   Bathroom setup: tub/shower with shower chair   Equipment owned: w/w    Prior Level of Function: assistance with ADLs , assistance with IADLs; ambulated w/ w/w  Driving: no  Pt provided PLOF/home setup - pt is a poor historian as often provided conflicting PLOF. Pain Level: Pt c/o no pain this session    Cognition: A&O: 3/4; Follows 1 step directions  Noted confusion during all functional tasks - cues required to initiate, sequence and attend to tasks.    Delayed response time   Memory:  fair -   Sequencing:  fair -   Problem solving:  poor   Judgement/safety:  poor     Functional Assessment:   Initial Eval Status  Date: 20 Treatment Status  Date: Short Term Goals/LTG  Treatment frequency: 1-4x/wk   Feeding Minimal Assist   Supervision    Grooming Moderate Assist   Stand by Assist    UB Dressing Maximal Assist   Minimal Assist    LB Dressing Dependent   Moderate Assist    Bathing Dependent  Moderate Assist    Toileting Dependent   Including hygiene - incontinent of urine  Moderate Assist    Bed Mobility  Rolling: Maximal Assist   Supine to sit: Maximal Assist   Sit to supine: Maximal Assist   Rolling: Stand by Assist   Supine to sit: Minimal Assist   Sit to supine: Minimal Assist    Functional Transfers Max A  Min A   Functional Mobility Max A w/ w/w  1 lateral sidestep to [facility]  Limited by noted fatigue, poor stand tolerance and cognition  Min A   Balance Sitting: Min A>SBA (improved as progressed)  Standing: Max A w/ w/w     Activity Tolerance Fair-  Fair+   Visual/  Perceptual Glasses: no                Hand dominance: R   Strength ROM Additional Info:    RUE  Distal: 3+/5   Proximal: 2+/5 Distal AROM: WFL  Proximal AROM: limited   Fair  and fair FMC/dexterity noted during ADL tasks       LUE Distal: 3+/5   Proximal: 2+/5  Distal AROM: WFL  Proximal AROM: limited   Fair  and fair FMC/dexterity noted during ADL tasks       Hearing: WFL   Sensation:  No c/o numbness or tingling   Tone: WFL   Edema: none noted                Comments: Upon arrival patient lying in bed. Pt agreeable to OT session this date. At end of session, patient lying in bed (bed alarm on) with call light and phone within reach, all lines and tubes intact. Overall patient demonstrated decreased independence and safety during completion of ADL/functional transfer/mobility tasks. Pt would benefit from continued skilled OT to increase safety and independence with completion of ADL/IADL tasks for functional independence and quality of life. Treatment: OT treatment provided this date includes:  Facilitation of bed mobility (rolling, supine><sit EOB w/ education/cues for body mechanics, sequencing and attention), unsupported sitting balance (including static and dynamic sitting - cues for forward gaze, balance and attention), functional transfers (sit><stand EOB w/ education/max cues for safety/hand placement, sequencing and attention), standing balance/tolerance tasks w/ w/w (addressing posture, balance, safety and activity tolerance) and 1 lateral sidestep to [facility] with w/w (skilled cuing on w/w management, posture, body mechanics and safety).   Therapist facilitated self-care retraining: UB/LB self-care tasks (gown, socks), toileting task (including hygiene - incontinent of urine) and seated grooming tasks while educating pt on modified techniques, posture, safety and energy conservation techniques. Following activity, facilitated bed repositioning for comfort (w/ pillow props. Reinforced importance for skin and joint integrity). Skilled monitoring of HR, O2 sats and pts response to treatment. mod  Profile and History- med (extensive chart review)  Assessment of Occupational Performance and Identification of Deficits- med  Clinical Decision Making- med    Assessment of current deficits   Functional mobility [x]  ADLs [x] Strength [x]  Cognition [x]  Functional transfers  [x] IADLs [x] Safety Awareness [x]  Endurance [x]  Fine Motor Coordination [] Balance [x] Vision/perception [] Sensation []   Gross Motor Coordination [] ROM [] Delirium []                  Motor Control []    Plan of Care: OT 1-3x/week for 5-7 days PRN   [x] ADL retraining/AE recommendations   [x] Energy Conservation Techniques/Strategies    [x] Neuromuscular Re-Education   [x] Functional Transfer Training         [x] Functional Mobility Training          [x] Cognitive Re-Training         [] Splinting/Positioning Needs           [x] Therapeutic Activity   [x]Therapeutic Exercise   [] Visual/Perceptual   [] Delirium Prevention/Treatment   [x] Positioning to Improve Functional [place], Safety, and Skin Integrity   [x] Patient and/or Family Education to Increase Safety and Functional [place]   [] Other:     Rehab Potential: Good for established goals    Patient / Family Goal: Not stated     Patient and/or family were instructed on diagnosis, prognosis/goals and plan of care. Demonstrated poor understanding. [] Malnutrition indicators have been identified and nursing has been notified to ensure a dietitian consult is ordered.        Mod Evaluation completed +              Time In: 07:40  Time Out: 08:15  Total Treatment Time: 25 minutes   Min Units   OT Eval Low 11516     OT Eval Medium 97166 X 1   OT Eval High 19535     OT Re-Eval [id number]     Therapeutic Ex 77272     Therapeutic Activities 14407 1 11 ADL/Self Care 69838 1 14   Orthotic Management 97858     Neuro Re-Ed 22819     Non-Billable Time     TOTAL TIMED TREATMENT [address], OTR/L #[id number]

## 2020-09-10 ENCOUNTER — APPOINTMENT (OUTPATIENT)
Dept: CT IMAGING | Age: 85
DRG: 884 | End: 2020-09-10
Payer: MEDICARE

## 2020-09-10 PROBLEM — M71.20 BAKER'S CYST: Status: ACTIVE | Noted: 2020-09-10

## 2020-09-10 LAB
ANION GAP SERPL CALCULATED.3IONS-SCNC: 14 MMOL/L (ref 7–16)
B.E.: 0.9 MMOL/L (ref -3–3)
BUN BLDV-MCNC: 12 MG/DL (ref 8–23)
CALCIUM SERPL-MCNC: 9.1 MG/DL (ref 8.6–10.2)
CHLORIDE BLD-SCNC: 100 MMOL/L (ref 98–107)
CO2: 22 MMOL/L (ref 22–29)
COHB: 1 % (ref 0–1.5)
CREAT SERPL-MCNC: 0.9 MG/DL (ref 0.5–1)
CRITICAL: ABNORMAL
DATE ANALYZED: ABNORMAL
DATE OF COLLECTION: ABNORMAL
GFR AFRICAN AMERICAN: >60
GFR NON-AFRICAN AMERICAN: >60 ML/MIN/1.73
GLUCOSE BLD-MCNC: 195 MG/DL (ref 74–99)
HCO3: 23.8 MMOL/L (ref 22–26)
HHB: 3.8 % (ref 0–5)
LAB: ABNORMAL
Lab: ABNORMAL
METER GLUCOSE: 149 MG/DL (ref 74–99)
METER GLUCOSE: 185 MG/DL (ref 74–99)
METER GLUCOSE: 197 MG/DL (ref 74–99)
METER GLUCOSE: 214 MG/DL (ref 74–99)
METHB: 0.4 % (ref 0–1.5)
MODE: ABNORMAL
O2 CONTENT: 18.2 ML/DL
O2 SATURATION: 96.1 % (ref 92–98.5)
O2HB: 94.8 % (ref 94–97)
OPERATOR ID: 659
PATIENT TEMP: 37 C
PCO2: 32.8 MMHG (ref 35–45)
PH BLOOD GAS: 7.48 (ref 7.35–7.45)
PO2: 77.6 MMHG (ref 75–100)
POTASSIUM SERPL-SCNC: 4.5 MMOL/L (ref 3.5–5)
SODIUM BLD-SCNC: 136 MMOL/L (ref 132–146)
SOURCE, BLOOD GAS: ABNORMAL
THB: 13.6 G/DL (ref 11.5–16.5)
TIME ANALYZED: 1116

## 2020-09-10 PROCEDURE — 6360000002 HC RX W HCPCS: Performed by: INTERNAL MEDICINE

## 2020-09-10 PROCEDURE — 36415 COLL VENOUS BLD VENIPUNCTURE: CPT

## 2020-09-10 PROCEDURE — 1200000000 HC SEMI PRIVATE

## 2020-09-10 PROCEDURE — 80048 BASIC METABOLIC PNL TOTAL CA: CPT

## 2020-09-10 PROCEDURE — 82962 GLUCOSE BLOOD TEST: CPT

## 2020-09-10 PROCEDURE — 82805 BLOOD GASES W/O2 SATURATION: CPT

## 2020-09-10 PROCEDURE — 2580000003 HC RX 258: Performed by: INTERNAL MEDICINE

## 2020-09-10 PROCEDURE — 6370000000 HC RX 637 (ALT 250 FOR IP): Performed by: INTERNAL MEDICINE

## 2020-09-10 PROCEDURE — 70450 CT HEAD/BRAIN W/O DYE: CPT

## 2020-09-10 PROCEDURE — 36600 WITHDRAWAL OF ARTERIAL BLOOD: CPT

## 2020-09-10 RX ORDER — AMLODIPINE BESYLATE 5 MG/1
5 TABLET ORAL DAILY
Status: DISCONTINUED | OUTPATIENT
Start: 2020-09-10 | End: 2020-09-12 | Stop reason: HOSPADM

## 2020-09-10 RX ORDER — CARVEDILOL 6.25 MG/1
12.5 TABLET ORAL 2 TIMES DAILY WITH MEALS
Status: DISCONTINUED | OUTPATIENT
Start: 2020-09-10 | End: 2020-09-12 | Stop reason: HOSPADM

## 2020-09-10 RX ADMIN — CEFTRIAXONE SODIUM 1 G: 1 INJECTION, POWDER, FOR SOLUTION INTRAMUSCULAR; INTRAVENOUS at 14:29

## 2020-09-10 RX ADMIN — INSULIN LISPRO 1 UNITS: 100 INJECTION, SOLUTION INTRAVENOUS; SUBCUTANEOUS at 11:17

## 2020-09-10 RX ADMIN — PANTOPRAZOLE SODIUM 20 MG: 20 TABLET, DELAYED RELEASE ORAL at 08:44

## 2020-09-10 RX ADMIN — METFORMIN HYDROCHLORIDE 500 MG: 500 TABLET, EXTENDED RELEASE ORAL at 08:44

## 2020-09-10 RX ADMIN — METOPROLOL SUCCINATE 25 MG: 25 TABLET, EXTENDED RELEASE ORAL at 08:48

## 2020-09-10 RX ADMIN — APIXABAN 5 MG: 5 TABLET, FILM COATED ORAL at 21:54

## 2020-09-10 RX ADMIN — LEVOTHYROXINE SODIUM 75 MCG: 0.07 TABLET ORAL at 05:59

## 2020-09-10 RX ADMIN — AMLODIPINE BESYLATE 5 MG: 5 TABLET ORAL at 11:12

## 2020-09-10 RX ADMIN — INSULIN LISPRO 1 UNITS: 100 INJECTION, SOLUTION INTRAVENOUS; SUBCUTANEOUS at 08:45

## 2020-09-10 RX ADMIN — CARVEDILOL 12.5 MG: 6.25 TABLET, FILM COATED ORAL at 17:13

## 2020-09-10 RX ADMIN — APIXABAN 5 MG: 5 TABLET, FILM COATED ORAL at 08:44

## 2020-09-10 RX ADMIN — PREDNISONE 5 MG: 5 TABLET ORAL at 21:54

## 2020-09-10 RX ADMIN — Medication 10 ML: at 21:54

## 2020-09-10 RX ADMIN — INSULIN LISPRO 1 UNITS: 100 INJECTION, SOLUTION INTRAVENOUS; SUBCUTANEOUS at 21:54

## 2020-09-10 RX ADMIN — INSULIN LISPRO 2 UNITS: 100 INJECTION, SOLUTION INTRAVENOUS; SUBCUTANEOUS at 18:33

## 2020-09-10 RX ADMIN — PREDNISONE 5 MG: 5 TABLET ORAL at 08:44

## 2020-09-10 ASSESSMENT — PAIN SCALES - GENERAL
PAINLEVEL_OUTOF10: 0
PAINLEVEL_OUTOF10: 0

## 2020-09-10 NOTE — PROGRESS NOTES
Chief Complaint:  Chief Complaint   Patient presents with    Fatigue     family states she has not been able to walk for a few days, hx dementia     Generalized weakness     Subjective:    Patient acting absolutely bizarre today. This morning per RN was very combative and angry. Refused sliding scale and other minor treatments. When I saw her, she was staring forwards, totally unresponsive but clearly awake. Blinks if I tap on her glasses. But NOT speaking at all. Her  seemed unsurprised/unconcerned about this, but also told me she has never done this before. Objective:    BP (!) 162/70   Pulse 79   Temp 96.3 °F (35.7 °C) (Temporal)   Resp 20   Ht 5' (1.524 m)   Wt 198 lb (89.8 kg)   SpO2 96%   BMI 38.67 kg/m²     Current medications that patient is taking have been reviewed. General appearance: Nontoxic female  HEENT: AT/NC, MMM  Neck: FROM, supple  Lungs: Clear to auscultation  CV: RRR, no MRGs  Abdomen: Soft, non-tender; no masses or HSM, +BS  Extremities: No peripheral edema or digital cyanosis  Skin: no rash, lesions or ulcers  Psych: Calm and cooperative  Neuro: Awake, blinks to encounter, no waxy flexibility, does not respond to nailbed pressure or sternal rub, but eyes are wide open. PERRLA. Face symmetric.     Labs:  CBC:   Lab Results   Component Value Date    WBC 9.9 09/08/2020    RBC 4.80 09/08/2020    HGB 13.3 09/08/2020    HCT 42.5 09/08/2020    MCV 88.5 09/08/2020    MCH 27.7 09/08/2020    MCHC 31.3 09/08/2020    RDW 17.0 09/08/2020     09/08/2020    MPV 9.8 09/08/2020     CMP:    Lab Results   Component Value Date     09/10/2020    K 4.5 09/10/2020    K 3.8 09/08/2020     09/10/2020    CO2 22 09/10/2020    BUN 12 09/10/2020    CREATININE 0.9 09/10/2020    GFRAA >60 09/10/2020    LABGLOM >60 09/10/2020    GLUCOSE 195 09/10/2020    PROT 6.8 09/08/2020    LABALBU 3.3 09/08/2020    CALCIUM 9.1 09/10/2020    BILITOT 0.3 09/08/2020    ALKPHOS 94 09/08/2020 AST 23 09/08/2020    ALT 17 09/08/2020        Imaging:  I've personally reviewed the patient's XR T/L spine and L knee       1. 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 2. Osteoarthritis at the left knee which is most severe at the medial   weightbearing compartment. 3. No acute fracture or dislocation involving the T-spine, L-spine or   left knee. US left lower extremity       1. No evidence to suggest deep venous thrombosis of the left lower   extremity. 2. Cystic area is seen at level of the popliteal fossa measuring 2.3 x   0.5 cm which could suggest a popliteal cyst.        Assessment/Plan:  Principal Problem:    Generalized weakness  Active Problems:    Hypertension    Type II diabetes mellitus, uncontrolled (HCC)    Abnormal urinalysis    History of pulmonary embolism    Acute kidney injury ([address]    Left knee pain    Baker's cyst  Resolved Problems:    * No resolved hospital problems. *       Seems this this may be behavioral.  Appears almost catatonic. Stat CT + ABG. Neuro consult. Left knee problem seems to be a major contributor to her loss of function. Once mental status issues are figured out, will consult Ortho to see if they might be willing to inject the knee to treat the Baker's cyst    PT, OT    Low suspicion for UTI but her behavior really is strange. Maybe having a component of delirium? Will go ahead and treat the ?UTI. Mild MERYL - now resolved    Glucose well controlled    Continue apixaban for h/o PE    BP a bit high but I don't think high enough to cause encephalopathy or PRES. If mental status doesn't get better though, reasonable to do MRI. Switch metoprolol to carvedilol, and add amloidpine.   Labetalol IV PRN    Requires continued inpatient level of care   Wallace Partida    6:43 AM  9/10/2020  Cell: [phone]

## 2020-09-10 NOTE — CARE COORDINATION
Noted her blood pressure this am is 190/100 and the patient is getting abgs. [facility] and I spoke to the  yesterday and he wants to take his wife home upon discharge. The patient has a wheelchair and a wheeled walker at home. The plan is home upon discharge and I will need hhc orders on discharge. [facility] is following the patient for home care. I will follow

## 2020-09-10 NOTE — PROGRESS NOTES
Messaged Dr Iliana Mike from [facility] to ask if pt was seen this morning. Consult was called at 655 this am but there was no response from the on call dr after the consult was placed this morning and no notes documented that the patient had been seen today.

## 2020-09-11 ENCOUNTER — APPOINTMENT (OUTPATIENT)
Dept: MRI IMAGING | Age: 85
DRG: 884 | End: 2020-09-11
Payer: MEDICARE

## 2020-09-11 LAB
ANION GAP SERPL CALCULATED.3IONS-SCNC: 13 MMOL/L (ref 7–16)
BASOPHILS ABSOLUTE: 0.03 E9/L (ref 0–0.2)
BASOPHILS RELATIVE PERCENT: 0.4 % (ref 0–2)
BUN BLDV-MCNC: 14 MG/DL (ref 8–23)
CALCIUM SERPL-MCNC: 8.5 MG/DL (ref 8.6–10.2)
CHLORIDE BLD-SCNC: 97 MMOL/L (ref 98–107)
CO2: 24 MMOL/L (ref 22–29)
CREAT SERPL-MCNC: 1 MG/DL (ref 0.5–1)
EOSINOPHILS ABSOLUTE: 0.05 E9/L (ref 0.05–0.5)
EOSINOPHILS RELATIVE PERCENT: 0.7 % (ref 0–6)
GFR AFRICAN AMERICAN: >60
GFR NON-AFRICAN AMERICAN: >60 ML/MIN/1.73
GLUCOSE BLD-MCNC: 211 MG/DL (ref 74–99)
HCT VFR BLD CALC: 38.8 % (ref 34–48)
HEMOGLOBIN: 11.7 G/DL (ref 11.5–15.5)
IMMATURE GRANULOCYTES #: 0.22 E9/L
IMMATURE GRANULOCYTES %: 3.1 % (ref 0–5)
LYMPHOCYTES ABSOLUTE: 1.22 E9/L (ref 1.5–4)
LYMPHOCYTES RELATIVE PERCENT: 17.2 % (ref 20–42)
MCH RBC QN AUTO: 27 PG (ref 26–35)
MCHC RBC AUTO-ENTMCNC: 30.2 % (ref 32–34.5)
MCV RBC AUTO: 89.6 FL (ref 80–99.9)
METER GLUCOSE: 169 MG/DL (ref 74–99)
METER GLUCOSE: 197 MG/DL (ref 74–99)
METER GLUCOSE: 202 MG/DL (ref 74–99)
METER GLUCOSE: 202 MG/DL (ref 74–99)
MONOCYTES ABSOLUTE: 0.53 E9/L (ref 0.1–0.95)
MONOCYTES RELATIVE PERCENT: 7.5 % (ref 2–12)
NEUTROPHILS ABSOLUTE: 5.05 E9/L (ref 1.8–7.3)
NEUTROPHILS RELATIVE PERCENT: 71.1 % (ref 43–80)
ORGANISM: ABNORMAL
ORGANISM: ABNORMAL
PDW BLD-RTO: 16.6 FL (ref 11.5–15)
PLATELET # BLD: 269 E9/L (ref 130–450)
PMV BLD AUTO: 9.8 FL (ref 7–12)
POTASSIUM SERPL-SCNC: 4.1 MMOL/L (ref 3.5–5)
RBC # BLD: 4.33 E12/L (ref 3.5–5.5)
SODIUM BLD-SCNC: 134 MMOL/L (ref 132–146)
TSH SERPL DL<=0.05 MIU/L-ACNC: 2.55 UIU/ML (ref 0.27–4.2)
URINE CULTURE, ROUTINE: ABNORMAL
URINE CULTURE, ROUTINE: ABNORMAL
WBC # BLD: 7.1 E9/L (ref 4.5–11.5)

## 2020-09-11 PROCEDURE — 2580000003 HC RX 258: Performed by: INTERNAL MEDICINE

## 2020-09-11 PROCEDURE — 97530 THERAPEUTIC ACTIVITIES: CPT

## 2020-09-11 PROCEDURE — 6370000000 HC RX 637 (ALT 250 FOR IP): Performed by: INTERNAL MEDICINE

## 2020-09-11 PROCEDURE — 82962 GLUCOSE BLOOD TEST: CPT

## 2020-09-11 PROCEDURE — 80048 BASIC METABOLIC PNL TOTAL CA: CPT

## 2020-09-11 PROCEDURE — 1200000000 HC SEMI PRIVATE

## 2020-09-11 PROCEDURE — 70551 MRI BRAIN STEM W/O DYE: CPT

## 2020-09-11 PROCEDURE — 84443 ASSAY THYROID STIM HORMONE: CPT

## 2020-09-11 PROCEDURE — 85025 COMPLETE CBC W/AUTO DIFF WBC: CPT

## 2020-09-11 PROCEDURE — 6360000002 HC RX W HCPCS: Performed by: INTERNAL MEDICINE

## 2020-09-11 PROCEDURE — 97535 SELF CARE MNGMENT TRAINING: CPT

## 2020-09-11 PROCEDURE — 36415 COLL VENOUS BLD VENIPUNCTURE: CPT

## 2020-09-11 RX ORDER — CARVEDILOL 12.5 MG/1
12.5 TABLET ORAL 2 TIMES DAILY WITH MEALS
Qty: 60 TABLET | Refills: 3 | Status: SHIPPED | OUTPATIENT
Start: 2020-09-11

## 2020-09-11 RX ORDER — AMLODIPINE BESYLATE 5 MG/1
5 TABLET ORAL DAILY
Qty: 30 TABLET | Refills: 3 | Status: SHIPPED | OUTPATIENT
Start: 2020-09-12

## 2020-09-11 RX ORDER — CEFDINIR 300 MG/1
300 CAPSULE ORAL 2 TIMES DAILY
Qty: 10 CAPSULE | Refills: 0 | Status: SHIPPED | OUTPATIENT
Start: 2020-09-11 | End: 2020-09-16

## 2020-09-11 RX ADMIN — PREDNISONE 5 MG: 5 TABLET ORAL at 09:34

## 2020-09-11 RX ADMIN — INSULIN LISPRO 1 UNITS: 100 INJECTION, SOLUTION INTRAVENOUS; SUBCUTANEOUS at 12:44

## 2020-09-11 RX ADMIN — PANTOPRAZOLE SODIUM 20 MG: 20 TABLET, DELAYED RELEASE ORAL at 09:59

## 2020-09-11 RX ADMIN — PREDNISONE 5 MG: 5 TABLET ORAL at 21:28

## 2020-09-11 RX ADMIN — AMLODIPINE BESYLATE 5 MG: 5 TABLET ORAL at 09:34

## 2020-09-11 RX ADMIN — APIXABAN 5 MG: 5 TABLET, FILM COATED ORAL at 09:59

## 2020-09-11 RX ADMIN — CEFTRIAXONE SODIUM 1 G: 1 INJECTION, POWDER, FOR SOLUTION INTRAMUSCULAR; INTRAVENOUS at 14:25

## 2020-09-11 RX ADMIN — INSULIN LISPRO 1 UNITS: 100 INJECTION, SOLUTION INTRAVENOUS; SUBCUTANEOUS at 21:30

## 2020-09-11 RX ADMIN — LEVOTHYROXINE SODIUM 75 MCG: 0.07 TABLET ORAL at 05:55

## 2020-09-11 RX ADMIN — METFORMIN HYDROCHLORIDE 500 MG: 500 TABLET, EXTENDED RELEASE ORAL at 09:34

## 2020-09-11 RX ADMIN — CARVEDILOL 12.5 MG: 6.25 TABLET, FILM COATED ORAL at 19:03

## 2020-09-11 RX ADMIN — INSULIN LISPRO 2 UNITS: 100 INJECTION, SOLUTION INTRAVENOUS; SUBCUTANEOUS at 19:03

## 2020-09-11 RX ADMIN — INSULIN LISPRO 2 UNITS: 100 INJECTION, SOLUTION INTRAVENOUS; SUBCUTANEOUS at 09:30

## 2020-09-11 RX ADMIN — Medication 10 ML: at 21:28

## 2020-09-11 RX ADMIN — CARVEDILOL 12.5 MG: 6.25 TABLET, FILM COATED ORAL at 09:34

## 2020-09-11 RX ADMIN — APIXABAN 5 MG: 5 TABLET, FILM COATED ORAL at 21:28

## 2020-09-11 ASSESSMENT — PAIN SCALES - GENERAL
PAINLEVEL_OUTOF10: 0
PAINLEVEL_OUTOF10: 0

## 2020-09-11 NOTE — PROGRESS NOTES
OCCUPATIONAL THERAPY  Treatment Session    Date:2020  Patient Name: Mami Massey  MRN: [id number]  : 1932  Room: [address] [place] [address], OTR/L #[id number]    AM-PAC Daily Activity Raw Score:   Recommended Adaptive Equipment: TBD     Diagnosis: Urinary tract infection [N39.0]  Urinary tract infection [N39.0]     Referring physician: Wallace Partida MD     Pertinent Medical History: arthritis, DM, HTN    Precautions:  Falls, bed alarm, incontinent, cognition     Home Living: Pt lives with spouse and daughter in 1 floor home. 3 LACY, 1 handrail   Bathroom setup: tub/shower with shower chair   Equipment owned: w/w    Prior Level of Function: assistance with ADLs , assistance with IADLs; ambulated w/ w/w  Driving: no  Pt provided PLOF/home setup - pt is a poor historian as often provided conflicting PLOF. Pain Level: Pt c/o no pain this session    Cognition: A&O: 3/4; Follows 1 step directions  Noted confusion during all functional tasks - cues required to initiate, sequence and attend to tasks.    Delayed response time   Memory:  fair -   Sequencing:  fair -   Problem solving:  fair   Judgement/safety:  fair     Functional Assessment:   Initial Eval Status  Date: 20 Treatment Status  Date:  20 Short Term Goals/LTG  Treatment frequency: 1-4x/wk   Feeding Minimal Assist  NT Supervision    Grooming Moderate Assist  Set up  Bed level Stand by Assist    UB Dressing Maximal Assist   Minimal Assist    LB Dressing Dependent   Moderate Assist    Bathing Dependent  Moderate Assist    Toileting Dependent   Including hygiene - incontinent of urine  Moderate Assist    Bed Mobility  Rolling: Maximal Assist   Supine to sit: Maximal Assist   Sit to supine: Maximal Assist  Mod A    For proper positioning in bed w/ B LEs elevated Rolling: Stand by Assist   Supine to sit: Minimal Assist   Sit to supine: Minimal Assist    Functional Transfers Max A NT Min A   Functional Mobility Max A w/ w/w  1 lateral sidestep to [facility]  Limited by noted fatigue, poor stand tolerance and cognition  Min A   Balance Sitting: Min A>SBA (improved as progressed)  Standing: Max A w/ w/w     Activity Tolerance Fair-  Fair+   Visual/  Perceptual Glasses: no                            Comments: Upon arrival patient found attempting to reposition self in bed. Dtr present at b/s. Pt agreeable to minimal ax this session. At end of session, patient properly positioned in semi-supine w/ Dtr at b/s. Bed alarm on, call light and phone within reach, all lines and tubes intact. Overall patient demonstrated decreased independence and safety during completion of ADL/functional transfer/mobility tasks. Pt would benefit from continued skilled OT to increase safety and independence with completion of ADL/IADL tasks for functional independence and quality of life. Treatment: Provided pt/family ed re: benefits of Participating in therapy - SNF vs HH OT - Family and pt insisting on returning home w/ 24/7 Family assist and Home health therapy - adamantly declining SNF;  Educated on benefits and use of DME/AD for Tub-Transfers/safety w/ Bathing, benefits of use of BSC for safety if unable to tolerate ambulating to bathroom - dtr reported having w/c for pt to perform stand-pivot transfers b/t surfaces - declined BSC;   Facilitation of bed mobility and repositioning    Plan of Care: OT 1-3x/week for 5-7 days PRN   [x] ADL retraining/AE recommendations   [x] Energy Conservation Techniques/Strategies    [x] Neuromuscular Re-Education   [x] Functional Transfer Training         [x] Functional Mobility Training          [x] Cognitive Re-Training         [] Splinting/Positioning Needs           [x] Therapeutic Activity   [x]Therapeutic Exercise   [] Visual/Perceptual   [] Delirium Prevention/Treatment   [x] Positioning to Improve Functional [place], Safety, and Skin Integrity   [x] Patient and/or Family Education to Increase Safety and Functional [place]   [] Other:     Rehab Potential: Good for established goals    Patient / Family Goal: Not stated     Patient and/or family were instructed on diagnosis, prognosis/goals and plan of care. Demonstrated poor understanding.               Time In: 7487  Time Out: 1524  Total Treatment Time: 10 minutes   Min Units   OT Eval Low 31630     OT Eval Medium 83502     OT Eval High [id number]     OT Re-Eval [id number]     Therapeutic Ex [id number]     Therapeutic Activities 77143     ADL/Self Care 75666 10 1   Orthotic Management 31246     Neuro Re-Ed 86678     Non-Billable Time     TOTAL TIMED TREATMENT 10 [address], [place], OTR/L  # [id number]

## 2020-09-11 NOTE — PROGRESS NOTES
Physical Therapy  Treatment Note    Name: Loyd Nissen  : 1932  MRN: [id number]    Referring Provider: Lexa Zhao MD    Date of Service: 2020    Evaluating PT: Jamila Brendon, PT, DPT, [id number]    Room #: 7309/2185-B  Diagnosis: Urinary tract infection  PMHx/PSHx: OA, DM, HLD, HTN  Precautions: Fall risk, dementia, incontinent of bladder, alarm    SUBJECTIVE:    Pt is questionable historian. Per , pt lives with  in a single story house with 1 stair(s) and 1 rail(s) to enter. Bed is on first floor and bath is on first floor. Pt ambulated with walker or SPC prior to admission. Pt owns both [employer] and rollator walker. OBJECTIVE:   Initial Evaluation  Date: 20 Treatment Date:  20 Short Term/ Long Term   Goals   AM-PAC 6 Clicks  63/17    Was pt agreeable to Eval/treatment? Yes Yes     Does pt have pain? Indications of generalized pain and discomfort with mobility Pt c/o back, buttock and BLE pain    Bed Mobility  Rolling: Max A  Supine to sit: Max A  Sit to supine: Max A  Scooting: Max A to EOB NT Rolling: Min A  Supine to sit: Min A  Sit to supine: Min A  Scooting: Min A   Transfers Sit to stand: Max A  Stand to sit: Max A  Stand pivot: NT Sit to stand: Min A 1x; Mod A 1x  Stand to sit: Mod A  Stand pivot: Mod A with [employer]  Sit to stand: Min A  Stand to sit: Min A  Stand pivot: Min A with [employer]   Ambulation   Unable to sidestep 30 feet x2 reps using [employer] with Min<>Mod A >10 feet with [employer] with Mod A   Stair negotiation: ascended and descended NT NT 1 step(s) with 1 rail(s) with Mod A   ROM BUE: Refer to OT note  BLE: WFL BLE: WFL    Strength BUE: Refer to OT note  BLE: WFL Pt did not participate in MMT or TE    Balance Sitting EOB: SBA  Dynamic Standing: Max A with [employer] Dynamic Standing: Min<>Mod A with [employer] Sitting EOB: Independent   Dynamic Standing: Min A with [employer]     Pt is A & O x: 2; hospital and 2020.   Pt unable to state [employer] ([facility] [place] and unable to recall month with

## 2020-09-11 NOTE — PROGRESS NOTES
Chief Complaint:  Chief Complaint   Patient presents with    Fatigue     family states she has not been able to walk for a few days, hx dementia     Generalized weakness     Subjective:    She is totally back to normal!  She is now awake and alert. She doesn't remember the episode yesterday. She says her left knee feels a lot better without any intervention. Objective:    BP (!) 143/65   Pulse 73   Temp 97.1 °F (36.2 °C)   Resp 18   Ht 5' (1.524 m)   Wt 198 lb (89.8 kg)   SpO2 96%   BMI 38.67 kg/m²     Current medications that patient is taking have been reviewed. General appearance: Nontoxic female  HEENT: AT/NC, MMM  Neck: FROM, supple  Lungs: Clear to auscultation  CV: RRR, no MRGs  Abdomen: Soft, non-tender; no masses or HSM, +BS  Extremities: No peripheral edema or digital cyanosis. FROM L knee without pain today. Skin: no rash, lesions or ulcers  Psych: Calm and cooperative  Neuro: Awake, interactive. Face symmetric. EOMI. LOC totally normal and conversant again. Labs:  CBC:   Lab Results   Component Value Date    WBC 7.1 09/11/2020    RBC 4.33 09/11/2020    HGB 11.7 09/11/2020    HCT 38.8 09/11/2020    MCV 89.6 09/11/2020    MCH 27.0 09/11/2020    MCHC 30.2 09/11/2020    RDW 16.6 09/11/2020     09/11/2020    MPV 9.8 09/11/2020     CMP:    Lab Results   Component Value Date     09/11/2020    K 4.1 09/11/2020    K 3.8 09/08/2020    CL 97 09/11/2020    CO2 24 09/11/2020    BUN 14 09/11/2020    CREATININE 1.0 09/11/2020    GFRAA >60 09/11/2020    LABGLOM >60 09/11/2020    GLUCOSE 211 09/11/2020    PROT 6.8 09/08/2020    LABALBU 3.3 09/08/2020    CALCIUM 8.5 09/11/2020    BILITOT 0.3 09/08/2020    ALKPHOS 94 09/08/2020    AST 23 09/08/2020    ALT 17 09/08/2020        Imaging:  I've personally reviewed the patient's XR T/L spine and L knee       1. 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    2. Osteoarthritis at the left knee which is most

## 2020-09-11 NOTE — CARE COORDINATION
Per the  the plan will be for the patient to be discharged in the am per Dr Ally Gomez. The patient is very weak and her evals are not great . I called Mr Cassie Roper to see if the patient will need a ride set up for the patient. He will discuss with his daughter and call me back. If [facility] ordered he has no preference . Per Mr Parker Harrell his dgt will transport in their car upon dc.

## 2020-09-11 NOTE — PROGRESS NOTES
IV team notified that pt is in need of new iv access and is a hard stick.  Multiple nurses attempted today and were unsuccessful

## 2020-09-11 NOTE — PROGRESS NOTES
Ortho was told by Dr Greg Lawrence to disregard the consult for now and he will reconsult when appropriate.

## 2020-09-12 VITALS
TEMPERATURE: 97.4 F | SYSTOLIC BLOOD PRESSURE: 164 MMHG | HEIGHT: 60 IN | OXYGEN SATURATION: 94 % | BODY MASS INDEX: 38.87 KG/M2 | WEIGHT: 198 LBS | RESPIRATION RATE: 18 BRPM | HEART RATE: 74 BPM | DIASTOLIC BLOOD PRESSURE: 70 MMHG

## 2020-09-12 LAB
ANION GAP SERPL CALCULATED.3IONS-SCNC: 17 MMOL/L (ref 7–16)
BASOPHILS ABSOLUTE: 0.03 E9/L (ref 0–0.2)
BASOPHILS RELATIVE PERCENT: 0.5 % (ref 0–2)
BUN BLDV-MCNC: 18 MG/DL (ref 8–23)
CALCIUM SERPL-MCNC: 8.6 MG/DL (ref 8.6–10.2)
CHLORIDE BLD-SCNC: 103 MMOL/L (ref 98–107)
CO2: 22 MMOL/L (ref 22–29)
CREAT SERPL-MCNC: 1 MG/DL (ref 0.5–1)
EOSINOPHILS ABSOLUTE: 0.04 E9/L (ref 0.05–0.5)
EOSINOPHILS RELATIVE PERCENT: 0.7 % (ref 0–6)
GFR AFRICAN AMERICAN: >60
GFR NON-AFRICAN AMERICAN: >60 ML/MIN/1.73
GLUCOSE BLD-MCNC: 199 MG/DL (ref 74–99)
HCT VFR BLD CALC: 35.3 % (ref 34–48)
HEMOGLOBIN: 10.9 G/DL (ref 11.5–15.5)
IMMATURE GRANULOCYTES #: 0.25 E9/L
IMMATURE GRANULOCYTES %: 4.2 % (ref 0–5)
LYMPHOCYTES ABSOLUTE: 1.04 E9/L (ref 1.5–4)
LYMPHOCYTES RELATIVE PERCENT: 17.5 % (ref 20–42)
MCH RBC QN AUTO: 27.7 PG (ref 26–35)
MCHC RBC AUTO-ENTMCNC: 30.9 % (ref 32–34.5)
MCV RBC AUTO: 89.8 FL (ref 80–99.9)
METER GLUCOSE: 185 MG/DL (ref 74–99)
METER GLUCOSE: 194 MG/DL (ref 74–99)
MONOCYTES ABSOLUTE: 0.44 E9/L (ref 0.1–0.95)
MONOCYTES RELATIVE PERCENT: 7.4 % (ref 2–12)
NEUTROPHILS ABSOLUTE: 4.15 E9/L (ref 1.8–7.3)
NEUTROPHILS RELATIVE PERCENT: 69.7 % (ref 43–80)
PDW BLD-RTO: 16.4 FL (ref 11.5–15)
PLATELET # BLD: 257 E9/L (ref 130–450)
PMV BLD AUTO: 10 FL (ref 7–12)
POTASSIUM SERPL-SCNC: 4.3 MMOL/L (ref 3.5–5)
RBC # BLD: 3.93 E12/L (ref 3.5–5.5)
SODIUM BLD-SCNC: 142 MMOL/L (ref 132–146)
VITAMIN B-12: 810 PG/ML (ref 211–946)
WBC # BLD: 6 E9/L (ref 4.5–11.5)

## 2020-09-12 PROCEDURE — 82607 VITAMIN B-12: CPT

## 2020-09-12 PROCEDURE — 36415 COLL VENOUS BLD VENIPUNCTURE: CPT

## 2020-09-12 PROCEDURE — 85025 COMPLETE CBC W/AUTO DIFF WBC: CPT

## 2020-09-12 PROCEDURE — 2580000003 HC RX 258: Performed by: INTERNAL MEDICINE

## 2020-09-12 PROCEDURE — 80048 BASIC METABOLIC PNL TOTAL CA: CPT

## 2020-09-12 PROCEDURE — 82962 GLUCOSE BLOOD TEST: CPT

## 2020-09-12 PROCEDURE — 6370000000 HC RX 637 (ALT 250 FOR IP): Performed by: INTERNAL MEDICINE

## 2020-09-12 RX ADMIN — AMLODIPINE BESYLATE 5 MG: 5 TABLET ORAL at 08:56

## 2020-09-12 RX ADMIN — METFORMIN HYDROCHLORIDE 500 MG: 500 TABLET, EXTENDED RELEASE ORAL at 08:56

## 2020-09-12 RX ADMIN — CARVEDILOL 12.5 MG: 6.25 TABLET, FILM COATED ORAL at 08:56

## 2020-09-12 RX ADMIN — LEVOTHYROXINE SODIUM 75 MCG: 0.07 TABLET ORAL at 06:08

## 2020-09-12 RX ADMIN — APIXABAN 5 MG: 5 TABLET, FILM COATED ORAL at 08:56

## 2020-09-12 RX ADMIN — INSULIN LISPRO 1 UNITS: 100 INJECTION, SOLUTION INTRAVENOUS; SUBCUTANEOUS at 08:55

## 2020-09-12 RX ADMIN — Medication 10 ML: at 08:56

## 2020-09-12 RX ADMIN — PANTOPRAZOLE SODIUM 20 MG: 20 TABLET, DELAYED RELEASE ORAL at 08:56

## 2020-09-12 RX ADMIN — PREDNISONE 5 MG: 5 TABLET ORAL at 08:56

## 2020-09-12 ASSESSMENT — PAIN SCALES - GENERAL: PAINLEVEL_OUTOF10: 0

## 2020-09-12 NOTE — CARE COORDINATION
Patient for discharge, homecare orders written. No preference on company, [employer] has staffing available, notified of orders and discharge today. They will follow up at home. For questions I can be reached at [phone].  Roger Agsutin

## 2020-09-12 NOTE — DISCHARGE SUMMARY
Physician Discharge Summary     Patient ID:  Elke Mann  [id number]  86 y.o.  12/24/1932    Admit date: 9/8/2020    Discharge date and time:  9/12/2020     Admission Diagnoses:   Chief Complaint   Patient presents with    Fatigue     family states she has not been able to walk for a few days, hx dementia      Generalized weakness     Discharge Diagnoses:   Principal Problem:    Generalized weakness  Active Problems:    Hypertension    Type II diabetes mellitus, uncontrolled ([facility]    Urinary tract infection    History of pulmonary embolism    Acute kidney injury ([facility]    Left knee pain    Baker's cyst  Resolved Problems:    * No resolved hospital problems. *       Consults: none    Procedures: None    Hospital Course:   Patient presented with generalized weakness. She was found to have a possible UTI and has been treated for that. She complained of left posterior knee pain and was found to have a very small popliteal cyst, i.e. Baker's cyst.  He did not seem ruptured. Ortho was consulted, but I am ended up canceling the consult because the knee got better on its own without intervention. Her mental status waxes and wanes, and I think she just has significant underlying dementia. There was 1 day where she got frustrated with the nurses giving her insulin sliding scale, apparently got angry, and went into almost a catatonic state for several hours. There were no seizure-like activity. She had an ABG and lab work that was all unremarkable. She had CT head, and later even MRI head, all of which were unremarkable. Her mental status went back to normal without any further intervention. She was found to be severely hypertensive at times although her MRI did not show any evidence of MI ES. Nevertheless, due to the mental status changes, I thought might be wise to try and control her blood pressure better. I have just turned her antihypertensive regimen as below.      Discharge Exam:  Vitals:    09/11/20 0800 09/11/20 1610 09/11/20 2115 09/12/20 0800   BP: (!) 143/65 (!) 122/59 129/71 (!) 164/70   Pulse: 73 74 75 74   Resp: 18 18 18 18   Temp: 97.1 °F (36.2 °C) 97.6 °F (36.4 °C) 96.5 °F (35.8 °C) 97.4 °F (36.3 °C)   TempSrc:  Temporal Temporal Temporal   SpO2:  96% 95% 94%   Weight:       Height:            General appearance: Nontoxic female  HEENT: AT/NC, MMM  Neck: FROM, supple  Lungs: Clear to auscultation  CV: RRR, no MRGs  Abdomen: Soft, non-tender; no masses or HSM, +BS  Extremities: No peripheral edema or digital cyanosis. Skin: no rash, lesions or ulcers  Psych: Calm and cooperative  Neuro: Awake, interactive. Face symmetric. EOMI. LOC totally normal and conversant again. Hip flexors at least 3/5 b/l (effort poor). Foot plantarflexors 5/5 b/l.     Condition:  Stable    Disposition: home    Patient Instructions:   Discharge Medication List as of 9/12/2020 11:32 AM      START taking these medications    Details   carvedilol (COREG) 12.5 MG tablet Take 1 tablet by mouth 2 times daily (with meals), Disp-60 tablet,R-3Normal      amLODIPine (NORVASC) 5 MG tablet Take 1 tablet by mouth daily, Disp-30 tablet,R-3Normal      cefdinir (OMNICEF) 300 MG capsule Take 1 capsule by mouth 2 times daily for 5 days, Disp-10 capsule,R-0Normal         CONTINUE these medications which have NOT CHANGED    Details   ipratropium-albuterol (DUONEB) 0.5-2.5 (3) MG/3ML SOLN nebulizer solution Inhale 1 vial into the lungs every 6 hours as needed for Shortness of BreathHistorical Med      levothyroxine (SYNTHROID) 75 MCG tablet Take 75 mcg by mouth DailyHistorical Med      predniSONE (DELTASONE) 5 MG tablet Take 5 mg by mouth 2 times dailyHistorical Med      metFORMIN (GLUCOPHAGE-XR) 500 MG extended release tablet Take 500 mg by mouth daily (with breakfast)Historical Med      oxybutynin (DITROPAN-XL) 5 MG extended release tablet Take 5 mg by mouth nightly as neededHistorical Med      apixaban (ELIQUIS) 5 MG TABS tablet Take 5 mg by

## 2020-09-12 NOTE — PLAN OF CARE
Problem: Falls - Risk of:  Goal: Will remain free from falls  Description: Will remain free from falls  9/12/2020 0216 by Anatoly Bright RN  Outcome: Met This Shift  9/12/2020 0216 by Anatoly Bright RN  Outcome: Met This Shift  Goal: Absence of physical injury  Description: Absence of physical injury  Outcome: Met This Shift     Problem: Skin Integrity:  Goal: Will show no infection signs and symptoms  Description: Will show no infection signs and symptoms  Outcome: Met This Shift  Goal: Absence of new skin breakdown  Description: Absence of new skin breakdown  Outcome: Met This Shift

## 2020-09-12 NOTE — PROGRESS NOTES
CLINICAL PHARMACY NOTE: MEDS TO [address] Select Patient?: No  Total # of Prescriptions Filled: 3   The following medications were delivered to the patient:  · Amlodipine besylate 5 mg  · Carvedilol 12.5 mg  · Cefdinir 300 mg  Total # of Interventions Completed: 3  Time Spent (min): 15    Additional Documentation:

## 2020-09-14 ENCOUNTER — CARE COORDINATION (OUTPATIENT)
Dept: CASE MANAGEMENT | Age: 85
End: 2020-09-14

## 2020-09-14 NOTE — CARE COORDINATION
[facility] Transitions Initial Follow Up Call    Call within 2 business days of discharge: Yes    Patient: Jorge Shen Patient : 1932   MRN: [id number]  Reason for Admission: generalized weakness  Discharge Date: 20 RARS: Readmission Risk Score: 18      Last Discharge [employer]       Complaint Diagnosis Description Type Department Provider    20 Fatigue Dehydration . .. ED to Hosp-Admission (Discharged) (ADMITTED) Jenifer Kevin MD; Vikki Huffman MD            Facility: [facility] ([facility])    Non-face-to-face services provided:  Communication with home health agencies or other community services the patient is currently using-SOC to be tomorrow(9/15/20)per Evelyn Scott 12 24 Hour Call    Care Transitions Interventions       -Firtst attempt to reach the patient for COVID-19 at risk education/initial BPCI  call post hospital discharge. Message left with CTN's contact information requesting return phone call. Follow Up  No future appointments.     Clarice Monge RN

## 2020-09-15 ENCOUNTER — CARE COORDINATION (OUTPATIENT)
Dept: CASE MANAGEMENT | Age: 85
End: 2020-09-15

## 2020-09-15 NOTE — CARE COORDINATION
Patient contacted regarding recent discharge and COVID-19 risk. Discussed COVID-19 related testing which was not done at this time. Test results were not done. Patient informed of results, if available? N/A     Care Transition Nurse/ Ambulatory Care Manager contacted the patient by telephone to perform post discharge assessment. Verified name and  with patient as identifiers. Patient has following risk factors of: sepsis and diabetes. CTN/ACM reviewed discharge instructions, medical action plan and red flags related to discharge diagnosis. Reviewed and educated them on any new and changed medications related to discharge diagnosis. Advised obtaining a 90-day supply of all daily and as-needed medications. Education provided regarding infection prevention, and signs and symptoms of COVID-19 and when to seek medical attention with patient who verbalized understanding. Discussed exposure protocols and quarantine from [address] you at higher risk for severe illness  and given an opportunity for questions and concerns. The patient agrees to contact the COVID-19 hotline 006-135-7924 or PCP office for questions related to their healthcare. CTN/ACM provided contact information for future reference. From CDC: Are you at higher risk for severe illness?  Wash your hands often.  Avoid close contact (6 feet, which is about two arm lengths) with people who are sick.  Put distance between yourself and other people if COVID-19 is spreading in your community.  Clean and disinfect frequently touched surfaces.  Avoid all cruise travel and non-essential air travel.  Call your healthcare professional if you have concerns about COVID-19 and your underlying condition or if you are sick. For more information on steps you can take to protect yourself, see CDC's How to [address] [place] [address] for follow-up call in 7-14 days based on severity of symptoms and risk factors.   CTN to hand off to central care transition team.    [facility] Transitions Initial Follow Up Call    Call within 2 business days of discharge: Yes    Patient: Hugh Byrd Patient : 1932   MRN: [id number]  Reason for Admission: generalized weakness  Discharge Date: 20 RARS: Readmission Risk Score: 18      Last Discharge [address] [address]       Complaint Diagnosis Description Type Department Provider    20 Fatigue Dehydration . .. ED to Hosp-Admission (Discharged) (ADMITTED) YZ 8WE Maria E Rojas MD; Estrellita Estrada MD           Spoke with: Hugh Byrd, patient    Facility:[facility] [place] [place]    Non-face-to-face services provided:  Scheduled appointment with PCP-patient states she will call  Obtained and reviewed discharge summary and/or continuity of care documents  Communication with home health agencies or other community services the patient is currently using-spoke with Neha Dias at [facility], PT(David)will be visiting patient today and will do a  complete review of medications with patient, patient updated    Care Transitions 24 Hour Call    Do you have any ongoing symptoms?:  No  Do you have all of your prescriptions and are they filled?:  No  Have you been contacted by a [address]?:  No  Have you scheduled your follow up appointment?:  No  Were you discharged with any Home Care or Post Acute Services:  Yes  Post Acute Services:  Home Health (Comment: [facility]/15/20-[facility])  Care Transitions Interventions       -Spoke with patient for initial BPCI care transition call post hospital discharge. Explained the role of Care Transition Nurse and the BPCI-A program, patient is agreeable to follow up post discharge from the hospital.   -Patient admitted to [facility] ([facility]-) on 20 for generalized weakness.  -Patient states she is less fatigued and is walking better than prior to admission.  -Patient declined full med review and did not wish a call from Sheryl Palacios to review medications.   Patient states she did not receive any new medications at discharge. CTN phoned [facility] and spoke to Clara Marshall who states the medications(norvasc, coreg, and omnicef)were dispensed on 9/11/20. Upon calling patient back, patient states her daughter must have gotten them and she will be visiting today. CTN also called [address] and spoke to Dreon to update on medication concerns. [place] states PT(David)will be visiting patient today and will do a complete review of medications. Patient updated by CTN on this as well.  -Patient states her daughter transports her to any appointments. -CTN explained that a member of the Care Transition Central Team will be contacting her for further follow up calls, patient is in agreement. Follow Up  No future appointments.     Moi Damon RN

## 2020-09-22 ENCOUNTER — CARE COORDINATION (OUTPATIENT)
Dept: CASE MANAGEMENT | Age: 85
End: 2020-09-22

## 2020-10-05 ENCOUNTER — CARE COORDINATION (OUTPATIENT)
Dept: CASE MANAGEMENT | Age: 85
End: 2020-10-05

## 2020-10-05 NOTE — CARE COORDINATION
[facility] Transitions Bundled Payments for Care Improvement (BP) Follow Up Call  Qualifying Diagnosis of Qualifying Diagnosis Renal Failure   10/5/2020  Patient Name:  Roslyn Loya   YOB: 1932  Discharge Date:  9/12/20  RARS:  Readmission Risk Score: 18    PCP:  Radha Aguilar MD    Assessment:   Disorientation/Confusion: denies   Nausea, Vomiting, or Diarrhea: denies   Shortness of Breath: denies   Muscle Pain: denies. Just had eye surgery, goes to see her eye doctor tomorrow. Doing OK.  Sweaty Skin: denies   Dizziness or Fainting: denies   Decrease in Urination: denies, is incontinent   Fever >101 degrees F: denies  [address],  Active: yes   Medication needs/Questions: denies   Transportation Need: denies   Live Alone:  no   Ability to [employer], [place]: ok   Follow Up Appointment Scheduled: yes   Do you feel like you have everything you need to stay well at home? yes  Care Transitions will continue to follow per [facility] , RN, CTN    Follow Up Concerns:  No future appointments.

## 2020-10-11 PROBLEM — N39.0 URINARY TRACT INFECTION: Status: RESOLVED | Noted: 2020-09-08 | Resolved: 2020-10-11

## 2020-10-19 ENCOUNTER — CARE COORDINATION (OUTPATIENT)
Dept: CARE COORDINATION | Age: 85
End: 2020-10-19

## 2020-10-19 NOTE — CARE COORDINATION
[facility] Trans[facility] Follow Up Call    10/19/2020    Patient: Rekha Arias  Patient : 1932   MRN: <[id number]>  Reason for Admission:   Discharge Date: 20 RARS: Readmission Risk Score: 25         Spoke with: Patient    Needs to be reviewed by the provider          Method of communication with provider : none    Care Transition Nurse (CTN) contacted the patient by telephone to follow up after admission on 2020. Verified name and  with patient as identifiers. Addressed changes since last contact: symptom management-Renal Failure  Discharged needs reviewed: none  Follow up appointment completed? Yes    Advance Care Planning:   Does patient have an Advance Directive:  not on file. CTN reviewed discharge instructions, medical action plan and red flags with patient and discussed any barriers to care and/or understanding of plan of care after discharge. Discussed appropriate site of care based on symptoms and resources available to patient including: When to call 911. The patient agrees to contact the PCP office for questions related to their healthcare. Patients top risk factors for readmission: medical condition  Interventions to address risk factors: Obtained and reviewed discharge summary and/or continuity of care documents    Discussed follow-up appointments. If no appointment was previously scheduled, appointment scheduling offered: Yes     Is follow up appointment scheduled within 7 days of discharge? Yes  Non-[facility] follow up appointment(s):     Plan for follow-up call in 10-14 days based on severity of symptoms and risk factors. Plan for next call: symptom management-Renal Failure  CTN provided contact information for future needs. Patient states she is doing well. No problems urinating, amount good, urine yellow, no visible blood. No SOB,No swelling of hands, feet or legs, No nausea or vomiting, fatigue, kidney pain, Drinking adequate fluids.    Patient has all medications is taking medications as directed and has no questions concerning medications at this time. Patient has no needs or concerns for writer at this time. Explained the BPCI-A program and that the patient is followed for 90 days after discharge. Expresses understanding. Will Follow up at later time. [facility] [address] [place] [address] Transitions Subsequent and Final Call    Subsequent and Final Calls  Do you have any ongoing symptoms?:  No  Have your medications changed?:  No  Do you have any questions related to your medications?:  No  Do you currently have any active services?:  Yes  Are you currently active with any services?:  Home Health  Do you have any needs or concerns that I can assist you with?:  No  Identified Barriers:  None  Care Transitions Interventions  Other Interventions: Follow Up  No future appointments.     Jim Bowling LPN

## 2020-11-04 ENCOUNTER — CARE COORDINATION (OUTPATIENT)
Dept: CARE COORDINATION | Age: 85
End: 2020-11-04

## 2020-11-04 NOTE — CARE COORDINATION
[facility] Transitions Follow Up Call    2020    Patient: Marielena Keller  Patient : 1932   MRN: <[id number]>  Reason for Admission:   Discharge Date: 20 RARS: Readmission Risk Score: 18         Spoke with: Patient    Transition of Care Follow Up Call:     Care Transition Nurse (CTN) contacted the patient by telephone to follow up after admission on 2020. Verified name and  with patient as identifiers. Addressed changes since last contact: follow up appointment-Make appointment  Discharged needs reviewed:  None  Follow up appointment completed? No    Advance Care Planning:   Does patient have an Advance Directive:  not on file. CTN reviewed discharge instructions, medical action plan and red flags with patient and discussed any barriers to care and/or understanding of plan of care after discharge. Discussed appropriate site of care based on symptoms and resources available to patient including: When to call 911. The patient agrees to contact the PCP office for questions related to their healthcare. Patients top risk factors for readmission: medical condition  Interventions to address risk factors: Obtained and reviewed discharge summary and/or continuity of care documents    Discussed follow-up appointments. If no appointment was previously scheduled, appointment scheduling offered: Yes     Is follow up appointment scheduled within 7 days of discharge? No  Non-[facility] follow up appointment(s): Patient states will call. Plan for follow-up call in 10-14 days based on severity of symptoms and risk factors. Plan for next call: follow up appointment-with PCP  CTN provided contact information for future needs. Spoke with patient briefly. She states she is doing fine with no complaints. No problems urinating, amount good, urine yellow, no visible blood.  No SOB,No swelling of hands, feet or legs, No nausea or vomiting, fatigue, kidney pain, Drinking adequate fluids. Patient has all medications is taking medications as directed and has no questions concerning medications at this time. Will Follow up at later time. Smooth Craft LPN    [phone]  [facility] / [address] [place] [address] Transitions Subsequent and Final Call    Subsequent and Final Calls  Do you have any ongoing symptoms?:  No  Have your medications changed?:  No  Do you have any questions related to your medications?:  No  Do you currently have any active services?:  No  Are you currently active with any services?:  Home Health  Do you have any needs or concerns that I can assist you with?:  No  Identified Barriers:  None  Care Transitions Interventions  Other Interventions: Follow Up  No future appointments.     Smooth Craft LPN

## 2020-11-18 ENCOUNTER — CARE COORDINATION (OUTPATIENT)
Dept: CASE MANAGEMENT | Age: 85
End: 2020-11-18

## 2020-11-18 NOTE — CARE COORDINATION
[facility] Transitions Follow Up Call    2020    Patient: Katey Carias  Patient : 1932   MRN: [id number]  Reason for Admission:   Discharge Date: 20 RARS: Readmission Risk Score: 18         Spoke with: PatientOsvaldo Transitions Subsequent and Final Call    Subsequent and Final Calls  Do you have any ongoing symptoms?:  Yes  -swelling in legs/ankles in the morning; resolves slightly after elevating BLE's  -reports weakness is improving  Have your medications changed?:  Yes  Patient Reports:  Patient reports she is taking Lasix as prescribed  Do you have any needs or concerns that I can assist you with?:  No  Identified Barriers:  None  -patient has supportive family network  Care Transitions Interventions  No Identified Needs    Patient is pleasant in conversation.   -denies any C/O chest pain, palpitations, shortness of breath, lightheaded, or dizziness   -reports she does not check her blood sugars and does not have a glucometer  -patient voices concerns about urinating so much; discussed side effects of Lasix   -reports daughter assists with medication management     Discussed patient's daughter, Steffanie Starr, to call patient's PCP today regarding patient's concerns. Emotional support provided; discussed will continue to follow. Follow Up  No future appointments.     Gaviota Teran RN

## 2020-11-30 ENCOUNTER — CARE COORDINATION (OUTPATIENT)
Dept: CASE MANAGEMENT | Age: 85
End: 2020-11-30

## 2020-11-30 NOTE — CARE COORDINATION
[facility] Transitions Follow Up Call    2020    Patient: Rekha Arias  Patient : 1932   MRN: [id number]  Reason for Admission:   Discharge Date: 20 RARS: Readmission Risk Score: 18         Spoke with: Patient, Annemarie Escobar Transitions Subsequent and Final Call    Subsequent and Final Calls  Do you have any ongoing symptoms?:  Yes  Patient-reported symptoms:  Weakness  -patient reports weakness continues to improve   -swelling in legs/ankles in the morning; unchanged  Do you have any needs or concerns that I can assist you with?:  No  Care Transitions Interventions  No Identified Needs    Patient reports she is doing well and voices no needs or concerns at this time. -reports she had follow up with PCP     Emotional support provided; will continue to follow. Follow Up  No future appointments.     Margarita Miles RN

## 2020-12-07 ENCOUNTER — CARE COORDINATION (OUTPATIENT)
Dept: CASE MANAGEMENT | Age: 85
End: 2020-12-07

## 2020-12-07 NOTE — CARE COORDINATION
[facility] Transitions Follow Up Call    2020    Patient: Rekha Arias  Patient : 1932   MRN: <[id number]>  Reason for Admission:   Discharge Date: 20 RARS: Readmission Risk Score: 18    Follow Up: Attempted to contact patient for final BPCI-A follow up. Unable to reach patient. Left message with contact information and request for call back with any questions or concerns that they may have. BPCI-A bundle ending. CTN signing off. No future appointments.     Sarita Glez, RN

## 2020-12-25 ENCOUNTER — APPOINTMENT (OUTPATIENT)
Dept: CT IMAGING | Age: 85
End: 2020-12-25
Payer: MEDICARE

## 2020-12-25 ENCOUNTER — APPOINTMENT (OUTPATIENT)
Dept: GENERAL RADIOLOGY | Age: 85
End: 2020-12-25
Payer: MEDICARE

## 2020-12-25 ENCOUNTER — HOSPITAL ENCOUNTER (EMERGENCY)
Age: 85
Discharge: HOME | End: 2020-12-25
Attending: EMERGENCY MEDICINE
Payer: MEDICARE

## 2020-12-25 VITALS
RESPIRATION RATE: 22 BRPM | HEIGHT: 60 IN | SYSTOLIC BLOOD PRESSURE: 150 MMHG | BODY MASS INDEX: 31.41 KG/M2 | HEART RATE: 77 BPM | OXYGEN SATURATION: 95 % | TEMPERATURE: 96.2 F | DIASTOLIC BLOOD PRESSURE: 88 MMHG | WEIGHT: 160 LBS

## 2020-12-25 PROCEDURE — 99284 EMERGENCY DEPT VISIT MOD MDM: CPT

## 2020-12-25 PROCEDURE — 72125 CT NECK SPINE W/O DYE: CPT

## 2020-12-25 PROCEDURE — 71045 X-RAY EXAM CHEST 1 VIEW: CPT

## 2020-12-25 PROCEDURE — 96374 THER/PROPH/DIAG INJ IV PUSH: CPT

## 2020-12-25 PROCEDURE — 73564 X-RAY EXAM KNEE 4 OR MORE: CPT

## 2020-12-25 PROCEDURE — 72170 X-RAY EXAM OF PELVIS: CPT

## 2020-12-25 PROCEDURE — 70450 CT HEAD/BRAIN W/O DYE: CPT

## 2020-12-25 PROCEDURE — 96375 TX/PRO/DX INJ NEW DRUG ADDON: CPT

## 2020-12-25 PROCEDURE — 6360000002 HC RX W HCPCS: Performed by: EMERGENCY MEDICINE

## 2020-12-25 RX ORDER — FENTANYL CITRATE 50 UG/ML
50 INJECTION, SOLUTION INTRAMUSCULAR; INTRAVENOUS ONCE
Status: COMPLETED | OUTPATIENT
Start: 2020-12-25 | End: 2020-12-25

## 2020-12-25 RX ORDER — HYDROCODONE BITARTRATE AND ACETAMINOPHEN 5; 325 MG/1; MG/1
1 TABLET ORAL EVERY 6 HOURS PRN
Qty: 12 TABLET | Refills: 0 | Status: SHIPPED | OUTPATIENT
Start: 2020-12-25 | End: 2020-12-28

## 2020-12-25 RX ORDER — ONDANSETRON 2 MG/ML
4 INJECTION INTRAMUSCULAR; INTRAVENOUS ONCE
Status: COMPLETED | OUTPATIENT
Start: 2020-12-25 | End: 2020-12-25

## 2020-12-25 RX ADMIN — FENTANYL CITRATE 50 MCG: 50 INJECTION, SOLUTION INTRAMUSCULAR; INTRAVENOUS at 14:29

## 2020-12-25 RX ADMIN — ONDANSETRON 4 MG: 2 INJECTION INTRAMUSCULAR; INTRAVENOUS at 14:28

## 2020-12-25 ASSESSMENT — PAIN SCALES - GENERAL
PAINLEVEL_OUTOF10: 5
PAINLEVEL_OUTOF10: 5

## 2020-12-25 ASSESSMENT — PAIN DESCRIPTION - DESCRIPTORS: DESCRIPTORS: DISCOMFORT;SORE

## 2020-12-25 ASSESSMENT — PAIN DESCRIPTION - ONSET: ONSET: ON-GOING

## 2020-12-25 ASSESSMENT — PAIN DESCRIPTION - ORIENTATION: ORIENTATION: RIGHT

## 2020-12-25 ASSESSMENT — PAIN DESCRIPTION - FREQUENCY: FREQUENCY: CONTINUOUS

## 2020-12-25 ASSESSMENT — PAIN DESCRIPTION - PAIN TYPE: TYPE: ACUTE PAIN

## 2020-12-25 ASSESSMENT — PAIN DESCRIPTION - LOCATION: LOCATION: KNEE

## 2020-12-25 NOTE — ED NOTES
Bed: 24  Expected date:   Expected time:   Means of arrival:   Comments:  Lamberto Xavier RN  12/25/20 1442

## 2020-12-25 NOTE — ED PROVIDER NOTES
consider CT assessment. XR KNEE RIGHT (MIN 4 VIEWS)   Final Result   1. Suggestion of pre patella and prepatellar tendon soft tissue swelling. No acute osseous findings about the right knee. 2.  Right knee osteoarthritis as above. This is most pronounced in the   medial compartment. 3.  Lateral compartment chondrocalcinosis. XR CHEST PORTABLE   Final Result   1. Mild cardiomegaly. There is no evidence of failure or pneumonia. 2. Suboptimal inspiration was obtained for the chest x-ray. EKG: This EKG is signed and interpreted by the EP. Time:   Rate:   Rhythm:   Interpretation:   Comparison:       ------------------------- NURSING NOTES AND VITALS REVIEWED ---------------------------   The nursing notes within the ED encounter and vital signs as below have been reviewed by myself. BP (!) 150/88   Pulse 77   Temp 96.2 °F (35.7 °C)   Resp 22   Ht 5' (1.524 m)   Wt 160 lb (72.6 kg)   SpO2 95%   BMI 31.25 kg/m²   Oxygen Saturation Interpretation: Normal    The patients available past medical records and past encounters were reviewed. ------------------------------ ED COURSE/MEDICAL DECISION MAKING----------------------  Medications   fentaNYL (SUBLIMAZE) injection 50 mcg (50 mcg Intravenous Given 12/25/20 1429)   ondansetron (ZOFRAN) injection 4 mg (4 mg Intravenous Given 12/25/20 1428)         ED COURSE:       Medical Decision Making:    [facility], [facility], [place] home      This patient's ED course included: a personal history and physicial examination    This patient has remained hemodynamically stable during their ED course. Re-Evaluations:             Re-evaluation.   Patients symptoms are improving    Re-examination  12/25/20   3:31 PM EST          Vital Signs:   Vitals:    12/25/20 1356 12/25/20 1658   BP: (!) 153/97 (!) 150/88   Pulse: 72 77   Resp: 16 22   Temp: 95.7 °F (35.4 °C) 96.2 °F (35.7 °C)   SpO2: 95%    Weight: 160 lb (72.6 kg)    Height: 5' (1.524 m) Card/Pulm:  Rhythm: normal rate. Heart Sounds: no murmurs, gallops, or rubs. clear to auscultation, no wheezes or rales and unlabored breathing. Capillary Refill: normal.  Radial Pulse:  equal.  Skin:  Warm. Consultations:                 Critical Care:         Counseling: The emergency provider has spoken with the patient and discussed todays results, in addition to providing specific details for the plan of care and counseling regarding the diagnosis and prognosis. Questions are answered at this time and they are agreeable with the plan.       --------------------------------- IMPRESSION AND DISPOSITION ---------------------------------    IMPRESSION  1. Closed head injury, initial encounter    2. Contusion of right knee, initial encounter    3. Fall, initial encounter    4. Sprain of right knee, unspecified ligament, initial encounter        DISPOSITION  Disposition: Discharge to home  Patient condition is stable    NOTE: This report was transcribed using voice recognition software.  Every effort was made to ensure accuracy; however, inadvertent computerized transcription errors may be present        Brandan Kiran MD  12/26/20 9589

## 2021-04-07 ENCOUNTER — IMMUNIZATION (OUTPATIENT)
Dept: PRIMARY CARE CLINIC | Age: 86
End: 2021-04-07
Payer: MEDICARE

## 2021-04-07 PROCEDURE — 0001A COVID-19, PFIZER VACCINE 30MCG/0.3ML DOSE: CPT | Performed by: NURSE PRACTITIONER

## 2021-04-07 PROCEDURE — 91300 COVID-19, PFIZER VACCINE 30MCG/0.3ML DOSE: CPT | Performed by: NURSE PRACTITIONER

## 2021-05-05 ENCOUNTER — IMMUNIZATION (OUTPATIENT)
Dept: PRIMARY CARE CLINIC | Age: 86
End: 2021-05-05
Payer: MEDICARE

## 2021-05-05 PROCEDURE — 91300 COVID-19, PFIZER VACCINE 30MCG/0.3ML DOSE: CPT | Performed by: NURSE PRACTITIONER

## 2021-05-05 PROCEDURE — 0002A COVID-19, PFIZER VACCINE 30MCG/0.3ML DOSE: CPT | Performed by: NURSE PRACTITIONER

## 2022-12-11 ENCOUNTER — HOSPITAL ENCOUNTER (INPATIENT)
Age: 87
LOS: 8 days | Discharge: ANOTHER HOSPITAL | DRG: 309 | End: 2022-12-19
Attending: STUDENT IN AN ORGANIZED HEALTH CARE EDUCATION/TRAINING PROGRAM | Admitting: INTERNAL MEDICINE
Payer: MEDICARE

## 2022-12-11 ENCOUNTER — APPOINTMENT (OUTPATIENT)
Dept: GENERAL RADIOLOGY | Age: 87
DRG: 309 | End: 2022-12-11
Payer: MEDICARE

## 2022-12-11 DIAGNOSIS — N39.0 URINARY TRACT INFECTION WITHOUT HEMATURIA, SITE UNSPECIFIED: ICD-10-CM

## 2022-12-11 DIAGNOSIS — I48.91 ATRIAL FIBRILLATION, UNSPECIFIED TYPE (HCC): Primary | ICD-10-CM

## 2022-12-11 LAB
ALBUMIN SERPL-MCNC: 3.3 G/DL (ref 3.5–5.2)
ALP BLD-CCNC: 79 U/L (ref 35–104)
ALT SERPL-CCNC: 28 U/L (ref 0–32)
ANION GAP SERPL CALCULATED.3IONS-SCNC: 13 MMOL/L (ref 7–16)
AST SERPL-CCNC: 19 U/L (ref 0–31)
BACTERIA: ABNORMAL /HPF
BASOPHILS ABSOLUTE: 0.05 E9/L (ref 0–0.2)
BASOPHILS RELATIVE PERCENT: 0.5 % (ref 0–2)
BILIRUB SERPL-MCNC: 0.6 MG/DL (ref 0–1.2)
BILIRUBIN URINE: NEGATIVE
BLOOD, URINE: ABNORMAL
BUN BLDV-MCNC: 23 MG/DL (ref 6–23)
CALCIUM SERPL-MCNC: 9.4 MG/DL (ref 8.6–10.2)
CHLORIDE BLD-SCNC: 95 MMOL/L (ref 98–107)
CHP ED QC CHECK: NORMAL
CLARITY: ABNORMAL
CO2: 27 MMOL/L (ref 22–29)
COLOR: YELLOW
CREAT SERPL-MCNC: 1 MG/DL (ref 0.5–1)
D DIMER: 351 NG/ML DDU
EOSINOPHILS ABSOLUTE: 0.01 E9/L (ref 0.05–0.5)
EOSINOPHILS RELATIVE PERCENT: 0.1 % (ref 0–6)
EPITHELIAL CELLS, UA: ABNORMAL /HPF
GFR SERPL CREATININE-BSD FRML MDRD: 54 ML/MIN/1.73
GLUCOSE BLD-MCNC: 185 MG/DL
GLUCOSE BLD-MCNC: 185 MG/DL
GLUCOSE BLD-MCNC: 208 MG/DL (ref 74–99)
GLUCOSE URINE: NEGATIVE MG/DL
HCT VFR BLD CALC: 37.1 % (ref 34–48)
HEMOGLOBIN: 11.7 G/DL (ref 11.5–15.5)
IMMATURE GRANULOCYTES #: 0.25 E9/L
IMMATURE GRANULOCYTES %: 2.4 % (ref 0–5)
INFLUENZA A BY PCR: NOT DETECTED
INFLUENZA B BY PCR: NOT DETECTED
KETONES, URINE: ABNORMAL MG/DL
LACTIC ACID: 2.3 MMOL/L (ref 0.5–2.2)
LEUKOCYTE ESTERASE, URINE: ABNORMAL
LIPASE: 17 U/L (ref 13–60)
LYMPHOCYTES ABSOLUTE: 1.58 E9/L (ref 1.5–4)
LYMPHOCYTES RELATIVE PERCENT: 15.3 % (ref 20–42)
MAGNESIUM: 1.9 MG/DL (ref 1.6–2.6)
MCH RBC QN AUTO: 29.1 PG (ref 26–35)
MCHC RBC AUTO-ENTMCNC: 31.5 % (ref 32–34.5)
MCV RBC AUTO: 92.3 FL (ref 80–99.9)
METER GLUCOSE: 185 MG/DL (ref 74–99)
MONOCYTES ABSOLUTE: 1.17 E9/L (ref 0.1–0.95)
MONOCYTES RELATIVE PERCENT: 11.3 % (ref 2–12)
NEUTROPHILS ABSOLUTE: 7.3 E9/L (ref 1.8–7.3)
NEUTROPHILS RELATIVE PERCENT: 70.4 % (ref 43–80)
NITRITE, URINE: NEGATIVE
PDW BLD-RTO: 15.9 FL (ref 11.5–15)
PH UA: 5.5 (ref 5–9)
PLATELET # BLD: 257 E9/L (ref 130–450)
PMV BLD AUTO: 9.4 FL (ref 7–12)
POTASSIUM REFLEX MAGNESIUM: 3.6 MMOL/L (ref 3.5–5)
PRO-BNP: 2809 PG/ML (ref 0–450)
PROTEIN UA: >=300 MG/DL
RBC # BLD: 4.02 E12/L (ref 3.5–5.5)
RBC UA: ABNORMAL /HPF (ref 0–2)
SARS-COV-2, NAAT: NOT DETECTED
SODIUM BLD-SCNC: 135 MMOL/L (ref 132–146)
SPECIFIC GRAVITY UA: >=1.03 (ref 1–1.03)
TOTAL PROTEIN: 6.6 G/DL (ref 6.4–8.3)
TROPONIN, HIGH SENSITIVITY: 81 NG/L (ref 0–9)
TROPONIN, HIGH SENSITIVITY: 87 NG/L (ref 0–9)
UROBILINOGEN, URINE: 0.2 E.U./DL
WBC # BLD: 10.4 E9/L (ref 4.5–11.5)
WBC UA: >20 /HPF (ref 0–5)

## 2022-12-11 PROCEDURE — 87077 CULTURE AEROBIC IDENTIFY: CPT

## 2022-12-11 PROCEDURE — 84484 ASSAY OF TROPONIN QUANT: CPT

## 2022-12-11 PROCEDURE — 71045 X-RAY EXAM CHEST 1 VIEW: CPT

## 2022-12-11 PROCEDURE — 83735 ASSAY OF MAGNESIUM: CPT

## 2022-12-11 PROCEDURE — 87502 INFLUENZA DNA AMP PROBE: CPT

## 2022-12-11 PROCEDURE — 6360000002 HC RX W HCPCS

## 2022-12-11 PROCEDURE — 83880 ASSAY OF NATRIURETIC PEPTIDE: CPT

## 2022-12-11 PROCEDURE — 85025 COMPLETE CBC W/AUTO DIFF WBC: CPT

## 2022-12-11 PROCEDURE — 93005 ELECTROCARDIOGRAM TRACING: CPT | Performed by: INTERNAL MEDICINE

## 2022-12-11 PROCEDURE — 99285 EMERGENCY DEPT VISIT HI MDM: CPT

## 2022-12-11 PROCEDURE — 96374 THER/PROPH/DIAG INJ IV PUSH: CPT

## 2022-12-11 PROCEDURE — 83690 ASSAY OF LIPASE: CPT

## 2022-12-11 PROCEDURE — 87186 SC STD MICRODIL/AGAR DIL: CPT

## 2022-12-11 PROCEDURE — 2060000000 HC ICU INTERMEDIATE R&B

## 2022-12-11 PROCEDURE — 82962 GLUCOSE BLOOD TEST: CPT

## 2022-12-11 PROCEDURE — 2580000003 HC RX 258

## 2022-12-11 PROCEDURE — 2580000003 HC RX 258: Performed by: STUDENT IN AN ORGANIZED HEALTH CARE EDUCATION/TRAINING PROGRAM

## 2022-12-11 PROCEDURE — 96375 TX/PRO/DX INJ NEW DRUG ADDON: CPT

## 2022-12-11 PROCEDURE — 85378 FIBRIN DEGRADE SEMIQUANT: CPT

## 2022-12-11 PROCEDURE — 2500000003 HC RX 250 WO HCPCS: Performed by: STUDENT IN AN ORGANIZED HEALTH CARE EDUCATION/TRAINING PROGRAM

## 2022-12-11 PROCEDURE — 87088 URINE BACTERIA CULTURE: CPT

## 2022-12-11 PROCEDURE — 81001 URINALYSIS AUTO W/SCOPE: CPT

## 2022-12-11 PROCEDURE — 87635 SARS-COV-2 COVID-19 AMP PRB: CPT

## 2022-12-11 PROCEDURE — 80053 COMPREHEN METABOLIC PANEL: CPT

## 2022-12-11 PROCEDURE — 83605 ASSAY OF LACTIC ACID: CPT

## 2022-12-11 RX ORDER — DILTIAZEM HYDROCHLORIDE 5 MG/ML
10 INJECTION INTRAVENOUS ONCE
Status: COMPLETED | OUTPATIENT
Start: 2022-12-11 | End: 2022-12-11

## 2022-12-11 RX ORDER — 0.9 % SODIUM CHLORIDE 0.9 %
1000 INTRAVENOUS SOLUTION INTRAVENOUS ONCE
Status: COMPLETED | OUTPATIENT
Start: 2022-12-11 | End: 2022-12-11

## 2022-12-11 RX ADMIN — DILTIAZEM HYDROCHLORIDE 10 MG: 5 INJECTION, SOLUTION INTRAVENOUS at 23:53

## 2022-12-11 RX ADMIN — WATER 1000 MG: 1 INJECTION INTRAMUSCULAR; INTRAVENOUS; SUBCUTANEOUS at 23:53

## 2022-12-11 RX ADMIN — SODIUM CHLORIDE 1000 ML: 9 INJECTION, SOLUTION INTRAVENOUS at 22:44

## 2022-12-11 ASSESSMENT — PAIN SCALES - GENERAL: PAINLEVEL_OUTOF10: 8

## 2022-12-11 ASSESSMENT — PAIN - FUNCTIONAL ASSESSMENT: PAIN_FUNCTIONAL_ASSESSMENT: 0-10

## 2022-12-12 PROBLEM — I48.91 NEW ONSET A-FIB (HCC): Status: ACTIVE | Noted: 2022-12-12

## 2022-12-12 PROBLEM — E11.65 INADEQUATELY CONTROLLED DIABETES MELLITUS (HCC): Status: ACTIVE | Noted: 2020-01-19

## 2022-12-12 LAB
ALBUMIN SERPL-MCNC: 3 G/DL (ref 3.5–5.2)
ALP BLD-CCNC: 66 U/L (ref 35–104)
ALT SERPL-CCNC: 24 U/L (ref 0–32)
ANION GAP SERPL CALCULATED.3IONS-SCNC: 8 MMOL/L (ref 7–16)
AST SERPL-CCNC: 18 U/L (ref 0–31)
BASOPHILS ABSOLUTE: 0.04 E9/L (ref 0–0.2)
BASOPHILS RELATIVE PERCENT: 0.5 % (ref 0–2)
BILIRUB SERPL-MCNC: 0.5 MG/DL (ref 0–1.2)
BUN BLDV-MCNC: 22 MG/DL (ref 6–23)
CALCIUM SERPL-MCNC: 8.9 MG/DL (ref 8.6–10.2)
CHLORIDE BLD-SCNC: 101 MMOL/L (ref 98–107)
CO2: 27 MMOL/L (ref 22–29)
CREAT SERPL-MCNC: 0.9 MG/DL (ref 0.5–1)
EKG ATRIAL RATE: 117 BPM
EKG Q-T INTERVAL: 348 MS
EKG QRS DURATION: 86 MS
EKG QTC CALCULATION (BAZETT): 519 MS
EKG R AXIS: -26 DEGREES
EKG T AXIS: 84 DEGREES
EKG VENTRICULAR RATE: 134 BPM
EOSINOPHILS ABSOLUTE: 0.03 E9/L (ref 0.05–0.5)
EOSINOPHILS RELATIVE PERCENT: 0.4 % (ref 0–6)
GFR SERPL CREATININE-BSD FRML MDRD: >60 ML/MIN/1.73
GLUCOSE BLD-MCNC: 126 MG/DL (ref 74–99)
HCT VFR BLD CALC: 33.8 % (ref 34–48)
HEMOGLOBIN: 10.8 G/DL (ref 11.5–15.5)
IMMATURE GRANULOCYTES #: 0.18 E9/L
IMMATURE GRANULOCYTES %: 2.1 % (ref 0–5)
LYMPHOCYTES ABSOLUTE: 1.67 E9/L (ref 1.5–4)
LYMPHOCYTES RELATIVE PERCENT: 19.9 % (ref 20–42)
MAGNESIUM: 1.8 MG/DL (ref 1.6–2.6)
MCH RBC QN AUTO: 29.6 PG (ref 26–35)
MCHC RBC AUTO-ENTMCNC: 32 % (ref 32–34.5)
MCV RBC AUTO: 92.6 FL (ref 80–99.9)
METER GLUCOSE: 108 MG/DL (ref 74–99)
METER GLUCOSE: 132 MG/DL (ref 74–99)
METER GLUCOSE: 206 MG/DL (ref 74–99)
MONOCYTES ABSOLUTE: 0.82 E9/L (ref 0.1–0.95)
MONOCYTES RELATIVE PERCENT: 9.8 % (ref 2–12)
NEUTROPHILS ABSOLUTE: 5.66 E9/L (ref 1.8–7.3)
NEUTROPHILS RELATIVE PERCENT: 67.3 % (ref 43–80)
PDW BLD-RTO: 15.9 FL (ref 11.5–15)
PLATELET # BLD: 253 E9/L (ref 130–450)
PMV BLD AUTO: 9.7 FL (ref 7–12)
POTASSIUM REFLEX MAGNESIUM: 3.4 MMOL/L (ref 3.5–5)
RBC # BLD: 3.65 E12/L (ref 3.5–5.5)
SODIUM BLD-SCNC: 136 MMOL/L (ref 132–146)
TOTAL PROTEIN: 5.9 G/DL (ref 6.4–8.3)
TSH SERPL DL<=0.05 MIU/L-ACNC: 2.81 UIU/ML (ref 0.27–4.2)
WBC # BLD: 8.4 E9/L (ref 4.5–11.5)

## 2022-12-12 PROCEDURE — 80053 COMPREHEN METABOLIC PANEL: CPT

## 2022-12-12 PROCEDURE — 6370000000 HC RX 637 (ALT 250 FOR IP): Performed by: INTERNAL MEDICINE

## 2022-12-12 PROCEDURE — 82962 GLUCOSE BLOOD TEST: CPT

## 2022-12-12 PROCEDURE — 93010 ELECTROCARDIOGRAM REPORT: CPT | Performed by: INTERNAL MEDICINE

## 2022-12-12 PROCEDURE — 2500000003 HC RX 250 WO HCPCS

## 2022-12-12 PROCEDURE — 6360000002 HC RX W HCPCS: Performed by: INTERNAL MEDICINE

## 2022-12-12 PROCEDURE — 2060000000 HC ICU INTERMEDIATE R&B

## 2022-12-12 PROCEDURE — 83735 ASSAY OF MAGNESIUM: CPT

## 2022-12-12 PROCEDURE — 93005 ELECTROCARDIOGRAM TRACING: CPT

## 2022-12-12 PROCEDURE — 84443 ASSAY THYROID STIM HORMONE: CPT

## 2022-12-12 PROCEDURE — 2580000003 HC RX 258: Performed by: INTERNAL MEDICINE

## 2022-12-12 PROCEDURE — 85025 COMPLETE CBC W/AUTO DIFF WBC: CPT

## 2022-12-12 RX ORDER — INSULIN LISPRO 100 [IU]/ML
0-4 INJECTION, SOLUTION INTRAVENOUS; SUBCUTANEOUS
Status: DISCONTINUED | OUTPATIENT
Start: 2022-12-12 | End: 2022-12-19 | Stop reason: HOSPADM

## 2022-12-12 RX ORDER — IPRATROPIUM BROMIDE AND ALBUTEROL SULFATE 2.5; .5 MG/3ML; MG/3ML
1 SOLUTION RESPIRATORY (INHALATION) EVERY 6 HOURS PRN
Status: DISCONTINUED | OUTPATIENT
Start: 2022-12-12 | End: 2022-12-19 | Stop reason: HOSPADM

## 2022-12-12 RX ORDER — SODIUM CHLORIDE 9 MG/ML
INJECTION, SOLUTION INTRAVENOUS PRN
Status: DISCONTINUED | OUTPATIENT
Start: 2022-12-12 | End: 2022-12-19 | Stop reason: HOSPADM

## 2022-12-12 RX ORDER — POTASSIUM CHLORIDE 20 MEQ/1
40 TABLET, EXTENDED RELEASE ORAL PRN
Status: DISCONTINUED | OUTPATIENT
Start: 2022-12-12 | End: 2022-12-19 | Stop reason: HOSPADM

## 2022-12-12 RX ORDER — DEXTROSE MONOHYDRATE 100 MG/ML
INJECTION, SOLUTION INTRAVENOUS CONTINUOUS PRN
Status: DISCONTINUED | OUTPATIENT
Start: 2022-12-12 | End: 2022-12-19 | Stop reason: HOSPADM

## 2022-12-12 RX ORDER — DIGOXIN 0.25 MG/ML
250 INJECTION INTRAMUSCULAR; INTRAVENOUS ONCE
Status: COMPLETED | OUTPATIENT
Start: 2022-12-12 | End: 2022-12-12

## 2022-12-12 RX ORDER — DIGOXIN 0.25 MG/ML
500 INJECTION INTRAMUSCULAR; INTRAVENOUS ONCE
Status: COMPLETED | OUTPATIENT
Start: 2022-12-12 | End: 2022-12-12

## 2022-12-12 RX ORDER — NADOLOL 20 MG/1
20 TABLET ORAL DAILY
Status: CANCELLED | OUTPATIENT
Start: 2022-12-12

## 2022-12-12 RX ORDER — ACETAMINOPHEN 325 MG/1
650 TABLET ORAL EVERY 6 HOURS PRN
Status: DISCONTINUED | OUTPATIENT
Start: 2022-12-12 | End: 2022-12-19 | Stop reason: HOSPADM

## 2022-12-12 RX ORDER — SODIUM CHLORIDE 0.9 % (FLUSH) 0.9 %
10 SYRINGE (ML) INJECTION PRN
Status: DISCONTINUED | OUTPATIENT
Start: 2022-12-12 | End: 2022-12-19 | Stop reason: HOSPADM

## 2022-12-12 RX ORDER — PANTOPRAZOLE SODIUM 20 MG/1
20 TABLET, DELAYED RELEASE ORAL DAILY
Status: DISCONTINUED | OUTPATIENT
Start: 2022-12-12 | End: 2022-12-19 | Stop reason: HOSPADM

## 2022-12-12 RX ORDER — DIGOXIN 0.25 MG/ML
250 INJECTION INTRAMUSCULAR; INTRAVENOUS ONCE
Status: DISCONTINUED | OUTPATIENT
Start: 2022-12-12 | End: 2022-12-19

## 2022-12-12 RX ORDER — METOPROLOL SUCCINATE 25 MG/1
25 TABLET, EXTENDED RELEASE ORAL 2 TIMES DAILY
Status: DISCONTINUED | OUTPATIENT
Start: 2022-12-12 | End: 2022-12-13

## 2022-12-12 RX ORDER — CARVEDILOL 6.25 MG/1
12.5 TABLET ORAL 2 TIMES DAILY WITH MEALS
Status: DISCONTINUED | OUTPATIENT
Start: 2022-12-12 | End: 2022-12-12

## 2022-12-12 RX ORDER — LEVOTHYROXINE SODIUM 0.07 MG/1
75 TABLET ORAL DAILY
Status: DISCONTINUED | OUTPATIENT
Start: 2022-12-12 | End: 2022-12-19 | Stop reason: HOSPADM

## 2022-12-12 RX ORDER — SENNA PLUS 8.6 MG/1
1 TABLET ORAL DAILY PRN
Status: DISCONTINUED | OUTPATIENT
Start: 2022-12-12 | End: 2022-12-19 | Stop reason: HOSPADM

## 2022-12-12 RX ORDER — SODIUM CHLORIDE 0.9 % (FLUSH) 0.9 %
10 SYRINGE (ML) INJECTION EVERY 12 HOURS SCHEDULED
Status: DISCONTINUED | OUTPATIENT
Start: 2022-12-12 | End: 2022-12-19 | Stop reason: HOSPADM

## 2022-12-12 RX ORDER — POTASSIUM CHLORIDE 7.45 MG/ML
10 INJECTION INTRAVENOUS PRN
Status: DISCONTINUED | OUTPATIENT
Start: 2022-12-12 | End: 2022-12-19 | Stop reason: HOSPADM

## 2022-12-12 RX ORDER — ACETAMINOPHEN 650 MG/1
650 SUPPOSITORY RECTAL EVERY 6 HOURS PRN
Status: DISCONTINUED | OUTPATIENT
Start: 2022-12-12 | End: 2022-12-19 | Stop reason: HOSPADM

## 2022-12-12 RX ORDER — INSULIN LISPRO 100 [IU]/ML
0-4 INJECTION, SOLUTION INTRAVENOUS; SUBCUTANEOUS NIGHTLY
Status: DISCONTINUED | OUTPATIENT
Start: 2022-12-12 | End: 2022-12-19 | Stop reason: HOSPADM

## 2022-12-12 RX ORDER — PREDNISONE 1 MG/1
5 TABLET ORAL 2 TIMES DAILY
Status: DISCONTINUED | OUTPATIENT
Start: 2022-12-12 | End: 2022-12-19 | Stop reason: HOSPADM

## 2022-12-12 RX ADMIN — METOPROLOL SUCCINATE 25 MG: 25 TABLET, EXTENDED RELEASE ORAL at 21:02

## 2022-12-12 RX ADMIN — PREDNISONE 5 MG: 5 TABLET ORAL at 14:00

## 2022-12-12 RX ADMIN — PANTOPRAZOLE SODIUM 20 MG: 20 TABLET, DELAYED RELEASE ORAL at 08:14

## 2022-12-12 RX ADMIN — APIXABAN 5 MG: 5 TABLET, FILM COATED ORAL at 09:39

## 2022-12-12 RX ADMIN — METOPROLOL SUCCINATE 25 MG: 25 TABLET, EXTENDED RELEASE ORAL at 09:38

## 2022-12-12 RX ADMIN — LEVOTHYROXINE SODIUM 75 MCG: 75 TABLET ORAL at 08:14

## 2022-12-12 RX ADMIN — DIGOXIN 250 MCG: 0.25 INJECTION INTRAMUSCULAR; INTRAVENOUS at 14:01

## 2022-12-12 RX ADMIN — SODIUM CHLORIDE, PRESERVATIVE FREE 10 ML: 5 INJECTION INTRAVENOUS at 14:06

## 2022-12-12 RX ADMIN — DEXTROSE MONOHYDRATE 2.5 MG/HR: 50 INJECTION, SOLUTION INTRAVENOUS at 00:13

## 2022-12-12 RX ADMIN — DIGOXIN 500 MCG: 0.25 INJECTION INTRAMUSCULAR; INTRAVENOUS at 09:38

## 2022-12-12 RX ADMIN — DEXTROSE MONOHYDRATE 10 MG/HR: 50 INJECTION, SOLUTION INTRAVENOUS at 21:03

## 2022-12-12 ASSESSMENT — PAIN SCALES - GENERAL: PAINLEVEL_OUTOF10: 0

## 2022-12-12 NOTE — CONSULTS
INPATIENT CONSULT-[facility] CARDIOLOGY    Name: Markie Del Rosario    Age: 80 y.o. Date of Admission: 12/11/2022  9:03 PM    Date of Service: 12/12/2022    Reason for Consultation: New onset rapid atrial fibrillation    Referring Physician: Dr. Juan Le    History of Present Illness: The patient is a 80y.o. year old female, poor historian who does not know her medical history and states that her daughter takes care of her. Admitted with increasing back and musculoskeletal pain and does endorse some shortness of breath. Admission ECG showed rapid AF and she is on IV diltiazem infusion. Past Medical History:   All information obtained from the previous medical record. Hypertension, hyperlipidemia, poorly controlled diabetes mellitus. No known history of CAD. Last echo from early 2020 showed preserved ejection fraction, dilated hypokinetic right ventricle, moderate pulmonary hypertension. Already on Eliquis reportedly for previous DVT. Review of Systems: Unable to obtain due to mental status. Family History:  History reviewed. No pertinent family history.     Social History:  Social History     Socioeconomic History    Marital status:      Spouse name: Not on file    Number of children: Not on file    Years of education: Not on file    Highest education level: Not on file   Occupational History    Not on file   Tobacco Use    Smoking status: Never    Smokeless tobacco: Never   Substance and Sexual Activity    Alcohol use: No    Drug use: Not on file    Sexual activity: Not on file   Other Topics Concern    Not on file   Social History Narrative    Not on file     Social Determinants of Health     Financial Resource Strain: Not on file   Food Insecurity: Not on file   Transportation Needs: Not on file   Physical Activity: Not on file   Stress: Not on file   Social Connections: Not on file   Intimate Partner Violence: Not on file   Housing Stability: Not on file       Allergies:  No Known Allergies    Current Medications:  Current Facility-Administered Medications   Medication Dose Route Frequency Provider Last Rate Last Admin    sodium chloride flush 0.9 % injection 10 mL  10 mL IntraVENous 2 times per day Brianda E Chayito, DO        sodium chloride flush 0.9 % injection 10 mL  10 mL IntraVENous PRN Brianda E Chayito, DO        0.9 % sodium chloride infusion   IntraVENous PRN Brianda E Chayito, DO        potassium chloride (KLOR-CON M) extended release tablet 40 mEq  40 mEq Oral PRN Brianda E Chayito, DO        Or    potassium bicarb-citric acid (EFFER-K) effervescent tablet 40 mEq  40 mEq Oral PRN Brianda E Chayito, DO        Or    potassium chloride 10 mEq/100 mL IVPB (Peripheral Line)  10 mEq IntraVENous PRN Brianda E Chayito, DO        senna (SENOKOT) tablet 8.6 mg  1 tablet Oral Daily PRN Brianda E Chayito, DO        acetaminophen (TYLENOL) tablet 650 mg  650 mg Oral Q6H PRN Brianda E Chayito, DO        Or    acetaminophen (TYLENOL) suppository 650 mg  650 mg Rectal Q6H PRN Brianda E Chayito, DO        apixaban (ELIQUIS) tablet 5 mg  5 mg Oral BID Brianda E Chayito, DO        carvedilol (COREG) tablet 12.5 mg  12.5 mg Oral BID  Brianda E Chayito, DO        ipratropium-albuterol (DUONEB) nebulizer solution 3 mL  1 vial Inhalation Q6H PRN Brianda E Chayito, DO        pantoprazole (PROTONIX) tablet 20 mg  20 mg Oral Daily Brianda E Chayito, DO        predniSONE (DELTASONE) tablet 5 mg  5 mg Oral BID Brianda E Chayito, DO        insulin lispro (HUMALOG) injection vial 0-4 Units  0-4 Units SubCUTAneous TID  Brianda E Chayito, DO        insulin lispro (HUMALOG) injection vial 0-4 Units  0-4 Units SubCUTAneous Nightly Brianda E Chayito, DO        glucose chewable tablet 16 g  4 tablet Oral PRN Brianda E Chayito, DO        dextrose bolus 10% 125 mL  125 mL IntraVENous PRN Brinada E Chayito, DO        Or    dextrose bolus 10% 250 mL  250 mL IntraVENous PRN Brianda E Chayito, DO        glucagon (rDNA) injection 1 mg 1 mg SubCUTAneous PRN Brianda E Chayito, DO        dextrose 10 % infusion   IntraVENous Continuous PRN Brianda E Chayito, DO        levothyroxine (SYNTHROID) tablet 75 mcg  75 mcg Oral Daily Brianda E Chayito, DO        dilTIAZem 100 mg in dextrose 5 % 100 mL infusion ([facility])  2.5 mg/hr IntraVENous Continuous Woody Heys, DO 10 mL/hr at 12/12/22 0329 10 mg/hr at 12/12/22 0329     Current Outpatient Medications   Medication Sig Dispense Refill    carvedilol (COREG) 12.5 MG tablet Take 1 tablet by mouth 2 times daily (with meals) 60 tablet 3    amLODIPine (NORVASC) 5 MG tablet Take 1 tablet by mouth daily 30 tablet 3    ipratropium-albuterol (DUONEB) 0.5-2.5 (3) MG/3ML SOLN nebulizer solution Inhale 1 vial into the lungs every 6 hours as needed for Shortness of Breath      levothyroxine (SYNTHROID) 75 MCG tablet Take 75 mcg by mouth Daily      predniSONE (DELTASONE) 5 MG tablet Take 5 mg by mouth 2 times daily      metFORMIN (GLUCOPHAGE-XR) 500 MG extended release tablet Take 500 mg by mouth daily (with breakfast)      oxybutynin (DITROPAN-XL) 5 MG extended release tablet Take 5 mg by mouth nightly as needed      apixaban (ELIQUIS) 5 MG TABS tablet Take 5 mg by mouth 2 times daily      pantoprazole (PROTONIX) 20 MG tablet Take 1 tablet by mouth daily 30 tablet 0      sodium chloride      dextrose      dilTIAZem 10 mg/hr (12/12/22 0329)       Physical Exam:  /72   Pulse 87   Temp 98.2 °F (36.8 °C)   Resp 16   Ht 5' (1.524 m)   Wt 194 lb (88 kg)   SpO2 93%   BMI 37.89 kg/m²   Weight change: Wt Readings from Last 3 Encounters:   12/11/22 194 lb (88 kg)   12/25/20 160 lb (72.6 kg)   09/08/20 198 lb (89.8 kg)         General: Awake, alert, oriented x3, no acute distress  HEENT: Unremarkable  Neck: No JVD or bruits. Cardiac: Irregular rate and rhythm, normal S1 and S2, no extra heart sounds, murmurs, heaves, thrills  Resp: Lungs clear without wheezing or crackles.  No accessory muscle use or retraction  Abdomen: soft, nontender, nondistended, no gross organomegaly or mass  Skin: Warm and dry, no cyanosis. Trivial bilateral lower extremity edema. Musculoskeletal: No identified joint deformity  Neuro: Grossly unremarkable  Psych: Cooperative, and normal affect    Intake/Output:  No intake or output data in the 24 hours ending 12/12/22 0749  No intake/output data recorded. Laboratory Tests:  Lab Results   Component Value Date    CREATININE 0.9 12/12/2022    BUN 22 12/12/2022     12/12/2022    K 3.4 (L) 12/12/2022     12/12/2022    CO2 27 12/12/2022     No results for input(s): CKTOTAL, CKMB in the last 72 hours. Invalid input(s): TROPONONI  No results found for: BNP  Lab Results   Component Value Date/Time    WBC 8.4 12/12/2022 06:40 AM    RBC 3.65 12/12/2022 06:40 AM    HGB 10.8 12/12/2022 06:40 AM    HCT 33.8 12/12/2022 06:40 AM    MCV 92.6 12/12/2022 06:40 AM    MCH 29.6 12/12/2022 06:40 AM    MCHC 32.0 12/12/2022 06:40 AM    RDW 15.9 12/12/2022 06:40 AM     12/12/2022 06:40 AM    MPV 9.7 12/12/2022 06:40 AM     Recent Labs     12/11/22  2157 12/12/22  0640   ALKPHOS 79 66   ALT 28 24   AST 19 18   PROT 6.6 5.9*   BILITOT 0.6 0.5   LABALBU 3.3* 3.0*     Lab Results   Component Value Date/Time    MG 1.9 12/11/2022 09:57 PM     Lab Results   Component Value Date/Time    PROTIME 12.8 09/08/2020 10:59 AM    INR 1.1 09/08/2020 10:59 AM     Lab Results   Component Value Date/Time    TSH 2.810 12/12/2022 12:07 AM     No components found for: CHLPL  No results found for: TRIG  No results found for: HDL  No results found for: [facility]  Lab Results   Component Value Date    INR 1.1 09/08/2020       Admission ECG revealed rapid atrial fibrillation. Currently telemetry shows AF with rate 110 bpm.    ASSESSMENT / PLAN:    1. Rapid atrial fibrillation-rate mildly rapid on IV diltiazem. Stop carvedilol and change to metoprolol succinate 25 mg twice daily. Load with IV digoxin. Echocardiogram to assess left ventricular function. 2.  Anticoagulation status-Eliquis to be continued at the current dose. 3.  Hypertension-as above. 4.  Hyperlipidemia-lipid profile while admitted. 5.  Diabetic therapy per Dr. Eliud Black. 6.  Continue to follow.     Electronically signed by Urszula Pollack MD on 12/12/2022 at 7:49 AM

## 2022-12-12 NOTE — PROGRESS NOTES
Database initiated. Patient is A&O from home with family. States she uses a walker to ambulate and is RA at baseline. Pharmacy tech to verify home medications.

## 2022-12-12 NOTE — H&P
History and Physical  Internal Medicine  Maria Elena Orta MD    CHIEF COMPLAINT:  weakness      HISTORY OF PRESENT ILLNESS:      The patient is a 80 y.o. female patient of Dr Anthony Lawrence who presents with AF with RVR - pt admitted for this. Past Medical History:   Diagnosis Date    Arthritis     Diabetes mellitus ([address]     Hyperlipidemia     Hypertension            Past Surgical History:   Procedure Laterality Date    CARPAL TUNNEL RELEASE      CHOLECYSTECTOMY      HC INSERT PICC CATH, 5/> YRS  1/21/2020         HYSTERECTOMY (CERVIX STATUS UNKNOWN)         Medications Prior to Admission:    Not in a hospital admission. Allergies:    Patient has no known allergies. Social History:    reports that she has never smoked. She has never used smokeless tobacco. She reports that she does not drink alcohol. Family History:   family history is not on file. REVIEW OF SYSTEMS:  As above in the HPI, otherwise negative    Vital Signs:  /76   Pulse (!) 125   Temp 98.2 °F (36.8 °C)   Resp 16   Ht 5' (1.524 m)   Wt 194 lb (88 kg)   SpO2 92%   BMI 37.89 kg/m²     Physical Exam:    General appearance: alert, appears stated age, and cooperative  Head: Normocephalic, without obvious abnormality, atraumatic  Eyes: negative  Throat: normal findings: lips normal without lesions  Neck: no adenopathy, no carotid bruit, no JVD, and supple, symmetrical, trachea midline  Back: symmetric, no curvature. ROM normal. No CVA tenderness.   Lungs: clear to auscultation bilaterally  Chest wall: no tenderness  Heart: irregularly irregular rhythm  Abdomen: soft, non-tender; bowel sounds normal; no masses,  no organomegaly  Extremities: extremities normal, atraumatic, no cyanosis or edema  Pulses: 2+ and symmetric  Skin: Skin color, texture, turgor normal. No rashes or lesions  Lymph nodes: Cervical, supraclavicular, and axillary nodes normal.  Neurologic: Grossly normal  Rectal: deferred    LABS:    Recent Results (from the past 24 hour(s))   CBC with Auto Differential    Collection Time: 12/11/22  9:57 PM   Result Value Ref Range    WBC 10.4 4.5 - 11.5 E9/L    RBC 4.02 3.50 - 5.50 E12/L    Hemoglobin 11.7 11.5 - 15.5 g/dL    Hematocrit 37.1 34.0 - 48.0 %    MCV 92.3 80.0 - 99.9 fL    MCH 29.1 26.0 - 35.0 pg    MCHC 31.5 (L) 32.0 - 34.5 %    RDW 15.9 (H) 11.5 - 15.0 fL    Platelets 361 886 - 775 E9/L    MPV 9.4 7.0 - 12.0 fL    Neutrophils % 70.4 43.0 - 80.0 %    Immature Granulocytes % 2.4 0.0 - 5.0 %    Lymphocytes % 15.3 (L) 20.0 - 42.0 %    Monocytes % 11.3 2.0 - 12.0 %    Eosinophils % 0.1 0.0 - 6.0 %    Basophils % 0.5 0.0 - 2.0 %    Neutrophils Absolute 7.30 1.80 - 7.30 E9/L    Immature Granulocytes # 0.25 E9/L    Lymphocytes Absolute 1.58 1.50 - 4.00 E9/L    Monocytes Absolute 1.17 (H) 0.10 - 0.95 E9/L    Eosinophils Absolute 0.01 (L) 0.05 - 0.50 E9/L    Basophils Absolute 0.05 0.00 - 0.20 E9/L   Comprehensive Metabolic Panel w/ Reflex to MG    Collection Time: 12/11/22  9:57 PM   Result Value Ref Range    Sodium 135 132 - 146 mmol/L    Potassium reflex Magnesium 3.6 3.5 - 5.0 mmol/L    Chloride 95 (L) 98 - 107 mmol/L    CO2 27 22 - 29 mmol/L    Anion Gap 13 7 - 16 mmol/L    Glucose 208 (H) 74 - 99 mg/dL    BUN 23 6 - 23 mg/dL    Creatinine 1.0 0.5 - 1.0 mg/dL    Est, Glom Filt Rate 54 >=60 mL/min/1.73    Calcium 9.4 8.6 - 10.2 mg/dL    Total Protein 6.6 6.4 - 8.3 g/dL    Albumin 3.3 (L) 3.5 - 5.2 g/dL    Total Bilirubin 0.6 0.0 - 1.2 mg/dL    Alkaline Phosphatase 79 35 - 104 U/L    ALT 28 0 - 32 U/L    AST 19 0 - 31 U/L   Magnesium    Collection Time: 12/11/22  9:57 PM   Result Value Ref Range    Magnesium 1.9 1.6 - 2.6 mg/dL   Troponin    Collection Time: 12/11/22  9:57 PM   Result Value Ref Range    Troponin, High Sensitivity 87 (H) 0 - 9 ng/L   D-Dimer, Quantitative    Collection Time: 12/11/22  9:57 PM   Result Value Ref Range    D-Dimer, Quant 351 ng/mL DDU   Brain Natriuretic Peptide    Collection Time: 12/11/22  9:57 PM Result Value Ref Range    Pro-BNP 2,809 (H) 0 - 450 pg/mL   COVID-19, Rapid    Collection Time: 12/11/22  9:57 PM    Specimen: Nasopharyngeal Swab   Result Value Ref Range    SARS-CoV-2, NAAT Not Detected Not Detected   RAPID INFLUENZA A/B ANTIGENS    Collection Time: 12/11/22  9:57 PM    Specimen: Nasopharyngeal   Result Value Ref Range    Influenza A by PCR Not Detected Not Detected    Influenza B by PCR Not Detected Not Detected   Lactic Acid    Collection Time: 12/11/22  9:57 PM   Result Value Ref Range    Lactic Acid 2.3 (H) 0.5 - 2.2 mmol/L   Lipase    Collection Time: 12/11/22  9:57 PM   Result Value Ref Range    Lipase 17 13 - 60 U/L   POCT Glucose    Collection Time: 12/11/22 10:26 PM   Result Value Ref Range    Meter Glucose 185 (H) 74 - 99 mg/dL   POCT Glucose    Collection Time: 12/11/22 10:27 PM   Result Value Ref Range    Glucose 185 mg/dL    QC OK? ok    POCT glucose    Collection Time: 12/11/22 10:38 PM   Result Value Ref Range    Glucose 185 mg/dL   Urinalysis with Microscopic    Collection Time: 12/11/22 10:44 PM   Result Value Ref Range    Color, UA Yellow Straw/Yellow    Clarity, UA SL CLOUDY Clear    Glucose, Ur Negative Negative mg/dL    Bilirubin Urine Negative Negative    Ketones, Urine TRACE (A) Negative mg/dL    Specific Gravity, UA >=1.030 1.005 - 1.030    Blood, Urine SMALL (A) Negative    pH, UA 5.5 5.0 - 9.0    Protein, UA >=300 (A) Negative mg/dL    Urobilinogen, Urine 0.2 <2.0 E.U./dL    Nitrite, Urine Negative Negative    Leukocyte Esterase, Urine MODERATE (A) Negative    WBC, UA >20 (A) 0 - 5 /HPF    RBC, UA 2-5 0 - 2 /HPF    Epithelial Cells, UA FEW /HPF    Bacteria, UA MANY (A) None Seen /HPF   Troponin    Collection Time: 12/11/22 10:49 PM   Result Value Ref Range    Troponin, High Sensitivity 81 (H) 0 - 9 ng/L   TSH    Collection Time: 12/12/22 12:07 AM   Result Value Ref Range    TSH 2.810 0.270 - 4.200 uIU/mL       Radiology:     Chest  Xray:  Results for orders placed during the hospital encounter of 20    XR CHEST STANDARD (2 VW)    Narrative  Patient MRN:  [id number]  : 1932  Age: 80 years  Gender: Female    Order Date:  3/10/2020 4:45 PM        TECHNIQUE/NUMBER OF IMAGES/COMPARISON/CLINICAL HISTORY:    Chest and lateral views. Comparison with . History difficult breathing. Reviewed the images of the lower lung bases from CT abdomen and pelvis  performed same day. The    Findings are: There is a moderate right-sided pleural effusion. There  is no left-sided pleural effusion. Compression atelectasis in the  right base. Heart has a upper borderline size. There is no perihilar vascular  congestion. Impression  Moderate right-sided pleural effusion causing compression  atelectasis in the right lower lobe. Results for orders placed during the hospital encounter of 22    XR CHEST PORTABLE    Narrative  EXAMINATION:  ONE XRAY VIEW OF THE CHEST    2022 9:32 pm    COMPARISON:  2020    HISTORY:  ORDERING SYSTEM PROVIDED HISTORY: sob  TECHNOLOGIST PROVIDED HISTORY:  Reason for exam:->sob    FINDINGS:  The cardiomediastinal silhouette is unchanged. The lungs are clear except  for bibasilar atelectasis. No pneumothorax or pleural effusion. Impression  No acute cardiopulmonary abnormality. Other:  none      ASSESSMENT:      Principal Problem:    Atrial fibrillation with RVR (HCC)  Active Problems:    New onset a-fib ([address]    Hypertension    Inadequately controlled diabetes mellitus ([address]  Resolved Problems:    * No resolved hospital problems.  *      PLAN:    ADmit  Rate control  Cardio consult    Simi Estes MD  6:30 AM  2022

## 2022-12-12 NOTE — ED PROVIDER NOTES
80-year-old female with history of type 2 diabetes on metformin, hypothyroidism on levothyroxine, metoprolol for heart palpitations presents to the emergency department for concerns of hyperglycemia. She takes eliqus history of DVT At home her family took her blood glucose read to 88. She was given extra dose of metformin. Patient has been feeling unwell for the past 2 days. She reports clear vomiting, shortness of breath with exertion better with rest, frontal headache, burning sensation with urinating, and muscle aches. Per EMS report to be issued as more weakness than usual.  Patient gets around with a walker and assisted with her , which has been troubling her for the past 2 days. No acute palliative measures taken. No other aggravating alleviating factors noted. Patient denies the following chest pain, leg erythema/edema, visual changes, runny nose, congestion. Patient reports nausea/vomiting/abdominal pain log with her symptoms. No chief complaint on file. Review of Systems   Stated in the HPI above  Physical Exam  HENT:      Head: Normocephalic and atraumatic. Nose: Nose normal.   Eyes:      Extraocular Movements: Extraocular movements intact. Conjunctiva/sclera: Conjunctivae normal.      Pupils: Pupils are equal, round, and reactive to light. Cardiovascular:      Rate and Rhythm: Rhythm irregular. Heart sounds: No murmur heard. No friction rub. Pulmonary:      Effort: Pulmonary effort is normal. No respiratory distress. Breath sounds: No wheezing, rhonchi or rales. Abdominal:      Palpations: Abdomen is soft. Tenderness: There is abdominal tenderness. There is no guarding or rebound. Musculoskeletal:      Right lower leg: No edema. Left lower leg: No edema. Neurological:      General: No focal deficit present. Mental Status: She is alert and oriented to person, place, and time. Mental status is at baseline.         Procedures EKG:  This EKG is signed by emergency department physician. Rate: 134  Rhythm: Atrial fibrillation  AXIS:left axis   ST Changes:No ST elevations   Interpretation: atrial fibrillation (new onset)  Comparison: changes compared to previous EKG . A.fib has replaced sinus     MDM     49-year-old female with history of type 2 diabetes on metformin, hypothyroidism on levothyroxine, metoprolol for heart palpitations presents to the emergency department for concerns of hyperglycemia. At home her family took her blood glucose read to 88. She was given extra dose of metformin. Patient has been feeling unwell for the past 2 days. She reports clear vomiting, shortness of breath with exertion better with rest, frontal headache, burning sensation with urinating, and muscle aches. Per EMS report to be issued as more weakness than usual.  Patient gets around with a walker and assisted with her , which has been troubling her for the past 2 days. No acute palliative measures taken. No other aggravating alleviating factors noted. Patient denies the following chest pain, leg erythema/edema, visual changes, runny nose, congestion. Patient reports nausea/vomiting/abdominal pain log with her symptoms. On entering the room patient is an irregular rhythm 94% on room air blood pressure 338 systolic. Lungs are CTA bilaterally, no wheezing no rhonchi no crackles noted. Abdomen is tender on the right upper quadrant and right lower quadrant. No rigidity no guarding. No rebound. Bilateral lower extremity edema. She did have elevated troponin and BNP. Patient is on eliquis for DVT and is taking it regularly which is confirmed by her daughter at bedside. UA showed UTI and she was given Ceftriaxone. Her heart rate improved steadily with fluids however still in the 120s-130s. She was given bolus of Diltazam and infusion at 2.5 with titration to 100-120 HR. Her SOB is likely from A.fib.  There was improvement in patients heart rate. I spoke with admitting physician in great detail about this case who agreed to admitt the patient for new onset of A.fib and UTI. I Discussed the work up at findings with the patient and her family who agreed to stay in the hospital for further evaluation. ED Course as of 12/12/22 0710   Sun Dec 11, 2022   2247 Lactate 2.3. Fluid bolus ordered for increased lactic and A.fib  [MN]   2313 UA reads WBC >20 and bacteria, and Leuk esterase. Ceftriaxone ordered for UTI  [MN]      ED Course User Index  [MN] Brian Valerio DO        ED Course as of 12/12/22 1512   Sun Dec 11, 2022   2247 Lactate 2.3. Fluid bolus ordered for increased lactic and A.fib  [MN]   2313 UA reads WBC >20 and bacteria, and Leuk esterase. Ceftriaxone ordered for UTI  [MN]      ED Course User Index  [MN] Brian Valerio DO       --------------------------------------------- PAST HISTORY ---------------------------------------------  Past Medical History:  has a past medical history of Arthritis, Diabetes mellitus ([address] Hyperlipidemia, and Hypertension. Past Surgical History:  has a past surgical history that includes Cholecystectomy; Hysterectomy; Carpal tunnel release; and Insert Picc Cath, 5/> Yrs (1/21/2020). Social History:  reports that she has never smoked. She has never used smokeless tobacco. She reports that she does not drink alcohol. Family History: family history is not on file. The patients home medications have been reviewed. Allergies: Patient has no known allergies.     -------------------------------------------------- RESULTS -------------------------------------------------    LABS:  Results for orders placed or performed during the hospital encounter of 12/11/22   COVID-19, Rapid    Specimen: Nasopharyngeal Swab   Result Value Ref Range    SARS-CoV-2, NAAT Not Detected Not Detected   RAPID INFLUENZA A/B ANTIGENS    Specimen: Nasopharyngeal   Result Value Ref Range    Influenza A by PCR Not Detected Not Detected    Influenza B by PCR Not Detected Not Detected   CBC with Auto Differential   Result Value Ref Range    WBC 10.4 4.5 - 11.5 E9/L    RBC 4.02 3.50 - 5.50 E12/L    Hemoglobin 11.7 11.5 - 15.5 g/dL    Hematocrit 37.1 34.0 - 48.0 %    MCV 92.3 80.0 - 99.9 fL    MCH 29.1 26.0 - 35.0 pg    MCHC 31.5 (L) 32.0 - 34.5 %    RDW 15.9 (H) 11.5 - 15.0 fL    Platelets 075 423 - 176 E9/L    MPV 9.4 7.0 - 12.0 fL    Neutrophils % 70.4 43.0 - 80.0 %    Immature Granulocytes % 2.4 0.0 - 5.0 %    Lymphocytes % 15.3 (L) 20.0 - 42.0 %    Monocytes % 11.3 2.0 - 12.0 %    Eosinophils % 0.1 0.0 - 6.0 %    Basophils % 0.5 0.0 - 2.0 %    Neutrophils Absolute 7.30 1.80 - 7.30 E9/L    Immature Granulocytes # 0.25 E9/L    Lymphocytes Absolute 1.58 1.50 - 4.00 E9/L    Monocytes Absolute 1.17 (H) 0.10 - 0.95 E9/L    Eosinophils Absolute 0.01 (L) 0.05 - 0.50 E9/L    Basophils Absolute 0.05 0.00 - 0.20 E9/L   Comprehensive Metabolic Panel w/ Reflex to MG   Result Value Ref Range    Sodium 135 132 - 146 mmol/L    Potassium reflex Magnesium 3.6 3.5 - 5.0 mmol/L    Chloride 95 (L) 98 - 107 mmol/L    CO2 27 22 - 29 mmol/L    Anion Gap 13 7 - 16 mmol/L    Glucose 208 (H) 74 - 99 mg/dL    BUN 23 6 - 23 mg/dL    Creatinine 1.0 0.5 - 1.0 mg/dL    Est, Glom Filt Rate 54 >=60 mL/min/1.73    Calcium 9.4 8.6 - 10.2 mg/dL    Total Protein 6.6 6.4 - 8.3 g/dL    Albumin 3.3 (L) 3.5 - 5.2 g/dL    Total Bilirubin 0.6 0.0 - 1.2 mg/dL    Alkaline Phosphatase 79 35 - 104 U/L    ALT 28 0 - 32 U/L    AST 19 0 - 31 U/L   Magnesium   Result Value Ref Range    Magnesium 1.9 1.6 - 2.6 mg/dL   Troponin   Result Value Ref Range    Troponin, High Sensitivity 87 (H) 0 - 9 ng/L   D-Dimer, Quantitative   Result Value Ref Range    D-Dimer, Quant 351 ng/mL DDU   Brain Natriuretic Peptide   Result Value Ref Range    Pro-BNP 2,809 (H) 0 - 450 pg/mL   Lactic Acid   Result Value Ref Range    Lactic Acid 2.3 (H) 0.5 - 2.2 mmol/L   Lipase   Result Value Ref Range    Lipase 17 13 - 60 U/L   Urinalysis with Microscopic   Result Value Ref Range    Color, UA Yellow Straw/Yellow    Clarity, UA SL CLOUDY Clear    Glucose, Ur Negative Negative mg/dL    Bilirubin Urine Negative Negative    Ketones, Urine TRACE (A) Negative mg/dL    Specific Gravity, UA >=1.030 1.005 - 1.030    Blood, Urine SMALL (A) Negative    pH, UA 5.5 5.0 - 9.0    Protein, UA >=300 (A) Negative mg/dL    Urobilinogen, Urine 0.2 <2.0 E.U./dL    Nitrite, Urine Negative Negative    Leukocyte Esterase, Urine MODERATE (A) Negative    WBC, UA >20 (A) 0 - 5 /HPF    RBC, UA 2-5 0 - 2 /HPF    Epithelial Cells, UA FEW /HPF    Bacteria, UA MANY (A) None Seen /HPF   Troponin   Result Value Ref Range    Troponin, High Sensitivity 81 (H) 0 - 9 ng/L   TSH   Result Value Ref Range    TSH 2.810 0.270 - 4.200 uIU/mL   Comprehensive Metabolic Panel w/ Reflex to MG   Result Value Ref Range    Sodium 136 132 - 146 mmol/L    Potassium reflex Magnesium 3.4 (L) 3.5 - 5.0 mmol/L    Chloride 101 98 - 107 mmol/L    CO2 27 22 - 29 mmol/L    Anion Gap 8 7 - 16 mmol/L    Glucose 126 (H) 74 - 99 mg/dL    BUN 22 6 - 23 mg/dL    Creatinine 0.9 0.5 - 1.0 mg/dL    Est, Glom Filt Rate >60 >=60 mL/min/1.73    Calcium 8.9 8.6 - 10.2 mg/dL    Total Protein 5.9 (L) 6.4 - 8.3 g/dL    Albumin 3.0 (L) 3.5 - 5.2 g/dL    Total Bilirubin 0.5 0.0 - 1.2 mg/dL    Alkaline Phosphatase 66 35 - 104 U/L    ALT 24 0 - 32 U/L    AST 18 0 - 31 U/L   CBC auto differential   Result Value Ref Range    WBC 8.4 4.5 - 11.5 E9/L    RBC 3.65 3.50 - 5.50 E12/L    Hemoglobin 10.8 (L) 11.5 - 15.5 g/dL    Hematocrit 33.8 (L) 34.0 - 48.0 %    MCV 92.6 80.0 - 99.9 fL    MCH 29.6 26.0 - 35.0 pg    MCHC 32.0 32.0 - 34.5 %    RDW 15.9 (H) 11.5 - 15.0 fL    Platelets 215 667 - 292 E9/L    MPV 9.7 7.0 - 12.0 fL    Neutrophils % 67.3 43.0 - 80.0 %    Immature Granulocytes % 2.1 0.0 - 5.0 %    Lymphocytes % 19.9 (L) 20.0 - 42.0 %    Monocytes % 9.8 2.0 - 12.0 %    Eosinophils % 0.4 0.0 - 6.0 %    Basophils % 0.5 0.0 - 2.0 %    Neutrophils Absolute 5.66 1.80 - 7.30 E9/L    Immature Granulocytes # 0.18 E9/L    Lymphocytes Absolute 1.67 1.50 - 4.00 E9/L    Monocytes Absolute 0.82 0.10 - 0.95 E9/L    Eosinophils Absolute 0.03 (L) 0.05 - 0.50 E9/L    Basophils Absolute 0.04 0.00 - 0.20 E9/L   Magnesium   Result Value Ref Range    Magnesium 1.8 1.6 - 2.6 mg/dL   POCT Glucose   Result Value Ref Range    Glucose 185 mg/dL    QC OK? ok    POCT glucose   Result Value Ref Range    Glucose 185 mg/dL   POCT Glucose   Result Value Ref Range    Meter Glucose 185 (H) 74 - 99 mg/dL   POCT Glucose   Result Value Ref Range    Meter Glucose 108 (H) 74 - 99 mg/dL   EKG 12 Lead   Result Value Ref Range    Ventricular Rate 97 BPM    Atrial Rate 105 BPM    QRS Duration 78 ms    Q-T Interval 398 ms    QTc Calculation (Bazett) 505 ms    R Axis -27 degrees    T Axis 120 degrees   EKG 12 Lead   Result Value Ref Range    Ventricular Rate 134 BPM    Atrial Rate 117 BPM    QRS Duration 86 ms    Q-T Interval 348 ms    QTc Calculation (Bazett) 519 ms    R Axis -26 degrees    T Axis 84 degrees       RADIOLOGY:  XR CHEST PORTABLE   Final Result   No acute cardiopulmonary abnormality.                 ------------------------- NURSING NOTES AND VITALS REVIEWED ---------------------------  Date / Time Roomed:  12/11/2022  9:03 PM  ED Bed Assignment:  21/21    The nursing notes within the ED encounter and vital signs as below have been reviewed.      Patient Vitals for the past 24 hrs:   BP Temp Pulse Resp SpO2 Height Weight   12/12/22 1401 -- -- (!) 104 -- -- -- --   12/12/22 1345 134/74 -- (!) 104 20 98 % -- --   12/12/22 1115 120/77 -- (!) 105 20 99 % -- --   12/12/22 0938 118/76 -- (!) 109 -- -- -- --   12/12/22 0915 -- -- -- 20 -- -- --   12/12/22 0808 -- -- (!) 117 -- -- -- --   12/12/22 0745 125/74 -- 94 18 92 % -- --   12/12/22 0630 121/72 98.2 °F (36.8 °C) 87 16 93 % -- --   12/12/22 0329 -- -- (!) 125 -- 92 % -- -- 12/12/22 0315 111/76 -- (!) 112 -- -- -- --   12/12/22 0245 129/89 -- (!) 112 -- -- -- --   12/12/22 0215 (!) 135/113 -- (!) 116 -- -- -- --   12/12/22 0145 136/75 -- (!) 116 -- -- -- --   12/12/22 0014 136/78 -- (!) 125 -- 92 % -- --   12/11/22 2345 (!) 146/90 98.2 °F (36.8 °C) (!) 126 16 91 % -- --   12/11/22 2338 -- -- -- -- -- 5' (1.524 m) 194 lb (88 kg)   12/11/22 2245 (!) 144/95 -- (!) 125 -- -- -- --   12/11/22 2215 -- -- (!) 111 26 -- -- --   12/11/22 2150 (!) 127/93 97.9 °F (36.6 °C) (!) 127 23 95 % -- --       Oxygen Saturation Interpretation: Normal      Counseling:  I have spoken with the patient and discussed todays results, in addition to providing specific details for the plan of care and counseling regarding the diagnosis and prognosis. Their questions are answered at this time and they are agreeable with the plan of admission. This patient has remained hemodynamically stable during their ED course. Diagnosis:  1. Atrial fibrillation, unspecified type ([address]    2. Urinary tract infection without hematuria, site unspecified        Disposition:  Patient's disposition: Admit to med/surg floor  Patient's condition is stable. Attending was present and available throughout encounter including all critical portions;  See Attending Note/Attestation for Final Plan      Mayte Warren DO  Resident  12/12/22 3805

## 2022-12-13 LAB
ALBUMIN SERPL-MCNC: 2.9 G/DL (ref 3.5–5.2)
ALP BLD-CCNC: 69 U/L (ref 35–104)
ALT SERPL-CCNC: 25 U/L (ref 0–32)
ANION GAP SERPL CALCULATED.3IONS-SCNC: 13 MMOL/L (ref 7–16)
AST SERPL-CCNC: 19 U/L (ref 0–31)
BASOPHILS ABSOLUTE: 0.03 E9/L (ref 0–0.2)
BASOPHILS RELATIVE PERCENT: 0.4 % (ref 0–2)
BILIRUB SERPL-MCNC: 0.6 MG/DL (ref 0–1.2)
BUN BLDV-MCNC: 25 MG/DL (ref 6–23)
CALCIUM SERPL-MCNC: 8.8 MG/DL (ref 8.6–10.2)
CHLORIDE BLD-SCNC: 100 MMOL/L (ref 98–107)
CHOLESTEROL, TOTAL: 208 MG/DL (ref 0–199)
CO2: 23 MMOL/L (ref 22–29)
CREAT SERPL-MCNC: 1.1 MG/DL (ref 0.5–1)
EKG ATRIAL RATE: 105 BPM
EKG Q-T INTERVAL: 398 MS
EKG QRS DURATION: 78 MS
EKG QTC CALCULATION (BAZETT): 505 MS
EKG R AXIS: -27 DEGREES
EKG T AXIS: 120 DEGREES
EKG VENTRICULAR RATE: 97 BPM
EOSINOPHILS ABSOLUTE: 0.02 E9/L (ref 0.05–0.5)
EOSINOPHILS RELATIVE PERCENT: 0.2 % (ref 0–6)
GFR SERPL CREATININE-BSD FRML MDRD: 48 ML/MIN/1.73
GLUCOSE BLD-MCNC: 128 MG/DL (ref 74–99)
HCT VFR BLD CALC: 35.4 % (ref 34–48)
HDLC SERPL-MCNC: 52 MG/DL
HEMOGLOBIN: 10.8 G/DL (ref 11.5–15.5)
IMMATURE GRANULOCYTES #: 0.12 E9/L
IMMATURE GRANULOCYTES %: 1.5 % (ref 0–5)
LDL CHOLESTEROL CALCULATED: 119 MG/DL (ref 0–99)
LYMPHOCYTES ABSOLUTE: 1.45 E9/L (ref 1.5–4)
LYMPHOCYTES RELATIVE PERCENT: 17.7 % (ref 20–42)
MCH RBC QN AUTO: 28.3 PG (ref 26–35)
MCHC RBC AUTO-ENTMCNC: 30.5 % (ref 32–34.5)
MCV RBC AUTO: 92.7 FL (ref 80–99.9)
METER GLUCOSE: 104 MG/DL (ref 74–99)
METER GLUCOSE: 104 MG/DL (ref 74–99)
METER GLUCOSE: 175 MG/DL (ref 74–99)
METER GLUCOSE: 202 MG/DL (ref 74–99)
MONOCYTES ABSOLUTE: 0.71 E9/L (ref 0.1–0.95)
MONOCYTES RELATIVE PERCENT: 8.7 % (ref 2–12)
NEUTROPHILS ABSOLUTE: 5.87 E9/L (ref 1.8–7.3)
NEUTROPHILS RELATIVE PERCENT: 71.5 % (ref 43–80)
PDW BLD-RTO: 15.4 FL (ref 11.5–15)
PLATELET # BLD: 241 E9/L (ref 130–450)
PMV BLD AUTO: 9.3 FL (ref 7–12)
POTASSIUM REFLEX MAGNESIUM: 4.1 MMOL/L (ref 3.5–5)
POTASSIUM SERPL-SCNC: 4.1 MMOL/L (ref 3.5–5)
RBC # BLD: 3.82 E12/L (ref 3.5–5.5)
SODIUM BLD-SCNC: 136 MMOL/L (ref 132–146)
TOTAL PROTEIN: 5.9 G/DL (ref 6.4–8.3)
TRIGL SERPL-MCNC: 183 MG/DL (ref 0–149)
VLDLC SERPL CALC-MCNC: 37 MG/DL
WBC # BLD: 8.2 E9/L (ref 4.5–11.5)

## 2022-12-13 PROCEDURE — 97161 PT EVAL LOW COMPLEX 20 MIN: CPT

## 2022-12-13 PROCEDURE — 6370000000 HC RX 637 (ALT 250 FOR IP): Performed by: INTERNAL MEDICINE

## 2022-12-13 PROCEDURE — 97165 OT EVAL LOW COMPLEX 30 MIN: CPT

## 2022-12-13 PROCEDURE — 36415 COLL VENOUS BLD VENIPUNCTURE: CPT

## 2022-12-13 PROCEDURE — 85025 COMPLETE CBC W/AUTO DIFF WBC: CPT

## 2022-12-13 PROCEDURE — 2500000003 HC RX 250 WO HCPCS

## 2022-12-13 PROCEDURE — 80048 BASIC METABOLIC PNL TOTAL CA: CPT

## 2022-12-13 PROCEDURE — 82962 GLUCOSE BLOOD TEST: CPT

## 2022-12-13 PROCEDURE — 80053 COMPREHEN METABOLIC PANEL: CPT

## 2022-12-13 PROCEDURE — 2700000000 HC OXYGEN THERAPY PER DAY

## 2022-12-13 PROCEDURE — 2060000000 HC ICU INTERMEDIATE R&B

## 2022-12-13 PROCEDURE — 80061 LIPID PANEL: CPT

## 2022-12-13 RX ORDER — METOPROLOL SUCCINATE 50 MG/1
50 TABLET, EXTENDED RELEASE ORAL 2 TIMES DAILY
Status: DISCONTINUED | OUTPATIENT
Start: 2022-12-13 | End: 2022-12-13

## 2022-12-13 RX ORDER — METOPROLOL SUCCINATE 50 MG/1
50 TABLET, EXTENDED RELEASE ORAL 2 TIMES DAILY
Status: DISCONTINUED | OUTPATIENT
Start: 2022-12-13 | End: 2022-12-14

## 2022-12-13 RX ORDER — DONEPEZIL HYDROCHLORIDE 10 MG/1
10 TABLET, FILM COATED ORAL NIGHTLY
Status: DISCONTINUED | OUTPATIENT
Start: 2022-12-13 | End: 2022-12-18

## 2022-12-13 RX ORDER — DONEPEZIL HYDROCHLORIDE 10 MG/1
10 TABLET, FILM COATED ORAL NIGHTLY
COMMUNITY
Start: 2022-11-22

## 2022-12-13 RX ADMIN — METOPROLOL SUCCINATE 50 MG: 50 TABLET, EXTENDED RELEASE ORAL at 19:58

## 2022-12-13 RX ADMIN — DEXTROSE MONOHYDRATE 5 MG/HR: 50 INJECTION, SOLUTION INTRAVENOUS at 10:16

## 2022-12-13 RX ADMIN — APIXABAN 5 MG: 5 TABLET, FILM COATED ORAL at 19:59

## 2022-12-13 RX ADMIN — INSULIN LISPRO 1 UNITS: 100 INJECTION, SOLUTION INTRAVENOUS; SUBCUTANEOUS at 16:20

## 2022-12-13 RX ADMIN — METOPROLOL SUCCINATE 50 MG: 50 TABLET, EXTENDED RELEASE ORAL at 09:34

## 2022-12-13 RX ADMIN — PREDNISONE 5 MG: 5 TABLET ORAL at 09:34

## 2022-12-13 RX ADMIN — APIXABAN 5 MG: 5 TABLET, FILM COATED ORAL at 09:34

## 2022-12-13 RX ADMIN — PANTOPRAZOLE SODIUM 20 MG: 20 TABLET, DELAYED RELEASE ORAL at 05:53

## 2022-12-13 RX ADMIN — DONEPEZIL HYDROCHLORIDE 10 MG: 10 TABLET, FILM COATED ORAL at 19:59

## 2022-12-13 RX ADMIN — PREDNISONE 5 MG: 5 TABLET ORAL at 19:58

## 2022-12-13 RX ADMIN — LEVOTHYROXINE SODIUM 75 MCG: 75 TABLET ORAL at 05:53

## 2022-12-13 ASSESSMENT — PAIN SCALES - GENERAL: PAINLEVEL_OUTOF10: 0

## 2022-12-13 NOTE — PROGRESS NOTES
Occupational Therapy  OCCUPATIONAL THERAPY INITIAL EVALUATION     [facility]:                                                  Patient Name: Karishma Lara    MRN: [id number]    : 1932    Room: [address] [place] [address]      Evaluating OT: Nora aPredes OTR/L [id number]      Referring Provider: Tamika Tanner DO    Specific Provider Orders/Date:OT eval and treat 22      Diagnosis:  New onset a-fib ([address] [I48.91]  Atrial fibrillation with RVR ([address] [I48.91]  Urinary tract infection without hematuria, site unspecified [N39.0]  Atrial fibrillation, unspecified type ([address] [I48.91]      Pertinent Medical History: arthritis, DM, HTN      Precautions:  Fall Risk, O2     Assessment of current deficits    [x] Functional mobility  [x]ADLs  [x] Strength               [x]Cognition    [x] Functional transfers   [x] IADLs         [x] Safety Awareness   [x]Endurance    [] Fine Coordination              [x] Balance      [] Vision/perception   []Sensation     []Gross Motor Coordination  [] ROM  [] Delirium                   [] Motor Control     OT PLAN OF CARE   OT POC based on physician orders, patient diagnosis and results of clinical assessment    Frequency/Duration 2-4 days/wk for 2 weeks PRN   Specific OT Treatment Interventions to include:   * Instruction/training on adapted ADL techniques and AE recommendations to increase functional independence within precautions       * Training on energy conservation strategies, correct breathing pattern and techniques to improve independence/tolerance for self-care routine  * Functional transfer/mobility training/DME recommendations for increased independence, safety, and fall prevention  * Patient/Family education to increase follow through with safety techniques and functional independence  * Recommendation of environmental modifications for increased safety with functional transfers/mobility and ADLs  * Cognitive retraining/development of therapeutic activities to improve problem solving, judgement, memory, and attention for increased safety/participation in ADL/IADL tasks  * Therapeutic exercise to improve motor endurance, ROM, and functional strength for ADLs/functional transfers  * Therapeutic activities to facilitate/challenge dynamic balance, stand tolerance for increased safety and independence with ADLs  * Neuro-muscular re-education: facilitation of righting/equilibrium reactions, midline orientation, scapular stability/mobility, normalization of muscle tone, and facilitation of volitional active controled movement  * Positioning to improve skin integrity, interaction with environment and functional independence        Recommended Adaptive Equipment: TBD     Home Living: Pt lives with  in 1 story with 2 LACY. Bathroom setup: tub/shower, elevated commode; pt sponge bathes with 's assistance   Equipment owned: wheeled walker    Prior Level of Function: assist from  with ADLs , assist from  with IADLs; functional mobility: wheeled walker    Pain Level: pt did not report pain this date; pt agreeable to therapy with encouragement  Cognition: A&O: pt alert and oriented grossly; 2-3 step command follow demonstrated. Memory: Fair   Sequencing:  Fair   Problem solving:  Fair   Judgement/safety:  Fair     Functional Assessment:  AM-PAC Daily Activity Raw Score: 12/24   Initial Eval Status  Date: 12/13/22 Treatment Status  Date: STGs = LTGs  Time frame: 10-14 days   Feeding Set up     Grooming Min A, seated   SBA   UB Dressing Mod A  For gown management  SBA   LB Dressing Dependent  To don socks    Mod A-with use of AD as appropriate/needed   Bathing Max A  Mod A -with use of AD as appropriate/needed   Toileting Dependent  Mod A    Bed Mobility  Supine to sit:  Max A X2   Sit to supine: Max A X2   Mod A   Functional Transfers Mod A X2 with wheeled walker  Sit to stand from EOB  Stand to sit to chair  Stand pivot from bed to chair  Cues for hand placement and safe technique   Min A    Functional Mobility NT  Min A -with device as needed to maximize independence with ADLs and functional task completion   Balance Sitting:     Static:  SBA    Dynamic:CGA  Standing: Mod A X2 with wheeled walker  Sup for static/dynamic sitting balance to maximize independence with ADLs and functional task completion    CGA for standing balance to maximize independence with ADLs and functional task completion   Activity Tolerance Fair- with light activity. Limited by weakness and fatigue. Fair+ with ADL activity. Pt will demonstrate good understanding of education provided on EC/WS techniques    Visual/  Perceptual Glasses: yes                Additional long-term goal: Pt will increase functional independence to PLOF to allow pt to live in least restrictive environment. Hand Dominance R   AROM (PROM) Strength Additional Info:    [facility] WFL  and wfl [facility]/dexterity noted during functional bed mobility     [facility] WFL  and wfl [facility]/dexterity noted during functional bed mobility     Hearing: WFL   Sensation:  pt reported some numbness in BLE (states this is not new)  Tone: WFL   Edema: BLE    Comments: Upon arrival patient lying in bed. At end of session, patient sitting in chair with call light and phone within reach, all lines and tubes intact, with family present and alarm set. Overall patient demonstrated decreased independence and safety during completion of ADL/functional transfer/mobility tasks. Pt would benefit from continued skilled OT to increase safety and independence with completion of ADL/IADL tasks for functional independence and quality of life. Rehab Potential: Good for established goals     Patient / Family Goal: none stated    Patient and/or family were instructed on functional diagnosis, prognosis/goals and OT plan of care.  Demonstrated fair understanding. Eval Complexity: Low    Time In: 1037  Time Out: 1053    Min Units   OT Eval Low 97165  x  1   OT Eval Medium 90880      OT Eval High 44996      OT Re-Eval [id number]       Therapeutic Ex 20893       Therapeutic Activities 00001       ADL/Self Care 84331       Orthotic Management 47011       Manual [id number]     Neuro Re-Ed 11599       Non-Billable Time          Evaluation Time additionally includes thorough review of current medical information, gathering information on past medical history/social history and prior level of function, interpretation of standardized testing/informal observation of tasks, assessment of data and development of plan of care and goals.             Suzanne Pugh, OTR/L, [id number]

## 2022-12-13 NOTE — PROGRESS NOTES
Left message w/ Dr. Aaron Bernstein office notifying of patient's family requesting patient's home medication Aricept 10mg/nightly be ordered along w/ requesting a diet be ordered as well.

## 2022-12-13 NOTE — PROGRESS NOTES
Physical Therapy  Facility/Department: [address] INTERMEDIATE 1  Physical Therapy Initial Assessment    Name: Ivania Ozuna  : 1932  MRN: [id number]  Date of Service: 2022      Patient Diagnosis(es): The primary encounter diagnosis was Atrial fibrillation, unspecified type ([facility] A diagnosis of Urinary tract infection without hematuria, site unspecified was also pertinent to this visit. Past Medical History:  has a past medical history of Arthritis, Diabetes mellitus ([facility] Hyperlipidemia, and Hypertension. Past Surgical History:  has a past surgical history that includes Cholecystectomy; Hysterectomy; Carpal tunnel release; and Insert Picc Cath, 5/> Yrs (2020). Evaluating Therapist: Isak Morse PT    Room #:  3148/0897-D  Diagnosis:  New onset a-fib [facility]) [I48.91]  Atrial fibrillation with RVR ([facility] [I48.91]  Urinary tract infection without hematuria, site unspecified [N39.0]  Atrial fibrillation, unspecified type ([facility] [I48.91]  PMHx/PSHx:  DM, arthritis  Precautions:  falls, alarm      Social:  Pt lives with  in a 1 floor plan 2 steps  to enter. Prior to admission Pt states she walks short distance with ww and assist from her      Initial Evaluation  Date: 22 Treatment      Short Term/ Long Term   Goals   Was pt agreeable to Eval/treatment? With much encouragement     Does pt have pain?  Back pain, pain in legs     Bed Mobility  Rolling: max assist of 2  Supine to sit: max assist of 2  Sit to supine: NT  Scooting: max assist of 2  Mod assist   Transfers Sit to stand: mod assist of 2  Stand to sit: mod assist of 2  Stand pivot: mod assist of 2 with ww  Min assist   Ambulation    3 feet with ww with mod assist of 2  25 feet with ww with min assist   Stair Negotiation  Ascended and descended  NT      LE strength     3-/5    3+/5   balance      poor     AM-PAC Raw score                        Pt is alert and grossly oriented  LE ROM: WFL  Sensation: c/o numbness B LE's  Edema: B LEs  Endurance: fair-  Chair alarm: yes     ASSESSMENT:    Pt displays functional ability as noted in the objective portion of this evaluation. Patient education  Pt educated on importance of mobility    Patient response to education:   Pt verbalized understanding Pt demonstrated skill Pt requires further education in this area   no   yes       Comments:  Pt required much encouragement to participate. Daughter present to encourage pt to participate. Pt fatigues quickly with mobility. Conditions Requiring Skilled Therapeutic Intervention:    [x]Decreased strength     []Decreased ROM  [x]Decreased functional mobility  [x]Decreased balance   [x]Decreased endurance   []Decreased posture  []Decreased sensation  []Decreased coordination   []Decreased vision  []Decreased safety awareness   []Increased pain       Patient and or family understand(s) diagnosis, prognosis, and plan of care.     Prognosis is good for reaching above PT goals    PHYSICAL THERAPY PLAN OF CARE:    PT POC is established based on physician order and patient diagnosis     Referring provider/PT Order: Jorge Lombardi, DO/ PT eval and treat      Current Treatment Recommendations:     [x] Strengthening to improve independence with functional mobility   [] ROM to improve independence with functional mobility   [x] Balance Training to improve static/dynamic balance and to reduce fall risk  [x] Endurance Training to improve activity tolerance during functional mobility   [x] Transfer Training to improve safety and independence with all functional transfers   [] Gait Training to improve gait mechanics, endurance and assess need for appropriate assistive device  [] Stair Training in preparation for safe discharge home and/or into the community   [] Positioning to prevent skin breakdown and contractures  [x] Safety and Education Training   [x] Patient/Caregiver Education   [] HEP  [] Other     PT long term treatment goals are located in above grid    Frequency of treatments: 2-5x/week x 5 days. Time in  1037  Time out  1053      Evaluation Time includes thorough review of current medical information, gathering information on past medical history/social history and prior level of function, completion of standardized testing/informal observation of tasks, assessment of data and education on plan of care and goals.       CPT codes:  [x] Low Complexity PT evaluation 56014  [] Moderate Complexity PT evaluation 15262  [] High Complexity PT evaluation 16322  [] PT Re-evaluation 90958  [] Gait training 87458 minutes  [] Manual therapy 93211 minutes  [] Therapeutic activities 40566 minutes  [] Therapeutic exercises 76463 minutes  [] Neuromuscular reeducation 75635 minutes     [facility] PSYCHIATRY PT [id number]

## 2022-12-13 NOTE — PROGRESS NOTES
Internal Medicine  Progress Note  Nicholas Owens MD     Subjective:     Patient is alert. Patient reports she wants to get rid of the oxygen. Tolerating diet well no other c/o.     Scheduled Meds:   sodium chloride flush  10 mL IntraVENous 2 times per day    apixaban  5 mg Oral BID    pantoprazole  20 mg Oral Daily    predniSONE  5 mg Oral BID    insulin lispro  0-4 Units SubCUTAneous TID WC    insulin lispro  0-4 Units SubCUTAneous Nightly    levothyroxine  75 mcg Oral Daily    metoprolol succinate  25 mg Oral BID    digoxin  250 mcg IntraVENous Once     Continuous Infusions:   sodium chloride      dextrose      dilTIAZem 7.5 mg/hr (12/13/22 0221)     PRN Meds:sodium chloride flush, sodium chloride, potassium chloride **OR** potassium alternative oral replacement **OR** potassium chloride, senna, acetaminophen **OR** acetaminophen, ipratropium-albuterol, glucose, dextrose bolus **OR** dextrose bolus, glucagon (rDNA), dextrose, perflutren lipid microspheres    Objective:      Physical Exam:  Vitals:   /72   Pulse 80   Temp 98.1 °F (36.7 °C) (Oral)   Resp 20   Ht 5' (1.524 m)   Wt 182 lb 8.7 oz (82.8 kg)   SpO2 98%   BMI 35.65 kg/m²   Orthostatic BPs and Heart Rates:     I/O's    Intake/Output Summary (Last 24 hours) at 12/13/2022 0618  Last data filed at 12/13/2022 0548  Gross per 24 hour   Intake --   Output 150 ml   Net -150 ml     Exam:  General appearance: alert, appears stated age, and cooperative  Head: Normocephalic, without obvious abnormality, atraumatic  Eyes: negative  Throat: normal findings: lips normal without lesions  Neck: no adenopathy, no carotid bruit, no JVD, and supple, symmetrical, trachea midline  Back: negative  Lungs: clear to auscultation bilaterally  Chest wall: no tenderness  Heart: irreg  Abdomen: soft, non-tender; bowel sounds normal; no masses,  no organomegaly  Extremities: extremities normal, atraumatic, no cyanosis or edema  Pulses: 2+ and symmetric  Skin: Skin color, texture, turgor normal. No rashes or lesions  Lymph nodes: Cervical, supraclavicular, and axillary nodes normal.  Neurologic: Grossly normal      Imaging     Chest  Xray:  Results for orders placed during the hospital encounter of 20    XR CHEST STANDARD (2 VW)    Narrative  Patient MRN:  [id number]  : 1932  Age: 80 years  Gender: Female    Order Date:  3/10/2020 4:45 PM        TECHNIQUE/NUMBER OF IMAGES/COMPARISON/CLINICAL HISTORY:    Chest and lateral views. Comparison with . History difficult breathing. Reviewed the images of the lower lung bases from CT abdomen and pelvis  performed same day. The    Findings are: There is a moderate right-sided pleural effusion. There  is no left-sided pleural effusion. Compression atelectasis in the  right base. Heart has a upper borderline size. There is no perihilar vascular  congestion. Impression  Moderate right-sided pleural effusion causing compression  atelectasis in the right lower lobe. Results for orders placed during the hospital encounter of 22    XR CHEST PORTABLE    Narrative  EXAMINATION:  ONE XRAY VIEW OF THE CHEST    2022 9:32 pm    COMPARISON:  2020    HISTORY:  ORDERING SYSTEM PROVIDED HISTORY: sob  TECHNOLOGIST PROVIDED HISTORY:  Reason for exam:->sob    FINDINGS:  The cardiomediastinal silhouette is unchanged. The lungs are clear except  for bibasilar atelectasis. No pneumothorax or pleural effusion. Impression  No acute cardiopulmonary abnormality.       Additional Imaging:  none    Lab Review   Recent Results (from the past 24 hour(s))   Comprehensive Metabolic Panel w/ Reflex to MG    Collection Time: 22  6:40 AM   Result Value Ref Range    Sodium 136 132 - 146 mmol/L    Potassium reflex Magnesium 3.4 (L) 3.5 - 5.0 mmol/L    Chloride 101 98 - 107 mmol/L    CO2 27 22 - 29 mmol/L    Anion Gap 8 7 - 16 mmol/L    Glucose 126 (H) 74 - 99 mg/dL    BUN 22 6 - 23 mg/dL    Creatinine 0.9 0.5 - 1.0 mg/dL    Est, Glom Filt Rate >60 >=60 mL/min/1.73    Calcium 8.9 8.6 - 10.2 mg/dL    Total Protein 5.9 (L) 6.4 - 8.3 g/dL    Albumin 3.0 (L) 3.5 - 5.2 g/dL    Total Bilirubin 0.5 0.0 - 1.2 mg/dL    Alkaline Phosphatase 66 35 - 104 U/L    ALT 24 0 - 32 U/L    AST 18 0 - 31 U/L   CBC auto differential    Collection Time: 12/12/22  6:40 AM   Result Value Ref Range    WBC 8.4 4.5 - 11.5 E9/L    RBC 3.65 3.50 - 5.50 E12/L    Hemoglobin 10.8 (L) 11.5 - 15.5 g/dL    Hematocrit 33.8 (L) 34.0 - 48.0 %    MCV 92.6 80.0 - 99.9 fL    MCH 29.6 26.0 - 35.0 pg    MCHC 32.0 32.0 - 34.5 %    RDW 15.9 (H) 11.5 - 15.0 fL    Platelets 253 528 - 884 E9/L    MPV 9.7 7.0 - 12.0 fL    Neutrophils % 67.3 43.0 - 80.0 %    Immature Granulocytes % 2.1 0.0 - 5.0 %    Lymphocytes % 19.9 (L) 20.0 - 42.0 %    Monocytes % 9.8 2.0 - 12.0 %    Eosinophils % 0.4 0.0 - 6.0 %    Basophils % 0.5 0.0 - 2.0 %    Neutrophils Absolute 5.66 1.80 - 7.30 E9/L    Immature Granulocytes # 0.18 E9/L    Lymphocytes Absolute 1.67 1.50 - 4.00 E9/L    Monocytes Absolute 0.82 0.10 - 0.95 E9/L    Eosinophils Absolute 0.03 (L) 0.05 - 0.50 E9/L    Basophils Absolute 0.04 0.00 - 0.20 E9/L   Magnesium    Collection Time: 12/12/22  6:40 AM   Result Value Ref Range    Magnesium 1.8 1.6 - 2.6 mg/dL   POCT Glucose    Collection Time: 12/12/22  8:13 AM   Result Value Ref Range    Meter Glucose 108 (H) 74 - 99 mg/dL   EKG 12 Lead    Collection Time: 12/12/22 11:35 AM   Result Value Ref Range    Ventricular Rate 97 BPM    Atrial Rate 105 BPM    QRS Duration 78 ms    Q-T Interval 398 ms    QTc Calculation (Bazett) 505 ms    R Axis -27 degrees    T Axis 120 degrees   POCT Glucose    Collection Time: 12/12/22  2:40 PM   Result Value Ref Range    Meter Glucose 132 (H) 74 - 99 mg/dL   POCT Glucose    Collection Time: 12/12/22  8:01 PM   Result Value Ref Range    Meter Glucose 206 (H) 74 - 99 mg/dL   Basic Metabolic Panel    Collection Time: 12/13/22  3:14 AM   Result Value Ref Range    Sodium 136 132 - 146 mmol/L    Potassium 4.1 3.5 - 5.0 mmol/L    Chloride 100 98 - 107 mmol/L    CO2 23 22 - 29 mmol/L    Anion Gap 13 7 - 16 mmol/L    Glucose 128 (H) 74 - 99 mg/dL    BUN 25 (H) 6 - 23 mg/dL    Creatinine 1.1 (H) 0.5 - 1.0 mg/dL    Est, Glom Filt Rate 48 >=60 mL/min/1.73    Calcium 8.8 8.6 - 10.2 mg/dL   Comprehensive Metabolic Panel w/ Reflex to MG    Collection Time: 12/13/22  3:14 AM   Result Value Ref Range    Potassium reflex Magnesium 4.1 3.5 - 5.0 mmol/L    Total Protein 5.9 (L) 6.4 - 8.3 g/dL    Albumin 2.9 (L) 3.5 - 5.2 g/dL    Total Bilirubin 0.6 0.0 - 1.2 mg/dL    Alkaline Phosphatase 69 35 - 104 U/L    ALT 25 0 - 32 U/L    AST 19 0 - 31 U/L   CBC with Auto Differential    Collection Time: 12/13/22  3:14 AM   Result Value Ref Range    WBC 8.2 4.5 - 11.5 E9/L    RBC 3.82 3.50 - 5.50 E12/L    Hemoglobin 10.8 (L) 11.5 - 15.5 g/dL    Hematocrit 35.4 34.0 - 48.0 %    MCV 92.7 80.0 - 99.9 fL    MCH 28.3 26.0 - 35.0 pg    MCHC 30.5 (L) 32.0 - 34.5 %    RDW 15.4 (H) 11.5 - 15.0 fL    Platelets 914 378 - 555 E9/L    MPV 9.3 7.0 - 12.0 fL    Neutrophils % 71.5 43.0 - 80.0 %    Immature Granulocytes % 1.5 0.0 - 5.0 %    Lymphocytes % 17.7 (L) 20.0 - 42.0 %    Monocytes % 8.7 2.0 - 12.0 %    Eosinophils % 0.2 0.0 - 6.0 %    Basophils % 0.4 0.0 - 2.0 %    Neutrophils Absolute 5.87 1.80 - 7.30 E9/L    Immature Granulocytes # 0.12 E9/L    Lymphocytes Absolute 1.45 (L) 1.50 - 4.00 E9/L    Monocytes Absolute 0.71 0.10 - 0.95 E9/L    Eosinophils Absolute 0.02 (L) 0.05 - 0.50 E9/L    Basophils Absolute 0.03 0.00 - 0.20 E9/L   POCT Glucose    Collection Time: 12/13/22  5:50 AM   Result Value Ref Range    Meter Glucose 104 (H) 74 - 99 mg/dL     Assessment:     Principal Problem:    Atrial fibrillation with RVR (HCC)  Active Problems:    New onset a-fib (HCC)    Hypertension    Inadequately controlled diabetes mellitus ([facility]  Resolved Problems:    * No resolved hospital problems.  *      Plan:   Rate OK but currently on cardizem josé Amado MD  12/13/2022  6:18 AM

## 2022-12-13 NOTE — CARE COORDINATION
Social Work/Discharge Planning:  Social Work consult noted. Met with patient and completed initial assessment. Explained Social Work role and discussed transition of care/discharge planning. She lives with her  in a one story house with two steps to enter. PTA she uses a wheeled walker. She has a nebulizer at home. She has a home health care history with [facility]. Patient NPO for an echo. Patient PCP is Dr. Alexander Curry and pharmacy is [facility]. Patient to be evaluated by therapy. She is currently requiring two liters oxygen and RN to wean as tolerated. She plans to return home at discharge and denies any home care needs at this time. Will continue to follow and assist with discharge planning.   Electronically signed by ENZO Jordan on 12/13/2022 at 10:14 AM

## 2022-12-13 NOTE — PROGRESS NOTES
DAILY PROGRESS NOTE -[facility] CARDIOLOGY    SUBJECTIVE:    Being followed for new onset and rapid atrial fibrillation. She is feeling well without complaint of chest pain, worsening dyspnea, palpitations, syncope, presyncope. Remains on the diltiazem infusion at 7.5 mg/h. Awaiting echocardiogram.    Hypertension, hyperlipidemia, poorly controlled diabetes mellitus. No history of coronary disease and already on Eliquis for previous lower extremity DVT. OBJECTIVE:    Her vital signs were reviewed today. Vitals:    12/13/22 0241   BP: 123/72   Pulse: 80   Resp: 20   Temp: 98.1 °F (36.7 °C)   SpO2: 98%       Scheduled Meds:   sodium chloride flush  10 mL IntraVENous 2 times per day    apixaban  5 mg Oral BID    pantoprazole  20 mg Oral Daily    predniSONE  5 mg Oral BID    insulin lispro  0-4 Units SubCUTAneous TID WC    insulin lispro  0-4 Units SubCUTAneous Nightly    levothyroxine  75 mcg Oral Daily    metoprolol succinate  25 mg Oral BID    digoxin  250 mcg IntraVENous Once     Continuous Infusions:   sodium chloride      dextrose      dilTIAZem 7.5 mg/hr (12/13/22 0221)     PRN Meds:.sodium chloride flush, sodium chloride, potassium chloride **OR** potassium alternative oral replacement **OR** potassium chloride, senna, acetaminophen **OR** acetaminophen, ipratropium-albuterol, glucose, dextrose bolus **OR** dextrose bolus, glucagon (rDNA), dextrose, perflutren lipid microspheres    REVIEW OF SYSTEMS:     No pruritus, rash, bruising. Cardiac and pulmonary symptoms per HPI. No nausea, vomiting, abdominal pain, GI bleeding, change in bowel habits. No dysuria, urinary frequency, urgency, hematuria, flank pain. Joint pain but no muscle soreness, stiffness, aching. No headache, speech disturbance, lateralizing neurologic deficit. No hemoptysis, epistaxis, easy bruising. No anxiety, depression. No symptoms of hypothyroidism, hyperthyroidism, diabetes, heat or cold intolerance.     FAMILY HISTORY: Negative for CAD in first-degree relatives. SOCIAL HISTORY: Negative for alcohol, tobacco, or illicit drug use. PHYSICAL EXAM:    General Appearance:  awake, alert, oriented, in no acute distress  Neck:  no bruits  Lungs:  Normal expansion. Clear to auscultation. No rales, rhonchi, or wheezing. Heart:  Heart sounds are normal.  Regular rate and rhythm without murmur, gallop or rub. Abdomen:  Soft, non-tender. Extremities: Extremities warm to touch, pink, with no edema. Neuro/musculosketal:  Unremarkable. LABS:    Recent Labs     12/11/22 2157 12/12/22 0640 12/13/22  0314    136 136   CREATININE 1.0 0.9 1.1*       Recent Labs     12/11/22 2157 12/12/22 0640 12/13/22  0314   HGB 11.7 10.8* 10.8*       No results for input(s): INR in the last 72 hours. IMPRESSION:    1. Persistent atrial fibrillation-decrease infusion to 2.5 mg/h and give metoprolol succinate 50 mg twice daily, increased dose, with holding parameters. Try to wean off the infusion. Awaiting echocardiogram.    2.  Anticoagulation status-already on Eliquis which was being used for previous lower extremity DVTs but which will now need to be used for thromboembolic prophylaxis. 3.  Hypertension-controlled. Medication changes made as above. 4.  Hyperlipidemia and diabetes-lipid profile while admitted. Diabetic therapy per PCP. 5.  Continue to follow.

## 2022-12-14 LAB
ALBUMIN SERPL-MCNC: 3 G/DL (ref 3.5–5.2)
ALP BLD-CCNC: 82 U/L (ref 35–104)
ALT SERPL-CCNC: 28 U/L (ref 0–32)
ANION GAP SERPL CALCULATED.3IONS-SCNC: 12 MMOL/L (ref 7–16)
AST SERPL-CCNC: 22 U/L (ref 0–31)
BASOPHILS ABSOLUTE: 0.02 E9/L (ref 0–0.2)
BASOPHILS RELATIVE PERCENT: 0.2 % (ref 0–2)
BILIRUB SERPL-MCNC: 0.4 MG/DL (ref 0–1.2)
BUN BLDV-MCNC: 29 MG/DL (ref 6–23)
CALCIUM SERPL-MCNC: 8.9 MG/DL (ref 8.6–10.2)
CHLORIDE BLD-SCNC: 96 MMOL/L (ref 98–107)
CO2: 23 MMOL/L (ref 22–29)
CREAT SERPL-MCNC: 1.2 MG/DL (ref 0.5–1)
EOSINOPHILS ABSOLUTE: 0 E9/L (ref 0.05–0.5)
EOSINOPHILS RELATIVE PERCENT: 0 % (ref 0–6)
GFR SERPL CREATININE-BSD FRML MDRD: 43 ML/MIN/1.73
GLUCOSE BLD-MCNC: 178 MG/DL (ref 74–99)
HCT VFR BLD CALC: 34.3 % (ref 34–48)
HEMOGLOBIN: 10.5 G/DL (ref 11.5–15.5)
IMMATURE GRANULOCYTES #: 0.18 E9/L
IMMATURE GRANULOCYTES %: 1.9 % (ref 0–5)
LYMPHOCYTES ABSOLUTE: 0.89 E9/L (ref 1.5–4)
LYMPHOCYTES RELATIVE PERCENT: 9.6 % (ref 20–42)
MCH RBC QN AUTO: 28.9 PG (ref 26–35)
MCHC RBC AUTO-ENTMCNC: 30.6 % (ref 32–34.5)
MCV RBC AUTO: 94.5 FL (ref 80–99.9)
METER GLUCOSE: 155 MG/DL (ref 74–99)
METER GLUCOSE: 222 MG/DL (ref 74–99)
METER GLUCOSE: 227 MG/DL (ref 74–99)
METER GLUCOSE: 234 MG/DL (ref 74–99)
MONOCYTES ABSOLUTE: 0.64 E9/L (ref 0.1–0.95)
MONOCYTES RELATIVE PERCENT: 6.9 % (ref 2–12)
NEUTROPHILS ABSOLUTE: 7.53 E9/L (ref 1.8–7.3)
NEUTROPHILS RELATIVE PERCENT: 81.4 % (ref 43–80)
ORGANISM: ABNORMAL
PDW BLD-RTO: 15.1 FL (ref 11.5–15)
PLATELET # BLD: 266 E9/L (ref 130–450)
PMV BLD AUTO: 9.9 FL (ref 7–12)
POTASSIUM REFLEX MAGNESIUM: 4.3 MMOL/L (ref 3.5–5)
RBC # BLD: 3.63 E12/L (ref 3.5–5.5)
SODIUM BLD-SCNC: 131 MMOL/L (ref 132–146)
TOTAL PROTEIN: 6 G/DL (ref 6.4–8.3)
URINE CULTURE, ROUTINE: ABNORMAL
WBC # BLD: 9.3 E9/L (ref 4.5–11.5)

## 2022-12-14 PROCEDURE — 85025 COMPLETE CBC W/AUTO DIFF WBC: CPT

## 2022-12-14 PROCEDURE — 2580000003 HC RX 258: Performed by: INTERNAL MEDICINE

## 2022-12-14 PROCEDURE — 6370000000 HC RX 637 (ALT 250 FOR IP): Performed by: INTERNAL MEDICINE

## 2022-12-14 PROCEDURE — 2500000003 HC RX 250 WO HCPCS

## 2022-12-14 PROCEDURE — 2060000000 HC ICU INTERMEDIATE R&B

## 2022-12-14 PROCEDURE — 36415 COLL VENOUS BLD VENIPUNCTURE: CPT

## 2022-12-14 PROCEDURE — 80053 COMPREHEN METABOLIC PANEL: CPT

## 2022-12-14 PROCEDURE — 82962 GLUCOSE BLOOD TEST: CPT

## 2022-12-14 PROCEDURE — 6360000002 HC RX W HCPCS: Performed by: INTERNAL MEDICINE

## 2022-12-14 PROCEDURE — 2700000000 HC OXYGEN THERAPY PER DAY

## 2022-12-14 PROCEDURE — 93306 TTE W/DOPPLER COMPLETE: CPT

## 2022-12-14 RX ORDER — METOPROLOL SUCCINATE 50 MG/1
50 TABLET, EXTENDED RELEASE ORAL ONCE
Status: COMPLETED | OUTPATIENT
Start: 2022-12-14 | End: 2022-12-14

## 2022-12-14 RX ORDER — FUROSEMIDE 20 MG/1
20 TABLET ORAL DAILY
COMMUNITY
Start: 2022-09-13

## 2022-12-14 RX ORDER — FUROSEMIDE 20 MG/1
20 TABLET ORAL DAILY
Status: DISCONTINUED | OUTPATIENT
Start: 2022-12-14 | End: 2022-12-15

## 2022-12-14 RX ORDER — METOPROLOL SUCCINATE 100 MG/1
100 TABLET, EXTENDED RELEASE ORAL 2 TIMES DAILY
Status: DISCONTINUED | OUTPATIENT
Start: 2022-12-14 | End: 2022-12-16

## 2022-12-14 RX ADMIN — FUROSEMIDE 20 MG: 20 TABLET ORAL at 15:21

## 2022-12-14 RX ADMIN — APIXABAN 5 MG: 5 TABLET, FILM COATED ORAL at 21:11

## 2022-12-14 RX ADMIN — DONEPEZIL HYDROCHLORIDE 10 MG: 10 TABLET, FILM COATED ORAL at 21:11

## 2022-12-14 RX ADMIN — SODIUM CHLORIDE, PRESERVATIVE FREE 10 ML: 5 INJECTION INTRAVENOUS at 21:12

## 2022-12-14 RX ADMIN — DEXTROSE MONOHYDRATE 5 MG/HR: 50 INJECTION, SOLUTION INTRAVENOUS at 06:18

## 2022-12-14 RX ADMIN — METOPROLOL SUCCINATE 50 MG: 50 TABLET, EXTENDED RELEASE ORAL at 08:11

## 2022-12-14 RX ADMIN — APIXABAN 5 MG: 5 TABLET, FILM COATED ORAL at 08:08

## 2022-12-14 RX ADMIN — METOPROLOL SUCCINATE 50 MG: 50 TABLET, EXTENDED RELEASE ORAL at 09:15

## 2022-12-14 RX ADMIN — INSULIN LISPRO 1 UNITS: 100 INJECTION, SOLUTION INTRAVENOUS; SUBCUTANEOUS at 10:21

## 2022-12-14 RX ADMIN — PREDNISONE 5 MG: 5 TABLET ORAL at 08:09

## 2022-12-14 RX ADMIN — INSULIN LISPRO 1 UNITS: 100 INJECTION, SOLUTION INTRAVENOUS; SUBCUTANEOUS at 15:22

## 2022-12-14 RX ADMIN — SODIUM CHLORIDE, PRESERVATIVE FREE 10 ML: 5 INJECTION INTRAVENOUS at 08:09

## 2022-12-14 RX ADMIN — LEVOTHYROXINE SODIUM 75 MCG: 75 TABLET ORAL at 06:17

## 2022-12-14 RX ADMIN — DEXTROSE MONOHYDRATE 7.5 MG/HR: 50 INJECTION, SOLUTION INTRAVENOUS at 08:12

## 2022-12-14 RX ADMIN — METOPROLOL SUCCINATE 100 MG: 100 TABLET, EXTENDED RELEASE ORAL at 21:11

## 2022-12-14 RX ADMIN — PANTOPRAZOLE SODIUM 20 MG: 20 TABLET, DELAYED RELEASE ORAL at 06:17

## 2022-12-14 RX ADMIN — WATER 1000 MG: 1 INJECTION INTRAMUSCULAR; INTRAVENOUS; SUBCUTANEOUS at 15:21

## 2022-12-14 RX ADMIN — PREDNISONE 5 MG: 5 TABLET ORAL at 21:11

## 2022-12-14 ASSESSMENT — PAIN SCALES - GENERAL
PAINLEVEL_OUTOF10: 0
PAINLEVEL_OUTOF10: 0

## 2022-12-14 NOTE — PROGRESS NOTES
DAILY PROGRESS NOTE -[facility] CARDIOLOGY    SUBJECTIVE:    Being followed for new onset and rapid atrial fibrillation. She is feeling well without complaint of chest pain, worsening dyspnea, palpitations, syncope, presyncope. Remains on the diltiazem infusion at 7.5 mg/h. Awaiting echocardiogram.    Hypertension, hyperlipidemia, poorly controlled diabetes mellitus. No history of coronary disease and already on Eliquis for previous lower extremity DVT. OBJECTIVE:    Her vital signs were reviewed today. Vitals:    12/14/22 0811   BP: 122/73   Pulse: 75   Resp:    Temp:    SpO2:        Scheduled Meds:   metoprolol succinate  50 mg Oral BID    donepezil  10 mg Oral Nightly    sodium chloride flush  10 mL IntraVENous 2 times per day    apixaban  5 mg Oral BID    pantoprazole  20 mg Oral Daily    predniSONE  5 mg Oral BID    insulin lispro  0-4 Units SubCUTAneous TID WC    insulin lispro  0-4 Units SubCUTAneous Nightly    levothyroxine  75 mcg Oral Daily    digoxin  250 mcg IntraVENous Once     Continuous Infusions:   sodium chloride      dextrose      dilTIAZem 7.5 mg/hr (12/14/22 0812)     PRN Meds:.sodium chloride flush, sodium chloride, potassium chloride **OR** potassium alternative oral replacement **OR** potassium chloride, senna, acetaminophen **OR** acetaminophen, ipratropium-albuterol, glucose, dextrose bolus **OR** dextrose bolus, glucagon (rDNA), dextrose, perflutren lipid microspheres    REVIEW OF SYSTEMS:     No pruritus, rash, bruising. Cardiac and pulmonary symptoms per HPI. No nausea, vomiting, abdominal pain, GI bleeding, change in bowel habits. No dysuria, urinary frequency, urgency, hematuria, flank pain. Joint pain but no muscle soreness, stiffness, aching. No headache, speech disturbance, lateralizing neurologic deficit. No hemoptysis, epistaxis, easy bruising. No anxiety, depression. No symptoms of hypothyroidism, hyperthyroidism, diabetes, heat or cold intolerance.     FAMILY HISTORY: Negative for CAD in first-degree relatives. SOCIAL HISTORY: Negative for alcohol, tobacco, or illicit drug use. PHYSICAL EXAM:    General Appearance:  awake, alert, oriented, in no acute distress  Neck:  no bruits  Lungs:  Normal expansion. Clear to auscultation. No rales, rhonchi, or wheezing. Heart:  Heart sounds are normal.  Irregular rate and rhythm without murmur, gallop or rub. Abdomen:  Soft, non-tender. Extremities: Extremities warm to touch, pink, with no edema. Neuro/musculosketal:  Unremarkable. LABS:    Recent Labs     12/12/22  0640 12/13/22 0314 12/14/22 0215    136 131*   CREATININE 0.9 1.1* 1.2*       Recent Labs     12/12/22  0640 12/13/22 0314 12/14/22 0215   HGB 10.8* 10.8* 10.5*       No results for input(s): INR in the last 72 hours. IMPRESSION:    1. Persistent atrial fibrillation-still on IV diltiazem infusion. Stop it right now and give another dose of 50 mg p.o. metoprolol succinate. Increase metoprolol up to 100 mg twice daily with holding parameters. 2.  Anticoagulation status-already on Eliquis which was being used for previous lower extremity DVTs but which will now need to be used for thromboembolic prophylaxis. 3.  Hypertension-controlled. Medication changes made as above. 4.  Hyperlipidemia and uncontrolled diabetes-lipid profile while admitted. Diabetic therapy per PCP. 5.  Continue to follow.

## 2022-12-14 NOTE — CARE COORDINATION
Social Work/Discharge Planning:  Met with patient and reviewed therapy score indicating need for rehab. She is not interested in rehab, but is agreeable to home health care. Patient will use same c company she had in the past.  Patient currently on two liters oxygen and RN to wean as tolerated. Patient does not have a dme preference at this time. Referral made to [facility] with [facility] and they can accept patient. Patient will need a home health care order prior to discharge. Provided update to patient. Will continue to follow.   Electronically signed by ENZO Davis on 12/14/2022 at 10:10 AM

## 2022-12-14 NOTE — PROGRESS NOTES
Spoke w/ Dr. Peg Vora regarding patient's positive urine culture results and exhibiting signs of fluid overload. Also notified him of patient's family requesting at home medication Lasix 20mg PO/daily be ordered along w/ notifying him of patient wanting HHC at discharge. Orders received.

## 2022-12-14 NOTE — PROGRESS NOTES
Verbally spoke w/ Dr. Meryl Cruz regarding patient's cardizem gtt.  Verbal orders received to stop Cardizem gtt now at 7.5mg/hr and to give extra dose of 50mg of Metoprolol this AM.

## 2022-12-14 NOTE — PROGRESS NOTES
2 d echo completed attempted to do definity but iv not working properly Rn attempted to start new iv without success twice [employer] not given

## 2022-12-14 NOTE — PROGRESS NOTES
Spoke w/ Dr. Isa Pereira regarding needing order to increase Toprol XL to 100mg BID as mentioned in his note from today. Orders received to order the Toprol XL 100mg PO BID w/ same holding parameters as previous 50mg dose to begin tonight. Dr. Isa Pereira also notified of patient's most recent SBP in the 130s and patient's HR staying in 90s-low 100s throughout the day.

## 2022-12-14 NOTE — PROGRESS NOTES
Paged Dr. Isabel Solo notifying him of patient's positive urine cultures and showing signs fluid overload.

## 2022-12-14 NOTE — PROGRESS NOTES
Internal Medicine  Progress Note  Noe Moore MD     Subjective:     Patient is alert.  Patient reports I want to go home. Tolerating diet well no other c/o.    Scheduled Meds:   metoprolol succinate  50 mg Oral BID    donepezil  10 mg Oral Nightly    sodium chloride flush  10 mL IntraVENous 2 times per day    apixaban  5 mg Oral BID    pantoprazole  20 mg Oral Daily    predniSONE  5 mg Oral BID    insulin lispro  0-4 Units SubCUTAneous TID WC    insulin lispro  0-4 Units SubCUTAneous Nightly    levothyroxine  75 mcg Oral Daily    digoxin  250 mcg IntraVENous Once     Continuous Infusions:   sodium chloride      dextrose      dilTIAZem 5 mg/hr (12/13/22 1016)     PRN Meds:sodium chloride flush, sodium chloride, potassium chloride **OR** potassium alternative oral replacement **OR** potassium chloride, senna, acetaminophen **OR** acetaminophen, ipratropium-albuterol, glucose, dextrose bolus **OR** dextrose bolus, glucagon (rDNA), dextrose, perflutren lipid microspheres    Objective:      Physical Exam:  Vitals:   /76   Pulse 88   Temp 97.9 °F (36.6 °C) (Oral)   Resp 19   Ht 5' (1.524 m)   Wt 179 lb 7.3 oz (81.4 kg)   SpO2 99%   BMI 35.05 kg/m²   Orthostatic BPs and Heart Rates:     I/O's  No intake or output data in the 24 hours ending 12/14/22 0617  Exam:  General appearance: alert, appears stated age, and cooperative  Head: Normocephalic, without obvious abnormality, atraumatic  Eyes: negative  Throat: normal findings: lips normal without lesions  Neck: no adenopathy, no carotid bruit, no JVD, and supple, symmetrical, trachea midline  Back: negative  Lungs: clear to auscultation bilaterally  Chest wall: no tenderness  Heart: irregularly irregular rhythm  Abdomen: soft, non-tender; bowel sounds normal; no masses,  no organomegaly  Extremities: extremities normal, atraumatic, no cyanosis or edema  Pulses: 2+ and symmetric  Skin: Skin color, texture, turgor normal. No rashes or lesions  Lymph nodes:  Cervical, supraclavicular, and axillary nodes normal.  Neurologic: Grossly normal      Imaging     Chest  Xray:  Results for orders placed during the hospital encounter of 20    XR CHEST STANDARD (2 VW)    Narrative  Patient MRN:  [id number]  : 1932  Age: 80 years  Gender: Female    Order Date:  3/10/2020 4:45 PM        TECHNIQUE/NUMBER OF IMAGES/COMPARISON/CLINICAL HISTORY:    Chest and lateral views. Comparison with . History difficult breathing. Reviewed the images of the lower lung bases from CT abdomen and pelvis  performed same day. The    Findings are: There is a moderate right-sided pleural effusion. There  is no left-sided pleural effusion. Compression atelectasis in the  right base. Heart has a upper borderline size. There is no perihilar vascular  congestion. Impression  Moderate right-sided pleural effusion causing compression  atelectasis in the right lower lobe. Results for orders placed during the hospital encounter of 22    XR CHEST PORTABLE    Narrative  EXAMINATION:  ONE XRAY VIEW OF THE CHEST    2022 9:32 pm    COMPARISON:  2020    HISTORY:  ORDERING SYSTEM PROVIDED HISTORY: sob  TECHNOLOGIST PROVIDED HISTORY:  Reason for exam:->sob    FINDINGS:  The cardiomediastinal silhouette is unchanged. The lungs are clear except  for bibasilar atelectasis. No pneumothorax or pleural effusion. Impression  No acute cardiopulmonary abnormality.       Additional Imaging:  none    Lab Review   Recent Results (from the past 24 hour(s))   POCT Glucose    Collection Time: 22  9:31 AM   Result Value Ref Range    Meter Glucose 104 (H) 74 - 99 mg/dL   POCT Glucose    Collection Time: 22  3:33 PM   Result Value Ref Range    Meter Glucose 202 (H) 74 - 99 mg/dL   POCT Glucose    Collection Time: 22  7:57 PM   Result Value Ref Range    Meter Glucose 175 (H) 74 - 99 mg/dL   Comprehensive Metabolic Panel w/ Reflex to MG    Collection Time: 12/14/22  2:15 AM   Result Value Ref Range    Sodium 131 (L) 132 - 146 mmol/L    Potassium reflex Magnesium 4.3 3.5 - 5.0 mmol/L    Chloride 96 (L) 98 - 107 mmol/L    CO2 23 22 - 29 mmol/L    Anion Gap 12 7 - 16 mmol/L    Glucose 178 (H) 74 - 99 mg/dL    BUN 29 (H) 6 - 23 mg/dL    Creatinine 1.2 (H) 0.5 - 1.0 mg/dL    Est, Glom Filt Rate 43 >=60 mL/min/1.73    Calcium 8.9 8.6 - 10.2 mg/dL    Total Protein 6.0 (L) 6.4 - 8.3 g/dL    Albumin 3.0 (L) 3.5 - 5.2 g/dL    Total Bilirubin 0.4 0.0 - 1.2 mg/dL    Alkaline Phosphatase 82 35 - 104 U/L    ALT 28 0 - 32 U/L    AST 22 0 - 31 U/L   CBC with Auto Differential    Collection Time: 12/14/22  2:15 AM   Result Value Ref Range    WBC 9.3 4.5 - 11.5 E9/L    RBC 3.63 3.50 - 5.50 E12/L    Hemoglobin 10.5 (L) 11.5 - 15.5 g/dL    Hematocrit 34.3 34.0 - 48.0 %    MCV 94.5 80.0 - 99.9 fL    MCH 28.9 26.0 - 35.0 pg    MCHC 30.6 (L) 32.0 - 34.5 %    RDW 15.1 (H) 11.5 - 15.0 fL    Platelets 507 970 - 449 E9/L    MPV 9.9 7.0 - 12.0 fL    Neutrophils % 81.4 (H) 43.0 - 80.0 %    Immature Granulocytes % 1.9 0.0 - 5.0 %    Lymphocytes % 9.6 (L) 20.0 - 42.0 %    Monocytes % 6.9 2.0 - 12.0 %    Eosinophils % 0.0 0.0 - 6.0 %    Basophils % 0.2 0.0 - 2.0 %    Neutrophils Absolute 7.53 (H) 1.80 - 7.30 E9/L    Immature Granulocytes # 0.18 E9/L    Lymphocytes Absolute 0.89 (L) 1.50 - 4.00 E9/L    Monocytes Absolute 0.64 0.10 - 0.95 E9/L    Eosinophils Absolute 0.00 (L) 0.05 - 0.50 E9/L    Basophils Absolute 0.02 0.00 - 0.20 E9/L     Assessment:     Principal Problem:    Atrial fibrillation with RVR (HCC)  Active Problems:    New onset a-fib (HCC)    Hypertension    Inadequately controlled diabetes mellitus ([facility] [address]  Resolved Problems:    * No resolved hospital problems.  *      Plan:   Rate good  Still on drip  Once off  drip if rate remains OK  Will discharge home  Amy Alicia MD  12/14/2022  6:17 AM

## 2022-12-15 ENCOUNTER — APPOINTMENT (OUTPATIENT)
Dept: GENERAL RADIOLOGY | Age: 87
DRG: 309 | End: 2022-12-15
Payer: MEDICARE

## 2022-12-15 LAB
ALBUMIN SERPL-MCNC: 3 G/DL (ref 3.5–5.2)
ALP BLD-CCNC: 101 U/L (ref 35–104)
ALT SERPL-CCNC: 38 U/L (ref 0–32)
ANION GAP SERPL CALCULATED.3IONS-SCNC: 10 MMOL/L (ref 7–16)
AST SERPL-CCNC: 27 U/L (ref 0–31)
BILIRUB SERPL-MCNC: 0.3 MG/DL (ref 0–1.2)
BUN BLDV-MCNC: 27 MG/DL (ref 6–23)
CALCIUM SERPL-MCNC: 8.8 MG/DL (ref 8.6–10.2)
CHLORIDE BLD-SCNC: 98 MMOL/L (ref 98–107)
CO2: 26 MMOL/L (ref 22–29)
CREAT SERPL-MCNC: 1 MG/DL (ref 0.5–1)
GFR SERPL CREATININE-BSD FRML MDRD: 54 ML/MIN/1.73
GLUCOSE BLD-MCNC: 180 MG/DL (ref 74–99)
METER GLUCOSE: 182 MG/DL (ref 74–99)
METER GLUCOSE: 213 MG/DL (ref 74–99)
METER GLUCOSE: 219 MG/DL (ref 74–99)
METER GLUCOSE: 266 MG/DL (ref 74–99)
POTASSIUM REFLEX MAGNESIUM: 4.1 MMOL/L (ref 3.5–5)
SODIUM BLD-SCNC: 134 MMOL/L (ref 132–146)
TOTAL PROTEIN: 6.2 G/DL (ref 6.4–8.3)

## 2022-12-15 PROCEDURE — 2580000003 HC RX 258: Performed by: INTERNAL MEDICINE

## 2022-12-15 PROCEDURE — 2700000000 HC OXYGEN THERAPY PER DAY

## 2022-12-15 PROCEDURE — 6370000000 HC RX 637 (ALT 250 FOR IP): Performed by: INTERNAL MEDICINE

## 2022-12-15 PROCEDURE — 36415 COLL VENOUS BLD VENIPUNCTURE: CPT

## 2022-12-15 PROCEDURE — 94640 AIRWAY INHALATION TREATMENT: CPT

## 2022-12-15 PROCEDURE — 80053 COMPREHEN METABOLIC PANEL: CPT

## 2022-12-15 PROCEDURE — 82962 GLUCOSE BLOOD TEST: CPT

## 2022-12-15 PROCEDURE — 2060000000 HC ICU INTERMEDIATE R&B

## 2022-12-15 PROCEDURE — 94664 DEMO&/EVAL PT USE INHALER: CPT

## 2022-12-15 PROCEDURE — 71046 X-RAY EXAM CHEST 2 VIEWS: CPT

## 2022-12-15 PROCEDURE — 6360000002 HC RX W HCPCS: Performed by: INTERNAL MEDICINE

## 2022-12-15 RX ORDER — BUMETANIDE 1 MG/1
1 TABLET ORAL 2 TIMES DAILY
Status: DISCONTINUED | OUTPATIENT
Start: 2022-12-15 | End: 2022-12-19 | Stop reason: HOSPADM

## 2022-12-15 RX ORDER — ATORVASTATIN CALCIUM 20 MG/1
20 TABLET, FILM COATED ORAL NIGHTLY
Status: DISCONTINUED | OUTPATIENT
Start: 2022-12-15 | End: 2022-12-19 | Stop reason: HOSPADM

## 2022-12-15 RX ADMIN — ATORVASTATIN CALCIUM 20 MG: 20 TABLET, FILM COATED ORAL at 21:51

## 2022-12-15 RX ADMIN — ACETAMINOPHEN 650 MG: 325 TABLET ORAL at 22:43

## 2022-12-15 RX ADMIN — IPRATROPIUM BROMIDE AND ALBUTEROL SULFATE 3 ML: 2.5; .5 SOLUTION RESPIRATORY (INHALATION) at 15:32

## 2022-12-15 RX ADMIN — LEVOTHYROXINE SODIUM 75 MCG: 75 TABLET ORAL at 05:45

## 2022-12-15 RX ADMIN — BUMETANIDE 1 MG: 1 TABLET ORAL at 21:51

## 2022-12-15 RX ADMIN — FUROSEMIDE 20 MG: 20 TABLET ORAL at 07:58

## 2022-12-15 RX ADMIN — METOPROLOL SUCCINATE 100 MG: 100 TABLET, EXTENDED RELEASE ORAL at 07:58

## 2022-12-15 RX ADMIN — PREDNISONE 5 MG: 5 TABLET ORAL at 07:59

## 2022-12-15 RX ADMIN — PANTOPRAZOLE SODIUM 20 MG: 20 TABLET, DELAYED RELEASE ORAL at 05:45

## 2022-12-15 RX ADMIN — WATER 1000 MG: 1 INJECTION INTRAMUSCULAR; INTRAVENOUS; SUBCUTANEOUS at 13:10

## 2022-12-15 RX ADMIN — APIXABAN 5 MG: 5 TABLET, FILM COATED ORAL at 21:52

## 2022-12-15 RX ADMIN — SODIUM CHLORIDE, PRESERVATIVE FREE 10 ML: 5 INJECTION INTRAVENOUS at 21:53

## 2022-12-15 RX ADMIN — APIXABAN 5 MG: 5 TABLET, FILM COATED ORAL at 07:59

## 2022-12-15 RX ADMIN — METOPROLOL SUCCINATE 100 MG: 100 TABLET, EXTENDED RELEASE ORAL at 21:52

## 2022-12-15 RX ADMIN — IPRATROPIUM BROMIDE AND ALBUTEROL SULFATE 3 ML: 2.5; .5 SOLUTION RESPIRATORY (INHALATION) at 08:06

## 2022-12-15 RX ADMIN — DONEPEZIL HYDROCHLORIDE 10 MG: 10 TABLET, FILM COATED ORAL at 21:52

## 2022-12-15 RX ADMIN — INSULIN LISPRO 1 UNITS: 100 INJECTION, SOLUTION INTRAVENOUS; SUBCUTANEOUS at 10:48

## 2022-12-15 RX ADMIN — SODIUM CHLORIDE, PRESERVATIVE FREE 10 ML: 5 INJECTION INTRAVENOUS at 07:59

## 2022-12-15 RX ADMIN — INSULIN LISPRO 2 UNITS: 100 INJECTION, SOLUTION INTRAVENOUS; SUBCUTANEOUS at 15:59

## 2022-12-15 RX ADMIN — PREDNISONE 5 MG: 5 TABLET ORAL at 21:52

## 2022-12-15 ASSESSMENT — PAIN SCALES - GENERAL
PAINLEVEL_OUTOF10: 0
PAINLEVEL_OUTOF10: 8
PAINLEVEL_OUTOF10: 0

## 2022-12-15 ASSESSMENT — PAIN DESCRIPTION - DESCRIPTORS: DESCRIPTORS: ACHING;DISCOMFORT;SORE

## 2022-12-15 ASSESSMENT — PAIN - FUNCTIONAL ASSESSMENT: PAIN_FUNCTIONAL_ASSESSMENT: PREVENTS OR INTERFERES SOME ACTIVE ACTIVITIES AND ADLS

## 2022-12-15 ASSESSMENT — PAIN DESCRIPTION - ORIENTATION: ORIENTATION: UPPER

## 2022-12-15 ASSESSMENT — PAIN DESCRIPTION - LOCATION: LOCATION: NECK

## 2022-12-15 ASSESSMENT — PAIN DESCRIPTION - PAIN TYPE: TYPE: ACUTE PAIN

## 2022-12-15 NOTE — PROGRESS NOTES
Occupational Therapy  Patient treatment attempted this AM.  Patient off unit for testing/procedure, will continue OT POC as able and appropriate.     César RAYMUNDO/TEMI [id number]

## 2022-12-15 NOTE — PROGRESS NOTES
Physical Therapy  Facility/Department: [facility]    Name: Tracie Hawthorne  : 1932  MRN: [id number]    Chart reviewed and PT treatment attempted this am.  Pt out of the room. Will check back at later time/date.      Heidy Colon, [phone]

## 2022-12-15 NOTE — PROGRESS NOTES
DAILY PROGRESS NOTE -[facility] CARDIOLOGY    SUBJECTIVE:    Being followed for new onset and rapid atrial fibrillation. Heart rate borderline around 105 to 110 bpm.  Has been spending much of her time in bed. Echo showed LVEF around 50% with moderate mitral regurgitation. Keeps repeating that she wants to go home. On antibiotics for UTI. Hypertension, hyperlipidemia, poorly controlled diabetes mellitus. No history of coronary disease and already on Eliquis for previous lower extremity DVT. OBJECTIVE:    Her vital signs were reviewed today. Pulse 105-110 bpm, blood pressure 130/70, respiratory rate 16. Vitals:    12/15/22 0806   BP:    Pulse:    Resp:    Temp:    SpO2: 99%       Scheduled Meds:   cefTRIAXone (ROCEPHIN) IV  1,000 mg IntraVENous Q24H    furosemide  20 mg Oral Daily    metoprolol succinate  100 mg Oral BID    donepezil  10 mg Oral Nightly    sodium chloride flush  10 mL IntraVENous 2 times per day    apixaban  5 mg Oral BID    pantoprazole  20 mg Oral Daily    predniSONE  5 mg Oral BID    insulin lispro  0-4 Units SubCUTAneous TID WC    insulin lispro  0-4 Units SubCUTAneous Nightly    levothyroxine  75 mcg Oral Daily    digoxin  250 mcg IntraVENous Once     Continuous Infusions:   sodium chloride      dextrose      dilTIAZem Stopped (12/14/22 0909)     PRN Meds:.sodium chloride flush, sodium chloride, potassium chloride **OR** potassium alternative oral replacement **OR** potassium chloride, senna, acetaminophen **OR** acetaminophen, ipratropium-albuterol, glucose, dextrose bolus **OR** dextrose bolus, glucagon (rDNA), dextrose, perflutren lipid microspheres    REVIEW OF SYSTEMS:     No pruritus, rash, bruising. Cardiac and pulmonary symptoms per HPI. No nausea, vomiting, abdominal pain, GI bleeding, change in bowel habits. No dysuria, urinary frequency, urgency, hematuria, flank pain. Joint pain but no muscle soreness, stiffness, aching.   No headache, speech disturbance, lateralizing neurologic deficit. No hemoptysis, epistaxis, easy bruising. No anxiety, depression. No symptoms of hypothyroidism, hyperthyroidism, diabetes, heat or cold intolerance. FAMILY HISTORY: Negative for CAD in first-degree relatives. SOCIAL HISTORY: Negative for alcohol, tobacco, or illicit drug use. PHYSICAL EXAM:    General Appearance:  awake, alert, oriented, in no acute distress  Neck:  no bruits  Lungs:  Normal expansion. Clear to auscultation. No rales, rhonchi, or wheezing. Heart:  Heart sounds are normal.  Irregular rate and rhythm without murmur, gallop or rub. Abdomen:  Soft, non-tender. Extremities: Extremities warm to touch, pink, with no edema. Neuro/musculosketal:  Unremarkable. LABS:    Recent Labs     12/13/22 0314 12/14/22 0215 12/15/22  0305    131* 134   CREATININE 1.1* 1.2* 1.0       Recent Labs     12/13/22 0314 12/14/22  0215   HGB 10.8* 10.5*       No results for input(s): INR in the last 72 hours. IMPRESSION:    1. Persistent atrial fibrillation-rate mildly rapid. On metoprolol succinate 100 mg twice daily. Add digoxin 125 mcg daily starting this morning. 2.  Anticoagulation status-already on Eliquis which was being used for previous lower extremity DVTs but which will now need to be used for thromboembolic prophylaxis. 3.  Hypertension-controlled. 4.  Hyperlipidemia and diabetes-add atorvastatin 20 mg daily. Diabetic therapy per Dr. Linsey Mendoza.    5.  Continue to follow.

## 2022-12-15 NOTE — CARE COORDINATION
Social Work/Discharge Planning:  Met with patient and confirmed plan remains home at discharge.  [facility] order noted and [place] (ph: [phone]).  [facility] will need to be notified when patient discharges.  Patient still requiring two liters oxygen and RN to wean as tolerated.  Patient does not have a DME preference and will use any accepting oxygen supplier IF needed at discharge.  Will continue to follow.  Electronically signed by ENZO Zamudio on 12/15/2022 at 8:53 AM

## 2022-12-15 NOTE — PROGRESS NOTES
Subjective: The patient is awake and alert. No problems overnight. Denies chest pain, angina, and dyspnea. Denies abdominal pain. Tolerating diet. No nausea or vomiting. Objective:   STill in AF with improving VR. DM control in progress. Sat up in chair x 1 hr yesterday    BP (!) 142/66   Pulse 94   Temp 97.8 °F (36.6 °C) (Oral)   Resp 22   Ht 5' (1.524 m)   Wt 179 lb 7.3 oz (81.4 kg)   SpO2 98%   BMI 35.05 kg/m²     Heart:  IRRR, no murmurs, gallops, or rubs. Lungs:  CTA bilaterally, no wheeze, rales or rhonchi  Abd: bowel sounds present, nontender, nondistended, no masses  Extrem:  No clubbing, cyanosis, or edema    CBC:   Lab Results   Component Value Date/Time    WBC 9.3 12/14/2022 02:15 AM    RBC 3.63 12/14/2022 02:15 AM    HGB 10.5 12/14/2022 02:15 AM    HCT 34.3 12/14/2022 02:15 AM    MCV 94.5 12/14/2022 02:15 AM    MCH 28.9 12/14/2022 02:15 AM    MCHC 30.6 12/14/2022 02:15 AM    RDW 15.1 12/14/2022 02:15 AM     12/14/2022 02:15 AM    MPV 9.9 12/14/2022 02:15 AM     BMP:    Lab Results   Component Value Date/Time     12/15/2022 03:05 AM    K 4.1 12/15/2022 03:05 AM    CL 98 12/15/2022 03:05 AM    CO2 26 12/15/2022 03:05 AM    BUN 27 12/15/2022 03:05 AM    LABALBU 3.0 12/15/2022 03:05 AM    CREATININE 1.0 12/15/2022 03:05 AM    CALCIUM 8.8 12/15/2022 03:05 AM    GFRAA >60 09/12/2020 04:39 AM    LABGLOM 54 12/15/2022 03:05 AM    GLUCOSE 180 12/15/2022 03:05 AM        Assessment:  Improving AF/VR. Echo with 50-55%. Valves OK. DM control in progress. Pt strong-willed and willnot even consider rehab. Fortunaltely for her, has a devoted dtr and . Plan:  PT. Up in [facility]. Rate control.  (B-block increased last PM).           Mehran Jacobs MD  6:43 AM  12/15/2022

## 2022-12-16 LAB
ALBUMIN SERPL-MCNC: 3.1 G/DL (ref 3.5–5.2)
ALP BLD-CCNC: 137 U/L (ref 35–104)
ALT SERPL-CCNC: 61 U/L (ref 0–32)
ANION GAP SERPL CALCULATED.3IONS-SCNC: 10 MMOL/L (ref 7–16)
AST SERPL-CCNC: 44 U/L (ref 0–31)
BILIRUB SERPL-MCNC: 0.3 MG/DL (ref 0–1.2)
BUN BLDV-MCNC: 24 MG/DL (ref 6–23)
CALCIUM SERPL-MCNC: 8.8 MG/DL (ref 8.6–10.2)
CHLORIDE BLD-SCNC: 97 MMOL/L (ref 98–107)
CO2: 27 MMOL/L (ref 22–29)
CREAT SERPL-MCNC: 1 MG/DL (ref 0.5–1)
GFR SERPL CREATININE-BSD FRML MDRD: 54 ML/MIN/1.73
GLUCOSE BLD-MCNC: 214 MG/DL (ref 74–99)
METER GLUCOSE: 194 MG/DL (ref 74–99)
METER GLUCOSE: 199 MG/DL (ref 74–99)
METER GLUCOSE: 213 MG/DL (ref 74–99)
METER GLUCOSE: 234 MG/DL (ref 74–99)
POTASSIUM REFLEX MAGNESIUM: 3.9 MMOL/L (ref 3.5–5)
SODIUM BLD-SCNC: 134 MMOL/L (ref 132–146)
TOTAL PROTEIN: 6.2 G/DL (ref 6.4–8.3)

## 2022-12-16 PROCEDURE — 2700000000 HC OXYGEN THERAPY PER DAY

## 2022-12-16 PROCEDURE — 6370000000 HC RX 637 (ALT 250 FOR IP): Performed by: INTERNAL MEDICINE

## 2022-12-16 PROCEDURE — 36415 COLL VENOUS BLD VENIPUNCTURE: CPT

## 2022-12-16 PROCEDURE — 6360000002 HC RX W HCPCS: Performed by: INTERNAL MEDICINE

## 2022-12-16 PROCEDURE — 2060000000 HC ICU INTERMEDIATE R&B

## 2022-12-16 PROCEDURE — 82962 GLUCOSE BLOOD TEST: CPT

## 2022-12-16 PROCEDURE — 2580000003 HC RX 258: Performed by: INTERNAL MEDICINE

## 2022-12-16 PROCEDURE — 80053 COMPREHEN METABOLIC PANEL: CPT

## 2022-12-16 PROCEDURE — 97530 THERAPEUTIC ACTIVITIES: CPT

## 2022-12-16 RX ORDER — DIGOXIN 0.25 MG/ML
250 INJECTION INTRAMUSCULAR; INTRAVENOUS ONCE
Status: COMPLETED | OUTPATIENT
Start: 2022-12-16 | End: 2022-12-16

## 2022-12-16 RX ORDER — DIGOXIN 125 MCG
125 TABLET ORAL DAILY
Status: DISCONTINUED | OUTPATIENT
Start: 2022-12-16 | End: 2022-12-19

## 2022-12-16 RX ADMIN — APIXABAN 5 MG: 5 TABLET, FILM COATED ORAL at 20:40

## 2022-12-16 RX ADMIN — PANTOPRAZOLE SODIUM 20 MG: 20 TABLET, DELAYED RELEASE ORAL at 06:08

## 2022-12-16 RX ADMIN — ATORVASTATIN CALCIUM 20 MG: 20 TABLET, FILM COATED ORAL at 20:40

## 2022-12-16 RX ADMIN — SODIUM CHLORIDE, PRESERVATIVE FREE 10 ML: 5 INJECTION INTRAVENOUS at 20:40

## 2022-12-16 RX ADMIN — METOPROLOL SUCCINATE 100 MG: 100 TABLET, EXTENDED RELEASE ORAL at 09:21

## 2022-12-16 RX ADMIN — BUMETANIDE 1 MG: 1 TABLET ORAL at 09:21

## 2022-12-16 RX ADMIN — INSULIN LISPRO 1 UNITS: 100 INJECTION, SOLUTION INTRAVENOUS; SUBCUTANEOUS at 06:51

## 2022-12-16 RX ADMIN — DIGOXIN 250 MCG: 0.25 INJECTION INTRAMUSCULAR; INTRAVENOUS at 12:16

## 2022-12-16 RX ADMIN — PREDNISONE 5 MG: 5 TABLET ORAL at 20:40

## 2022-12-16 RX ADMIN — WATER 1000 MG: 1 INJECTION INTRAMUSCULAR; INTRAVENOUS; SUBCUTANEOUS at 12:16

## 2022-12-16 RX ADMIN — METOPROLOL SUCCINATE 150 MG: 100 TABLET, EXTENDED RELEASE ORAL at 20:40

## 2022-12-16 RX ADMIN — PREDNISONE 5 MG: 5 TABLET ORAL at 09:21

## 2022-12-16 RX ADMIN — APIXABAN 5 MG: 5 TABLET, FILM COATED ORAL at 09:21

## 2022-12-16 RX ADMIN — DIGOXIN 125 MCG: 0.12 TABLET ORAL at 16:53

## 2022-12-16 RX ADMIN — LEVOTHYROXINE SODIUM 75 MCG: 75 TABLET ORAL at 06:07

## 2022-12-16 RX ADMIN — BUMETANIDE 1 MG: 1 TABLET ORAL at 20:40

## 2022-12-16 RX ADMIN — SODIUM CHLORIDE, PRESERVATIVE FREE 10 ML: 5 INJECTION INTRAVENOUS at 09:22

## 2022-12-16 RX ADMIN — DONEPEZIL HYDROCHLORIDE 10 MG: 10 TABLET, FILM COATED ORAL at 20:48

## 2022-12-16 ASSESSMENT — PAIN SCALES - GENERAL
PAINLEVEL_OUTOF10: 0

## 2022-12-16 NOTE — PROGRESS NOTES
Subjective: The patient is awake and alert. No problems overnight. Denies chest pain, angina, and dyspnea. Denies abdominal pain. Tolerating diet. No nausea or vomiting. Objective:  VR still somewhat rapid with elevated BP.    BP (!) 142/94   Pulse (!) 115   Temp 97.7 °F (36.5 °C) (Axillary)   Resp 20   Ht 5' (1.524 m)   Wt 179 lb 7.3 oz (81.4 kg)   SpO2 100%   BMI 35.05 kg/m²     Heart:  IRRR, no murmurs, gallops, or rubs.   Lungs:  CTA bilaterally x rare wheeze(more transmitted upper airway), rales or rhonchi  Abd: bowel sounds present, nontender, nondistended, no masses  Extrem:  No clubbing, cyanosis, or edema    CBC:   Lab Results   Component Value Date/Time    WBC 9.3 12/14/2022 02:15 AM    RBC 3.63 12/14/2022 02:15 AM    HGB 10.5 12/14/2022 02:15 AM    HCT 34.3 12/14/2022 02:15 AM    MCV 94.5 12/14/2022 02:15 AM    MCH 28.9 12/14/2022 02:15 AM    MCHC 30.6 12/14/2022 02:15 AM    RDW 15.1 12/14/2022 02:15 AM     12/14/2022 02:15 AM    MPV 9.9 12/14/2022 02:15 AM     BMP:    Lab Results   Component Value Date/Time     12/15/2022 03:05 AM    K 4.1 12/15/2022 03:05 AM    CL 98 12/15/2022 03:05 AM    CO2 26 12/15/2022 03:05 AM    BUN 27 12/15/2022 03:05 AM    LABALBU 3.0 12/15/2022 03:05 AM    CREATININE 1.0 12/15/2022 03:05 AM    CALCIUM 8.8 12/15/2022 03:05 AM    GFRAA >60 09/12/2020 04:39 AM    LABGLOM 54 12/15/2022 03:05 AM    GLUCOSE 180 12/15/2022 03:05 AM        Assessment:    Patient Active Problem List   Diagnosis    Acute appendicitis    Hypertension    Inadequately controlled diabetes mellitus ([facility] [address]    Metabolic encephalopathy    Sepsis associated hypotension ([address]    Sepsis ([address]    Relapsing appendicitis    Saddle embolus of pulmonary artery ([address]    DVT of deep femoral vein, right (HCC)    History of pulmonary embolism    Generalized weakness    Acute kidney injury ([address]    Left knee pain    Baker's cyst    Atrial fibrillation with RVR ([address]    New onset a-fib ([address] Plan:  Present tx. Up in chair. Rate control. BS control adequate. Spoke to dtr yesterday late afternoon and updated.           Joanna Trevino MD  6:38 AM  12/16/2022

## 2022-12-16 NOTE — PROGRESS NOTES
[facility] CARDIOLOGY PROGRESS NOTE  [facility]        Subjective: AF RVR, shortness of breath, dyspnea on exertion, echocardiogram LVEF 50% moderate MR.  is at bedside throughout the interview. She is laying at about a 30 degree angle in bed and appears to be a little tachypneic. Denies any chest pain currently, no significant sputum production. Chronic hypertension, hyperlipidemia and diabetes. Prior history of DVT maintained on chronic Eliquis. Objective: Medications lab chart and telemetry all reviewed.     Patient Vitals for the past 24 hrs:   BP Temp Temp src Pulse Resp SpO2   12/16/22 1339 (!) 158/88 97.8 °F (36.6 °C) Oral (!) 115 20 100 %   12/16/22 1215 -- -- -- 100 -- --   12/16/22 0712 (!) 151/82 97.7 °F (36.5 °C) Oral (!) 117 20 98 %   12/16/22 0300 (!) 142/94 97.7 °F (36.5 °C) Axillary (!) 115 20 100 %   12/15/22 2145 (!) 176/90 98.6 °F (37 °C) Oral (!) 119 20 --   12/15/22 1500 -- -- -- -- -- 98 %         Intake/Output Summary (Last 24 hours) at 12/16/2022 1415  Last data filed at 12/16/2022 0616  Gross per 24 hour   Intake --   Output 700 ml   Net -700 ml       Wt Readings from Last 3 Encounters:   12/14/22 179 lb 7.3 oz (81.4 kg)   12/25/20 160 lb (72.6 kg)   09/08/20 198 lb (89.8 kg)       Telemetry: Personally reviewed and shows atrial fibrillation heart rate 120 to 100 bpm.    Current meds: Scheduled Meds:   digoxin  125 mcg Oral Daily    metoprolol succinate  150 mg Oral BID    atorvastatin  20 mg Oral Nightly    bumetanide  1 mg Oral BID    cefTRIAXone (ROCEPHIN) IV  1,000 mg IntraVENous Q24H    donepezil  10 mg Oral Nightly    sodium chloride flush  10 mL IntraVENous 2 times per day    apixaban  5 mg Oral BID    pantoprazole  20 mg Oral Daily    predniSONE  5 mg Oral BID    insulin lispro  0-4 Units SubCUTAneous TID     insulin lispro  0-4 Units SubCUTAneous Nightly    levothyroxine  75 mcg Oral Daily    digoxin  250 mcg IntraVENous Once     Continuous Infusions:   sodium chloride      dextrose      dilTIAZem Stopped (12/14/22 0909)     PRN Meds:.sodium chloride flush, sodium chloride, potassium chloride **OR** potassium alternative oral replacement **OR** potassium chloride, senna, acetaminophen **OR** acetaminophen, ipratropium-albuterol, glucose, dextrose bolus **OR** dextrose bolus, glucagon (rDNA), dextrose, perflutren lipid microspheres    Allergies: Patient has no known allergies. Labs:   Recent Labs     12/14/22 0215   WBC 9.3   HGB 10.5*   HCT 34.3   MCV 94.5          Labs:   Recent Labs     12/14/22  0215 12/15/22  0305 12/16/22  0639   * 134 134   K 4.3 4.1 3.9   CL 96* 98 97*   CO2 23 26 27   BUN 29* 27* 24*   CREATININE 1.2* 1.0 1.0       Labs: No results for input(s): CKTOTAL, CKMB, CKMBINDEX, TROPONINI in the last 72 hours. Labs: No results for input(s): BNP in the last 72 hours. Labs: No results for input(s): CHOL, HDL, TRIG in the last 72 hours. Invalid input(s): Keon Rojas    Labs:   Recent Labs     12/14/22  0215 12/15/22  0305 12/16/22  0639   PROT 6.0* 6.2* 6.2*       Review of systems: No reported significant weight gain or weight loss. no dysuria or frequency, no dizziness, falls or trauma, no change in bowel or bladder habits, no hematochezia, hemoptysis or hematuria. No fevers, chills, nausea or vomiting reported. No significant wheezing or sputum production. No headache or visual changes. The remainder of the 10 review of systems otherwise negative. Exam      General: Patient comfortable in no distress and currently denies any chest pain. HEENT: Face symmetrical and no apparent cranial nerve deficit. Neck: No jugular venous distention, carotid bruit or thyromegaly. Lungs: Clear bilaterally without rales, wheezes or dullness. Cardiac: IRegular rate and rhythm, no S3, S4, no rub or gallop. Abdomen: No rebound or guarding, no hepatosplenomegaly.     Extremities: Without significant clubbing , cyanosis, or edema. Neuro:  No focal motor or sensory deficit apparent. Skin: No petechiae, no significant bruising. Assessment: See plan below        Plan: #1 AF RVR and still somewhat short of breath and tachypneic at rest and will further increase metoprolol succinate up to 150 mg twice a day, IV digoxin this morning and start p.o. digoxin 125 mcg daily. Would like to see her resting heart rate in the 70s when she is laying in bed. #2 tachypnea and shortness of breath potentially multifactorial partially causing some of the tachycardia with atrial fibrillation. Also has diuresed -0.9 L overnight creatinine 1 BUN 24. Bumex 1 mg p.o. twice a day. Continues on IV ceftriaxone 1 g every 24 hours. #3 chronic Eliquis anticoagulation and no overt bleeding. Hemoglobin has been in the 10.5 range. Continue to avoid any significant nonsteroidals. #4 hypertension follow-up blood pressure and increasing beta-blocker. #5 moderate mitral valve regurgitation and continue afterload reduction optimizing blood pressure. Systolic blood pressure somewhat labile between 130 and up to 435 diastolic in the 02E in the 90s. Aspiration precautions. Up to chair with meals.     Electronically signed by Beatriz Nelson MD on 12/16/2022 at 2:15 PM

## 2022-12-16 NOTE — CARE COORDINATION
12/16/2022  Social Work Discharge Planning:Plan is home with spouse at discharge . [address]  - [phone] is following. HHC order is in. Pt is on 3l o2 here and uses none at home. Wean o2. Pulse ox testing will be needed. No DME preference if o2 is needed at discharge. Await ATB plan. Electronically signed by ENZO Brink on 12/16/2022 at 2:58 PM

## 2022-12-16 NOTE — PROGRESS NOTES
Wound / ostomy follow-up for this Pt  admitted with A-fib with RVR. History includes: DM, HLD and DVT. The Pt has IAD . She is incontinent of bowel and bladder. 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 Recommend Aloe Vesta . 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 The Pt is on a low air loss bed with SOS precautions. buttocks  **Informed Consent**    The patient has given verbal consent to have photos taken of buttocks and inserted into their chart as part of their permanent medical record for purposes of documentation, treatment management and/or medical review. All Images taken on 12/16/22 of patient name: Grey Scales were transmitted and stored on secured i'mma located within [facility] Tab by a registered [employer] Mobile Application Device.

## 2022-12-17 LAB
METER GLUCOSE: 172 MG/DL (ref 74–99)
METER GLUCOSE: 198 MG/DL (ref 74–99)
METER GLUCOSE: 213 MG/DL (ref 74–99)
METER GLUCOSE: 223 MG/DL (ref 74–99)

## 2022-12-17 PROCEDURE — 2580000003 HC RX 258: Performed by: INTERNAL MEDICINE

## 2022-12-17 PROCEDURE — 6370000000 HC RX 637 (ALT 250 FOR IP): Performed by: INTERNAL MEDICINE

## 2022-12-17 PROCEDURE — 2700000000 HC OXYGEN THERAPY PER DAY

## 2022-12-17 PROCEDURE — 2060000000 HC ICU INTERMEDIATE R&B

## 2022-12-17 PROCEDURE — 82962 GLUCOSE BLOOD TEST: CPT

## 2022-12-17 PROCEDURE — 6360000002 HC RX W HCPCS: Performed by: INTERNAL MEDICINE

## 2022-12-17 RX ADMIN — PREDNISONE 5 MG: 5 TABLET ORAL at 09:17

## 2022-12-17 RX ADMIN — PREDNISONE 5 MG: 5 TABLET ORAL at 20:18

## 2022-12-17 RX ADMIN — SODIUM CHLORIDE, PRESERVATIVE FREE 10 ML: 5 INJECTION INTRAVENOUS at 20:18

## 2022-12-17 RX ADMIN — DONEPEZIL HYDROCHLORIDE 10 MG: 10 TABLET, FILM COATED ORAL at 20:18

## 2022-12-17 RX ADMIN — WATER 1000 MG: 1 INJECTION INTRAMUSCULAR; INTRAVENOUS; SUBCUTANEOUS at 11:36

## 2022-12-17 RX ADMIN — DIGOXIN 125 MCG: 0.12 TABLET ORAL at 09:17

## 2022-12-17 RX ADMIN — METOPROLOL SUCCINATE 150 MG: 100 TABLET, EXTENDED RELEASE ORAL at 20:17

## 2022-12-17 RX ADMIN — PANTOPRAZOLE SODIUM 20 MG: 20 TABLET, DELAYED RELEASE ORAL at 05:49

## 2022-12-17 RX ADMIN — BUMETANIDE 1 MG: 1 TABLET ORAL at 09:17

## 2022-12-17 RX ADMIN — BUMETANIDE 1 MG: 1 TABLET ORAL at 20:18

## 2022-12-17 RX ADMIN — METOPROLOL SUCCINATE 150 MG: 100 TABLET, EXTENDED RELEASE ORAL at 09:17

## 2022-12-17 RX ADMIN — LEVOTHYROXINE SODIUM 75 MCG: 75 TABLET ORAL at 05:49

## 2022-12-17 RX ADMIN — ATORVASTATIN CALCIUM 20 MG: 20 TABLET, FILM COATED ORAL at 20:18

## 2022-12-17 RX ADMIN — APIXABAN 5 MG: 5 TABLET, FILM COATED ORAL at 09:17

## 2022-12-17 RX ADMIN — APIXABAN 5 MG: 5 TABLET, FILM COATED ORAL at 20:18

## 2022-12-17 RX ADMIN — SODIUM CHLORIDE, PRESERVATIVE FREE 10 ML: 5 INJECTION INTRAVENOUS at 09:18

## 2022-12-17 ASSESSMENT — PAIN SCALES - GENERAL
PAINLEVEL_OUTOF10: 0

## 2022-12-17 NOTE — PROGRESS NOTES
[facility] CARDIOLOGY PROGRESS NOTE  [facility]        Subjective: AF RVR, Eliquis anticoagulation initiated,     and son at bedside throughout the interview and very helpful. Patient laying at 30 degree angle in bed and appears to be comfortable. Reports she is breathing better no chest pain reported. Reviewed echocardiogram done this admission shows normal LVEF moderate mitral valve regurgitation. Reports she ate all right without nausea or vomiting. Objective: Medications lab chart and telemetry all reviewed. Patient Vitals for the past 24 hrs:   BP Temp Temp src Pulse Resp SpO2   12/17/22 0624 (!) 147/87 98 °F (36.7 °C) Axillary (!) 120 18 96 %   12/17/22 0000 (!) 144/72 98 °F (36.7 °C) Axillary 84 20 98 %   12/16/22 2030 (!) 160/101 98.4 °F (36.9 °C) Axillary (!) 115 20 100 %   12/16/22 1339 (!) 158/88 97.8 °F (36.6 °C) Oral (!) 115 20 100 %   12/16/22 1215 -- -- -- 100 -- --         Intake/Output Summary (Last 24 hours) at 12/17/2022 1042  Last data filed at 12/16/2022 1802  Gross per 24 hour   Intake --   Output 800 ml   Net -800 ml       Wt Readings from Last 3 Encounters:   12/14/22 179 lb 7.3 oz (81.4 kg)   12/25/20 160 lb (72.6 kg)   09/08/20 198 lb (89.8 kg)       Telemetry: Personally reviewed and shows atrial fibrillation heart rate in the 70s and 80s.   Heart rate improved    Current meds: Scheduled Meds:   digoxin  125 mcg Oral Daily    metoprolol succinate  150 mg Oral BID    atorvastatin  20 mg Oral Nightly    bumetanide  1 mg Oral BID    cefTRIAXone (ROCEPHIN) IV  1,000 mg IntraVENous Q24H    donepezil  10 mg Oral Nightly    sodium chloride flush  10 mL IntraVENous 2 times per day    apixaban  5 mg Oral BID    pantoprazole  20 mg Oral Daily    predniSONE  5 mg Oral BID    insulin lispro  0-4 Units SubCUTAneous TID     insulin lispro  0-4 Units SubCUTAneous Nightly    levothyroxine  75 mcg Oral Daily    digoxin  250 mcg IntraVENous Once     Continuous Infusions:   sodium chloride      dextrose      dilTIAZem Stopped (12/14/22 0909)     PRN Meds:.sodium chloride flush, sodium chloride, potassium chloride **OR** potassium alternative oral replacement **OR** potassium chloride, senna, acetaminophen **OR** acetaminophen, ipratropium-albuterol, glucose, dextrose bolus **OR** dextrose bolus, glucagon (rDNA), dextrose, perflutren lipid microspheres    Allergies: Patient has no known allergies. Labs: No results for input(s): WBC, HGB, HCT, MCV, PLT in the last 72 hours. Labs:   Recent Labs     12/15/22  0305 12/16/22  0639    134   K 4.1 3.9   CL 98 97*   CO2 26 27   BUN 27* 24*   CREATININE 1.0 1.0       Labs: No results for input(s): CKTOTAL, CKMB, CKMBINDEX, TROPONINI in the last 72 hours. Labs: No results for input(s): BNP in the last 72 hours. Labs: No results for input(s): CHOL, HDL, TRIG in the last 72 hours. Invalid input(s): Massiel Velasquez    Labs:   Recent Labs     12/15/22  0305 12/16/22  0639   PROT 6.2* 6.2*       Review of systems: No reported significant weight gain or weight loss. no dysuria or frequency, no dizziness, falls or trauma, no change in bowel or bladder habits, no hematochezia, hemoptysis or hematuria. No fevers, chills, nausea or vomiting reported. No significant wheezing or sputum production. No headache or visual changes. The remainder of the 10 review of systems otherwise negative. Exam      General: Patient comfortable in no distress and currently denies any chest pain. HEENT: Face symmetrical and no apparent cranial nerve deficit. Neck: No jugular venous distention, carotid bruit or thyromegaly. Lungs: Clear bilaterally without rales, wheezes or dullness. Cardiac: IRegular rate and rhythm, no S3, S4, no rub or gallop. Abdomen: No rebound or guarding, no hepatosplenomegaly. Extremities: Without significant clubbing , cyanosis, or edema.      Neuro:  No focal motor or sensory deficit apparent. Skin: No petechiae, no significant bruising. Assessment: See plan below        Plan: #1 AF RVR heart rate improving on increased metoprolol succinate now 150 mg twice a day, received IV digoxin yesterday and now low-dose on 25 mcg daily. #2 tachypnea and shortness of breath improving per patient. Has diuresed well with IV diuretic and would continue IV diuretic.  0.8 L negative. Continues on IV antibiotic ceftriaxone 1 g daily. #3 chronic Eliquis anticoagulation and no overt bleeding. Hemoglobin stable. Continues on Eliquis 5 mg twice a day without bleeding. #4 hypertension blood pressure reasonable on current medications and afterload reduction of mitral valve regurgitation. #5 moderate mitral valve regurgitation with preserved normal LVEF. Mobilize to chair with assistance up for meals. Advance activity as tolerated. She would like to return home and seems to have good family support. Ambulates with a walker at home by report. Discussed with her,  and son about concern of fall risk especially on Eliquis anticoagulation for atrial fibrillation for risk reduction of stroke.         Electronically signed by Abigail Dolan MD on 12/17/2022 at 10:42 AM

## 2022-12-17 NOTE — PROGRESS NOTES
Nutrition Assessment     Type and Reason for Visit: Initial, RD Nutrition Re-Screen/LOS    Nutrition Recommendations/Plan:   Continue current diet and ONS, as tolerated  Continue inpatient monitoring     Nutrition Assessment:  Pt admit 2/2 new onset A-Fib w/RVR. Pt had N/V, malaise and HA x 2 d PTA. PMH=DM. Will add ONS to optimize nutrient intake in the setting of suboptimal PO at times. PT/OT working with pt, who needs assist with meals/set-up. Family supportive and assisting pt w/ADL's. Nutrition Related Findings:   A&O and confused at times, soft round abd +BS, +1-+2 edema, -I/O 2.4L Wound Type: Moisture Associate Skin Damage, Venous Stasis (per wound care -Pt incontinent of bowel/bladder with IAD, fungal derm, chronically discolored buttocks)    Current Nutrition Therapies:    ADULT DIET; Regular; Low Fat/Low Chol/High Fiber/2 gm Na  ADULT ORAL NUTRITION SUPPLEMENT; Breakfast, Dinner;  Low Calorie/High Protein Oral Supplement    Anthropometric Measures:  Height: 5' (152.4 cm)  Current Body Wt: 173 lb 7.3 oz (78.7 kg) (12/14 bedscale)  BMI: 33.9      Nutrition Interventions:   Food and/or Nutrient Delivery: Continue Current Diet, Start Oral Nutrition Supplement (Ensure HP BID)  Nutrition Education/Counseling: No recommendation at this time  Coordination of Nutrition Care: Continue to monitor while inpatient       Goals:     Goals: PO intake 75% or greater, by next RD assessment       Nutrition Monitoring and Evaluation:   Behavioral-Environmental Outcomes: None Identified  Food/Nutrient Intake Outcomes: Food and Nutrient Intake, Supplement Intake  Physical Signs/Symptoms Outcomes: Biochemical Data, GI Status, Fluid Status or Edema, Nutrition Focused Physical Findings, Skin, Weight, Other (Comment)    Discharge Planning:    Continue Oral Nutrition Supplement     Renetta Demarco RD, [address] , [place]  Contact: [phone]

## 2022-12-17 NOTE — PROGRESS NOTES
Subjective:  Noticeably less dyspneic    The patient is awake and alert. No problems overnight. Denies chest pain, angina, and dyspnea. Denies abdominal pain. Tolerating diet. No nausea or vomiting. Objective:  BP and rate control still in progress. BP (!) 147/87   Pulse (!) 120   Temp 98 °F (36.7 °C) (Axillary)   Resp 18   Ht 5' (1.524 m)   Wt 179 lb 7.3 oz (81.4 kg)   SpO2 96%   BMI 35.05 kg/m²     Heart:  IRRR, no murmurs, gallops, or rubs.   Lungs:  CTA bilaterally, no wheeze, rales or rhonchi  Abd: bowel sounds present, nontender, nondistended, no masses  Extrem:  No clubbing, cyanosis, or edema    CBC:   Lab Results   Component Value Date/Time    WBC 9.3 12/14/2022 02:15 AM    RBC 3.63 12/14/2022 02:15 AM    HGB 10.5 12/14/2022 02:15 AM    HCT 34.3 12/14/2022 02:15 AM    MCV 94.5 12/14/2022 02:15 AM    MCH 28.9 12/14/2022 02:15 AM    MCHC 30.6 12/14/2022 02:15 AM    RDW 15.1 12/14/2022 02:15 AM     12/14/2022 02:15 AM    MPV 9.9 12/14/2022 02:15 AM     BMP:    Lab Results   Component Value Date/Time     12/16/2022 06:39 AM    K 3.9 12/16/2022 06:39 AM    CL 97 12/16/2022 06:39 AM    CO2 27 12/16/2022 06:39 AM    BUN 24 12/16/2022 06:39 AM    LABALBU 3.1 12/16/2022 06:39 AM    CREATININE 1.0 12/16/2022 06:39 AM    CALCIUM 8.8 12/16/2022 06:39 AM    GFRAA >60 09/12/2020 04:39 AM    LABGLOM 54 12/16/2022 06:39 AM    GLUCOSE 214 12/16/2022 06:39 AM        Assessment:    Patient Active Problem List   Diagnosis    Acute appendicitis    Hypertension    Inadequately controlled diabetes mellitus ([facility]    Metabolic encephalopathy    Sepsis associated hypotension ([address]    Sepsis ([address]    Relapsing appendicitis    Saddle embolus of pulmonary artery ([address]    DVT of deep femoral vein, right (HCC)    History of pulmonary embolism    Generalized weakness    Acute kidney injury ([address]    Left knee pain    Baker's cyst    Atrial fibrillation with RVR ([address]    New onset a-fib ([address]       Plan: Present tx. MA planning in progress.           Mehran Jacobs MD  7:24 AM  12/17/2022

## 2022-12-18 LAB
METER GLUCOSE: 194 MG/DL (ref 74–99)
METER GLUCOSE: 208 MG/DL (ref 74–99)
METER GLUCOSE: 211 MG/DL (ref 74–99)
METER GLUCOSE: 246 MG/DL (ref 74–99)

## 2022-12-18 PROCEDURE — 6370000000 HC RX 637 (ALT 250 FOR IP): Performed by: INTERNAL MEDICINE

## 2022-12-18 PROCEDURE — 6360000002 HC RX W HCPCS: Performed by: INTERNAL MEDICINE

## 2022-12-18 PROCEDURE — 2060000000 HC ICU INTERMEDIATE R&B

## 2022-12-18 PROCEDURE — 82962 GLUCOSE BLOOD TEST: CPT

## 2022-12-18 PROCEDURE — 2580000003 HC RX 258: Performed by: INTERNAL MEDICINE

## 2022-12-18 PROCEDURE — 2700000000 HC OXYGEN THERAPY PER DAY

## 2022-12-18 RX ORDER — DONEPEZIL HYDROCHLORIDE 10 MG/1
5 TABLET, FILM COATED ORAL NIGHTLY
Status: DISCONTINUED | OUTPATIENT
Start: 2022-12-18 | End: 2022-12-19 | Stop reason: HOSPADM

## 2022-12-18 RX ORDER — LISINOPRIL 10 MG/1
10 TABLET ORAL DAILY
Status: DISCONTINUED | OUTPATIENT
Start: 2022-12-18 | End: 2022-12-19 | Stop reason: HOSPADM

## 2022-12-18 RX ADMIN — DIGOXIN 125 MCG: 0.12 TABLET ORAL at 08:59

## 2022-12-18 RX ADMIN — BUMETANIDE 1 MG: 1 TABLET ORAL at 20:22

## 2022-12-18 RX ADMIN — ATORVASTATIN CALCIUM 20 MG: 20 TABLET, FILM COATED ORAL at 20:23

## 2022-12-18 RX ADMIN — METOPROLOL SUCCINATE 150 MG: 100 TABLET, EXTENDED RELEASE ORAL at 08:59

## 2022-12-18 RX ADMIN — LEVOTHYROXINE SODIUM 75 MCG: 75 TABLET ORAL at 06:20

## 2022-12-18 RX ADMIN — SODIUM CHLORIDE, PRESERVATIVE FREE 10 ML: 5 INJECTION INTRAVENOUS at 20:23

## 2022-12-18 RX ADMIN — PREDNISONE 5 MG: 5 TABLET ORAL at 08:59

## 2022-12-18 RX ADMIN — WATER 1000 MG: 1 INJECTION INTRAMUSCULAR; INTRAVENOUS; SUBCUTANEOUS at 14:29

## 2022-12-18 RX ADMIN — APIXABAN 5 MG: 5 TABLET, FILM COATED ORAL at 20:23

## 2022-12-18 RX ADMIN — PANTOPRAZOLE SODIUM 20 MG: 20 TABLET, DELAYED RELEASE ORAL at 06:20

## 2022-12-18 RX ADMIN — METOPROLOL SUCCINATE 150 MG: 100 TABLET, EXTENDED RELEASE ORAL at 20:22

## 2022-12-18 RX ADMIN — BUMETANIDE 1 MG: 1 TABLET ORAL at 08:59

## 2022-12-18 RX ADMIN — PREDNISONE 5 MG: 5 TABLET ORAL at 20:23

## 2022-12-18 RX ADMIN — DONEPEZIL HYDROCHLORIDE 5 MG: 10 TABLET, FILM COATED ORAL at 20:25

## 2022-12-18 RX ADMIN — SODIUM CHLORIDE, PRESERVATIVE FREE 10 ML: 5 INJECTION INTRAVENOUS at 09:00

## 2022-12-18 RX ADMIN — LISINOPRIL 10 MG: 10 TABLET ORAL at 09:00

## 2022-12-18 RX ADMIN — APIXABAN 5 MG: 5 TABLET, FILM COATED ORAL at 09:00

## 2022-12-18 ASSESSMENT — PAIN SCALES - GENERAL
PAINLEVEL_OUTOF10: 0

## 2022-12-18 NOTE — PROGRESS NOTES
Examined with dtr at bedside who contributes to history taking, etc.    Subjective:  Perhaps a little lethargic yesterday. Subpar appetite as opposed to 12/16    The patient is awake and alert. No problems overnight. Denies chest pain, angina, and dyspnea. Denies abdominal pain. Tolerating diet. No nausea or vomiting. Objective:    BP (!) 167/88   Pulse 83   Temp 97.8 °F (36.6 °C) (Oral)   Resp 16   Ht 5' (1.524 m)   Wt 179 lb 7.3 oz (81.4 kg)   SpO2 96%   BMI 35.05 kg/m²     Heart:  IRRR, no murmurs, gallops, or rubs.   Lungs:  CTA bilaterally, no wheeze, rales or rhonchi  Abd: bowel sounds present, nontender, nondistended, no masses  Extrem:  No clubbing, cyanosis, or edema    CBC:   Lab Results   Component Value Date/Time    WBC 9.3 12/14/2022 02:15 AM    RBC 3.63 12/14/2022 02:15 AM    HGB 10.5 12/14/2022 02:15 AM    HCT 34.3 12/14/2022 02:15 AM    MCV 94.5 12/14/2022 02:15 AM    MCH 28.9 12/14/2022 02:15 AM    MCHC 30.6 12/14/2022 02:15 AM    RDW 15.1 12/14/2022 02:15 AM     12/14/2022 02:15 AM    MPV 9.9 12/14/2022 02:15 AM     BMP:    Lab Results   Component Value Date/Time     12/16/2022 06:39 AM    K 3.9 12/16/2022 06:39 AM    CL 97 12/16/2022 06:39 AM    CO2 27 12/16/2022 06:39 AM    BUN 24 12/16/2022 06:39 AM    LABALBU 3.1 12/16/2022 06:39 AM    CREATININE 1.0 12/16/2022 06:39 AM    CALCIUM 8.8 12/16/2022 06:39 AM    GFRAA >60 09/12/2020 04:39 AM    LABGLOM 54 12/16/2022 06:39 AM    GLUCOSE 214 12/16/2022 06:39 AM        Assessment:    Patient Active Problem List   Diagnosis    Acute appendicitis    Hypertension    Inadequately controlled diabetes mellitus ([address]    Metabolic encephalopathy    Sepsis associated hypotension ([address]    Sepsis ([address]    Relapsing appendicitis    Saddle embolus of pulmonary artery ([address]    DVT of deep femoral vein, right (HCC)    History of pulmonary embolism    Generalized weakness    Acute kidney injury ([address]    Left knee pain    Baker's cyst Atrial fibrillation with RVR (HCC)    New onset a-fib ([address]       Plan:  RAte control in progress. Aria DM contro in progress.           Marie Asher MD  9:48 AM  12/18/2022

## 2022-12-18 NOTE — PROGRESS NOTES
[facility] CARDIOLOGY PROGRESS NOTE  [facility]        Subjective: Admitted with progressive shortness of breath, dyspnea on exertion, admitted with generalized weakness and debilitation. Found to be in AF RVR and started on Eliquis 5 mg twice a day. Daughter Jeremías Conley at bedside. Patient laying in about a 30 degree angle in bed and appears to be comfortable. No reported shortness of breath or distress. Ate okay without nausea vomiting. Objective: Medications lab chart and telemetry all reviewed. Patient Vitals for the past 24 hrs:   BP Temp Temp src Pulse Resp SpO2 Height   12/18/22 0733 (!) 167/88 97.8 °F (36.6 °C) Oral 83 16 96 % --   12/18/22 0015 (!) 151/75 98.2 °F (36.8 °C) Oral 95 22 100 % --   12/17/22 2020 -- -- -- -- -- 92 % --   12/17/22 2015 -- -- -- -- -- (!) 86 % --   12/17/22 1915 133/69 98.4 °F (36.9 °C) Oral (!) 116 24 95 % --   12/17/22 1317 (!) 157/105 97.1 °F (36.2 °C) Temporal (!) 138 20 95 % --   12/17/22 1134 -- -- -- -- -- -- 5' (1.524 m)   12/17/22 1130 -- -- -- -- -- 93 % --   12/17/22 1115 -- -- -- -- -- 96 % --         Intake/Output Summary (Last 24 hours) at 12/18/2022 1018  Last data filed at 12/18/2022 0844  Gross per 24 hour   Intake --   Output 1000 ml   Net -1000 ml       Wt Readings from Last 3 Encounters:   12/14/22 179 lb 7.3 oz (81.4 kg)   12/25/20 160 lb (72.6 kg)   09/08/20 198 lb (89.8 kg)       Telemetry: Personally reviewed and shows atrial fibrillation heart rate in the 70s and 80s.     Current meds: Scheduled Meds:   lisinopril  10 mg Oral Daily    donepezil  5 mg Oral Nightly    digoxin  125 mcg Oral Daily    metoprolol succinate  150 mg Oral BID    atorvastatin  20 mg Oral Nightly    bumetanide  1 mg Oral BID    cefTRIAXone (ROCEPHIN) IV  1,000 mg IntraVENous Q24H    sodium chloride flush  10 mL IntraVENous 2 times per day    apixaban  5 mg Oral BID    pantoprazole  20 mg Oral Daily    predniSONE  5 mg Oral BID    insulin lispro  0-4 Units SubCUTAneous TID WC    insulin lispro  0-4 Units SubCUTAneous Nightly    levothyroxine  75 mcg Oral Daily    digoxin  250 mcg IntraVENous Once     Continuous Infusions:   sodium chloride      dextrose      dilTIAZem Stopped (12/14/22 0909)     PRN Meds:.sodium chloride flush, sodium chloride, potassium chloride **OR** potassium alternative oral replacement **OR** potassium chloride, senna, acetaminophen **OR** acetaminophen, ipratropium-albuterol, glucose, dextrose bolus **OR** dextrose bolus, glucagon (rDNA), dextrose, perflutren lipid microspheres    Allergies: Patient has no known allergies. Labs: No results for input(s): WBC, HGB, HCT, MCV, PLT in the last 72 hours. Labs:   Recent Labs     12/16/22  0639      K 3.9   CL 97*   CO2 27   BUN 24*   CREATININE 1.0       Labs: No results for input(s): CKTOTAL, CKMB, CKMBINDEX, TROPONINI in the last 72 hours. Labs: No results for input(s): BNP in the last 72 hours. Labs: No results for input(s): CHOL, HDL, TRIG in the last 72 hours. Invalid input(s): Lanell Kussmaul    Labs:   Recent Labs     12/16/22  0639   PROT 6.2*       Review of systems: No reported significant weight gain or weight loss. no dysuria or frequency, no dizziness, falls or trauma, no change in bowel or bladder habits, no hematochezia, hemoptysis or hematuria. No fevers, chills, nausea or vomiting reported. No significant wheezing or sputum production. No headache or visual changes. The remainder of the 10 review of systems otherwise negative. Exam      General: Patient comfortable in no distress and currently denies any chest pain. HEENT: Face symmetrical and no apparent cranial nerve deficit. Neck: No jugular venous distention, carotid bruit or thyromegaly. Lungs: Clear bilaterally without rales, wheezes or dullness. Cardiac: IRegular rate and rhythm, no S3, S4, no rub or gallop. Abdomen: No rebound or guarding, no hepatosplenomegaly.     Extremities: Without significant clubbing , cyanosis, or edema. Neuro:  No focal motor or sensory deficit apparent. Skin: No petechiae, no significant bruising. Assessment: See plan below        Plan: #1 AF RVR heart rate greatly improved on increase metoprolol succinate dosing and p.o. digoxin 125 mcg daily. Eliquis anticoagulation well-tolerated thus far. #2 shortness of breath and dyspnea on exertion and admitted with generalized weakness potentially from AF RVR. Received IV antibiotic as well.  -0.8 L over the last 24 hours and continues on Bumex 1 mg p.o. twice a day. Likely some degree of pulmonary edema with AF RVR prior to admission of unclear duration. #3 chronic Eliquis anticoagulation but certainly concerned about debilitated state, age of 80years old with birthday in about a week and fall risk. Discussed with daughter Ricco Ann and patient advancing activity up to chair and see how she feels and doing with this. #4 hypertension blood pressure reasonable on current medications. Systolic blood pressure at times up into the 150s and 160s but would like to avoid hypotension to reduce fall risk. She is unsteady at times per her family. Uses walker at home. Started lisinopril 10 mg daily. #5 moderate MR with preserved LVEF normal.  Afterload reduction for mitral valve regurgitation. #6 diabetes and prior to admission was on metformin 500 mg daily. Reviewed with her to follow low-sodium low-fat low carbohydrate diet.         Electronically signed by Guerita Mills MD on 12/18/2022 at 10:18 AM

## 2022-12-19 ENCOUNTER — HOSPITAL ENCOUNTER (INPATIENT)
Age: 87
LOS: 4 days | Discharge: OTHER INSTITUTION | End: 2022-12-23
Attending: INTERNAL MEDICINE | Admitting: INTERNAL MEDICINE
Payer: MEDICARE

## 2022-12-19 ENCOUNTER — APPOINTMENT (OUTPATIENT)
Dept: GENERAL RADIOLOGY | Age: 87
End: 2022-12-19
Attending: INTERNAL MEDICINE
Payer: MEDICARE

## 2022-12-19 VITALS
DIASTOLIC BLOOD PRESSURE: 98 MMHG | TEMPERATURE: 97.8 F | HEART RATE: 71 BPM | WEIGHT: 179.45 LBS | OXYGEN SATURATION: 95 % | SYSTOLIC BLOOD PRESSURE: 158 MMHG | RESPIRATION RATE: 18 BRPM | HEIGHT: 60 IN | BODY MASS INDEX: 35.23 KG/M2

## 2022-12-19 PROBLEM — R00.1 BRADYCARDIA: Status: ACTIVE | Noted: 2022-01-01

## 2022-12-19 LAB
ANION GAP SERPL CALCULATED.3IONS-SCNC: 9 MMOL/L (ref 7–16)
BUN BLDV-MCNC: 24 MG/DL (ref 6–23)
CALCIUM SERPL-MCNC: 9.3 MG/DL (ref 8.6–10.2)
CHLORIDE BLD-SCNC: 93 MMOL/L (ref 98–107)
CO2: 36 MMOL/L (ref 22–29)
CREAT SERPL-MCNC: 0.8 MG/DL (ref 0.5–1)
DIGOXIN LEVEL: 1.4 NG/ML (ref 0.8–2)
EKG ATRIAL RATE: 89 BPM
EKG Q-T INTERVAL: 356 MS
EKG QRS DURATION: 84 MS
EKG QTC CALCULATION (BAZETT): 413 MS
EKG R AXIS: -30 DEGREES
EKG T AXIS: -16 DEGREES
EKG VENTRICULAR RATE: 81 BPM
GFR SERPL CREATININE-BSD FRML MDRD: >60 ML/MIN/1.73
GLUCOSE BLD-MCNC: 205 MG/DL (ref 74–99)
HCT VFR BLD CALC: 35.7 % (ref 34–48)
HEMOGLOBIN: 11.1 G/DL (ref 11.5–15.5)
MCH RBC QN AUTO: 28.8 PG (ref 26–35)
MCHC RBC AUTO-ENTMCNC: 31.1 % (ref 32–34.5)
MCV RBC AUTO: 92.5 FL (ref 80–99.9)
METER GLUCOSE: 166 MG/DL (ref 74–99)
METER GLUCOSE: 186 MG/DL (ref 74–99)
METER GLUCOSE: 198 MG/DL (ref 74–99)
METER GLUCOSE: 216 MG/DL (ref 74–99)
METER GLUCOSE: 244 MG/DL (ref 74–99)
PDW BLD-RTO: 14.6 FL (ref 11.5–15)
PLATELET # BLD: 418 E9/L (ref 130–450)
PMV BLD AUTO: 9.2 FL (ref 7–12)
POTASSIUM SERPL-SCNC: 3.9 MMOL/L (ref 3.5–5)
RBC # BLD: 3.86 E12/L (ref 3.5–5.5)
SODIUM BLD-SCNC: 138 MMOL/L (ref 132–146)
TSH SERPL DL<=0.05 MIU/L-ACNC: 2.96 UIU/ML (ref 0.27–4.2)
WBC # BLD: 10.6 E9/L (ref 4.5–11.5)

## 2022-12-19 PROCEDURE — 36415 COLL VENOUS BLD VENIPUNCTURE: CPT

## 2022-12-19 PROCEDURE — 93005 ELECTROCARDIOGRAM TRACING: CPT | Performed by: INTERNAL MEDICINE

## 2022-12-19 PROCEDURE — 6370000000 HC RX 637 (ALT 250 FOR IP): Performed by: INTERNAL MEDICINE

## 2022-12-19 PROCEDURE — 80162 ASSAY OF DIGOXIN TOTAL: CPT

## 2022-12-19 PROCEDURE — 82962 GLUCOSE BLOOD TEST: CPT

## 2022-12-19 PROCEDURE — 6360000002 HC RX W HCPCS: Performed by: INTERNAL MEDICINE

## 2022-12-19 PROCEDURE — 2700000000 HC OXYGEN THERAPY PER DAY

## 2022-12-19 PROCEDURE — 33210 INSERT ELECTRD/PM CATH SNGL: CPT | Performed by: INTERNAL MEDICINE

## 2022-12-19 PROCEDURE — 80048 BASIC METABOLIC PNL TOTAL CA: CPT

## 2022-12-19 PROCEDURE — 2709999900 HC NON-CHARGEABLE SUPPLY

## 2022-12-19 PROCEDURE — 2000000000 HC ICU R&B

## 2022-12-19 PROCEDURE — 2580000003 HC RX 258: Performed by: INTERNAL MEDICINE

## 2022-12-19 PROCEDURE — 99291 CRITICAL CARE FIRST HOUR: CPT | Performed by: INTERNAL MEDICINE

## 2022-12-19 PROCEDURE — 33210 INSERT ELECTRD/PM CATH SNGL: CPT

## 2022-12-19 PROCEDURE — 93010 ELECTROCARDIOGRAM REPORT: CPT | Performed by: INTERNAL MEDICINE

## 2022-12-19 PROCEDURE — 71045 X-RAY EXAM CHEST 1 VIEW: CPT

## 2022-12-19 PROCEDURE — 2500000003 HC RX 250 WO HCPCS

## 2022-12-19 PROCEDURE — C1894 INTRO/SHEATH, NON-LASER: HCPCS

## 2022-12-19 PROCEDURE — 85027 COMPLETE CBC AUTOMATED: CPT

## 2022-12-19 PROCEDURE — 84443 ASSAY THYROID STIM HORMONE: CPT

## 2022-12-19 RX ORDER — DEXTROSE MONOHYDRATE 100 MG/ML
INJECTION, SOLUTION INTRAVENOUS CONTINUOUS PRN
Status: DISCONTINUED | OUTPATIENT
Start: 2022-12-19 | End: 2022-12-23 | Stop reason: HOSPADM

## 2022-12-19 RX ORDER — INSULIN LISPRO 100 [IU]/ML
0-8 INJECTION, SOLUTION INTRAVENOUS; SUBCUTANEOUS
Status: DISCONTINUED | OUTPATIENT
Start: 2022-12-19 | End: 2022-12-23 | Stop reason: HOSPADM

## 2022-12-19 RX ORDER — LEVOTHYROXINE SODIUM 0.03 MG/1
75 TABLET ORAL DAILY
Status: DISCONTINUED | OUTPATIENT
Start: 2022-12-19 | End: 2022-12-23 | Stop reason: HOSPADM

## 2022-12-19 RX ORDER — DONEPEZIL HYDROCHLORIDE 5 MG/1
10 TABLET, FILM COATED ORAL NIGHTLY
Status: DISCONTINUED | OUTPATIENT
Start: 2022-12-19 | End: 2022-12-23 | Stop reason: HOSPADM

## 2022-12-19 RX ORDER — FUROSEMIDE 20 MG/1
20 TABLET ORAL DAILY
Status: DISCONTINUED | OUTPATIENT
Start: 2022-12-19 | End: 2022-12-23 | Stop reason: HOSPADM

## 2022-12-19 RX ORDER — PREDNISONE 1 MG/1
5 TABLET ORAL 2 TIMES DAILY
Status: DISCONTINUED | OUTPATIENT
Start: 2022-12-19 | End: 2022-12-23 | Stop reason: HOSPADM

## 2022-12-19 RX ORDER — IPRATROPIUM BROMIDE AND ALBUTEROL SULFATE 2.5; .5 MG/3ML; MG/3ML
1 SOLUTION RESPIRATORY (INHALATION) EVERY 6 HOURS PRN
Status: DISCONTINUED | OUTPATIENT
Start: 2022-12-19 | End: 2022-12-23 | Stop reason: HOSPADM

## 2022-12-19 RX ORDER — METFORMIN HYDROCHLORIDE 500 MG/1
500 TABLET, EXTENDED RELEASE ORAL
Status: DISCONTINUED | OUTPATIENT
Start: 2022-12-20 | End: 2022-12-19

## 2022-12-19 RX ORDER — AMLODIPINE BESYLATE 5 MG/1
5 TABLET ORAL DAILY
Status: DISCONTINUED | OUTPATIENT
Start: 2022-12-19 | End: 2022-12-23 | Stop reason: HOSPADM

## 2022-12-19 RX ORDER — SODIUM CHLORIDE 0.9 % (FLUSH) 0.9 %
5-40 SYRINGE (ML) INJECTION EVERY 12 HOURS SCHEDULED
Status: DISCONTINUED | OUTPATIENT
Start: 2022-12-19 | End: 2022-12-23 | Stop reason: HOSPADM

## 2022-12-19 RX ORDER — SODIUM CHLORIDE 0.9 % (FLUSH) 0.9 %
5-40 SYRINGE (ML) INJECTION PRN
Status: DISCONTINUED | OUTPATIENT
Start: 2022-12-19 | End: 2022-12-23 | Stop reason: HOSPADM

## 2022-12-19 RX ORDER — SODIUM CHLORIDE 9 MG/ML
INJECTION, SOLUTION INTRAVENOUS PRN
Status: DISCONTINUED | OUTPATIENT
Start: 2022-12-19 | End: 2022-12-23 | Stop reason: HOSPADM

## 2022-12-19 RX ORDER — INSULIN LISPRO 100 [IU]/ML
0-4 INJECTION, SOLUTION INTRAVENOUS; SUBCUTANEOUS NIGHTLY
Status: DISCONTINUED | OUTPATIENT
Start: 2022-12-19 | End: 2022-12-23 | Stop reason: HOSPADM

## 2022-12-19 RX ORDER — CARVEDILOL 6.25 MG/1
12.5 TABLET ORAL 2 TIMES DAILY WITH MEALS
Status: DISCONTINUED | OUTPATIENT
Start: 2022-12-19 | End: 2022-12-20

## 2022-12-19 RX ORDER — PANTOPRAZOLE SODIUM 20 MG/1
20 TABLET, DELAYED RELEASE ORAL DAILY
Status: DISCONTINUED | OUTPATIENT
Start: 2022-12-19 | End: 2022-12-23 | Stop reason: HOSPADM

## 2022-12-19 RX ADMIN — OVINE DIGOXIN IMMUNE FAB 120 MG: 40 INJECTION, POWDER, FOR SOLUTION INTRAVENOUS at 12:55

## 2022-12-19 RX ADMIN — PREDNISONE 5 MG: 5 TABLET ORAL at 20:28

## 2022-12-19 RX ADMIN — DIGOXIN 125 MCG: 0.12 TABLET ORAL at 08:08

## 2022-12-19 RX ADMIN — APIXABAN 5 MG: 5 TABLET, FILM COATED ORAL at 08:08

## 2022-12-19 RX ADMIN — SODIUM CHLORIDE, PRESERVATIVE FREE 10 ML: 5 INJECTION INTRAVENOUS at 08:08

## 2022-12-19 RX ADMIN — DONEPEZIL HYDROCHLORIDE 10 MG: 5 TABLET, FILM COATED ORAL at 20:28

## 2022-12-19 RX ADMIN — PREDNISONE 5 MG: 5 TABLET ORAL at 08:08

## 2022-12-19 RX ADMIN — BUMETANIDE 1 MG: 1 TABLET ORAL at 08:08

## 2022-12-19 RX ADMIN — PANTOPRAZOLE SODIUM 20 MG: 20 TABLET, DELAYED RELEASE ORAL at 06:08

## 2022-12-19 RX ADMIN — LEVOTHYROXINE SODIUM 75 MCG: 75 TABLET ORAL at 06:08

## 2022-12-19 RX ADMIN — Medication 10 ML: at 20:30

## 2022-12-19 RX ADMIN — METOPROLOL SUCCINATE 150 MG: 100 TABLET, EXTENDED RELEASE ORAL at 08:08

## 2022-12-19 RX ADMIN — LISINOPRIL 10 MG: 10 TABLET ORAL at 08:08

## 2022-12-19 RX ADMIN — AMLODIPINE BESYLATE 5 MG: 5 TABLET ORAL at 20:28

## 2022-12-19 ASSESSMENT — PAIN SCALES - GENERAL
PAINLEVEL_OUTOF10: 0

## 2022-12-19 NOTE — PROGRESS NOTES
Regardless of digoxin level will give Digibind 120 mg IV now. Strict bedrest not to get up at all today. Already ordered to stop metoprolol and digoxin. Continue to hold Eliquis for now. Electrolytes reasonable this morning.

## 2022-12-19 NOTE — PROGRESS NOTES
Left message with Dr. Julius Tolbert answering service in regards to patient having a 9 second pause.

## 2022-12-19 NOTE — PROGRESS NOTES
This RN received a call from [facility] stating the patient was ringing asystole on the monitor, which I looked over at the monitor and confirmed. This RN called for help while running into the room. Patient was sternal rubbed and was not responding, but was blinking and had a faint pulse. RRT called.

## 2022-12-19 NOTE — PROGRESS NOTES
Dr. Linda Fishman to update Dr. Kasie Meneses re: RRT. Left message for Dr. Isabel Solo at office to call unit.   11:11 AM

## 2022-12-19 NOTE — PROGRESS NOTES
Notified Dr. Bowen Ventura of patient having more frequent pauses back to back 10, 9, etc.  Reported she had one while I was on the phone with him. Patient is diaphoretic, reports not feeling well, more lethargic than normal.  Updated son and  at the bedside that Dr. Bowen Ventura would like to send patient to [facility] to have pacer wire. Agree with moving forward as full code and to transfer.   [address]

## 2022-12-19 NOTE — LETTER
[address] [facility] Encounter Date/Time: 2022 [facility] [address] Account: [de-identified]    MRN: [id number]    Patient: Palmer Shepherd    Contact Serial #: [id number]      ENCOUNTER          Patient Class: I Private Enc? No Unit RM BDReyes [address]   [facility] Service: CAR   Encounter DX: Bradycardia [R00.1]   ADM Provider: Jacquelyn Giordano MD   Procedure:     ATT Provider: Coco Michelle MD   REF Provider: Zurita Primes      Admission DX: Bradycardia and DX codes: R00.1      PATIENT                 Name: Palmer Shepherd : 1932 (89 yrs)   Address: [address] [place] [address] Sex: Female   [facility] [address]         Marital Status:    Employer: RETIRED         Shinto:  [place]*   Primary Care Provider: Jacquelyn Giordano MD         Primary Phone: [phone]   EMERGENCY CONTACT   Contact Name Legal Guardian? Relationship to Patient Home Phone Work Phone   1. John Manley  2. Damaris Small \"Sissy\"      Spouse  Child [phone]              GUARANTOR            Guarantor: Palmer Shepherd     : 1932   Address: [address] [place] [address] Sex: Female   [place] [id number]     Relation to Patient: Self       Home Phone: [phone]   Guarantor ID: [id number]       Work Phone:     Guarantor Employer: RETIRED         Status: RETIRED      COVERAGE        PRIMARY INSURANCE   Payor: MEDICARE Plan: MEDICARE PART A AND B   Payor Address: [facility] [address],  [facility] [address] [address]       Group Number:   Insurance Type: INDEMNITY   Subscriber Name: Jarek Prado : 1932   Subscriber ID: [id number] Altamese Creamer. Rel. to Sub: Self   SECONDARY INSURANCE   Payor:   Plan:     Payor Address: [place] Department Medicaid  CERTIFICATION OF NECESSITY  FOR NON-EMERGENCY TRANSPORTATION   BY GROUND AMBULANCE      Individual Information   1. Name: Palmer Shepherd 2. [place] Medicaid Billing Number:    3. Address: [address] [place] [address]      Transportation Provider Information   4. Provider Name:    5. [place] Medicaid Provider Number:  National Provider Identifier (NPI):      Certification  7. Criteria:  During transport, this individual requires:  [] Medical treatment or continuous     supervision by an EMT. [] The administration or regulation of oxygen by another person. [] Supervised protective restraint. 8. Period Beginning Date:    5. Length  [x] Not more than 10 day(s)  [] One Year     Additional Information Relevant to Certification   10. Comments or Explanations, If Necessary or Appropriate     Deconditioned, decreased strength, balance, and coordination impairing functional ability, poor trunk control     Certifying Practitioner Information   11. Name of Practitioner:    12. [place] Medicaid Provider Number, If Applicable:  [address] Provider Identifier (NPI):      Signature Information   14. Date of Signature:  13. Name of [address]   12. Signature and Professional Designation: Electronically signed by ROB Juan, ENZO on 2022 at 11:40 AM       [facility] [id number]  Rev. 2015 ,           Group Number:   Insurance Type:     Subscriber Name:   Subscriber :     Subscriber ID:   Pat.  Rel. to Sub:           CSN: [id number]

## 2022-12-19 NOTE — PROGRESS NOTES
Left voicemail with Dr. Tamiko Mc office in regards to RRT being called for another pause this morning.

## 2022-12-19 NOTE — PLAN OF CARE
Problem: Cardiovascular - Adult  Goal: Maintains optimal cardiac output and hemodynamic stability  Flowsheets (Taken 12/19/2022 7648)  Maintains optimal cardiac output and hemodynamic stability:   Monitor blood pressure and heart rate   Monitor urine output and notify Licensed Independent Practitioner for values outside of normal range   Assess for signs of decreased cardiac output   Administer fluid and/or volume expanders as ordered   Administer vasoactive medications as ordered  Note: MONITOR VITAL SIGNS ADM MEDS AS ORDERED, MONITOR HEART RATE PACER SITE

## 2022-12-19 NOTE — PROGRESS NOTES
[facility] CARDIOLOGY PROGRESS NOTE  [facility]        Subjective: This morning had a long pause of approximately 9 seconds on telemetry and had presented initially with progressive fatigue and weakness, shortness of breath and had been found to be in atrial fibrillation rapid ventricular rate of unclear onset and duration and started on Eliquis 5 mg twice a day.  and son are at bedside throughout the interview. Has diuresed well and breathing better.  -1.4 L overnight. Was weak and debilitated on admission and still is somewhat weak and debilitated even with just getting up at bedside and ambulating in the room a limited amount. This morning she denies any awareness of passing out but again was laying in bed. No reported chest pain shortness of breath or distress currently or overnight. Objective: Labs chart medications and telemetry all reviewed. Patient Vitals for the past 24 hrs:   BP Temp Temp src Pulse Resp SpO2   12/19/22 0930 (!) 151/57 -- -- 61 -- --   12/19/22 0800 (!) 132/56 98 °F (36.7 °C) Tympanic 87 18 99 %   12/19/22 0646 136/70 97.8 °F (36.6 °C) Oral 67 18 --   12/19/22 0215 137/77 97.7 °F (36.5 °C) Oral 81 20 96 %   12/18/22 1947 (!) 145/62 98.6 °F (37 °C) Oral 80 20 100 %   12/18/22 1452 135/75 97.5 °F (36.4 °C) Oral 86 24 99 %   12/18/22 1122 (!) 145/61 97.7 °F (36.5 °C) Oral 61 20 99 %         Intake/Output Summary (Last 24 hours) at 12/19/2022 1015  Last data filed at 12/19/2022 1547  Gross per 24 hour   Intake --   Output 1200 ml   Net -1200 ml       Wt Readings from Last 3 Encounters:   12/14/22 179 lb 7.3 oz (81.4 kg)   12/25/20 160 lb (72.6 kg)   09/08/20 198 lb (89.8 kg)       Telemetry: I personally reviewed and shows atrial fibrillation heart rate in the 70s and 80s. Even after the 9-second pause did not convert to normal sinus rhythm and remains in atrial fibrillation.   Heart rate in the 80s    Current meds: Scheduled Meds:   lisinopril  10 mg Oral Daily    donepezil  5 mg Oral Nightly    atorvastatin  20 mg Oral Nightly    bumetanide  1 mg Oral BID    sodium chloride flush  10 mL IntraVENous 2 times per day    apixaban  5 mg Oral BID    pantoprazole  20 mg Oral Daily    predniSONE  5 mg Oral BID    insulin lispro  0-4 Units SubCUTAneous TID WC    insulin lispro  0-4 Units SubCUTAneous Nightly    levothyroxine  75 mcg Oral Daily    digoxin  250 mcg IntraVENous Once     Continuous Infusions:   sodium chloride      dextrose       PRN Meds:.sodium chloride flush, sodium chloride, potassium chloride **OR** potassium alternative oral replacement **OR** potassium chloride, senna, acetaminophen **OR** acetaminophen, ipratropium-albuterol, glucose, dextrose bolus **OR** dextrose bolus, glucagon (rDNA), dextrose, perflutren lipid microspheres    Allergies: Patient has no known allergies. Labs: No results for input(s): WBC, HGB, HCT, MCV, PLT in the last 72 hours. Labs: No results for input(s): NA, K, CL, CO2, PHOS, BUN, CREATININE, CA in the last 72 hours. Labs: No results for input(s): CKTOTAL, CKMB, CKMBINDEX, TROPONINI in the last 72 hours. Labs: No results for input(s): BNP in the last 72 hours. Labs: No results for input(s): CHOL, HDL, TRIG in the last 72 hours. Invalid input(s): CHOLHDLR, LDLCALCU    Labs: No results for input(s): PROT, INR in the last 72 hours. Review of systems: No reported significant weight gain or weight loss. no dysuria or frequency, no dizziness, falls or trauma, no change in bowel or bladder habits, no hematochezia, hemoptysis or hematuria. No fevers, chills, nausea or vomiting reported. No significant wheezing or sputum production. No headache or visual changes. The remainder of the 10 review of systems otherwise negative. Exam      General: Patient comfortable in no distress and currently denies any chest pain. HEENT: Face symmetrical and no apparent cranial nerve deficit.      Neck: No jugular venous distention, carotid bruit or thyromegaly. Lungs: Clear bilaterally without rales, wheezes or dullness. Cardiac: IRegular rate and rhythm, no S3, S4, no rub or gallop. Abdomen: No rebound or guarding, no hepatosplenomegaly. Extremities: Without significant clubbing , cyanosis, or edema. Neuro:  No focal motor or sensory deficit apparent. Skin: No petechiae, no significant bruising. Assessment: See plan below        Plan: #1  Had 9-second pause on telemetry this morning at about 9:15 AM and for now we will stop AV blocking agents digoxin and metoprolol and follow-up telemetry heart rate. Hold Eliquis for now. #2 AF RVR and had received increasing doses of metoprolol to control heart rate also for hypertension. #3 chronic Eliquis anticoagulation and certainly concerned about fall risk as she is relatively debilitated, 180 pounds and wants to return home as opposed to go to rehab facility. May have to consider stopping Eliquis if not ambulating well and unsteady. #4 hypertension blood pressure reasonable. #5 moderate mitral valve regurgitation normal LVEF. #6 diabetes and follow-up blood sugar.         Electronically signed by Jet Acosta MD on 12/19/2022 at 10:15 AM

## 2022-12-19 NOTE — PROGRESS NOTES
Occupational Therapy  Patient treatment attempted this PM.  Patient being transferred to [facility].    [place] RODRIGUE/L [id number]

## 2022-12-19 NOTE — CARE COORDINATION
CASE MANAGEMENT. ... RRT this am for 18 sec pause. Patient evaluated and treated. Remained on tele. Notified by nursing that decision was made to transfer patient to [facility] cardiac cath lab for pacer. Patient still having pauses and is symptomatic. Transport forms on chart.

## 2022-12-19 NOTE — PROGRESS NOTES
Subjective: The patient is awake and alert. No problems overnight. Denies chest pain, angina, and dyspnea. Denies abdominal pain. Tolerating diet. No nausea or vomiting. Objective:  Much better rate control. /70   Pulse 67   Temp 97.8 °F (36.6 °C) (Oral)   Resp 18   Ht 5' (1.524 m)   Wt 179 lb 7.3 oz (81.4 kg)   SpO2 96%   BMI 35.05 kg/m²     Heart:  IRRR, no murmurs, gallops, or rubs. Lungs:  CTA bilaterally, no wheeze, rales or rhonchi  Abd: bowel sounds present, nontender, nondistended, no masses  Extrem:  No clubbing, cyanosis, or edema    CBC:   Lab Results   Component Value Date/Time    WBC 9.3 12/14/2022 02:15 AM    RBC 3.63 12/14/2022 02:15 AM    HGB 10.5 12/14/2022 02:15 AM    HCT 34.3 12/14/2022 02:15 AM    MCV 94.5 12/14/2022 02:15 AM    MCH 28.9 12/14/2022 02:15 AM    MCHC 30.6 12/14/2022 02:15 AM    RDW 15.1 12/14/2022 02:15 AM     12/14/2022 02:15 AM    MPV 9.9 12/14/2022 02:15 AM     BMP:    Lab Results   Component Value Date/Time     12/16/2022 06:39 AM    K 3.9 12/16/2022 06:39 AM    CL 97 12/16/2022 06:39 AM    CO2 27 12/16/2022 06:39 AM    BUN 24 12/16/2022 06:39 AM    LABALBU 3.1 12/16/2022 06:39 AM    CREATININE 1.0 12/16/2022 06:39 AM    CALCIUM 8.8 12/16/2022 06:39 AM    GFRAA >60 09/12/2020 04:39 AM    LABGLOM 54 12/16/2022 06:39 AM    GLUCOSE 214 12/16/2022 06:39 AM        Assessment:    Patient Active Problem List   Diagnosis    Acute appendicitis    Hypertension    Inadequately controlled diabetes mellitus ([address]    Metabolic encephalopathy    Sepsis associated hypotension ([address]    Sepsis ([address]    Relapsing appendicitis    Saddle embolus of pulmonary artery ([address]    DVT of deep femoral vein, right (HCC)    History of pulmonary embolism    Generalized weakness    Acute kidney injury ([address]    Left knee pain    Baker's cyst    Atrial fibrillation with RVR ([address]    New onset a-fib ([address]       Plan:  Present tx. DC planning in progress.         Nathanael Floyd Fidel Weeks MD  7:02 AM  12/19/2022

## 2022-12-19 NOTE — OP NOTE
[facility]) Physicians- [facility] Electrophysiology  Procedure Report  PATIENT:   MEDICAL RECORD NUMBER: [id number]  DATE OF PROCEDURE:  12/19/2022  ATTENDING ELECTROPHYSIOLOGIST:Summer Zaldivar MD  REFERRING PHYSICIAN: Dr. Kim Thorne and Dr. Yolande Maxwell    Procedure Performed:  1. Temporary pacing wire placement   2. Vascular ultrasound for venous access  3. Fluoroscopy    Indication for Procedure:  1. Atrial fibrillation with pauses    Flouroscopy: 2.2 min  Complications: none immediately apparent  EBL: minimal  Specimens: none  Contrast: 0 cc    FINDINGS:  Mode: VVI  Base Rate: 60  Threshold: less than 0.2 mA  Output: 5 mA  Sensitivity: 3 mV    DETAILS OF THE OPERATION: The risks, benefits, alternatives of the procedure were explained to patient. Consent was obtained verbally due to emergency nature of the case. The patient was brought to the Electrophysiology lab in a fasting and non-sedated state. The patient had electrocardiographic and hemodynamic monitoring equipment placed. During the case the patient was under the care of an EP nursing staffs for sedation and hemodynamic monitoring. The patient was prepped and draped in usual sterile fashion. Ten mL of 2% lidocaine was used to anesthetize the right groin area. We utilized vascular ultrasound to document venous patency and to allow for direct visualization of vascular needle entry. Using modified Seldinger's technique, the right femoral vein was accessed. The J-tipped guidewire was inserted through the needle. The 6-Fr locking sheath was inserted over the guide wire. The dilator and the wire were removed. The temporary pacing wire was inserted into the sheath and was advanced into the RV apex under fluoroscopic guidance. The pacing wire was connected to the temporary pacemaker. The threshold testing was performed and it was found to have adequate pacing threshold.  The pacemaker was programmed to 60 ppm. The sheath was suture down to the skin at the right groin using O-silk by the help of scrub nurse. Tegaderm was placed on top of the sheath and the lead to secure the stability. At the end of the case, the patient was hemodynamically stable and under the care of the EP nurses, the patient was brought back to the recovery area and then will be transferred to ICU for post procedural monitoring. ASSESSMENT:  1. Successful placement of temporary pacing wire. RECOMMENDATIONS:  1. Stat portable chest x-ray to check pacing wire position. 2. EKG to be performed now. 3. Post-procedural monitoring in the ICU. 4. Absolute bedrest.  5. Notify Dr. Cathy Lui for further management. I have spent a total of 35 CCT minutes with the patient and the family reviewing the above stated recommendations. And a total of >50% of that time involved face-to-face time providing counseling and/or coordination of care with the other providers, reviewing records/tests, counseling/education of the patient, ordering medications/tests/procedures, coordinating care, and documenting clinical information in the EHR.      Sofiya Benavides MD  Cardiac Electrophysiology  [address]  The [facility] [place]  3:44 PM  12/19/2022

## 2022-12-20 LAB
ANION GAP SERPL CALCULATED.3IONS-SCNC: 10 MMOL/L (ref 7–16)
BUN BLDV-MCNC: 24 MG/DL (ref 6–23)
CALCIUM SERPL-MCNC: 9.2 MG/DL (ref 8.6–10.2)
CHLORIDE BLD-SCNC: 93 MMOL/L (ref 98–107)
CO2: 36 MMOL/L (ref 22–29)
CREAT SERPL-MCNC: 0.8 MG/DL (ref 0.5–1)
GFR SERPL CREATININE-BSD FRML MDRD: >60 ML/MIN/1.73
GLUCOSE BLD-MCNC: 171 MG/DL (ref 74–99)
HCT VFR BLD CALC: 34.1 % (ref 34–48)
HEMOGLOBIN: 10.7 G/DL (ref 11.5–15.5)
MAGNESIUM: 1.8 MG/DL (ref 1.6–2.6)
MCH RBC QN AUTO: 28.3 PG (ref 26–35)
MCHC RBC AUTO-ENTMCNC: 31.4 % (ref 32–34.5)
MCV RBC AUTO: 90.2 FL (ref 80–99.9)
METER GLUCOSE: 151 MG/DL (ref 74–99)
METER GLUCOSE: 171 MG/DL (ref 74–99)
METER GLUCOSE: 186 MG/DL (ref 74–99)
METER GLUCOSE: 191 MG/DL (ref 74–99)
PDW BLD-RTO: 14.7 FL (ref 11.5–15)
PLATELET # BLD: 359 E9/L (ref 130–450)
PMV BLD AUTO: 9.3 FL (ref 7–12)
POTASSIUM SERPL-SCNC: 3.8 MMOL/L (ref 3.5–5)
RBC # BLD: 3.78 E12/L (ref 3.5–5.5)
SODIUM BLD-SCNC: 139 MMOL/L (ref 132–146)
WBC # BLD: 7.9 E9/L (ref 4.5–11.5)

## 2022-12-20 PROCEDURE — 36415 COLL VENOUS BLD VENIPUNCTURE: CPT

## 2022-12-20 PROCEDURE — 80048 BASIC METABOLIC PNL TOTAL CA: CPT

## 2022-12-20 PROCEDURE — 6370000000 HC RX 637 (ALT 250 FOR IP): Performed by: INTERNAL MEDICINE

## 2022-12-20 PROCEDURE — 82962 GLUCOSE BLOOD TEST: CPT

## 2022-12-20 PROCEDURE — 85027 COMPLETE CBC AUTOMATED: CPT

## 2022-12-20 PROCEDURE — 2000000000 HC ICU R&B

## 2022-12-20 PROCEDURE — 2700000000 HC OXYGEN THERAPY PER DAY

## 2022-12-20 PROCEDURE — 83735 ASSAY OF MAGNESIUM: CPT

## 2022-12-20 PROCEDURE — 2580000003 HC RX 258: Performed by: INTERNAL MEDICINE

## 2022-12-20 RX ORDER — LISINOPRIL 10 MG/1
10 TABLET ORAL DAILY
Status: DISCONTINUED | OUTPATIENT
Start: 2022-12-20 | End: 2022-12-23 | Stop reason: HOSPADM

## 2022-12-20 RX ADMIN — Medication 10 ML: at 08:16

## 2022-12-20 RX ADMIN — PANTOPRAZOLE SODIUM 20 MG: 20 TABLET, DELAYED RELEASE ORAL at 08:16

## 2022-12-20 RX ADMIN — LISINOPRIL 10 MG: 10 TABLET ORAL at 08:19

## 2022-12-20 RX ADMIN — FUROSEMIDE 20 MG: 20 TABLET ORAL at 08:19

## 2022-12-20 RX ADMIN — Medication 10 ML: at 19:41

## 2022-12-20 RX ADMIN — AMLODIPINE BESYLATE 5 MG: 5 TABLET ORAL at 08:19

## 2022-12-20 RX ADMIN — PREDNISONE 5 MG: 5 TABLET ORAL at 19:40

## 2022-12-20 RX ADMIN — PREDNISONE 5 MG: 5 TABLET ORAL at 08:20

## 2022-12-20 RX ADMIN — LEVOTHYROXINE SODIUM 75 MCG: 0.07 TABLET ORAL at 07:08

## 2022-12-20 RX ADMIN — DONEPEZIL HYDROCHLORIDE 10 MG: 5 TABLET, FILM COATED ORAL at 19:40

## 2022-12-20 ASSESSMENT — PAIN SCALES - GENERAL
PAINLEVEL_OUTOF10: 0

## 2022-12-20 NOTE — CARE COORDINATION
Care Coordination:   Patient from [facility] where she underwent pacemaker insertion on 12/19 . Remained bradycardic and transferred here for cardiology assessment. SW reviewed record for discharge planning. She lives with spouse in one floor home. 2STE. She used a wheeled walker. She has a nebulizer . Currently on 2L o2. No o2 at home. Patient has no preference for vendor if o2 is needed. At outside facility a referral was made to [facility]. Brandy Boyle is following ([phone]). SW called her this morning to inform of transfer here. SW will follow  for additional rehab needs. PT OT evaluations may be indicated based on clinical course for further in put re discharge needs. Discharge plan is home with home health care.

## 2022-12-20 NOTE — PLAN OF CARE
Problem: Chronic Conditions and Co-morbidities  Goal: Patient's chronic conditions and co-morbidity symptoms are monitored and maintained or improved  Outcome: Progressing     Problem: Discharge Planning  Goal: Discharge to home or other facility with appropriate resources  Outcome: Progressing     Problem: Cardiovascular - Adult  Goal: Maintains optimal cardiac output and hemodynamic stability  12/19/2022 2128 by Margarita Latif RN  Outcome: Progressing  12/19/2022 1647 by Stephanie Finn RN  Flowsheets (Taken 12/19/2022 1647)  Maintains optimal cardiac output and hemodynamic stability:   Monitor blood pressure and heart rate   Monitor urine output and notify Licensed Independent Practitioner for values outside of normal range   Assess for signs of decreased cardiac output   Administer fluid and/or volume expanders as ordered   Administer vasoactive medications as ordered  Note: MONITOR VITAL SIGNS ADM MEDS AS ORDERED, MONITOR HEART RATE PACER SITE  Goal: Absence of cardiac dysrhythmias or at baseline  Outcome: Progressing     Problem: Respiratory - Adult  Goal: Achieves optimal ventilation and oxygenation  12/19/2022 2128 by Margarita Latif RN  Outcome: Progressing  12/19/2022 1648 by Stephanie Finn RN  Flowsheets (Taken 12/19/2022 1648)  Achieves optimal ventilation and oxygenation:   Assess for changes in respiratory status   Assess for changes in mentation and behavior   Position to facilitate oxygenation and minimize respiratory effort   Oxygen supplementation based on oxygen saturation or arterial blood gases   Initiate smoking cessation protocol as indicated   Encourage broncho-pulmonary hygiene including cough, deep breathe, incentive spirometry   Assess the need for suctioning and aspirate as needed   Assess and instruct to report shortness of breath or any respiratory difficulty   Respiratory therapy support as indicated  Note: MONITOR RESP STATUS SAO2 BREATHE SOUNDS     Problem: Skin/Tissue Integrity - Adult  Goal: Skin integrity remains intact  Outcome: Progressing  Goal: Incisions, wounds, or drain sites healing without S/S of infection  Outcome: Progressing  Goal: Oral mucous membranes remain intact  Outcome: Progressing     Problem: Pain  Goal: Verbalizes/displays adequate comfort level or baseline comfort level  Outcome: Progressing  Flowsheets (Taken 12/19/2022 2000)  Verbalizes/displays adequate comfort level or baseline comfort level:   Encourage patient to monitor pain and request assistance   Assess pain using appropriate pain scale   Administer analgesics based on type and severity of pain and evaluate response   Implement non-pharmacological measures as appropriate and evaluate response

## 2022-12-20 NOTE — PROGRESS NOTES
[facility] CARDIOLOGY PROGRESS NOTE  [facility]        Subjective: Yesterday developed progressive pauses up to 10 seconds followed by QRS and then 8 seconds and received Digibind yesterday evening with a level of 1.3 but doubt she was toxic as she had not received much digoxin however with that degree of pauses  will also hold metoprolol. May become tachycardic. Today conversant pleasant and appropriate. Denies any shortness of breath or chest pain. Breathing comfortably. On admission in atrial fibrillation tachycardic 130s and 140s. Unsteady and debilitated on admission. Objective: Medications lab chart and telemetry all reviewed.     Patient Vitals for the past 24 hrs:   BP Temp Temp src Pulse Resp SpO2 Weight   12/20/22 1300 126/72 -- -- 96 21 96 % --   12/20/22 1200 134/60 97.6 °F (36.4 °C) Oral 83 21 97 % --   12/20/22 1100 131/81 -- -- 78 26 99 % --   12/20/22 1000 (!) 141/81 -- -- 62 16 99 % --   12/20/22 0900 139/82 -- -- 83 15 93 % --   12/20/22 0800 (!) 141/100 97.9 °F (36.6 °C) Oral 76 20 97 % --   12/20/22 0700 -- -- -- 75 18 98 % --   12/20/22 0600 (!) 155/68 -- -- 74 20 98 % --   12/20/22 0500 (!) 157/92 -- -- 85 21 98 % --   12/20/22 0400 131/81 97.7 °F (36.5 °C) Oral 68 19 97 % --   12/20/22 0300 (!) 152/82 -- -- 75 19 99 % --   12/20/22 0200 121/67 -- -- 64 17 98 % --   12/20/22 0100 (!) 150/82 -- -- 77 22 99 % --   12/20/22 0000 (!) 153/90 98 °F (36.7 °C) Oral 76 27 98 % --   12/19/22 2300 (!) 154/68 -- -- 76 20 98 % --   12/19/22 2200 (!) 157/90 -- -- 66 21 99 % --   12/19/22 2100 (!) 180/76 -- -- 75 16 98 % --   12/19/22 2000 (!) 166/89 97.8 °F (36.6 °C) Oral 75 20 97 % --   12/19/22 1900 (!) 157/74 -- -- 72 15 97 % --   12/19/22 1809 -- -- -- 75 18 98 % --   12/19/22 1725 (!) 152/78 97.8 °F (36.6 °C) Oral 80 22 95 % --   12/19/22 1640 (!) 146/105 -- -- 71 20 100 % --   12/19/22 1635 (!) 160/98 -- -- 68 -- 100 % --   12/19/22 1630 (!) 176/139 -- -- 85 -- 100 % --   12/19/22 1625 (!) 178/108 -- -- 86 17 100 % --   12/19/22 1620 (!) 190/92 -- -- 95 18 97 % --   12/19/22 1550 (!) 167/74 97.9 °F (36.6 °C) Temporal 79 18 98 % 190 lb (86.2 kg)         Intake/Output Summary (Last 24 hours) at 12/20/2022 1327  Last data filed at 12/20/2022 0816  Gross per 24 hour   Intake 370 ml   Output 300 ml   Net 70 ml       Wt Readings from Last 3 Encounters:   12/19/22 190 lb (86.2 kg)   12/14/22 179 lb 7.3 oz (81.4 kg)   12/25/20 160 lb (72.6 kg)       Telemetry: Personally reviewed and shows atrial fibrillation heart rate in the 80s. Current meds: Scheduled Meds:   lisinopril  10 mg Oral Daily    pantoprazole  20 mg Oral Daily    levothyroxine  75 mcg Oral Daily    predniSONE  5 mg Oral BID    amLODIPine  5 mg Oral Daily    donepezil  10 mg Oral Nightly    furosemide  20 mg Oral Daily    sodium chloride flush  5-40 mL IntraVENous 2 times per day    insulin lispro  0-8 Units SubCUTAneous TID WC    insulin lispro  0-4 Units SubCUTAneous Nightly     Continuous Infusions:   sodium chloride      dextrose       PRN Meds:.ipratropium-albuterol, sodium chloride flush, sodium chloride, glucose, dextrose bolus **OR** dextrose bolus, glucagon (rDNA), dextrose    Allergies: Patient has no known allergies. Labs:   Recent Labs     12/19/22  1054 12/20/22  0500   WBC 10.6 7.9   HGB 11.1* 10.7*   HCT 35.7 34.1   MCV 92.5 90.2    359       Labs:   Recent Labs     12/19/22  1054 12/20/22  0500    139   K 3.9 3.8   CL 93* 93*   CO2 36* 36*   BUN 24* 24*   CREATININE 0.8 0.8       Labs: No results for input(s): CKTOTAL, CKMB, CKMBINDEX, TROPONINI in the last 72 hours. Labs: No results for input(s): BNP in the last 72 hours. Labs: No results for input(s): CHOL, HDL, TRIG in the last 72 hours. Invalid input(s): CHOLHDLR, LDLCALCU    Labs: No results for input(s): PROT, INR in the last 72 hours. Review of systems: No reported significant weight gain or weight loss.   no dysuria or frequency, no dizziness, falls or trauma, no change in bowel or bladder habits, no hematochezia, hemoptysis or hematuria. No fevers, chills, nausea or vomiting reported. No significant wheezing or sputum production. No headache or visual changes. The remainder of the 10 review of systems otherwise negative. Exam      General: Patient comfortable in no distress and currently denies any chest pain. HEENT: Face symmetrical and no apparent cranial nerve deficit. Neck: No jugular venous distention, carotid bruit or thyromegaly. Lungs: Clear bilaterally without rales, wheezes or dullness. Cardiac: IRegular rate and rhythm, no S3, S4, no rub or gallop. Abdomen: No rebound or guarding, no hepatosplenomegaly. Extremities: Without significant clubbing , cyanosis, or edema. Neuro:  No focal motor or sensory deficit apparent. Skin: No petechiae, no significant bruising. Assessment: See plan below        Plan: #1 several long pauses that evolved to become progressively symptomatic and received transvenous pacing wire from the right common femoral vein. Today I lowered the ventricular rate to 40 bpm if she has not had any pacing over the last 12 hours. #2 atrial fibrillation tachycardic on presentation and received increasing beta-blocker dose to control ventricular rate. #3 chronic Eliquis anticoagulation on hold in case she does need implant of a permanent pacemaker. #4 moderate mitral valve regurgitation and normal LV systolic function. #5 hypertension and blood pressure elevated at times. Have added lisinopril 10 mg daily to her medical regimen and continues on furosemide 20 mg daily and amlodipine 5 mg daily. Hopefully can remove transvenous pacing wire in the next 48 hours if no ventricular pacing noted.         Electronically signed by Ema Leger MD on 12/20/2022 at 1:27 PM

## 2022-12-20 NOTE — ACP (ADVANCE CARE PLANNING)
Advance Care Planning   The patient has the following advanced directives on file:  Advance Directives       Power of [address] ACP-Advance Directive ACP-Power of     Not on File Not on File Not on File Not on File            The patient has appointed the following active healthcare agents:    Primary Decision MakerElene Tom - [phone]    Secondary Decision Maker: Ema Forman \"Sissy\" - Child - [phone]    The Patient has the following current code status:    Code Status: Full Code    Visit Documentation:  Confirmed with patient and spouse    ENZO Jacob  12/20/2022

## 2022-12-20 NOTE — PLAN OF CARE
Problem: Chronic Conditions and Co-morbidities  Goal: Patient's chronic conditions and co-morbidity symptoms are monitored and maintained or improved  12/20/2022 0929 by Frankie Lin RN  Outcome: Progressing     Problem: Cardiovascular - Adult  Goal: Maintains optimal cardiac output and hemodynamic stability  12/20/2022 0929 by Frankie Lin RN  Outcome: Progressing     Problem: Respiratory - Adult  Goal: Achieves optimal ventilation and oxygenation  12/20/2022 0929 by Frankie Lin RN  Outcome: Progressing     Problem: Skin/Tissue Integrity - Adult  Goal: Skin integrity remains intact  12/20/2022 0929 by Frankie Lin RN  Outcome: Progressing     Problem: Pain  Goal: Verbalizes/displays adequate comfort level or baseline comfort level  12/20/2022 0929 by Frankie Lin RN  Outcome: Progressing

## 2022-12-21 ENCOUNTER — APPOINTMENT (OUTPATIENT)
Dept: GENERAL RADIOLOGY | Age: 87
End: 2022-12-21
Attending: INTERNAL MEDICINE
Payer: MEDICARE

## 2022-12-21 LAB
ANION GAP SERPL CALCULATED.3IONS-SCNC: 11 MMOL/L (ref 7–16)
APTT: 32 SEC (ref 24.5–35.1)
BUN BLDV-MCNC: 29 MG/DL (ref 6–23)
CALCIUM SERPL-MCNC: 9.3 MG/DL (ref 8.6–10.2)
CHLORIDE BLD-SCNC: 92 MMOL/L (ref 98–107)
CO2: 35 MMOL/L (ref 22–29)
CREAT SERPL-MCNC: 1 MG/DL (ref 0.5–1)
EKG ATRIAL RATE: 91 BPM
EKG Q-T INTERVAL: 424 MS
EKG QRS DURATION: 114 MS
EKG QTC CALCULATION (BAZETT): 498 MS
EKG R AXIS: 43 DEGREES
EKG T AXIS: -16 DEGREES
EKG VENTRICULAR RATE: 83 BPM
GFR SERPL CREATININE-BSD FRML MDRD: 54 ML/MIN/1.73
GLUCOSE BLD-MCNC: 175 MG/DL (ref 74–99)
HCT VFR BLD CALC: 33.9 % (ref 34–48)
HEMOGLOBIN: 10.9 G/DL (ref 11.5–15.5)
INR BLD: 1.3
MCH RBC QN AUTO: 28.7 PG (ref 26–35)
MCHC RBC AUTO-ENTMCNC: 32.2 % (ref 32–34.5)
MCV RBC AUTO: 89.2 FL (ref 80–99.9)
METER GLUCOSE: 155 MG/DL (ref 74–99)
METER GLUCOSE: 168 MG/DL (ref 74–99)
METER GLUCOSE: 204 MG/DL (ref 74–99)
METER GLUCOSE: 228 MG/DL (ref 74–99)
PDW BLD-RTO: 14.7 FL (ref 11.5–15)
PLATELET # BLD: 361 E9/L (ref 130–450)
PMV BLD AUTO: 9.8 FL (ref 7–12)
POTASSIUM SERPL-SCNC: 4.2 MMOL/L (ref 3.5–5)
PROTHROMBIN TIME: 14.1 SEC (ref 9.3–12.4)
RBC # BLD: 3.8 E12/L (ref 3.5–5.5)
SODIUM BLD-SCNC: 138 MMOL/L (ref 132–146)
WBC # BLD: 8.4 E9/L (ref 4.5–11.5)

## 2022-12-21 PROCEDURE — 36415 COLL VENOUS BLD VENIPUNCTURE: CPT

## 2022-12-21 PROCEDURE — 6360000002 HC RX W HCPCS

## 2022-12-21 PROCEDURE — 2500000003 HC RX 250 WO HCPCS

## 2022-12-21 PROCEDURE — 0JH804Z INSERTION OF PACEMAKER, SINGLE CHAMBER INTO ABDOMEN SUBCUTANEOUS TISSUE AND FASCIA, OPEN APPROACH: ICD-10-PCS | Performed by: INTERNAL MEDICINE

## 2022-12-21 PROCEDURE — 80048 BASIC METABOLIC PNL TOTAL CA: CPT

## 2022-12-21 PROCEDURE — 2580000003 HC RX 258

## 2022-12-21 PROCEDURE — 2580000003 HC RX 258: Performed by: FAMILY MEDICINE

## 2022-12-21 PROCEDURE — C1894 INTRO/SHEATH, NON-LASER: HCPCS

## 2022-12-21 PROCEDURE — C1786 PMKR, SINGLE, RATE-RESP: HCPCS

## 2022-12-21 PROCEDURE — 6370000000 HC RX 637 (ALT 250 FOR IP): Performed by: NURSE PRACTITIONER

## 2022-12-21 PROCEDURE — 6360000002 HC RX W HCPCS: Performed by: INTERNAL MEDICINE

## 2022-12-21 PROCEDURE — 2580000003 HC RX 258: Performed by: INTERNAL MEDICINE

## 2022-12-21 PROCEDURE — C1898 LEAD, PMKR, OTHER THAN TRANS: HCPCS

## 2022-12-21 PROCEDURE — 85027 COMPLETE CBC AUTOMATED: CPT

## 2022-12-21 PROCEDURE — 02HK3JZ INSERTION OF PACEMAKER LEAD INTO RIGHT VENTRICLE, PERCUTANEOUS APPROACH: ICD-10-PCS | Performed by: INTERNAL MEDICINE

## 2022-12-21 PROCEDURE — 85730 THROMBOPLASTIN TIME PARTIAL: CPT

## 2022-12-21 PROCEDURE — 2140000000 HC CCU INTERMEDIATE R&B

## 2022-12-21 PROCEDURE — 85610 PROTHROMBIN TIME: CPT

## 2022-12-21 PROCEDURE — 6370000000 HC RX 637 (ALT 250 FOR IP): Performed by: INTERNAL MEDICINE

## 2022-12-21 PROCEDURE — 82962 GLUCOSE BLOOD TEST: CPT

## 2022-12-21 PROCEDURE — 2709999900 HC NON-CHARGEABLE SUPPLY

## 2022-12-21 PROCEDURE — 2700000000 HC OXYGEN THERAPY PER DAY

## 2022-12-21 PROCEDURE — 33207 INSERT HEART PM VENTRICULAR: CPT

## 2022-12-21 PROCEDURE — 71045 X-RAY EXAM CHEST 1 VIEW: CPT

## 2022-12-21 RX ORDER — SODIUM CHLORIDE 0.9 % (FLUSH) 0.9 %
5-40 SYRINGE (ML) INJECTION PRN
Status: DISCONTINUED | OUTPATIENT
Start: 2022-12-21 | End: 2022-12-23 | Stop reason: HOSPADM

## 2022-12-21 RX ORDER — SODIUM CHLORIDE 9 MG/ML
INJECTION, SOLUTION INTRAVENOUS PRN
Status: DISCONTINUED | OUTPATIENT
Start: 2022-12-21 | End: 2022-12-23 | Stop reason: HOSPADM

## 2022-12-21 RX ORDER — ENOXAPARIN SODIUM 100 MG/ML
40 INJECTION SUBCUTANEOUS DAILY
Status: DISCONTINUED | OUTPATIENT
Start: 2022-12-21 | End: 2022-12-23

## 2022-12-21 RX ORDER — SODIUM CHLORIDE 9 MG/ML
INJECTION, SOLUTION INTRAVENOUS CONTINUOUS
Status: DISCONTINUED | OUTPATIENT
Start: 2022-12-21 | End: 2022-12-23

## 2022-12-21 RX ORDER — SODIUM CHLORIDE 0.9 % (FLUSH) 0.9 %
5-40 SYRINGE (ML) INJECTION EVERY 12 HOURS SCHEDULED
Status: DISCONTINUED | OUTPATIENT
Start: 2022-12-21 | End: 2022-12-23 | Stop reason: HOSPADM

## 2022-12-21 RX ADMIN — LEVOTHYROXINE SODIUM 75 MCG: 0.07 TABLET ORAL at 06:34

## 2022-12-21 RX ADMIN — Medication 10 ML: at 09:26

## 2022-12-21 RX ADMIN — SODIUM CHLORIDE, PRESERVATIVE FREE 10 ML: 5 INJECTION INTRAVENOUS at 19:45

## 2022-12-21 RX ADMIN — AMLODIPINE BESYLATE 5 MG: 5 TABLET ORAL at 09:25

## 2022-12-21 RX ADMIN — ENOXAPARIN SODIUM 40 MG: 100 INJECTION SUBCUTANEOUS at 09:35

## 2022-12-21 RX ADMIN — PREDNISONE 5 MG: 5 TABLET ORAL at 19:44

## 2022-12-21 RX ADMIN — INSULIN LISPRO 2 UNITS: 100 INJECTION, SOLUTION INTRAVENOUS; SUBCUTANEOUS at 17:24

## 2022-12-21 RX ADMIN — LISINOPRIL 10 MG: 10 TABLET ORAL at 09:26

## 2022-12-21 RX ADMIN — DONEPEZIL HYDROCHLORIDE 10 MG: 5 TABLET, FILM COATED ORAL at 19:44

## 2022-12-21 RX ADMIN — VANCOMYCIN HYDROCHLORIDE 1000 MG: 1 INJECTION, POWDER, LYOPHILIZED, FOR SOLUTION INTRAVENOUS at 15:05

## 2022-12-21 RX ADMIN — FUROSEMIDE 20 MG: 20 TABLET ORAL at 09:25

## 2022-12-21 RX ADMIN — Medication 10 ML: at 19:44

## 2022-12-21 RX ADMIN — SODIUM CHLORIDE: 9 INJECTION, SOLUTION INTRAVENOUS at 06:30

## 2022-12-21 RX ADMIN — PANTOPRAZOLE SODIUM 20 MG: 20 TABLET, DELAYED RELEASE ORAL at 09:26

## 2022-12-21 RX ADMIN — PREDNISONE 5 MG: 5 TABLET ORAL at 09:26

## 2022-12-21 RX ADMIN — SODIUM CHLORIDE: 9 INJECTION, SOLUTION INTRAVENOUS at 17:30

## 2022-12-21 ASSESSMENT — PAIN SCALES - GENERAL
PAINLEVEL_OUTOF10: 0

## 2022-12-21 NOTE — PROCEDURES
Implant of a new single-chamber ventricular lead Medtronic permanent pacemaker left subclavicular region. Indication: Sinus node dysfunction with documented symptomatic significant pauses on telemetry. Patient had a temporary wire placed from the right common femoral artery Monday, December 19, 2022 after long pauses noted on telemetry and still 48 hours later it still occasionally ventricular pacing at a heart rate of 40 from the temporary wire. I previously discussed the risk benefits and alternatives of permanent pacemaker implant with her daughter Virginia Grider and the patient at length and all questions have been answered and she agrees to proceed. She was brought to the lab and the left subclavicular region was prepped and draped in sterile fashion. A venogram was done from the left arm IV and widely patent left subclavian vein noted. Subcutaneous lidocaine was placed. Incision was made. By blunt dissection a pocket was fashioned. Left subclavian vein was accessed with a needle and a wire was passed into the right heart chambers under fluoroscopy and that needle was removed. Over this wire 7 Turkish sheath was placed and the wire was removed. The 7 Turkish sheath a passive fixation bipolar Medtronic lead was passed into the right ventricular apex and excellent pacing and sensing parameters noted. Lead was sutured in place using 2 ties of 0 silk. The lead was connected to the single-chamber Medtronic permanent pacemaker and the pacemaker was placed into the pocket. Pocket flushed with vancomycin solution. 2 layers of running Vicryl suture used to close subcutaneous tissue and skin closed with staples. Patient awake and without hemodynamic arrhythmic instability. Estimated blood loss 30 cc. Total sedation given 0.5 mg IV. Conclusion #1 new single-chamber pacemaker is an QUIN XT SR MRI Medtronic model number W1 SR 01 serial number [id number]. Left subclavicular location.     #2 the right ventricular apical lead is a bipolar Medtronic model [id number], 58 cm, serial number [id number]. Pulse width 0.4 ms, voltage 0.75 threshold, impedance 1178 measured R wave 6 mV.

## 2022-12-21 NOTE — PROGRESS NOTES
Notified Ms. Christi Martin NP under Dr. Anthony Lawrence about decreased urine outputs (25cc output over the last 4 hours). Pt bladder scanned at 0400, 146cc shown. NS saline infusion at 75ml/hr ordered per Ms. Christi Martin. No new orders at this time.

## 2022-12-21 NOTE — CARE COORDINATION
Tvp remains in place. O2 2L . Per  assessment, [facility] is following if hhc needed. PT OT evaluations may be indicated based on clinical course to determine if rehab would be needed.

## 2022-12-21 NOTE — PROGRESS NOTES
[facility] CARDIOLOGY PROGRESS NOTE  [facility]        Subjective: Admitted with progressive debilitation, shortness of breath, respiratory decompensation found to be in atrial fibrillation of unclear onset and duration heart rate in the 140s. Had become progressively debilitated per family members. In the hospital heart rate gradually improved with increasing beta-blocker and IV digoxin and p.o. digoxin but then subsequently had long pauses up to 15 seconds. Digibind was given to reverse and the effect of digoxin and level was only 1.3. Today conversant pleasant and appropriate no distress. Temporary pacing wire was placed from the right groin. I lowered the rate to 40 yesterday and last evening and this morning she was transiently pacing at 40. Objective: Medications lab chart and telemetry all reviewed.     Patient Vitals for the past 24 hrs:   BP Temp Temp src Pulse Resp SpO2   12/21/22 1400 -- -- -- (!) 105 18 99 %   12/21/22 1300 124/75 -- -- 91 25 100 %   12/21/22 1200 111/71 97.7 °F (36.5 °C) Oral 87 15 99 %   12/21/22 1132 -- -- -- (!) 41 14 98 %   12/21/22 1100 113/72 -- -- 85 26 98 %   12/21/22 1000 130/63 -- -- 71 24 100 %   12/21/22 0930 121/71 -- -- 92 18 99 %   12/21/22 0900 (!) 129/56 -- -- 71 23 99 %   12/21/22 0800 111/66 97.6 °F (36.4 °C) Oral 77 19 100 %   12/21/22 0700 120/82 -- -- 83 15 98 %   12/21/22 0600 (!) 122/55 -- -- 83 18 97 %   12/21/22 0500 115/65 -- -- 77 26 100 %   12/21/22 0400 (!) 143/83 97.9 °F (36.6 °C) Oral 78 23 100 %   12/21/22 0300 (!) 126/91 -- -- 89 21 98 %   12/21/22 0200 124/62 -- -- 92 17 96 %   12/21/22 0100 126/60 -- -- 73 22 97 %   12/21/22 0000 (!) 110/54 97.9 °F (36.6 °C) Temporal 74 23 100 %   12/20/22 2300 136/61 -- -- 75 21 98 %   12/20/22 2200 107/72 -- -- 82 17 100 %   12/20/22 2100 121/65 -- -- 86 18 100 %   12/20/22 2000 124/67 96.8 °F (36 °C) Temporal 73 21 100 %   12/20/22 1900 (!) 129/57 -- -- 82 22 99 %   12/20/22 1800 (!) 127/54 -- -- 77 22 98 %   12/20/22 1700 137/71 -- -- 79 23 97 %   12/20/22 1600 (!) 130/58 97.8 °F (36.6 °C) Oral 82 21 96 %   12/20/22 1500 126/77 -- -- 78 22 97 %         Intake/Output Summary (Last 24 hours) at 12/21/2022 1423  Last data filed at 12/21/2022 1404  Gross per 24 hour   Intake 975.88 ml   Output 400 ml   Net 575.88 ml       Wt Readings from Last 3 Encounters:   12/19/22 190 lb (86.2 kg)   12/14/22 179 lb 7.3 oz (81.4 kg)   12/25/20 160 lb (72.6 kg)       Telemetry: Personally reviewed and shows atrial fibrillation heart rate in the 80s and 90s. Again did paced at a heart rate of 40 on telemetry and strips were reviewed. Current meds: Scheduled Meds:   enoxaparin  40 mg SubCUTAneous Daily    lisinopril  10 mg Oral Daily    pantoprazole  20 mg Oral Daily    levothyroxine  75 mcg Oral Daily    predniSONE  5 mg Oral BID    amLODIPine  5 mg Oral Daily    donepezil  10 mg Oral Nightly    furosemide  20 mg Oral Daily    sodium chloride flush  5-40 mL IntraVENous 2 times per day    insulin lispro  0-8 Units SubCUTAneous TID WC    insulin lispro  0-4 Units SubCUTAneous Nightly     Continuous Infusions:   sodium chloride 75 mL/hr at 12/21/22 0630    sodium chloride      dextrose       PRN Meds:.ipratropium-albuterol, sodium chloride flush, sodium chloride, glucose, dextrose bolus **OR** dextrose bolus, glucagon (rDNA), dextrose    Allergies: Patient has no known allergies. Labs:   Recent Labs     12/19/22  1054 12/20/22  0500 12/21/22  0430   WBC 10.6 7.9 8.4   HGB 11.1* 10.7* 10.9*   HCT 35.7 34.1 33.9*   MCV 92.5 90.2 89.2    359 361       Labs:   Recent Labs     12/19/22  1054 12/20/22  0500 12/21/22  0430    139 138   K 3.9 3.8 4.2   CL 93* 93* 92*   CO2 36* 36* 35*   BUN 24* 24* 29*   CREATININE 0.8 0.8 1.0       Labs: No results for input(s): CKTOTAL, CKMB, CKMBINDEX, TROPONINI in the last 72 hours. Labs: No results for input(s): BNP in the last 72 hours.     Labs: No results for input(s): CHOL, HDL, TRIG in the last 72 hours. Invalid input(s): Miriam Posadas    Labs:   Recent Labs     12/21/22  1345   INR 1.3       Review of systems: No reported significant weight gain or weight loss. no dysuria or frequency, no dizziness, falls or trauma, no change in bowel or bladder habits, no hematochezia, hemoptysis or hematuria. No fevers, chills, nausea or vomiting reported. No significant wheezing or sputum production. No headache or visual changes. The remainder of the 10 review of systems otherwise negative. Exam      General: Patient comfortable in no distress and currently denies any chest pain. HEENT: Face symmetrical and no apparent cranial nerve deficit. Neck: No jugular venous distention, carotid bruit or thyromegaly. Lungs: Clear bilaterally without rales, wheezes or dullness. Cardiac: IRegular rate and rhythm, no S3, S4, no rub or gallop. Abdomen: No rebound or guarding, no hepatosplenomegaly. Extremities: Without significant clubbing , cyanosis, or edema. Neuro:  No focal motor or sensory deficit apparent. Skin: No petechiae, no significant bruising. Assessment: See plan below        Plan: #1 AF RVR and then documented long pauses that are symptomatic on telemetry in excess of 10 seconds. Even this morning required use of temporary pacing wire from the right groin heart rate paced at 40. #2 sinus node dysfunction with symptomatic bradycardia and at a lengthy discussion with the patient and called her daughter Ricco Ann on the phone and discussed with both of them indication for implantation of single-chamber ventricular lead permanent pacemaker. All questions answered and patient and daughter Ricco Ann have agreed to proceed. #3 debilitated state and increased fall risk long-term.   Even with the atrial fibrillation may be hesitant to put her on long-term anticoagulation related to her wish to return home with family support but debilitated, and limited functional capacity. #4 hypertension blood pressure under reasonable control on current medications. #5 long pauses on telemetry and continue to hold beta-blocker or any AV blocking agents for now. Once pacemaker is in place would restart AV blocking agents. Suspect she would have had more long pauses if temporary wire was not in place this morning. Had light breakfast oatmeal at about 830 this morning and no lunch and we will keep her n.p.o. and plan to proceed with implant of ventricular lead permanent pacemaker this afternoon.           Electronically signed by Jack Lesches, MD on 12/21/2022 at 2:23 PM

## 2022-12-21 NOTE — PLAN OF CARE
Problem: Chronic Conditions and Co-morbidities  Goal: Patient's chronic conditions and co-morbidity symptoms are monitored and maintained or improved  12/21/2022 1000 by Juanita Ledesma RN  Outcome: Progressing  Flowsheets (Taken 12/21/2022 1000)  Care Plan - Patient's Chronic Conditions and Co-Morbidity Symptoms are Monitored and Maintained or Improved:   Monitor and assess patient's chronic conditions and comorbid symptoms for stability, deterioration, or improvement   Collaborate with multidisciplinary team to address chronic and comorbid conditions and prevent exacerbation or deterioration   Update acute care plan with appropriate goals if chronic or comorbid symptoms are exacerbated and prevent overall improvement and discharge     Problem: Discharge Planning  Goal: Discharge to home or other facility with appropriate resources  12/21/2022 1000 by Juanita Ledesma RN  Outcome: Progressing  Flowsheets (Taken 12/21/2022 1000)  Discharge to home or other facility with appropriate resources:   Identify barriers to discharge with patient and caregiver   Arrange for needed discharge resources and transportation as appropriate   Identify discharge learning needs (meds, wound care, etc)   Refer to discharge planning if patient needs post-hospital services based on physician order or complex needs related to functional status, cognitive ability or social support system     Problem: Cardiovascular - Adult  Goal: Maintains optimal cardiac output and hemodynamic stability  12/21/2022 1000 by Juanita Ledesma RN  Outcome: Progressing  Flowsheets (Taken 12/21/2022 1000)  Maintains optimal cardiac output and hemodynamic stability:   Monitor blood pressure and heart rate   Monitor urine output and notify Licensed Independent Practitioner for values outside of normal range   Assess for signs of decreased cardiac output   Administer fluid and/or volume expanders as ordered   Administer vasoactive medications as ordered     Problem: Cardiovascular - Adult  Goal: Absence of cardiac dysrhythmias or at baseline  12/21/2022 1000 by Celeste Hedrick RN  Outcome: Progressing  Flowsheets (Taken 12/21/2022 1000)  Absence of cardiac dysrhythmias or at baseline:   Monitor cardiac rate and rhythm   Assess for signs of decreased cardiac output   Administer antiarrhythmia medication and electrolyte replacement as ordered     Problem: Respiratory - Adult  Goal: Achieves optimal ventilation and oxygenation  12/21/2022 1000 by Celeste Hedrick RN  Outcome: Progressing  Flowsheets (Taken 12/21/2022 1000)  Achieves optimal ventilation and oxygenation:   Assess for changes in respiratory status   Assess for changes in mentation and behavior   Position to facilitate oxygenation and minimize respiratory effort   Oxygen supplementation based on oxygen saturation or arterial blood gases   Encourage broncho-pulmonary hygiene including cough, deep breathe, incentive spirometry   Assess the need for suctioning and aspirate as needed   Assess and instruct to report shortness of breath or any respiratory difficulty   Respiratory therapy support as indicated     Problem: Skin/Tissue Integrity - Adult  Goal: Skin integrity remains intact  12/21/2022 1000 by Celeste Hedrick RN  Outcome: Progressing  Flowsheets (Taken 12/21/2022 1000)  Skin Integrity Remains Intact:   Monitor for areas of redness and/or skin breakdown   Assess vascular access sites hourly     Problem: Skin/Tissue Integrity - Adult  Goal: Incisions, wounds, or drain sites healing without S/S of infection  12/21/2022 1000 by Celeste Hedrick RN  Outcome: Progressing  Flowsheets (Taken 12/21/2022 1000)  Incisions, Wounds, or Drain Sites Healing Without Sign and Symptoms of Infection:   ADMISSION and DAILY: Assess and document risk factors for pressure ulcer development   TWICE DAILY: Assess and document skin integrity   Implement wound care per orders   Initiate pressure ulcer prevention bundle as indicated     Problem: Skin/Tissue Integrity - Adult  Goal: Oral mucous membranes remain intact  12/21/2022 1000 by Ailyn Pop RN  Outcome: Progressing     Problem: Pain  Goal: Verbalizes/displays adequate comfort level or baseline comfort level  12/21/2022 1000 by Ailyn Pop RN  Outcome: Progressing  Flowsheets (Taken 12/21/2022 1000)  Verbalizes/displays adequate comfort level or baseline comfort level:   Encourage patient to monitor pain and request assistance   Assess pain using appropriate pain scale   Administer analgesics based on type and severity of pain and evaluate response   Implement non-pharmacological measures as appropriate and evaluate response   Consider cultural and social influences on pain and pain management   Notify Licensed Independent Practitioner if interventions unsuccessful or patient reports new pain     Problem: Skin/Tissue Integrity  Goal: Absence of new skin breakdown  Description: 1. Monitor for areas of redness and/or skin breakdown  2. Assess vascular access sites hourly  3. Every 4-6 hours minimum:  Change oxygen saturation probe site  4. Every 4-6 hours:  If on nasal continuous positive airway pressure, respiratory therapy assess nares and determine need for appliance change or resting period.   12/21/2022 1000 by Ailyn Pop RN  Outcome: Progressing     Problem: ABCDS Injury Assessment  Goal: Absence of physical injury  Outcome: Progressing  Flowsheets (Taken 12/21/2022 1000)  Absence of Physical Injury: Implement safety measures based on patient assessment

## 2022-12-21 NOTE — PLAN OF CARE
Problem: Chronic Conditions and Co-morbidities  Goal: Patient's chronic conditions and co-morbidity symptoms are monitored and maintained or improved  12/20/2022 2011 by Crystal Antonio RN  Outcome: Progressing  12/20/2022 0929 by Johnathan Castro RN  Outcome: Progressing     Problem: Discharge Planning  Goal: Discharge to home or other facility with appropriate resources  Outcome: Progressing     Problem: Cardiovascular - Adult  Goal: Maintains optimal cardiac output and hemodynamic stability  12/20/2022 2011 by Crystal Antonio RN  Outcome: Progressing  12/20/2022 0929 by Johnathan Castro RN  Outcome: Progressing  Goal: Absence of cardiac dysrhythmias or at baseline  Outcome: Progressing     Problem: Respiratory - Adult  Goal: Achieves optimal ventilation and oxygenation  12/20/2022 2011 by Crystal Antonio RN  Outcome: Progressing  12/20/2022 0929 by Johnathan Castro RN  Outcome: Progressing     Problem: Skin/Tissue Integrity - Adult  Goal: Skin integrity remains intact  12/20/2022 2011 by Crystal Antonio RN  Outcome: Progressing  12/20/2022 0929 by Johnathan Castro RN  Outcome: Progressing  Goal: Incisions, wounds, or drain sites healing without S/S of infection  Outcome: Progressing  Goal: Oral mucous membranes remain intact  Outcome: Progressing     Problem: Pain  Goal: Verbalizes/displays adequate comfort level or baseline comfort level  12/20/2022 2011 by Crystal Antonio RN  Outcome: Progressing  12/20/2022 0929 by Johnathan Castro RN  Outcome: Progressing     Problem: Skin/Tissue Integrity  Goal: Absence of new skin breakdown  Description: 1. Monitor for areas of redness and/or skin breakdown  2. Assess vascular access sites hourly  3. Every 4-6 hours minimum:  Change oxygen saturation probe site  4. Every 4-6 hours:  If on nasal continuous positive airway pressure, respiratory therapy assess nares and determine need for appliance change or resting period.   Outcome: Progressing

## 2022-12-21 NOTE — PROGRESS NOTES
Physician Progress Note      PATIENT:               Porsha Vega  CSN #:                  [id number]  :                       1932  ADMIT DATE:       2022 9:03 PM  [address] [place] DATE:        2022 5:41 PM  RESPONDING  PROVIDER #:        Josefina Cadena MD          QUERY TEXT:    Dr. Gigi Alexandra,    Patient admitted with new onset atrial fibrillation and noted to have   tachypnea and hypoxia. If possible, please document in the progress notes and   discharge summary if you are evaluating and/or treating any of the following: The medical record reflects the following:  Risk Factors: Advanced age, new onset atrial fibrillation  Clinical Indicators: per the  Cardiology consult note \" tachypnea and   shortness of breath potentially multifactorial.. Holli Stands \" RR 26, SpO2 86% on room   air, placed on 3L supplemental O2  Treatment: Supplemental O2, nebulizer treatments    Thank you,  Kadi Parada RN  Clinical Documentation Improvement  [email or web address]  Options provided:  -- Acute respiratory failure with hypoxia  -- Tachypnea and hypoxia without respiratory failure  -- Other - I will add my own diagnosis  -- Disagree - Not applicable / Not valid  -- Disagree - Clinically unable to determine / Unknown  -- Refer to Clinical Documentation Reviewer    PROVIDER RESPONSE TEXT:    This patient has tachypnea and hypoxia without respiratory failure.     Query created by: Yonis Jacome on 2022 10:34 AM      Electronically signed by:  Josefina Cadena MD 2022 4:52 AM

## 2022-12-22 LAB
METER GLUCOSE: 179 MG/DL (ref 74–99)
METER GLUCOSE: 243 MG/DL (ref 74–99)
METER GLUCOSE: 266 MG/DL (ref 74–99)
METER GLUCOSE: 277 MG/DL (ref 74–99)

## 2022-12-22 PROCEDURE — 6370000000 HC RX 637 (ALT 250 FOR IP): Performed by: INTERNAL MEDICINE

## 2022-12-22 PROCEDURE — 2700000000 HC OXYGEN THERAPY PER DAY

## 2022-12-22 PROCEDURE — 2580000003 HC RX 258: Performed by: FAMILY MEDICINE

## 2022-12-22 PROCEDURE — 2580000003 HC RX 258: Performed by: INTERNAL MEDICINE

## 2022-12-22 PROCEDURE — 6360000002 HC RX W HCPCS: Performed by: INTERNAL MEDICINE

## 2022-12-22 PROCEDURE — 82962 GLUCOSE BLOOD TEST: CPT

## 2022-12-22 PROCEDURE — 2140000000 HC CCU INTERMEDIATE R&B

## 2022-12-22 PROCEDURE — 6370000000 HC RX 637 (ALT 250 FOR IP): Performed by: FAMILY MEDICINE

## 2022-12-22 PROCEDURE — 6370000000 HC RX 637 (ALT 250 FOR IP): Performed by: NURSE PRACTITIONER

## 2022-12-22 RX ORDER — ACETAMINOPHEN 325 MG/1
650 TABLET ORAL EVERY 4 HOURS PRN
Status: DISCONTINUED | OUTPATIENT
Start: 2022-12-22 | End: 2022-12-23 | Stop reason: HOSPADM

## 2022-12-22 RX ORDER — CARVEDILOL 6.25 MG/1
12.5 TABLET ORAL 2 TIMES DAILY WITH MEALS
Status: DISCONTINUED | OUTPATIENT
Start: 2022-12-22 | End: 2022-12-23 | Stop reason: HOSPADM

## 2022-12-22 RX ADMIN — SODIUM CHLORIDE: 9 INJECTION, SOLUTION INTRAVENOUS at 08:22

## 2022-12-22 RX ADMIN — LEVOTHYROXINE SODIUM 75 MCG: 0.07 TABLET ORAL at 06:10

## 2022-12-22 RX ADMIN — INSULIN LISPRO 4 UNITS: 100 INJECTION, SOLUTION INTRAVENOUS; SUBCUTANEOUS at 17:08

## 2022-12-22 RX ADMIN — PREDNISONE 5 MG: 5 TABLET ORAL at 21:36

## 2022-12-22 RX ADMIN — ACETAMINOPHEN 650 MG: 325 TABLET ORAL at 10:08

## 2022-12-22 RX ADMIN — AMLODIPINE BESYLATE 5 MG: 5 TABLET ORAL at 08:51

## 2022-12-22 RX ADMIN — INSULIN LISPRO 2 UNITS: 100 INJECTION, SOLUTION INTRAVENOUS; SUBCUTANEOUS at 11:23

## 2022-12-22 RX ADMIN — SODIUM CHLORIDE, PRESERVATIVE FREE 10 ML: 5 INJECTION INTRAVENOUS at 21:36

## 2022-12-22 RX ADMIN — LISINOPRIL 10 MG: 10 TABLET ORAL at 08:51

## 2022-12-22 RX ADMIN — ACETAMINOPHEN 650 MG: 325 TABLET ORAL at 21:35

## 2022-12-22 RX ADMIN — PREDNISONE 5 MG: 5 TABLET ORAL at 08:51

## 2022-12-22 RX ADMIN — ENOXAPARIN SODIUM 40 MG: 100 INJECTION SUBCUTANEOUS at 08:52

## 2022-12-22 RX ADMIN — CARVEDILOL 12.5 MG: 6.25 TABLET, FILM COATED ORAL at 08:58

## 2022-12-22 RX ADMIN — DONEPEZIL HYDROCHLORIDE 10 MG: 5 TABLET, FILM COATED ORAL at 21:36

## 2022-12-22 RX ADMIN — FUROSEMIDE 20 MG: 20 TABLET ORAL at 08:51

## 2022-12-22 RX ADMIN — PANTOPRAZOLE SODIUM 20 MG: 20 TABLET, DELAYED RELEASE ORAL at 08:51

## 2022-12-22 RX ADMIN — CARVEDILOL 12.5 MG: 6.25 TABLET, FILM COATED ORAL at 18:19

## 2022-12-22 ASSESSMENT — PAIN SCALES - GENERAL
PAINLEVEL_OUTOF10: 1
PAINLEVEL_OUTOF10: 3
PAINLEVEL_OUTOF10: 0
PAINLEVEL_OUTOF10: 2

## 2022-12-22 ASSESSMENT — PAIN - FUNCTIONAL ASSESSMENT: PAIN_FUNCTIONAL_ASSESSMENT: ACTIVITIES ARE NOT PREVENTED

## 2022-12-22 ASSESSMENT — PAIN DESCRIPTION - LOCATION
LOCATION: GENERALIZED
LOCATION: HEAD

## 2022-12-22 ASSESSMENT — PAIN DESCRIPTION - DESCRIPTORS
DESCRIPTORS: ACHING;DISCOMFORT;HEAVINESS
DESCRIPTORS: ACHING;DISCOMFORT;SORE

## 2022-12-22 ASSESSMENT — PAIN DESCRIPTION - ORIENTATION: ORIENTATION: RIGHT;LEFT

## 2022-12-22 NOTE — PLAN OF CARE
Problem: Chronic Conditions and Co-morbidities  Goal: Patient's chronic conditions and co-morbidity symptoms are monitored and maintained or improved  12/22/2022 0040 by Bryce Bertrand RN  Outcome: Progressing     Problem: Cardiovascular - Adult  Goal: Maintains optimal cardiac output and hemodynamic stability  12/22/2022 0040 by Bryce Bertrand RN  Outcome: Progressing     Problem: Cardiovascular - Adult  Goal: Absence of cardiac dysrhythmias or at baseline  12/22/2022 0040 by Bryce Bertrand RN  Outcome: Progressing     Problem: Respiratory - Adult  Goal: Achieves optimal ventilation and oxygenation  12/22/2022 0040 by Bryce Bertrand RN  Outcome: Progressing     Problem: Skin/Tissue Integrity - Adult  Goal: Skin integrity remains intact  12/22/2022 0040 by Bryce Bertrand RN  Outcome: Progressing     Problem: Skin/Tissue Integrity - Adult  Goal: Oral mucous membranes remain intact  12/22/2022 0040 by Bryce Bertrand RN  Outcome: Progressing

## 2022-12-22 NOTE — PLAN OF CARE
Problem: Respiratory - Adult  Goal: Achieves optimal ventilation and oxygenation  12/22/2022 1201 by Evelin Muñoz RN  Outcome: Progressing     Problem: Skin/Tissue Integrity - Adult  Goal: Skin integrity remains intact  12/22/2022 1201 by Evelin Muñoz RN  Outcome: Progressing  Flowsheets (Taken 12/22/2022 0951)  Skin Integrity Remains Intact: Monitor for areas of redness and/or skin breakdown

## 2022-12-22 NOTE — PROGRESS NOTES
[facility] CARDIOLOGY PROGRESS NOTE  [facility]        Subjective: Admitted with progressive debilitation, shortness of breath, respiratory decompensation found to be in atrial fibrillation of unclear onset and duration heart rate in the 140s. Had become progressively debilitated per family members. In the hospital heart rate gradually improved with increasing beta-blocker and IV digoxin and p.o. digoxin but then subsequently had long pauses up to 15 seconds. Digibind was given to reverse and the effect of digoxin and level was only 1.3.    12/22/2022 interim history  Resting comfortably. Permanent pacemaker insertion yesterday afternoon. No chest pain or shortness of breath. Denies PND/orthopnea. No palpitations. Telemetry reviewed: Atrial fibrillation with CVR and pacemaker is sensing appropriately. SPO2 99% on 2 L. Chest x-ray images reviewed: No pneumothorax. Lungs clear      Objective: Medications lab chart and telemetry all reviewed.     Patient Vitals for the past 24 hrs:   BP Temp Temp src Pulse Resp SpO2 Height Weight   12/22/22 0730 135/69 98.2 °F (36.8 °C) Temporal (!) 103 20 99 % -- --   12/22/22 0323 135/60 97.6 °F (36.4 °C) Temporal 89 18 99 % -- --   12/21/22 2302 (!) 115/54 97.2 °F (36.2 °C) Temporal 97 18 98 % -- --   12/21/22 2130 -- -- -- -- -- -- 5' (1.524 m) 190 lb 1.6 oz (86.2 kg)   12/21/22 2107 124/69 97.6 °F (36.4 °C) Temporal 92 18 98 % -- --   12/21/22 2000 (!) 97/53 97.7 °F (36.5 °C) Oral 85 17 98 % -- --   12/21/22 1900 117/62 -- -- 80 24 100 % -- --   12/21/22 1800 122/74 -- -- 81 21 99 % -- --   12/21/22 1730 -- -- -- -- -- 90 % -- --   12/21/22 1700 (!) 141/64 -- -- 98 20 100 % -- --   12/21/22 1605 (!) 146/96 97.9 °F (36.6 °C) Oral 100 26 91 % -- --   12/21/22 1400 -- -- -- (!) 105 18 99 % -- --   12/21/22 1300 124/75 -- -- 91 25 100 % -- --   12/21/22 1200 111/71 97.7 °F (36.5 °C) Oral 87 15 99 % -- --   12/21/22 1132 -- -- -- (!) 41 14 98 % -- --   12/21/22 1100 113/72 -- -- 85 26 98 % -- --   12/21/22 1000 130/63 -- -- 71 24 100 % -- --   12/21/22 0930 121/71 -- -- 92 18 99 % -- --   12/21/22 0900 (!) 129/56 -- -- 71 23 99 % -- --         Intake/Output Summary (Last 24 hours) at 12/22/2022 0840  Last data filed at 12/22/2022 3411  Gross per 24 hour   Intake 555.88 ml   Output 600 ml   Net -44.12 ml       Wt Readings from Last 3 Encounters:   12/21/22 190 lb 1.6 oz (86.2 kg)   12/14/22 179 lb 7.3 oz (81.4 kg)   12/25/20 160 lb (72.6 kg)       Current meds: Scheduled Meds:   enoxaparin  40 mg SubCUTAneous Daily    sodium chloride flush  5-40 mL IntraVENous 2 times per day    lisinopril  10 mg Oral Daily    pantoprazole  20 mg Oral Daily    levothyroxine  75 mcg Oral Daily    predniSONE  5 mg Oral BID    amLODIPine  5 mg Oral Daily    donepezil  10 mg Oral Nightly    furosemide  20 mg Oral Daily    sodium chloride flush  5-40 mL IntraVENous 2 times per day    insulin lispro  0-8 Units SubCUTAneous TID WC    insulin lispro  0-4 Units SubCUTAneous Nightly     Continuous Infusions:   sodium chloride 75 mL/hr at 12/21/22 1730    sodium chloride      sodium chloride      dextrose       PRN Meds:.sodium chloride flush, sodium chloride, ipratropium-albuterol, sodium chloride flush, sodium chloride, glucose, dextrose bolus **OR** dextrose bolus, glucagon (rDNA), dextrose    Allergies: Patient has no known allergies. Labs:   Recent Labs     12/19/22  1054 12/20/22  0500 12/21/22  0430   WBC 10.6 7.9 8.4   HGB 11.1* 10.7* 10.9*   HCT 35.7 34.1 33.9*   MCV 92.5 90.2 89.2    359 361       Labs:   Recent Labs     12/19/22  1054 12/20/22  0500 12/21/22  0430    139 138   K 3.9 3.8 4.2   CL 93* 93* 92*   CO2 36* 36* 35*   BUN 24* 24* 29*   CREATININE 0.8 0.8 1.0       Labs: No results for input(s): CKTOTAL, CKMB, CKMBINDEX, TROPONINI in the last 72 hours. Labs: No results for input(s): BNP in the last 72 hours.     Labs: No results for input(s): CHOL, HDL, TRIG in the last 72 hours. Invalid input(s): Ardell Fogo    Labs:   Recent Labs     12/21/22  1345   INR 1.3       Review of systems:   No headache or confusion. No fever or chills. Denies cough. No nausea or vomiting. Exam      General: Patient comfortable in no distress and currently denies any chest pain. Neck: No jugular venous distention, carotid bruit or thyromegaly. Lungs: Clear bilaterally without rales, wheezes or dullness. Cardiac irregularly irregular rhythm with normal rate. No S3, S4, no rub or gallop. Pulse generator site without hematoma or drainage. Normal pulses in the upper and lower extremities bilaterally       Abdomen: No rebound or guarding, active bowel sounds. Nondistended    Extremities: Without significant clubbing , cyanosis, or edema. Neuro: Moves all extremities appropriately to command. Skin: Warm and dry      Assessment: See plan below        Plan:     #1 AF RVR and then documented long pauses that are symptomatic on telemetry in excess of 10 seconds. Status post permanent pacemaker insertion yesterday. Restart carvedilol today and Eliquis low-dose tomorrow. #2 sinus node dysfunction status post pacemaker      #3 debilitated state may require rehab. #4 hypertension blood pressure under reasonable control on current medications.                   Electronically signed by Randall Carias MD on 12/22/2022 at 8:40 AM

## 2022-12-22 NOTE — CARE COORDINATION
12/22/2022 - Updated CM note - admitted for bradycardia - transfer from [facility] on 12/19/2022. S/P pacemaker implantation on 12/21. Cardiology following. Wearing 2L NC. Does not have home oxygen. No preference for O2 suplly company if O2 required for discharge. [address] following. PT/OT ordered. Spoke with pt daughter in room and discussed transition of care. Discussed pt going home with Chad galvez. Asked for referral for #1 [facility], #2 [facility] [employer].. Sent referral message to Laureen at [facility]. SW/CM will follow.

## 2022-12-22 NOTE — PATIENT CARE CONFERENCE
P Quality Flow/Interdisciplinary Rounds Progress Note        Quality Flow Rounds held on December 22, 2022    Disciplines Attending:  Bedside Nurse, , and Nursing Unit Leadership    Migdalia Hall was admitted on 12/19/2022  5:44 PM    Anticipated Discharge Date:       Disposition:    Bert Score:  Bert Scale Score: 17    Readmission Risk              Risk of Unplanned Readmission:  16           Discussed patient goal for the day, patient clinical progression, and barriers to discharge.   The following Goal(s) of the Day/Commitment(s) have been identified:   increase activity      Iván Khalil RN  December 22, 2022

## 2022-12-22 NOTE — PROGRESS NOTES
[facility]   Progress note      Subjective: The patient is awake and alert. feels well no acute    Objective:    /70   Pulse 92   Temp 98.2 °F (36.8 °C) (Temporal)   Resp 18   Ht 5' (1.524 m)   Wt 190 lb 1.6 oz (86.2 kg)   SpO2 100%   BMI 37.13 kg/m²     In: 675.9 [P.O.:120; I.V.:555.9]  Out: 600   In: 675.9   Out: 600 [Urine:600]    General appearance: NAD, conversant  HEENT: AT/NC, MMM  Neck: FROM, supple  Lungs: Clear to auscultation  CV: RRR, no MRGs-left-sided permanent pacemaker in place  Vasc: Radial pulses 2+  Abdomen: Soft, non-tender; no masses or HSM  Extremities: No peripheral edema or digital cyanosis  Skin: no rash, lesions or ulcers  Psych: Alert and oriented to person, place and time  Neuro: Alert and interactive     Recent Labs     12/20/22  0500 12/21/22  0430   WBC 7.9 8.4   HGB 10.7* 10.9*   HCT 34.1 33.9*    361       Recent Labs     12/20/22  0500 12/21/22  0430    138   K 3.8 4.2   CL 93* 92*   CO2 36* 35*   BUN 24* 29*   CREATININE 0.8 1.0   CALCIUM 9.2 9.3       Assessment:    Principal Problem:    Bradycardia  Resolved Problems:    * No resolved hospital problems.  *      Plan:    55-year-old female who was admitted at [facility] and began having 18-second pauses transferred to [facility] for possible pacer placement and is admitted to the ICU with     Symptomatic bradycardia, appears to have iatrogenic  Offending agents discontinued at [facility]  Status post PPM 12/21/2022-tolerating well  Deferred electrophysiology/cardiology regarding PPM placement  You do not appear to be any plans for such currently  Continue to monitor heart rate closely  Monitor BPs-medications ordered with parameters  IV hydration for decreased urine output, likely a process of hypotensive episodes  Echo - pending     Diabetes Mellitus  -Monitor labs  -ISS glucose control  -Hypoglycemia protocol initiated    12/22/2022  Patient tolerated PPM placement yesterday-tolerated well, continue rate control meds at discretion of cardiology   No acute complaints  Vitals and blood work remained stable  Okay for discharge from medicine standpoint if no further plans by cardiology or EP  Await PT evaluation for possible placement versus home  Continue insulin sliding scale for blood sugar cover      Code Status: Full  Consultants: EP/critical care  DVT Prophylaxis   PT/OT  Discharge planning           Neha Smith MD  1:18 PM  12/22/2022

## 2022-12-22 NOTE — PLAN OF CARE
Problem: Chronic Conditions and Co-morbidities  Goal: Patient's chronic conditions and co-morbidity symptoms are monitored and maintained or improved  12/21/2022 2040 by Hannah Young RN  Outcome: Progressing  12/21/2022 1000 by Steve Romero RN  Outcome: Progressing  Flowsheets (Taken 12/21/2022 1000)  Care Plan - Patient's Chronic Conditions and Co-Morbidity Symptoms are Monitored and Maintained or Improved:   Monitor and assess patient's chronic conditions and comorbid symptoms for stability, deterioration, or improvement   Collaborate with multidisciplinary team to address chronic and comorbid conditions and prevent exacerbation or deterioration   Update acute care plan with appropriate goals if chronic or comorbid symptoms are exacerbated and prevent overall improvement and discharge     Problem: Discharge Planning  Goal: Discharge to home or other facility with appropriate resources  12/21/2022 2040 by Hannah Yuong RN  Outcome: Progressing  12/21/2022 1000 by Steve Romero RN  Outcome: Progressing  Flowsheets (Taken 12/21/2022 1000)  Discharge to home or other facility with appropriate resources:   Identify barriers to discharge with patient and caregiver   Arrange for needed discharge resources and transportation as appropriate   Identify discharge learning needs (meds, wound care, etc)   Refer to discharge planning if patient needs post-hospital services based on physician order or complex needs related to functional status, cognitive ability or social support system     Problem: Cardiovascular - Adult  Goal: Maintains optimal cardiac output and hemodynamic stability  12/21/2022 2040 by Hannah Young RN  Outcome: Progressing  12/21/2022 1000 by Steve Romero RN  Outcome: Progressing  Flowsheets (Taken 12/21/2022 1000)  Maintains optimal cardiac output and hemodynamic stability:   Monitor blood pressure and heart rate   Monitor urine output and notify Licensed Independent Practitioner for values outside of normal range   Assess for signs of decreased cardiac output   Administer fluid and/or volume expanders as ordered   Administer vasoactive medications as ordered  Goal: Absence of cardiac dysrhythmias or at baseline  12/21/2022 2040 by Cara Saucedo RN  Outcome: Progressing  12/21/2022 1000 by Rolo Hernandez RN  Outcome: Progressing  Flowsheets (Taken 12/21/2022 1000)  Absence of cardiac dysrhythmias or at baseline:   Monitor cardiac rate and rhythm   Assess for signs of decreased cardiac output   Administer antiarrhythmia medication and electrolyte replacement as ordered     Problem: Respiratory - Adult  Goal: Achieves optimal ventilation and oxygenation  12/21/2022 2040 by Cara Saucedo RN  Outcome: Progressing  12/21/2022 1000 by Rolo Hernandez RN  Outcome: Progressing  Flowsheets (Taken 12/21/2022 1000)  Achieves optimal ventilation and oxygenation:   Assess for changes in respiratory status   Assess for changes in mentation and behavior   Position to facilitate oxygenation and minimize respiratory effort   Oxygen supplementation based on oxygen saturation or arterial blood gases   Encourage broncho-pulmonary hygiene including cough, deep breathe, incentive spirometry   Assess the need for suctioning and aspirate as needed   Assess and instruct to report shortness of breath or any respiratory difficulty   Respiratory therapy support as indicated     Problem: Skin/Tissue Integrity - Adult  Goal: Skin integrity remains intact  12/21/2022 2040 by Cara Saucedo RN  Outcome: Progressing  12/21/2022 1000 by Rolo Hernandez RN  Outcome: Progressing  Flowsheets (Taken 12/21/2022 1000)  Skin Integrity Remains Intact:   Monitor for areas of redness and/or skin breakdown   Assess vascular access sites hourly  Goal: Incisions, wounds, or drain sites healing without S/S of infection  12/21/2022 2040 by Cara Saucedo RN  Outcome: Progressing  12/21/2022 1000 by Jose Nicole RN  Outcome: Progressing  Flowsheets (Taken 12/21/2022 1000)  Incisions, Wounds, or Drain Sites Healing Without Sign and Symptoms of Infection:   ADMISSION and DAILY: Assess and document risk factors for pressure ulcer development   TWICE DAILY: Assess and document skin integrity   Implement wound care per orders   Initiate pressure ulcer prevention bundle as indicated  Goal: Oral mucous membranes remain intact  12/21/2022 2040 by Cj Melara RN  Outcome: Progressing  12/21/2022 1000 by Jose Nicole RN  Outcome: Progressing     Problem: Pain  Goal: Verbalizes/displays adequate comfort level or baseline comfort level  12/21/2022 2040 by Cj Melara RN  Outcome: Progressing  12/21/2022 1000 by Jose Nicole RN  Outcome: Progressing  Flowsheets (Taken 12/21/2022 1000)  Verbalizes/displays adequate comfort level or baseline comfort level:   Encourage patient to monitor pain and request assistance   Assess pain using appropriate pain scale   Administer analgesics based on type and severity of pain and evaluate response   Implement non-pharmacological measures as appropriate and evaluate response   Consider cultural and social influences on pain and pain management   Notify Licensed Independent Practitioner if interventions unsuccessful or patient reports new pain     Problem: Skin/Tissue Integrity  Goal: Absence of new skin breakdown  Description: 1. Monitor for areas of redness and/or skin breakdown  2. Assess vascular access sites hourly  3. Every 4-6 hours minimum:  Change oxygen saturation probe site  4. Every 4-6 hours:  If on nasal continuous positive airway pressure, respiratory therapy assess nares and determine need for appliance change or resting period.   12/21/2022 2040 by Cj Melara RN  Outcome: Progressing  12/21/2022 1000 by Jose Nicole RN  Outcome: Progressing     Problem: ABCDS Injury Assessment  Goal: Absence of physical injury  12/21/2022 2040 by Shlomo Gipson RN  Outcome: Progressing  12/21/2022 1000 by Mason Delarosa RN  Outcome: Progressing  Flowsheets (Taken 12/21/2022 1000)  Absence of Physical Injury: Implement safety measures based on patient assessment

## 2022-12-23 VITALS
OXYGEN SATURATION: 96 % | WEIGHT: 190.1 LBS | HEART RATE: 76 BPM | HEIGHT: 60 IN | RESPIRATION RATE: 16 BRPM | BODY MASS INDEX: 37.32 KG/M2 | SYSTOLIC BLOOD PRESSURE: 132 MMHG | TEMPERATURE: 97.4 F | DIASTOLIC BLOOD PRESSURE: 59 MMHG

## 2022-12-23 LAB
METER GLUCOSE: 195 MG/DL (ref 74–99)
METER GLUCOSE: 260 MG/DL (ref 74–99)
METER GLUCOSE: 292 MG/DL (ref 74–99)
SARS-COV-2, NAAT: NOT DETECTED

## 2022-12-23 PROCEDURE — 6370000000 HC RX 637 (ALT 250 FOR IP): Performed by: NURSE PRACTITIONER

## 2022-12-23 PROCEDURE — 97165 OT EVAL LOW COMPLEX 30 MIN: CPT

## 2022-12-23 PROCEDURE — 97535 SELF CARE MNGMENT TRAINING: CPT

## 2022-12-23 PROCEDURE — 2580000003 HC RX 258: Performed by: INTERNAL MEDICINE

## 2022-12-23 PROCEDURE — 6370000000 HC RX 637 (ALT 250 FOR IP): Performed by: INTERNAL MEDICINE

## 2022-12-23 PROCEDURE — 97530 THERAPEUTIC ACTIVITIES: CPT

## 2022-12-23 PROCEDURE — 2580000003 HC RX 258: Performed by: FAMILY MEDICINE

## 2022-12-23 PROCEDURE — 87635 SARS-COV-2 COVID-19 AMP PRB: CPT

## 2022-12-23 PROCEDURE — 6370000000 HC RX 637 (ALT 250 FOR IP): Performed by: FAMILY MEDICINE

## 2022-12-23 PROCEDURE — 97161 PT EVAL LOW COMPLEX 20 MIN: CPT

## 2022-12-23 PROCEDURE — 82962 GLUCOSE BLOOD TEST: CPT

## 2022-12-23 RX ORDER — LISINOPRIL 10 MG/1
10 TABLET ORAL DAILY
Qty: 30 TABLET | Refills: 3 | Status: SHIPPED | OUTPATIENT
Start: 2022-12-24

## 2022-12-23 RX ADMIN — LEVOTHYROXINE SODIUM 75 MCG: 0.07 TABLET ORAL at 06:42

## 2022-12-23 RX ADMIN — FUROSEMIDE 20 MG: 20 TABLET ORAL at 09:06

## 2022-12-23 RX ADMIN — CARVEDILOL 12.5 MG: 6.25 TABLET, FILM COATED ORAL at 17:45

## 2022-12-23 RX ADMIN — AMLODIPINE BESYLATE 5 MG: 5 TABLET ORAL at 09:06

## 2022-12-23 RX ADMIN — SODIUM CHLORIDE, PRESERVATIVE FREE 10 ML: 5 INJECTION INTRAVENOUS at 09:07

## 2022-12-23 RX ADMIN — SODIUM CHLORIDE: 9 INJECTION, SOLUTION INTRAVENOUS at 07:43

## 2022-12-23 RX ADMIN — INSULIN LISPRO 4 UNITS: 100 INJECTION, SOLUTION INTRAVENOUS; SUBCUTANEOUS at 09:08

## 2022-12-23 RX ADMIN — PANTOPRAZOLE SODIUM 20 MG: 20 TABLET, DELAYED RELEASE ORAL at 09:07

## 2022-12-23 RX ADMIN — APIXABAN 5 MG: 5 TABLET, FILM COATED ORAL at 09:07

## 2022-12-23 RX ADMIN — PREDNISONE 5 MG: 5 TABLET ORAL at 09:05

## 2022-12-23 RX ADMIN — LISINOPRIL 10 MG: 10 TABLET ORAL at 09:07

## 2022-12-23 RX ADMIN — CARVEDILOL 12.5 MG: 6.25 TABLET, FILM COATED ORAL at 09:06

## 2022-12-23 RX ADMIN — INSULIN LISPRO 4 UNITS: 100 INJECTION, SOLUTION INTRAVENOUS; SUBCUTANEOUS at 13:17

## 2022-12-23 RX ADMIN — ACETAMINOPHEN 650 MG: 325 TABLET ORAL at 13:16

## 2022-12-23 ASSESSMENT — PAIN DESCRIPTION - ORIENTATION: ORIENTATION: MID

## 2022-12-23 ASSESSMENT — PAIN DESCRIPTION - FREQUENCY: FREQUENCY: CONTINUOUS

## 2022-12-23 ASSESSMENT — PAIN SCALES - GENERAL
PAINLEVEL_OUTOF10: 3
PAINLEVEL_OUTOF10: 0

## 2022-12-23 ASSESSMENT — PAIN DESCRIPTION - PAIN TYPE: TYPE: ACUTE PAIN

## 2022-12-23 ASSESSMENT — PAIN DESCRIPTION - DESCRIPTORS: DESCRIPTORS: ACHING;DISCOMFORT;THROBBING

## 2022-12-23 ASSESSMENT — PAIN DESCRIPTION - LOCATION: LOCATION: BACK

## 2022-12-23 ASSESSMENT — PAIN DESCRIPTION - ONSET: ONSET: ON-GOING

## 2022-12-23 ASSESSMENT — PAIN - FUNCTIONAL ASSESSMENT: PAIN_FUNCTIONAL_ASSESSMENT: ACTIVITIES ARE NOT PREVENTED

## 2022-12-23 NOTE — DISCHARGE INSTR - COC
Continuity of Care Form    Patient Name: Dez Romero   :  1932  MRN:  [id number]    Admit date:  2022  Discharge date:  22    Code Status Order: Full Code   Advance Directives:     Admitting Physician:  Johanny Dillard MD  PCP: Christina Miranda MD    Discharging Nurse: Alesia Mandujano Unit/Room#: 8346/6695-S  Discharging Unit Phone Number: [phone]    Emergency Contact:   Extended Emergency Contact Information  Primary Emergency Contact: Reynaldo Efrem  Address: [address] [place] [address], [place] [address] [facility] [address] [place] [address] Phone: [phone]  Relation: Spouse  Secondary Emergency Contact: Damaris Small \"Sissy\"  Home Phone: [phone]  Mobile Phone: [phone]  Relation: Child    Past Surgical History:  Past Surgical History:   Procedure Laterality Date    CARPAL TUNNEL RELEASE      CHOLECYSTECTOMY      HC INSERT PICC CATH, 5/> YRS  2020         HYSTERECTOMY (CERVIX STATUS UNKNOWN)         Immunization History:   Immunization History   Administered Date(s) Administered    COVID-19, PFIZER GRAY top, DO NOT Dilute, (age 15 y+), IM, 30 mcg/0.3 mL 2022    COVID-19, PFIZER PURPLE top, DILUTE for use, (age 15 y+), 30mcg/0.3mL 2021, 2021, 2021       Active Problems:  Patient Active Problem List   Diagnosis Code    Acute appendicitis K35.80    Hypertension I10    Inadequately controlled diabetes mellitus ([address] F17.54    Metabolic encephalopathy T91.58    Sepsis associated hypotension (HCC) A41.9, I95.9    Sepsis ([address] A41.9    Relapsing appendicitis K36    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    DVT of deep femoral vein, right (HCC) I82.411    History of pulmonary embolism Z86.711    Generalized weakness R53.1    Acute kidney injury ([address] N17.9    Left knee pain M25.562    Baker's cyst M71.20    Atrial fibrillation with RVR ([address] I48.91    New onset a-fib ([address] I48.91    Bradycardia R00.1       Isolation/Infection:   Isolation No Isolation          Patient Infection Status       Infection Onset Added Last Indicated Last Indicated By Review Planned Expiration Resolved Resolved By    None active    Resolved    COVID-19 (Rule Out) 12/11/22 12/11/22 12/11/22 COVID-19, Rapid (Ordered)   12/11/22 Rule-Out Test Resulted    C-diff Rule Out 03/09/20 03/09/20 03/09/20 C. difficile toxin Molecular (Ordered)   03/09/20 Rule-Out Test Resulted            Nurse Assessment:  Last Vital Signs: BP (!) 126/59   Pulse 71   Temp 97.3 °F (36.3 °C) (Temporal)   Resp 18   Ht 5' (1.524 m)   Wt 190 lb 1.6 oz (86.2 kg)   SpO2 98%   BMI 37.13 kg/m²     Last documented pain score (0-10 scale): Pain Level: 3  Last Weight:   Wt Readings from Last 1 Encounters:   12/21/22 190 lb 1.6 oz (86.2 kg)     Mental Status:  oriented and alert    IV Access:  - None    Nursing Mobility/ADLs:  Walking   Dependent  Transfer  Dependent  Bathing  Dependent  Dressing  Dependent  Toileting  Dependent  Feeding  Assisted  Med Admin  Assisted  Med Delivery   whole    Wound Care Documentation and Therapy:  Wound 01/18/20 Buttocks Medial dark purple area on right and left buttock (Active)   Dressing Status Clean;Dry; Intact 12/23/22 1130   Dressing/Treatment Foam 12/23/22 1130   Wound Assessment Purple/maroon 12/23/22 1130   Drainage Amount None 12/23/22 1130   Odor None 12/23/22 1130   Aria-wound Assessment Non-blanchable erythema 12/23/22 1130   Margins Attached edges 12/23/22 1130   Number of days: 1070        Elimination:  Continence: Bowel: No  Bladder: No  Urinary Catheter: None   Colostomy/Ileostomy/Ileal Conduit: No       Date of Last BM: 12/22/22    Intake/Output Summary (Last 24 hours) at 12/23/2022 1455  Last data filed at 12/23/2022 1440  Gross per 24 hour   Intake 250 ml   Output 625 ml   Net -375 ml     I/O last 3 completed shifts: In: 240 [P.O.:240]  Out: 550 [Urine:550]    Safety Concerns:      At Risk for Falls    Impairments/Disabilities: Vision    Nutrition Therapy:  Current Nutrition Therapy:   - Oral Diet:  Carb Control 5 carbs/meal (2000kcals/day)    Routes of Feeding: Oral  Liquids: Thin Liquids  Daily Fluid Restriction: no  Last Modified Barium Swallow with Video (Video Swallowing Test): not done    Treatments at the Time of Hospital Discharge:   Respiratory Treatments: ***  Oxygen Therapy:  is on oxygen at 2 L/min per nasal cannula. Ventilator:    - No ventilator support    Rehab Therapies: Physical Therapy and Occupational Therapy  Weight Bearing Status/Restrictions: No weight bearing restrictions  Other Medical Equipment (for information only, NOT a DME order):  wheelchair and bedside commode  Other Treatments: ***    Patient's personal belongings (please select all that are sent with patient):  Glasses    RN SIGNATURE:  Electronically signed by Trenton Milian RN on 12/23/22 at 3:36 PM EST    CASE MANAGEMENT/SOCIAL WORK SECTION    Inpatient Status Date: ***    Readmission Risk Assessment Score:  Readmission Risk              Risk of Unplanned Readmission:  16           Discharging to Facility/ Agency   Name:   Address:  Phone:  Fax:    Dialysis Facility (if applicable)   Name:  Address:  Dialysis Schedule:  Phone:  Fax:    / signature: {Esignature:[id number]}    PHYSICIAN SECTION    Prognosis: Fair    Condition at Discharge: Stable    Rehab Potential (if transferring to Rehab): Fair    Recommended Labs or Other Treatments After Discharge: CBC and BMP in 3 days    Physician Certification: I certify the above information and transfer of Freeman Carrillo  is necessary for the continuing treatment of the diagnosis listed and that she requires [place] for less 30 days.      Update Admission H&P: No change in H&P    PHYSICIAN SIGNATURE:  Electronically signed by Pablo Murphy MD on 12/23/22 at 2:56 PM EST

## 2022-12-23 NOTE — PROGRESS NOTES
This nurse called report to [facility]. All questions answered. Paperwork faxed to number provided by nurse. [employer] card inside folder that will go with pt.

## 2022-12-23 NOTE — PATIENT CARE CONFERENCE
P Quality Flow/Interdisciplinary Rounds Progress Note        Quality Flow Rounds held on December 23, 2022    Disciplines Attending:  Bedside Nurse, , , and Nursing Unit Leadership    Homa Watts was admitted on 12/19/2022  5:44 PM    Anticipated Discharge Date:       Disposition:    Bert Score:  Ebrt Scale Score: 17    Readmission Risk              Risk of Unplanned Readmission:  16           Discussed patient goal for the day, patient clinical progression, and barriers to discharge.   The following Goal(s) of the Day/Commitment(s) have been identified:  discharge plan      Guerda Kyle RN  December 23, 2022

## 2022-12-23 NOTE — PLAN OF CARE
Problem: Chronic Conditions and Co-morbidities  Goal: Patient's chronic conditions and co-morbidity symptoms are monitored and maintained or improved  Outcome: Progressing     Problem: Discharge Planning  Goal: Discharge to home or other facility with appropriate resources  Outcome: Progressing     Problem: Cardiovascular - Adult  Goal: Maintains optimal cardiac output and hemodynamic stability  Outcome: Progressing     Problem: Respiratory - Adult  Goal: Achieves optimal ventilation and oxygenation  Outcome: Progressing     Problem: Skin/Tissue Integrity - Adult  Goal: Skin integrity remains intact  Outcome: Progressing     Problem: Pain  Goal: Verbalizes/displays adequate comfort level or baseline comfort level  Outcome: Progressing

## 2022-12-23 NOTE — CARE COORDINATION
Received call from [facility], they accepted pt, no precert needed. Cardiology signed off 12/23. Notified family in room that [facility] accepted. Envelope, ambulance form,  and completed PASRR in soft chart. Pt accepted to [facility], discharge order obtained , SHILPIG for 7pm via ambulance. Notified bedside RN, charge RN, family in room, and [facility] of discharge/time.   G called to change time to [address] [place] [address], MSW, LSW

## 2022-12-23 NOTE — PROGRESS NOTES
[facility] CARDIOLOGY PROGRESS NOTE  [facility]        Subjective: Admitted with progressive debilitation, shortness of breath, respiratory decompensation found to be in atrial fibrillation of unclear onset and duration heart rate in the 140s. Had become progressively debilitated per family members. In the hospital heart rate gradually improved with increasing beta-blocker and IV digoxin and p.o. digoxin but then subsequently had long pauses up to 15 seconds. Digibind was given to reverse and the effect of digoxin and level was only 1.3.    12/22/2022 interim history  Resting comfortably. Permanent pacemaker insertion yesterday afternoon. No chest pain or shortness of breath. Denies PND/orthopnea. No palpitations. Telemetry reviewed: Atrial fibrillation with CVR and pacemaker is sensing appropriately. SPO2 99% on 2 L. Chest x-ray images reviewed: No pneumothorax. Lungs clear      12/23 interim history  No cardiac issues. Denies chest pain and no shortness of breath lying flat. Telemetry reviewed: Chronic atrial fibrillation with CVR and intermittent ventricular pacing. Objective: Medications lab chart and telemetry all reviewed.     Patient Vitals for the past 24 hrs:   BP Temp Temp src Pulse Resp SpO2   12/23/22 0730 (!) 156/70 97.7 °F (36.5 °C) Oral 85 18 99 %   12/23/22 0334 116/72 97.3 °F (36.3 °C) Temporal 92 18 100 %   12/23/22 0021 (!) 103/52 98 °F (36.7 °C) Temporal 95 18 100 %   12/22/22 2135 -- -- -- 94 -- --   12/22/22 1918 121/72 97.5 °F (36.4 °C) Temporal 50 18 100 %   12/22/22 1600 (!) 110/58 97.6 °F (36.4 °C) Temporal 96 20 97 %   12/22/22 1500 (!) 111/58 97.8 °F (36.6 °C) Temporal 97 18 98 %   12/22/22 1100 136/70 98.2 °F (36.8 °C) Temporal 92 18 100 %         Intake/Output Summary (Last 24 hours) at 12/23/2022 0853  Last data filed at 12/22/2022 1924  Gross per 24 hour   Intake 120 ml   Output 300 ml   Net -180 ml       Wt Readings from Last 3 Encounters:   12/21/22 190 lb 1.6 oz (86.2 kg)   12/14/22 179 lb 7.3 oz (81.4 kg)   12/25/20 160 lb (72.6 kg)       Current meds: Scheduled Meds:   carvedilol  12.5 mg Oral BID WC    enoxaparin  40 mg SubCUTAneous Daily    sodium chloride flush  5-40 mL IntraVENous 2 times per day    lisinopril  10 mg Oral Daily    pantoprazole  20 mg Oral Daily    levothyroxine  75 mcg Oral Daily    predniSONE  5 mg Oral BID    amLODIPine  5 mg Oral Daily    donepezil  10 mg Oral Nightly    furosemide  20 mg Oral Daily    sodium chloride flush  5-40 mL IntraVENous 2 times per day    insulin lispro  0-8 Units SubCUTAneous TID WC    insulin lispro  0-4 Units SubCUTAneous Nightly     Continuous Infusions:   sodium chloride 75 mL/hr at 12/23/22 0743    sodium chloride      sodium chloride      dextrose       PRN Meds:.acetaminophen, sodium chloride flush, sodium chloride, ipratropium-albuterol, sodium chloride flush, sodium chloride, glucose, dextrose bolus **OR** dextrose bolus, glucagon (rDNA), dextrose    Allergies: Patient has no known allergies. Labs:   Recent Labs     12/21/22  0430   WBC 8.4   HGB 10.9*   HCT 33.9*   MCV 89.2          Labs:   Recent Labs     12/21/22  0430      K 4.2   CL 92*   CO2 35*   BUN 29*   CREATININE 1.0       Labs: No results for input(s): CKTOTAL, CKMB, CKMBINDEX, TROPONINI in the last 72 hours. Labs: No results for input(s): BNP in the last 72 hours. Labs: No results for input(s): CHOL, HDL, TRIG in the last 72 hours. Invalid input(s): Keenan Recio    Labs:   Recent Labs     12/21/22  1345   INR 1.3       Review of systems:   No headache or confusion. No fever or chills. Denies cough. No nausea or vomiting. Exam      General: Patient comfortable in no distress and currently denies any chest pain. Neck: No jugular venous distention, carotid bruit or thyromegaly. Lungs: Clear bilaterally without rales, wheezes or dullness.       Cardiac irregularly irregular rhythm with normal rate.  No S3, S4, no rub or gallop. Pulse generator site without hematoma or drainage. Normal pulses in the upper and lower extremities bilaterally       Abdomen: No rebound or guarding, active bowel sounds. Nondistended    Extremities: Without significant clubbing , cyanosis, or edema. Neuro: Moves all extremities appropriately to command. Skin: Warm and dry      Assessment: See plan below        Plan:     #1 AF RVR and then documented long pauses that are symptomatic on telemetry in excess of 10 seconds. Status post permanent pacemaker insertion on 12/21. Restarted carvedilol and Eliquis low-dose. #2 sinus node dysfunction status post pacemaker      #3 debilitated state may require rehab. #4 hypertension blood pressure under reasonable control on current medications. Okay for discharge from my perspective.   Office will arrange follow-up                  Electronically signed by Sher Calvo MD on 12/23/2022 at 8:53 AM

## 2022-12-23 NOTE — PROGRESS NOTES
Occupational Therapy  OCCUPATIONAL THERAPY INITIAL EVALUATION     [address] [address] [address]      JNNO:                                                Patient Name: Clinton Sullivan  MRN: [id number]  : 1932  Room: [address] [place] [address]    Evaluating OT: Jeremías Breeden OTR/L #[id number]     Referring Provider: Ngoc Burroughs MD  Specific Provider Orders/Date: OT eval and treat 22    Diagnosis: Bradycardia [R00.1]   Pt admitted to hospital with SOB and bradycardia     Pertinent Medical History:  has a past medical history of Arthritis, Diabetes mellitus ([address] Hyperlipidemia, and Hypertension.        Precautions:  Fall Risk, pacemaker precautions, sling Edger Saltness, TAPS    Assessment of current deficits    [x] Functional mobility  [x]ADLs  [x] Strength               []Cognition    [x] Functional transfers   [x] IADLs         [x] Safety Awareness   [x]Endurance    [] Fine Coordination              [x] Balance      [] Vision/perception   []Sensation     []Gross Motor Coordination  [] ROM  [] Delirium                   [] Motor Control     OT PLAN OF CARE   OT POC based on physician orders, patient diagnosis and results of clinical assessment    Frequency/Duration 1-3 days/wk for 2 weeks PRN   Specific OT Treatment Interventions to include:   * Instruction/training on adapted ADL techniques and AE recommendations to increase functional independence within precautions       * Training on energy conservation strategies, correct breathing pattern and techniques to improve independence/tolerance for self-care routine  * Functional transfer/mobility training/DME recommendations for increased independence, safety, and fall prevention  * Patient/Family education to increase follow through with safety techniques and functional independence  * Recommendation of environmental modifications for increased safety with functional transfers/mobility and ADLs  * Cognitive retraining/development of therapeutic activities to improve problem solving, judgement, memory, and attention for increased safety/participation in ADL/IADL tasks  * Therapeutic exercise to improve motor endurance, ROM, and functional strength for ADLs/functional transfers  * Therapeutic activities to facilitate/challenge dynamic balance, stand tolerance for increased safety and independence with ADLs  * Therapeutic activities to facilitate gross/fine motor skills for increased independence with ADLs  * Neuro-muscular re-education: facilitation of righting/equilibrium reactions, midline orientation, scapular stability/mobility, normalization of muscle tone, and facilitation of volitional active controled movement  * Positioning to improve skin integrity, interaction with environment and functional independence    Recommended Adaptive Equipment:  TBD     Home Living: Pt lives with  in a 1 story home with 4 LACY   Bathroom setup: sponge bathes with assist from     Equipment owned: none    Prior Level of Function: assist with ADLs , assist with IADLs; ambulated with w/w    Driving: yes   Occupation: na    Pain Level: Pt denies pain this session    Cognition: A&O: 4/4; Follows 1 step directions   Memory:  good   Sequencing:  good   Problem solving:  fair   Judgement/safety:  fair     Functional Assessment:  AM-PAC Daily Activity Raw Score: 12/24   Initial Eval Status  Date: 12/23/22 Treatment Status  Date: STGs = LTGs  Time frame: 10-14 days   Feeding Minimal Assist  Mod I   Grooming Minimal Assist   Modified [place]    UB Dressing Maximal Assist   Minimal Assist    LB Dressing Dependent  Maximal Assist   Bathing Maximal Assist  Moderate Assist    Toileting Dependent   Maximal Assist    Bed Mobility  Supine to sit: Maximal Assist x2  Sit to supine: Maximal Assist x2  Supine to sit: Moderate Assist   Sit to supine:  Moderate Assist    Functional Transfers Maximal Assist x2    Sit to stands Maximal Assist    Functional Mobility NT      Balance Sitting: min - mod A  Standing: max Ax2     Activity Tolerance F-  F+   Visual/  Perceptual wfl                Hand Dominance right   Strength ROM Additional Info:    RUE   4/5 wfl good  and wfl FMC/dexterity noted during ADL tasks     LUE Sling/swathe Sling/swathe good  and wfl FMC/dexterity noted during ADL tasks     Hearing: wfl  Sensation:wfl  Tone: wfl  Edema:none noted     Comments: Upon arrival patient supine in bed and agreeable to OT Session. Therapist educated pt on role of OT. At end of session, patient semi-supine in bed with call light and phone within reach, all lines and tubes intact. Overall patient demonstrated decreased independence and safety during completion of ADL/functional transfer/mobility tasks. Pt would benefit from continued skilled OT to increase safety and independence with completion of ADL/IADL tasks for functional independence and quality of life. Treatment: OT treatment provided this date includes: Therapist educated pt on role of OT and pacemaker precautions. Therapist facilitated bed mobility (rolling, supine<>sit), functional sit to stand transfers, standing tolerance tasks (addressing posture, balance and activity tolerance) - skilled cuing on sequencing, pacemaker precautions, body mechanics and safety. Therapist facilitated self-care retraining: education regarding UB/LB self-care tasks (robe, socks), toileting hygiene task and seated grooming task educating pt on modified techniques, posture, safety and energy conservation techniques. Skilled monitoring of HR, O2 sats and pts response to treatment      Rehab Potential: Good for established goals     Patient / Family Goal: return home      Patient and/or family were instructed on functional diagnosis, prognosis/goals and OT plan of care. Demonstrated fair- understanding.      Eval Complexity: Low    Time In: 1035  Time Out: 1115  Total Treatment Time: 25 minutes    Min Units   OT Eval Low 97165  x  1   OT Eval Medium 63158      OT Eval High 52507      OT Re-Eval [id number]       Therapeutic Ex 61346       Therapeutic Activities 22841  12  1   ADL/Self Care 65162  13  1   Orthotic Management 79789       Manual 33831     Neuro Re-Ed 53712       Non-Billable Time          Evaluation Time additionally includes thorough review of current medical information, gathering information on past medical history/social history and prior level of function, interpretation of standardized testing/informal observation of tasks, assessment of data and development of plan of care and goals.           Dominican Parent OTR/L #[id number]

## 2022-12-23 NOTE — PROGRESS NOTES
Physical Therapy  Physical Therapy Initial Evaluation    Name: Clinton Sullivan  : 1932  MRN: [id number]      Date of Service: 2022    Evaluating PT:  [place], PT [id number]    Referring provider/PT Order:  PT Eval and Treat   22 1545  PT eval and treat          Ngoc Burroughs MD     Room #:  7700/4908-Q  Diagnosis:  Bradycardia [R00.1]  PMHx/PSHx:     has a past medical history of Arthritis, Diabetes mellitus ([address] Hyperlipidemia, and Hypertension. has a past surgical history that includes Cholecystectomy; Hysterectomy; Carpal tunnel release; and Insert Picc Cath, 5/> Yrs (2020). Procedure/Surgery:  22 Pacemaker placement. Precautions:  Falls, pacemaker , FWB (full weight bearing) ,   Equipment Needs: To be determined,    SUBJECTIVE:    Patient lives with spouse  in a ranch home  with 4 steps to enter with 0 Rail  Bed is on 1 floor and bath is on 1 floor. Patient ambulated with wheeled walker with assistance PTA. Equipment owned: Leslie Bryan,      OBJECTIVE:   Initial Evaluation  Date: 22 Treatment Short Term/ Long Term   Goals   AM-PAC 6 Clicks 0/65     Was pt agreeable to Eval/treatment? yes     Does pt have pain? Yes r/o LBP and joint pain. Bed Mobility  Rolling: Max   Supine to sit:   Max x 2   Sit to supine: Max x 2   Scooting: Max    Rolling: Mod   Supine to sit: Mod   Sit to supine: Mod   Scooting: Mod    Transfers Sit to stand: Max x 2  to HHA. Stand to sit: Max x 2  Stand pivot: NT unable to move feet. Sit to stand: Mod    Stand to sit: Mod    Stand pivot: Mod    Ambulation    No steps taken. Stair negotiation: ascended and descended  NT       Strength/ROM:     RLE grossly 4-/5  LLE grossly 4-/5  RLE AROM WFL  LLE AROM WFL    Balance:   Static Sitting: Mod  flexed posture  Dynamic Sitting:  Mod   Static Standing: Max x 2 with HHA  Dynamic Standing: Max x 2 with HHA      Patient is Alert & Oriented x person, place, time, and situation and follows directions   Sensation:  Pt denies numbness and tingling to extremities  Edema:  none    Vitals:  Seated   Blood Pressure at rest    Heart Rate at rest 85 bpm   SPO2 at rest 95% 1L     Therapeutic Exercises:  Functional activity as stated above. Patient education  Pt educated on role of Physical Therapy, risks of immobility, safety and plan of care and  importance of mobility while in hospital  and use of call light for safety. Patient response to education:   Pt verbalized understanding Pt demonstrated skill Pt requires further education in this area   yes yes Reminders     ASSESSMENT:    Conditions Requiring Skilled Therapeutic Intervention:    [x]Decreased strength     [x]Decreased ROM  [x]Decreased functional mobility  [x]Decreased balance   [x]Decreased endurance   [x]Decreased posture  []Decreased sensation  []Decreased coordination   []Decreased vision  [x]Decreased safety awareness   []Increased pain       Comments:    RN cleared patient for participation in therapy session. Patient was seen this date for PT evaluation. . Patient was agreeable to intervention. Results of the functional assessment are noted above. Upon entering the room patient was found supine in bed. Family present. . Increased assist required to transition to EOB. Sat EOB x 10 minutes to increase dynamic sitting balance and activity tolerance. STS attempted 3x with increased assist required. Poor tolerance to standing. Will be difficult for family to manage this individual at home due to debility. At end of session, patient in bed with  call light and phone within reach,  all lines and tubes intact, nursing notified. This patient can benefit from the continuation of skilled PT possibly in a rehab setting to maximize functional level and return to PLOF. Assistance of two required due to acuity of medical condition, complex medical lines management as well as overall deconditioning of patient.     Treatment:  Patient completed and was instructed in the following treatment:      Bed mobility training - pt given verbal and tactile cues to facilitate proper sequencing and safety during rolling and supine>sit as well as provided with physical assistance to complete task    STS and transfer training - educated on hand/foot placement, safety, and sequencing during STS and pivot transfers using assistive device  Gait training - NT unable. Education in pacemaker precautions. Skillful positioning in bed to protect skin/joint integrity. Vitals and symptoms were closely monitored throughout session. Pt's/ family goals      Participate in Rehab process SALMA, Return to PLOF, and Improve strength    Prognosis is good  for reaching above PT goals.     Patient and or family understand(s) diagnosis, prognosis, and plan of care.  yes,     PHYSICAL THERAPY PLAN OF CARE:    PT POC is established based on physician order and patient diagnosis     Diagnosis:  Bradycardia [R00.1]  Specific instructions for next treatment:  Sit EOB, postural exercises and Transfer to bedside chair  Current Treatment Recommendations:     [x] Strengthening to improve independence with functional mobility   [x] ROM to improve independence with functional mobility   [x] Balance Training to improve static/dynamic balance and to reduce fall risk  [x] Endurance Training to improve activity tolerance during functional mobility   [x] Transfer Training to improve safety and independence with all functional transfers   [x] Gait Training to improve gait mechanics, endurance and asses need for appropriate assistive device  [x] Stair Training in preparation for safe discharge home and/or into the community when appropriate  [] Positioning to prevent skin breakdown and contractures  [x] Safety and Education Training   [] Patient/Caregiver Education   [] HEP  [] Gait Team to be added to POC  [] Other     PT long term treatment goals are located in above grid    Frequency of treatments: 2-5x/week x 1-2 weeks. Time in  1035  Time out  1115    Total Treatment Time  25 minutes     Evaluation Time includes thorough review of current medical information, gathering information on past medical history/social history and prior level of function, completion of standardized testing/informal observation of tasks, assessment of data and education on plan of care and goals.     CPT codes:  [x] Low Complexity PT evaluation 54634  [] Moderate Complexity PT evaluation 25123  [] High Complexity PT evaluation 63607  [] PT Re-evaluation 30538  [] Gait training 31859 - minutes  [] Manual therapy 69161  minutes  [x] Therapeutic activities 60426 25 minutes  [] Therapeutic exercises 18531 - minutes  [] Neuromuscular reeducation [address] minutes     Jose Corrigan, [address] [facility]

## 2022-12-23 NOTE — DISCHARGE SUMMARY
[facility]   Discharge summary   Patient ID:  Bishop Galvan  [id number]  80 y.o.  12/24/1932    Admit date: 12/19/2022    Discharge date and time: 12/23/2022    Admission Diagnoses:   Patient Active Problem List   Diagnosis    Acute appendicitis    Hypertension    Inadequately controlled diabetes mellitus ([address]    Metabolic encephalopathy    Sepsis associated hypotension ([address]    Sepsis ([address]    Relapsing appendicitis    Saddle embolus of pulmonary artery (HCC)    DVT of deep femoral vein, right (HCC)    History of pulmonary embolism    Generalized weakness    Acute kidney injury ([address]    Left knee pain    Baker's cyst    Atrial fibrillation with RVR ([address]    New onset a-fib (HCC)    Bradycardia       Discharge Diagnoses: Bradycardia with symptomatic long pauses    Consults: cardiology, electrophysiology    Procedures: Temporary transvenous pacemaker followed by permanent pacemaker placement    Hospital Course: 80-year-old female who was admitted at [facility] and began having 18-second pauses transferred to [facility] for possible pacer placement and is admitted to the ICU with     Symptomatic bradycardia, appears to have iatrogenic  Offending agents discontinued at [facility]  Status post PPM 12/21/2022-tolerating well  Deferred electrophysiology/cardiology regarding PPM placement  You do not appear to be any plans for such currently  Continue to monitor heart rate closely  Monitor BPs-medications ordered with parameters  IV hydration for decreased urine output, likely a process of hypotensive episodes  Echo - pending     Diabetes Mellitus  -Monitor labs  -ISS glucose control  -Hypoglycemia protocol initiated     12/22/2022  Patient tolerated PPM placement yesterday-tolerated well, continue rate control meds at discretion of cardiology   No acute complaints  Vitals and blood work remained stable  Okay for discharge from medicine standpoint if no further plans by cardiology or EP  Await PT evaluation for possible placement versus home  Continue insulin sliding scale for blood sugar cover    12/23/2022  Patient has been accepted to facility  Okay for discharge from medicine to [address] paced on telemetry  Continue beta-blocker at discharge      Recent Labs     12/21/22  0430   WBC 8.4   HGB 10.9*   HCT 33.9*          Recent Labs     12/21/22  0430      K 4.2   CL 92*   CO2 35*   BUN 29*   CREATININE 1.0   CALCIUM 9.3       XR CHEST PORTABLE    Result Date: 12/19/2022  EXAMINATION: ONE XRAY VIEW OF THE CHEST 12/19/2022 6:01 pm COMPARISON: 12/15/2022 chest radiograph. HISTORY: ORDERING SYSTEM PROVIDED HISTORY: Pacemaker placement and rule out pneumothorax TECHNOLOGIST PROVIDED HISTORY: Reason for exam:->Pacemaker placement and rule out pneumothorax What reading provider will be dictating this exam?->CRC FINDINGS: The lungs are hypoinflated. There are interstitial and ground-glass densities left mid and lower lung field. There is blunting of the costophrenic sulci. Cardiac silhouette is enlarged. There is moderate thoracic spondylosis. No pneumothorax. Left predominantly lower lobe lung infiltrates consistent with atelectasis or pneumonitis. Small bilateral pleural effusions. Stable enlargement of the cardiac silhouette. Discharge Exam:    HEENT: NCAT,  PERRLA, No JVD  Heart:  RRR, no murmurs, gallops, or rubs.   Lungs:  CTA bilaterally, no wheeze, rales or rhonchi  Abd: bowel sounds present, nontender, nondistended, no masses  Extrem:  No clubbing, cyanosis, or edema    Disposition: SNF     Patient Condition at Discharge: Stable    Patient Instructions:      Medication List        START taking these medications      lisinopril 10 MG tablet  Commonly known as: PRINIVIL;ZESTRIL  Take 1 tablet by mouth daily  Start taking on: December 24, 2022            CONTINUE taking these medications      amLODIPine 5 MG tablet  Commonly known as: NORVASC  Take 1 tablet by mouth daily     carvedilol 12.5 MG tablet  Commonly known as: COREG  Take 1 tablet by mouth 2 times daily (with meals)     donepezil 10 MG tablet  Commonly known as: ARICEPT     Eliquis 5 MG Tabs tablet  Generic drug: apixaban     furosemide 20 MG tablet  Commonly known as: LASIX     ipratropium-albuterol 0.5-2.5 (3) MG/3ML Soln nebulizer solution  Commonly known as: DUONEB     levothyroxine 75 MCG tablet  Commonly known as: SYNTHROID     metFORMIN 500 MG extended release tablet  Commonly known as: GLUCOPHAGE-XR     pantoprazole 20 MG tablet  Commonly known as: Protonix  Take 1 tablet by mouth daily     predniSONE 5 MG tablet  Commonly known as: DELTASONE            STOP taking these medications      oxybutynin 5 MG extended release tablet  Commonly known as: DITROPAN-XL               Where to Get Your Medications        These medications were sent to [address] [place] [address] Girma Dunbar [address] [address]      Phone: [phone]   lisinopril 10 MG tablet       Activity: activity as tolerated  Diet: cardiac diet    Pt has been advised to: Follow-up with Ethel Bedolla MD in 1 week.   Follow-up with consultants as recommended by them    Note that over 30 minutes was spent in preparing discharge papers, discussing discharge with patient, medication review, etc.    Signed:  Karthikeyan Waggoner MD  12/23/2022  3:51 PM

## 2023-01-01 DIAGNOSIS — E40 KWASHIORKOR (HCC): Primary | ICD-10-CM

## 2023-01-01 RX ORDER — LORAZEPAM 1 MG/1
1 TABLET ORAL
Qty: 30 TABLET | Refills: 0 | Status: SHIPPED | OUTPATIENT
Start: 2023-01-01 | End: 2023-01-01 | Stop reason: SDUPTHER

## 2023-01-01 RX ORDER — MORPHINE SULFATE 100 MG/5ML
5 SOLUTION ORAL
Qty: 30 ML | Refills: 0 | Status: SHIPPED | OUTPATIENT
Start: 2023-01-01 | End: 2023-01-01 | Stop reason: SDUPTHER

## 2023-02-04 ENCOUNTER — HOSPITAL ENCOUNTER (INPATIENT)
Age: 88
LOS: 11 days | Discharge: HOME | DRG: 189 | End: 2023-02-15
Attending: EMERGENCY MEDICINE | Admitting: INTERNAL MEDICINE
Payer: MEDICARE

## 2023-02-04 ENCOUNTER — APPOINTMENT (OUTPATIENT)
Dept: GENERAL RADIOLOGY | Age: 88
DRG: 189 | End: 2023-02-04
Payer: MEDICARE

## 2023-02-04 DIAGNOSIS — E87.5 ACUTE HYPERKALEMIA: ICD-10-CM

## 2023-02-04 DIAGNOSIS — R73.9 HYPERGLYCEMIA: ICD-10-CM

## 2023-02-04 DIAGNOSIS — J96.01 ACUTE RESPIRATORY FAILURE WITH HYPOXIA (HCC): ICD-10-CM

## 2023-02-04 DIAGNOSIS — D64.9 ANEMIA, UNSPECIFIED TYPE: ICD-10-CM

## 2023-02-04 DIAGNOSIS — E87.1 ACUTE HYPONATREMIA: Primary | ICD-10-CM

## 2023-02-04 DIAGNOSIS — B33.8 RESPIRATORY SYNCYTIAL VIRUS (RSV): ICD-10-CM

## 2023-02-04 LAB
ADENOVIRUS BY PCR: NOT DETECTED
ALBUMIN SERPL-MCNC: 3.6 G/DL (ref 3.5–5.2)
ALP BLD-CCNC: 79 U/L (ref 35–104)
ALT SERPL-CCNC: 34 U/L (ref 0–32)
ANION GAP SERPL CALCULATED.3IONS-SCNC: 12 MMOL/L (ref 7–16)
AST SERPL-CCNC: 31 U/L (ref 0–31)
B.E.: -0.4 MMOL/L (ref -3–3)
BASOPHILS ABSOLUTE: 0.05 E9/L (ref 0–0.2)
BASOPHILS RELATIVE PERCENT: 0.5 % (ref 0–2)
BILIRUB SERPL-MCNC: 0.4 MG/DL (ref 0–1.2)
BORDETELLA PARAPERTUSSIS BY PCR: NOT DETECTED
BORDETELLA PERTUSSIS BY PCR: NOT DETECTED
BUN BLDV-MCNC: 26 MG/DL (ref 6–23)
CALCIUM SERPL-MCNC: 9.3 MG/DL (ref 8.6–10.2)
CHLAMYDOPHILIA PNEUMONIAE BY PCR: NOT DETECTED
CHLORIDE BLD-SCNC: 87 MMOL/L (ref 98–107)
CO2: 26 MMOL/L (ref 22–29)
COHB: 0.8 % (ref 0–1.5)
CORONAVIRUS 229E BY PCR: NOT DETECTED
CORONAVIRUS HKU1 BY PCR: NOT DETECTED
CORONAVIRUS NL63 BY PCR: NOT DETECTED
CORONAVIRUS OC43 BY PCR: NOT DETECTED
CREAT SERPL-MCNC: 1.1 MG/DL (ref 0.5–1)
CRITICAL: ABNORMAL
DATE ANALYZED: ABNORMAL
DATE OF COLLECTION: ABNORMAL
EOSINOPHILS ABSOLUTE: 0.01 E9/L (ref 0.05–0.5)
EOSINOPHILS RELATIVE PERCENT: 0.1 % (ref 0–6)
FIO2: 60 %
GFR SERPL CREATININE-BSD FRML MDRD: 48 ML/MIN/1.73
GLUCOSE BLD-MCNC: 243 MG/DL (ref 74–99)
HBA1C MFR BLD: 7 % (ref 4–5.6)
HCO3: 25 MMOL/L (ref 22–26)
HCT VFR BLD CALC: 34.3 % (ref 34–48)
HEMOGLOBIN: 11.1 G/DL (ref 11.5–15.5)
HHB: 0.4 % (ref 0–5)
HUMAN METAPNEUMOVIRUS BY PCR: NOT DETECTED
HUMAN RHINOVIRUS/ENTEROVIRUS BY PCR: NOT DETECTED
IMMATURE GRANULOCYTES #: 0.41 E9/L
IMMATURE GRANULOCYTES %: 4.1 % (ref 0–5)
INFLUENZA A BY PCR: NOT DETECTED
INFLUENZA A: NOT DETECTED
INFLUENZA B BY PCR: NOT DETECTED
INFLUENZA B: NOT DETECTED
LAB: ABNORMAL
LYMPHOCYTES ABSOLUTE: 1.17 E9/L (ref 1.5–4)
LYMPHOCYTES RELATIVE PERCENT: 11.6 % (ref 20–42)
Lab: ABNORMAL
MCH RBC QN AUTO: 28.1 PG (ref 26–35)
MCHC RBC AUTO-ENTMCNC: 32.4 % (ref 32–34.5)
MCV RBC AUTO: 86.8 FL (ref 80–99.9)
METER GLUCOSE: 202 MG/DL (ref 74–99)
METER GLUCOSE: 224 MG/DL (ref 74–99)
METER GLUCOSE: 267 MG/DL (ref 74–99)
METHB: 0.3 % (ref 0–1.5)
MODE: ABNORMAL
MONOCYTES ABSOLUTE: 0.7 E9/L (ref 0.1–0.95)
MONOCYTES RELATIVE PERCENT: 7 % (ref 2–12)
MYCOPLASMA PNEUMONIAE BY PCR: NOT DETECTED
NEUTROPHILS ABSOLUTE: 7.71 E9/L (ref 1.8–7.3)
NEUTROPHILS RELATIVE PERCENT: 76.7 % (ref 43–80)
O2 CONTENT: 18.1 ML/DL
O2 SATURATION: 99.6 % (ref 92–98.5)
O2HB: 98.5 % (ref 94–97)
OPERATOR ID: 1741
PARAINFLUENZA VIRUS 1 BY PCR: NOT DETECTED
PARAINFLUENZA VIRUS 2 BY PCR: NOT DETECTED
PARAINFLUENZA VIRUS 3 BY PCR: NOT DETECTED
PARAINFLUENZA VIRUS 4 BY PCR: NOT DETECTED
PATIENT TEMP: 37 C
PCO2: 44 MMHG (ref 35–45)
PDW BLD-RTO: 15.9 FL (ref 11.5–15)
PEEP/CPAP: 6 CMH2O
PFO2: 6.8 MMHG/%
PH BLOOD GAS: 7.37 (ref 7.35–7.45)
PLATELET # BLD: 260 E9/L (ref 130–450)
PMV BLD AUTO: 9.7 FL (ref 7–12)
PO2: 407.8 MMHG (ref 75–100)
POTASSIUM REFLEX MAGNESIUM: 5.2 MMOL/L (ref 3.5–5)
PRO-BNP: 3109 PG/ML (ref 0–450)
PS: 12 CMH20
RBC # BLD: 3.95 E12/L (ref 3.5–5.5)
RESPIRATORY SYNCYTIAL VIRUS BY PCR: DETECTED
RR MECHANICAL: 16 B/MIN
SARS-COV-2 RNA, RT PCR: NOT DETECTED
SARS-COV-2, PCR: NOT DETECTED
SODIUM BLD-SCNC: 125 MMOL/L (ref 132–146)
SOURCE, BLOOD GAS: ABNORMAL
THB: 12.3 G/DL (ref 11.5–16.5)
TIME ANALYZED: 956
TOTAL PROTEIN: 6.5 G/DL (ref 6.4–8.3)
TROPONIN, HIGH SENSITIVITY: 69 NG/L (ref 0–9)
TROPONIN, HIGH SENSITIVITY: 77 NG/L (ref 0–9)
WBC # BLD: 10.1 E9/L (ref 4.5–11.5)

## 2023-02-04 PROCEDURE — 6360000002 HC RX W HCPCS: Performed by: STUDENT IN AN ORGANIZED HEALTH CARE EDUCATION/TRAINING PROGRAM

## 2023-02-04 PROCEDURE — 36415 COLL VENOUS BLD VENIPUNCTURE: CPT

## 2023-02-04 PROCEDURE — 82962 GLUCOSE BLOOD TEST: CPT

## 2023-02-04 PROCEDURE — 82805 BLOOD GASES W/O2 SATURATION: CPT

## 2023-02-04 PROCEDURE — 2140000000 HC CCU INTERMEDIATE R&B

## 2023-02-04 PROCEDURE — 85025 COMPLETE CBC W/AUTO DIFF WBC: CPT

## 2023-02-04 PROCEDURE — 6370000000 HC RX 637 (ALT 250 FOR IP): Performed by: STUDENT IN AN ORGANIZED HEALTH CARE EDUCATION/TRAINING PROGRAM

## 2023-02-04 PROCEDURE — 94664 DEMO&/EVAL PT USE INHALER: CPT

## 2023-02-04 PROCEDURE — 6370000000 HC RX 637 (ALT 250 FOR IP): Performed by: NURSE PRACTITIONER

## 2023-02-04 PROCEDURE — 93005 ELECTROCARDIOGRAM TRACING: CPT | Performed by: STUDENT IN AN ORGANIZED HEALTH CARE EDUCATION/TRAINING PROGRAM

## 2023-02-04 PROCEDURE — 71045 X-RAY EXAM CHEST 1 VIEW: CPT

## 2023-02-04 PROCEDURE — 83880 ASSAY OF NATRIURETIC PEPTIDE: CPT

## 2023-02-04 PROCEDURE — 94640 AIRWAY INHALATION TREATMENT: CPT

## 2023-02-04 PROCEDURE — 2580000003 HC RX 258: Performed by: NURSE PRACTITIONER

## 2023-02-04 PROCEDURE — 2700000000 HC OXYGEN THERAPY PER DAY

## 2023-02-04 PROCEDURE — 80053 COMPREHEN METABOLIC PANEL: CPT

## 2023-02-04 PROCEDURE — 6360000002 HC RX W HCPCS: Performed by: NURSE PRACTITIONER

## 2023-02-04 PROCEDURE — 96374 THER/PROPH/DIAG INJ IV PUSH: CPT

## 2023-02-04 PROCEDURE — 94660 CPAP INITIATION&MGMT: CPT

## 2023-02-04 PROCEDURE — 84484 ASSAY OF TROPONIN QUANT: CPT

## 2023-02-04 PROCEDURE — 83036 HEMOGLOBIN GLYCOSYLATED A1C: CPT

## 2023-02-04 PROCEDURE — 99285 EMERGENCY DEPT VISIT HI MDM: CPT

## 2023-02-04 PROCEDURE — 0202U NFCT DS 22 TRGT SARS-COV-2: CPT

## 2023-02-04 PROCEDURE — 87636 SARSCOV2 & INF A&B AMP PRB: CPT

## 2023-02-04 RX ORDER — DONEPEZIL HYDROCHLORIDE 5 MG/1
10 TABLET, FILM COATED ORAL NIGHTLY
Status: DISCONTINUED | OUTPATIENT
Start: 2023-02-04 | End: 2023-02-05

## 2023-02-04 RX ORDER — SODIUM CHLORIDE 9 MG/ML
INJECTION, SOLUTION INTRAVENOUS PRN
Status: DISCONTINUED | OUTPATIENT
Start: 2023-02-04 | End: 2023-02-15 | Stop reason: HOSPADM

## 2023-02-04 RX ORDER — ENOXAPARIN SODIUM 100 MG/ML
40 INJECTION SUBCUTANEOUS DAILY
Status: DISCONTINUED | OUTPATIENT
Start: 2023-02-04 | End: 2023-02-04

## 2023-02-04 RX ORDER — AMLODIPINE BESYLATE 5 MG/1
5 TABLET ORAL DAILY
Status: DISCONTINUED | OUTPATIENT
Start: 2023-02-05 | End: 2023-02-15 | Stop reason: HOSPADM

## 2023-02-04 RX ORDER — INSULIN LISPRO 100 [IU]/ML
0-8 INJECTION, SOLUTION INTRAVENOUS; SUBCUTANEOUS
Status: DISCONTINUED | OUTPATIENT
Start: 2023-02-04 | End: 2023-02-15 | Stop reason: HOSPADM

## 2023-02-04 RX ORDER — SODIUM CHLORIDE 0.9 % (FLUSH) 0.9 %
5-40 SYRINGE (ML) INJECTION PRN
Status: DISCONTINUED | OUTPATIENT
Start: 2023-02-04 | End: 2023-02-15 | Stop reason: HOSPADM

## 2023-02-04 RX ORDER — METHYLPREDNISOLONE SODIUM SUCCINATE 125 MG/2ML
125 INJECTION, POWDER, LYOPHILIZED, FOR SOLUTION INTRAMUSCULAR; INTRAVENOUS ONCE
Status: COMPLETED | OUTPATIENT
Start: 2023-02-04 | End: 2023-02-04

## 2023-02-04 RX ORDER — IPRATROPIUM BROMIDE AND ALBUTEROL SULFATE 2.5; .5 MG/3ML; MG/3ML
3 SOLUTION RESPIRATORY (INHALATION) ONCE
Status: COMPLETED | OUTPATIENT
Start: 2023-02-04 | End: 2023-02-04

## 2023-02-04 RX ORDER — LISINOPRIL 10 MG/1
10 TABLET ORAL DAILY
Status: DISCONTINUED | OUTPATIENT
Start: 2023-02-05 | End: 2023-02-15 | Stop reason: HOSPADM

## 2023-02-04 RX ORDER — DEXTROSE MONOHYDRATE 100 MG/ML
INJECTION, SOLUTION INTRAVENOUS CONTINUOUS PRN
Status: DISCONTINUED | OUTPATIENT
Start: 2023-02-04 | End: 2023-02-15 | Stop reason: HOSPADM

## 2023-02-04 RX ORDER — ONDANSETRON 2 MG/ML
4 INJECTION INTRAMUSCULAR; INTRAVENOUS EVERY 6 HOURS PRN
Status: DISCONTINUED | OUTPATIENT
Start: 2023-02-04 | End: 2023-02-15 | Stop reason: HOSPADM

## 2023-02-04 RX ORDER — SODIUM CHLORIDE 0.9 % (FLUSH) 0.9 %
5-40 SYRINGE (ML) INJECTION EVERY 12 HOURS SCHEDULED
Status: DISCONTINUED | OUTPATIENT
Start: 2023-02-04 | End: 2023-02-15 | Stop reason: HOSPADM

## 2023-02-04 RX ORDER — IPRATROPIUM BROMIDE AND ALBUTEROL SULFATE 2.5; .5 MG/3ML; MG/3ML
1 SOLUTION RESPIRATORY (INHALATION)
Status: DISCONTINUED | OUTPATIENT
Start: 2023-02-04 | End: 2023-02-15 | Stop reason: HOSPADM

## 2023-02-04 RX ORDER — LEVOTHYROXINE SODIUM 0.07 MG/1
75 TABLET ORAL DAILY
Status: DISCONTINUED | OUTPATIENT
Start: 2023-02-05 | End: 2023-02-15 | Stop reason: HOSPADM

## 2023-02-04 RX ORDER — PREDNISONE 20 MG/1
20 TABLET ORAL 2 TIMES DAILY
Status: DISCONTINUED | OUTPATIENT
Start: 2023-02-06 | End: 2023-02-15 | Stop reason: HOSPADM

## 2023-02-04 RX ORDER — ACETAMINOPHEN 325 MG/1
650 TABLET ORAL EVERY 6 HOURS PRN
Status: DISCONTINUED | OUTPATIENT
Start: 2023-02-04 | End: 2023-02-15 | Stop reason: HOSPADM

## 2023-02-04 RX ORDER — ONDANSETRON 4 MG/1
4 TABLET, ORALLY DISINTEGRATING ORAL EVERY 8 HOURS PRN
Status: DISCONTINUED | OUTPATIENT
Start: 2023-02-04 | End: 2023-02-15 | Stop reason: HOSPADM

## 2023-02-04 RX ORDER — FUROSEMIDE 20 MG/1
20 TABLET ORAL DAILY
Status: DISCONTINUED | OUTPATIENT
Start: 2023-02-04 | End: 2023-02-15 | Stop reason: HOSPADM

## 2023-02-04 RX ORDER — METHYLPREDNISOLONE SODIUM SUCCINATE 40 MG/ML
40 INJECTION, POWDER, LYOPHILIZED, FOR SOLUTION INTRAMUSCULAR; INTRAVENOUS EVERY 12 HOURS
Status: COMPLETED | OUTPATIENT
Start: 2023-02-04 | End: 2023-02-06

## 2023-02-04 RX ORDER — IPRATROPIUM BROMIDE AND ALBUTEROL SULFATE 2.5; .5 MG/3ML; MG/3ML
1 SOLUTION RESPIRATORY (INHALATION) EVERY 6 HOURS PRN
Status: DISCONTINUED | OUTPATIENT
Start: 2023-02-04 | End: 2023-02-15 | Stop reason: HOSPADM

## 2023-02-04 RX ORDER — CARVEDILOL 6.25 MG/1
12.5 TABLET ORAL 2 TIMES DAILY WITH MEALS
Status: DISCONTINUED | OUTPATIENT
Start: 2023-02-04 | End: 2023-02-15 | Stop reason: HOSPADM

## 2023-02-04 RX ORDER — INSULIN LISPRO 100 [IU]/ML
0-4 INJECTION, SOLUTION INTRAVENOUS; SUBCUTANEOUS NIGHTLY
Status: DISCONTINUED | OUTPATIENT
Start: 2023-02-04 | End: 2023-02-15 | Stop reason: HOSPADM

## 2023-02-04 RX ORDER — ACETAMINOPHEN 650 MG/1
650 SUPPOSITORY RECTAL EVERY 6 HOURS PRN
Status: DISCONTINUED | OUTPATIENT
Start: 2023-02-04 | End: 2023-02-15 | Stop reason: HOSPADM

## 2023-02-04 RX ADMIN — Medication 10 ML: at 22:25

## 2023-02-04 RX ADMIN — METHYLPREDNISOLONE SODIUM SUCCINATE 40 MG: 40 INJECTION, POWDER, FOR SOLUTION INTRAMUSCULAR; INTRAVENOUS at 22:25

## 2023-02-04 RX ADMIN — DONEPEZIL HYDROCHLORIDE 10 MG: 5 TABLET ORAL at 22:24

## 2023-02-04 RX ADMIN — METHYLPREDNISOLONE SODIUM SUCCINATE 125 MG: 125 INJECTION, POWDER, FOR SOLUTION INTRAMUSCULAR; INTRAVENOUS at 10:11

## 2023-02-04 RX ADMIN — APIXABAN 5 MG: 5 TABLET, FILM COATED ORAL at 22:24

## 2023-02-04 RX ADMIN — IPRATROPIUM BROMIDE AND ALBUTEROL SULFATE 1 AMPULE: .5; 2.5 SOLUTION RESPIRATORY (INHALATION) at 20:53

## 2023-02-04 RX ADMIN — IPRATROPIUM BROMIDE AND ALBUTEROL SULFATE 1 AMPULE: .5; 2.5 SOLUTION RESPIRATORY (INHALATION) at 16:26

## 2023-02-04 RX ADMIN — IPRATROPIUM BROMIDE AND ALBUTEROL SULFATE 3 AMPULE: 2.5; .5 SOLUTION RESPIRATORY (INHALATION) at 09:49

## 2023-02-04 RX ADMIN — INSULIN LISPRO 2 UNITS: 100 INJECTION, SOLUTION INTRAVENOUS; SUBCUTANEOUS at 17:21

## 2023-02-04 RX ADMIN — CARVEDILOL 12.5 MG: 6.25 TABLET, FILM COATED ORAL at 17:21

## 2023-02-04 ASSESSMENT — ENCOUNTER SYMPTOMS
DIARRHEA: 0
ABDOMINAL PAIN: 0
NAUSEA: 0
VOMITING: 0
SHORTNESS OF BREATH: 1
BACK PAIN: 0
COLOR CHANGE: 0
COUGH: 1

## 2023-02-04 NOTE — ED PROVIDER NOTES
ATTENDING PROVIDER ATTESTATION:     Richelle Galicia presented to the emergency department for evaluation of Shortness of Breath (Pt 58% RA per EMS, 100% NRB, pt breathing labored upon arrival. Hx COPD)   and was initially evaluated by the Medical Resident. See Original ED Note for H&P and ED course above. I have reviewed and discussed the case, including pertinent history (medical, surgical, family and social) and exam findings with the Medical Resident assigned to Richelle Galicia. I have personally performed and/or participated in the history, exam, medical decision making, and procedures and agree with all pertinent clinical information and any additional changes or corrections are noted below in my assessment and plan. I have discussed this patient in detail with the resident, and provided the instruction and education,       I have reviewed my findings and recommendations with the assigned Medical Resident, Richelle Galicia and members of family present at the time of disposition. I have performed a history and physical examination of this patient and directly supervised the resident caring for this patient              [address] [facility]        Pt Name: Richelle Galicia  MRN: [id number]  [place] 12/24/1932  Date of evaluation: 2/4/2023  Provider: Ade Infante MD  PCP: Lavelle Cervantes MD  Note Started: 9:50 AM EST 2/4/23    CHIEF COMPLAINT       Chief Complaint   Patient presents with    Shortness of Breath     Pt 58% RA per EMS, 100% NRB, pt breathing labored upon arrival. Hx COPD       HISTORY OF PRESENT ILLNESS: 1 or more Elements     Limitations to history : None    Richelle Galicia is a 80 y.o. female who presents for shortness of breath for the last few days. Worsening shortness of breath for the last few days. Nonproductive cough. EMS was called today for worsening shortness of breath. She was in respiratory distress on their arrival and hypoxemic.   Placed on nonrebreather which improved her oxygen saturations from 58% to 100%. She arrives tachypneic and in distress. She was placed on BiPAP to prevent life-threatening deterioration. She denies chest pain. Denies fever. She has a history of COPD. She is also on Eliquis for history of pulmonary embolism. She denies other complaints. Nursing Notes were all reviewed and agreed with or any disagreements were addressed in the HPI. REVIEW OF EXTERNAL NOTE :       3/7/20 echo, 55-60% EF  12/23/22 discharge summary from internal medicine      REVIEW OF SYSTEMS :      Positives and Pertinent negatives as per HPI.      SURGICAL HISTORY     Past Surgical History:   Procedure Laterality Date    CARPAL TUNNEL RELEASE      CHOLECYSTECTOMY      HC INSERT PICC CATH, 5/> YRS  01/21/2020         HYSTERECTOMY (CERVIX STATUS UNKNOWN)      PACEMAKER INSERTION Left 12/21/2022    [employer] Single PPM (Dr. Wanda Christine)       [address]       Current Discharge Medication List        CONTINUE these medications which have NOT CHANGED    Details   Multiple Vitamin (MULTIVITAMIN ADULT PO) Take by mouth daily      lisinopril (PRINIVIL;ZESTRIL) 10 MG tablet Take 1 tablet by mouth daily  Qty: 30 tablet, Refills: 3      furosemide (LASIX) 20 MG tablet Take 20 mg by mouth daily      donepezil (ARICEPT) 10 MG tablet Take 10 mg by mouth at bedtime      carvedilol (COREG) 12.5 MG tablet Take 1 tablet by mouth 2 times daily (with meals)  Qty: 60 tablet, Refills: 3      amLODIPine (NORVASC) 5 MG tablet Take 1 tablet by mouth daily  Qty: 30 tablet, Refills: 3      ipratropium-albuterol (DUONEB) 0.5-2.5 (3) MG/3ML SOLN nebulizer solution Inhale 1 vial into the lungs every 6 hours as needed for Shortness of Breath      levothyroxine (SYNTHROID) 75 MCG tablet Take 75 mcg by mouth Daily      predniSONE (DELTASONE) 5 MG tablet Take 5 mg by mouth 2 times daily      metFORMIN (GLUCOPHAGE-XR) 500 MG extended release tablet Take 500 mg by mouth daily (with breakfast) apixaban (ELIQUIS) 5 MG TABS tablet Take 5 mg by mouth 2 times daily             ALLERGIES     Patient has no known allergies. FAMILYHISTORY     History reviewed. No pertinent family history. SOCIAL HISTORY       Social History     Tobacco Use    Smoking status: Never    Smokeless tobacco: Never   Substance Use Topics    Alcohol use: No       SCREENINGS        [place] Coma Scale  Eye Opening: Spontaneous  Best Verbal Response: Oriented  Best Motor Response: Obeys commands  [place] Coma Scale Score: 15                CIWA Assessment  BP: 108/69  Heart Rate: (!) 108           PHYSICAL EXAM  1 or more Elements     ED Triage Vitals   BP Temp Temp src Pulse Resp SpO2 Height Weight   -- -- -- -- -- -- -- --                 Constitutional/General: Alert and oriented x3  Head: Normocephalic and atraumatic  Eyes: PERRL, EOMI, conjunctiva normal, sclera non icteric  ENT:  Oropharynx clear, handling secretions, no trismus, no asymmetry of the posterior oropharynx or uvular edema  Neck: Supple, full ROM, no stridor, no meningeal signs  Respiratory: Lungs with diffuse wheezing bilaterally. In respiratory distress. Cardiovascular: Irregularly irregular, tachycardic., no gallops, no rubs. 2+ distal pulses. Equal extremity pulses. Chest: No chest wall tenderness  GI:  Abdomen Soft, Non tender, Non distended. No rebound, guarding, or rigidity. No pulsatile masses. Musculoskeletal: Moves all extremities x 4. Warm and well perfused, no clubbing, no cyanosis,   Capillary refill <3 seconds  Integument: skin warm and dry. No rashes.    Neurologic: GCS 15, no focal deficits, symmetric strength 5/5 in the upper and lower extremities bilaterally  Psychiatric: Normal Affect            DIAGNOSTIC RESULTS   LABS: Interpreted by Bello De La Torre MD    Labs Reviewed   RESPIRATORY PANEL, MOLECULAR, WITH COVID-19 - Abnormal; Notable for the following components:       Result Value    Respiratory Syncytial Virus by PCR DETECTED (*) All other components within normal limits   CBC WITH AUTO DIFFERENTIAL - Abnormal; Notable for the following components:    Hemoglobin 11.1 (*)     RDW 15.9 (*)     Lymphocytes % 11.6 (*)     Neutrophils Absolute 7.71 (*)     Lymphocytes Absolute 1.17 (*)     Eosinophils Absolute 0.01 (*)     All other components within normal limits   COMPREHENSIVE METABOLIC PANEL W/ REFLEX TO MG FOR LOW K - Abnormal; Notable for the following components:    Sodium 125 (*)     Potassium reflex Magnesium 5.2 (*)     Chloride 87 (*)     Glucose 243 (*)     BUN 26 (*)     Creatinine 1.1 (*)     ALT 34 (*)     All other components within normal limits   TROPONIN - Abnormal; Notable for the following components:    Troponin, High Sensitivity 69 (*)     All other components within normal limits   BRAIN NATRIURETIC PEPTIDE - Abnormal; Notable for the following components:    Pro-BNP 3,109 (*)     All other components within normal limits   BLOOD GAS, ARTERIAL - Abnormal; Notable for the following components:    PO2 407.8 (*)     O2 Sat 99.6 (*)     O2Hb 98.5 (*)     All other components within normal limits   TROPONIN - Abnormal; Notable for the following components:    Troponin, High Sensitivity 77 (*)     All other components within normal limits   HEMOGLOBIN A1C - Abnormal; Notable for the following components:    Hemoglobin A1C 7.0 (*)     All other components within normal limits   POCT GLUCOSE - Abnormal; Notable for the following components:    Meter Glucose 202 (*)     All other components within normal limits   POCT GLUCOSE - Abnormal; Notable for the following components:    Meter Glucose 224 (*)     All other components within normal limits   COVID-19 & INFLUENZA COMBO   ARTERIAL BLOOD GAS, RESPIRATORY ONLY   ARTERIAL BLOOD GAS, RESPIRATORY ONLY   POCT GLUCOSE   POCT GLUCOSE       As interpreted by me, the above displayed labs are abnormal. All other labs obtained during this visit were within normal range or not returned as of this dictation. RADIOLOGY:   Non-plain film images such as CT, Ultrasound and MRI are read by the radiologist. Plain radiographic images are visualized and preliminarily interpreted by the ED Provider with the findings documented in the ED course:    Interpretation per the Radiologist below, if available at the time of this note:    XR CHEST PORTABLE   Final Result   No acute cardiopulmonary process. No results found. No results found. PROCEDURES   Unless otherwise noted below, none          CRITICAL CARE TIME (.cct)   CRITICAL CARE:  Please note that the withdrawal or failure to initiate urgent interventions for this patient would likely result in a life threatening deterioration or permanent disability. Accordingly this patient received 30 minutes of critical care time, including coordination of care, and direct bedside care and excluding separately billable procedures. PAST MEDICAL HISTORY/Chronic Conditions Affecting Care      has a past medical history of Arthritis, COPD (chronic obstructive pulmonary disease) ([facility] [address] Diabetes mellitus ([facility] [address] Hyperlipidemia, and Hypertension.      EMERGENCY DEPARTMENT COURSE    Vitals:    Vitals:    02/04/23 1545 02/04/23 1600 02/04/23 1615 02/04/23 1645   BP:       Pulse:       Resp:       Temp:       TempSrc:       SpO2: 100% 100% 100% 100%   Weight:       Height:           Patient was given the following medications:  Medications   amLODIPine (NORVASC) tablet 5 mg (has no administration in time range)   carvedilol (COREG) tablet 12.5 mg (has no administration in time range)   donepezil (ARICEPT) tablet 10 mg (has no administration in time range)   ipratropium-albuterol (DUONEB) nebulizer solution 3 mL (has no administration in time range)   levothyroxine (SYNTHROID) tablet 75 mcg (has no administration in time range)   lisinopril (PRINIVIL;ZESTRIL) tablet 10 mg (has no administration in time range)   furosemide (LASIX) tablet 20 mg ( Oral Automatically Held 2/7/23 0900)   glucose chewable tablet 16 g (has no administration in time range)   dextrose bolus 10% 125 mL (has no administration in time range)     Or   dextrose bolus 10% 250 mL (has no administration in time range)   glucagon injection 1 mg (has no administration in time range)   dextrose 10 % infusion (has no administration in time range)   sodium chloride flush 0.9 % injection 5-40 mL (has no administration in time range)   sodium chloride flush 0.9 % injection 5-40 mL (has no administration in time range)   0.9 % sodium chloride infusion (has no administration in time range)   ondansetron (ZOFRAN-ODT) disintegrating tablet 4 mg (has no administration in time range)     Or   ondansetron (ZOFRAN) injection 4 mg (has no administration in time range)   magnesium hydroxide (MILK OF MAGNESIA) 400 MG/5ML suspension 30 mL (has no administration in time range)   acetaminophen (TYLENOL) tablet 650 mg (has no administration in time range)     Or   acetaminophen (TYLENOL) suppository 650 mg (has no administration in time range)   ipratropium-albuterol (DUONEB) nebulizer solution 1 ampule (1 ampule Inhalation Given 2/4/23 1626)   methylPREDNISolone sodium (SOLU-MEDROL) injection 40 mg (has no administration in time range)     Followed by   predniSONE (DELTASONE) tablet 20 mg (has no administration in time range)   insulin lispro (HUMALOG) injection vial 0-8 Units (has no administration in time range)   insulin lispro (HUMALOG) injection vial 0-4 Units (has no administration in time range)   apixaban (ELIQUIS) tablet 5 mg (has no administration in time range)   ipratropium-albuterol (DUONEB) nebulizer solution 3 ampule (3 ampules Inhalation Given 2/4/23 0949)   methylPREDNISolone sodium (SOLU-MEDROL) injection 125 mg (125 mg IntraVENous Given 2/4/23 1011)                  Medical Decision Making/Differential Diagnosis:    CC/HPI Summary, Social Determinants of health, Records Reviewed, DDx, testing done/not done, ED Course, Reassessment, disposition considerations/shared decision making with patient, consults, disposition:      ED Course as of 02/04/23 1649   Sat Feb 04, 2023   0951 On external chart review patient had A. fib with RVR 12/21/2022, admitted under Dr. Damien Choe she was also having long 18 second pauses, pacemaker was placed. [FG]   3772 EKG showing A. fib with RVR, no acute ST elevations or depressions. QTc 490. Previous EKG showing A. fib RVR. Interpretation performed by ED physician. [FG]   [phone] with [facility] for Dr. Damien Choe, accepted for admission [FG]      ED Course User Index  [FG] Radha Miranda, DO          Medical Decision Making  Shortness of breath, differential includes but is not limited to CHF, COPD, PTX, PNA, pulmonary edema/CHF to name a few. Arrives in distress, 50% on RA, placed on BIPAP to prevent life threatening deterioration. Improved quickly on bipap. No longer in distress. CXR per my wet read interpretation with no PTX and no focal consolidatyon. The following tests were interpreted by me: respiratory film array + for RSV, troponin 69, repeat 77, arterial blood gas with pH 7.37, PCO2 44, PO2 407, CBC with white count 10.1, hemoglobin 11.1, CMP with sodium 125, potassium 5.2, BUN 26, creatinine 1.1, BNP 3109, she is hyperglycemic but not in DKA. She continued to improve on BiPAP. No longer in distress. Consult placed to internal medicine for admission.             Problems Addressed:  Acute hyperkalemia: acute illness or injury  Acute hyponatremia: acute illness or injury  Acute respiratory failure with hypoxia [facility]): acute illness or injury that poses a threat to life or bodily functions  Anemia, unspecified type: chronic illness or injury  Hyperglycemia: acute illness or injury  Respiratory syncytial virus (RSV): acute illness or injury that poses a threat to life or bodily functions    Amount and/or Complexity of Data Reviewed  Independent Historian: EMS  External Data Reviewed: notes. Labs: ordered. Decision-making details documented in ED Course. Radiology: ordered and independent interpretation performed. Decision-making details documented in ED Course. ECG/medicine tests: ordered and independent interpretation performed. Decision-making details documented in ED Course. Discussion of management or test interpretation with external provider(s): [facility]    Risk  Prescription drug management. Decision regarding hospitalization. CONSULTS:   IP CONSULT TO INTERNAL MEDICINE  IP CONSULT TO SOCIAL WORK         I am the Primary Clinician of Record. FINAL IMPRESSION      1. Acute respiratory failure with hypoxia ([facility] [address]    2. Respiratory syncytial virus (RSV)          DISPOSITION/PLAN     DISPOSITION Admitted 02/04/2023 12:57:18 PM      PATIENT REFERRED TO:  No follow-up provider specified.     DISCHARGE MEDICATIONS:  Current Discharge Medication List          DISCONTINUED MEDICATIONS:  Current Discharge Medication List        STOP taking these medications       pantoprazole (PROTONIX) 20 MG tablet Comments:   Reason for Stopping:                      (Please note that portions of this note were completed with a voice recognition program.  Efforts were made to edit the dictations but occasionally words are mis-transcribed.)    Paula Desai MD (electronically signed)            Lisa Whittaker MD  02/04/23 0490

## 2023-02-04 NOTE — ED PROVIDER NOTES
HPI   80-year-old female patient presents to emergency department for shortness of breath. Symptoms starting over the last couple of days, worsening. EMS reporting that patient was very hypoxic on arrival and 60s. She denies any chest pain but having shortness of breath with associated coughing and wheezing. She denies being on dialysis. On med list it appears patient is on Eliquis she has been compliant with this. EMS placed patient on oxygen and DuoNeb treatments with some improvement of symptoms. She denies any nausea vomiting or diarrhea. Review of Systems   Constitutional:  Negative for chills and fever. HENT:  Negative for congestion. Respiratory:  Positive for cough and shortness of breath. Cardiovascular:  Negative for chest pain. Gastrointestinal:  Negative for abdominal pain, diarrhea, nausea and vomiting. Genitourinary:  Negative for difficulty urinating, dysuria and hematuria. Musculoskeletal:  Negative for back pain. Skin:  Negative for color change. All other systems reviewed and are negative. Physical Exam  Vitals and nursing note reviewed. Constitutional:       Appearance: Normal appearance. HENT:      Head: Normocephalic and atraumatic. Nose: Nose normal. No congestion. Mouth/Throat:      Mouth: Mucous membranes are moist.      Pharynx: Oropharynx is clear. Eyes:      Conjunctiva/sclera: Conjunctivae normal.      Pupils: Pupils are equal, round, and reactive to light. Cardiovascular:      Rate and Rhythm: Normal rate and regular rhythm. Pulses: Normal pulses. Heart sounds: Normal heart sounds. Pulmonary:      Comments: Patient tachypneic, wheezing throughout. Abdominal:      General: Bowel sounds are normal. There is no distension. Tenderness: There is no abdominal tenderness. Musculoskeletal:         General: Normal range of motion. Cervical back: Normal range of motion and neck supple.    Skin:     General: Skin is warm and dry.      Capillary Refill: Capillary refill takes less than 2 seconds. Neurological:      General: No focal deficit present. Mental Status: She is alert. Procedures     MDM  This is a 80-year-old female patient presented to emergency department for acute respiratory distress. Patient hypoxic on arrival, satting in the 62s for EMS on room air not normally on oxygen. Initial differentials include pneumonia, pneumothorax, COPD exacerbation, CHF exacerbation, PE. She is on Eliquis she has been compliant with her medication, PE is less likely. On patient's lab work of sodium came low at 125 as interpreted by me. Potassium slightly elevated at 5.2. Creatinine stable at 1.1. Her first troponin is 69 and repeat is 77, BNP also elevated at 3100. As compared to prior BNP has not been this high in the past.  No acute leukocytosis noted. Respiratory film array was positive for RSV. ABG actually stable at 7.37 pH and PCO2 44. Chest x-ray interpreted by me showing no obvious pneumonia. Symptoms did improve with duo nebs and Solu-Medrol as well as BiPAP which was initiated here. Patient's daughter was at bedside, she provided some supplementary history she had let me know that the patient was short of breath for the last few days and getting increasingly worse. I did update her on the plan to admit the patient and she is agreeable with this.     Medications   amLODIPine (NORVASC) tablet 5 mg (has no administration in time range)   carvedilol (COREG) tablet 12.5 mg (12.5 mg Oral Given 2/4/23 1721)   donepezil (ARICEPT) tablet 10 mg (has no administration in time range)   ipratropium-albuterol (DUONEB) nebulizer solution 3 mL (has no administration in time range)   levothyroxine (SYNTHROID) tablet 75 mcg (has no administration in time range)   lisinopril (PRINIVIL;ZESTRIL) tablet 10 mg (has no administration in time range)   furosemide (LASIX) tablet 20 mg ( Oral Automatically Held 2/7/23 0900) glucose chewable tablet 16 g (has no administration in time range)   dextrose bolus 10% 125 mL (has no administration in time range)     Or   dextrose bolus 10% 250 mL (has no administration in time range)   glucagon injection 1 mg (has no administration in time range)   dextrose 10 % infusion (has no administration in time range)   sodium chloride flush 0.9 % injection 5-40 mL (has no administration in time range)   sodium chloride flush 0.9 % injection 5-40 mL (has no administration in time range)   0.9 % sodium chloride infusion (has no administration in time range)   ondansetron (ZOFRAN-ODT) disintegrating tablet 4 mg (has no administration in time range)     Or   ondansetron (ZOFRAN) injection 4 mg (has no administration in time range)   magnesium hydroxide (MILK OF MAGNESIA) 400 MG/5ML suspension 30 mL (has no administration in time range)   acetaminophen (TYLENOL) tablet 650 mg (has no administration in time range)     Or   acetaminophen (TYLENOL) suppository 650 mg (has no administration in time range)   ipratropium-albuterol (DUONEB) nebulizer solution 1 ampule (1 ampule Inhalation Given 2/4/23 1626)   methylPREDNISolone sodium (SOLU-MEDROL) injection 40 mg (has no administration in time range)     Followed by   predniSONE (DELTASONE) tablet 20 mg (has no administration in time range)   insulin lispro (HUMALOG) injection vial 0-8 Units (2 Units SubCUTAneous Given 2/4/23 1721)   insulin lispro (HUMALOG) injection vial 0-4 Units (has no administration in time range)   apixaban (ELIQUIS) tablet 5 mg (has no administration in time range)   ipratropium-albuterol (DUONEB) nebulizer solution 3 ampule (3 ampules Inhalation Given 2/4/23 0949)   methylPREDNISolone sodium (SOLU-MEDROL) injection 125 mg (125 mg IntraVENous Given 2/4/23 1011)         ED Course as of 02/04/23 1741   Sat Feb 04, 2023   0951 On external chart review patient had A. fib with RVR 12/21/2022, admitted under Dr. Ross Donohue she was also having long 18 second pauses, pacemaker was placed. [FG]   4395 EKG showing A. fib with RVR, no acute ST elevations or depressions. QTc 490. Previous EKG showing A. fib RVR. Interpretation performed by ED physician. [FG]   [phone] with [facility] for Dr. Nick Downs, accepted for admission [FG]      ED Course User Index  [FG] Rodiego Dong DO     ED Course as of 02/04/23 1741   Sat Feb 04, 2023   0951 On external chart review patient had A. fib with RVR 12/21/2022, admitted under Dr. Nick Downs she was also having long 18 second pauses, pacemaker was placed. [FG]   7412 EKG showing A. fib with RVR, no acute ST elevations or depressions. QTc 490. Previous EKG showing A. fib RVR. Interpretation performed by ED physician. [FG]   [phone] with [facility] for Dr. Nick Downs, accepted for admission [FG]      ED Course User Index  [FG] Rodiego Dong DO       --------------------------------------------- PAST HISTORY ---------------------------------------------  Past Medical History:  has a past medical history of Arthritis, COPD (chronic obstructive pulmonary disease) ([facility] [address] [facility] Hyperlipidemia, and Hypertension. Past Surgical History:  has a past surgical history that includes Cholecystectomy; Hysterectomy; Carpal tunnel release; Insert Picc Cath, 5/> Yrs (01/21/2020); and Pacemaker insertion (Left, 12/21/2022). Social History:  reports that she has never smoked. She has never used smokeless tobacco. She reports that she does not drink alcohol. Family History: family history is not on file. The patients home medications have been reviewed. Allergies: Patient has no known allergies.     -------------------------------------------------- RESULTS -------------------------------------------------    LABS:  Results for orders placed or performed during the hospital encounter of 02/04/23   COVID-19 & Influenza Combo    Specimen: Nasopharyngeal Swab   Result Value Ref Range    SARS-CoV-2 RNA, RT PCR NOT DETECTED NOT DETECTED    INFLUENZA A NOT DETECTED NOT DETECTED    INFLUENZA B NOT DETECTED NOT DETECTED   Respiratory Panel, Molecular, with COVID-19 (Restricted: peds pts or suitable admitted adults)    Specimen: Nasopharyngeal   Result Value Ref Range    Adenovirus by PCR Not Detected Not Detected    Bordetella parapertussis by PCR Not Detected Not Detected    Bordetella pertussis by PCR Not Detected Not Detected    Chlamydophilia pneumoniae by PCR Not Detected Not Detected    Coronavirus 229E by PCR Not Detected Not Detected    Coronavirus HKU1 by PCR Not Detected Not Detected    Coronavirus NL63 by PCR Not Detected Not Detected    Coronavirus OC43 by PCR Not Detected Not Detected    SARS-CoV-2, PCR Not Detected Not Detected    Human Metapneumovirus by PCR Not Detected Not Detected    Human Rhinovirus/Enterovirus by PCR Not Detected Not Detected    Influenza A by PCR Not Detected Not Detected    Influenza B by PCR Not Detected Not Detected    Mycoplasma pneumoniae by PCR Not Detected Not Detected    Parainfluenza Virus 1 by PCR Not Detected Not Detected    Parainfluenza Virus 2 by PCR Not Detected Not Detected    Parainfluenza Virus 3 by PCR Not Detected Not Detected    Parainfluenza Virus 4 by PCR Not Detected Not Detected    Respiratory Syncytial Virus by PCR DETECTED (A) Not Detected   CBC with Auto Differential   Result Value Ref Range    WBC 10.1 4.5 - 11.5 E9/L    RBC 3.95 3.50 - 5.50 E12/L    Hemoglobin 11.1 (L) 11.5 - 15.5 g/dL    Hematocrit 34.3 34.0 - 48.0 %    MCV 86.8 80.0 - 99.9 fL    MCH 28.1 26.0 - 35.0 pg    MCHC 32.4 32.0 - 34.5 %    RDW 15.9 (H) 11.5 - 15.0 fL    Platelets 906 513 - 081 E9/L    MPV 9.7 7.0 - 12.0 fL    Neutrophils % 76.7 43.0 - 80.0 %    Immature Granulocytes % 4.1 0.0 - 5.0 %    Lymphocytes % 11.6 (L) 20.0 - 42.0 %    Monocytes % 7.0 2.0 - 12.0 %    Eosinophils % 0.1 0.0 - 6.0 %    Basophils % 0.5 0.0 - 2.0 %    Neutrophils Absolute 7.71 (H) 1.80 - 7.30 E9/L    Immature Granulocytes # 0.41 E9/L    Lymphocytes Absolute 1.17 (L) 1.50 - 4.00 E9/L    Monocytes Absolute 0.70 0.10 - 0.95 E9/L    Eosinophils Absolute 0.01 (L) 0.05 - 0.50 E9/L    Basophils Absolute 0.05 0.00 - 0.20 E9/L   Comprehensive Metabolic Panel w/ Reflex to MG   Result Value Ref Range    Sodium 125 (L) 132 - 146 mmol/L    Potassium reflex Magnesium 5.2 (H) 3.5 - 5.0 mmol/L    Chloride 87 (L) 98 - 107 mmol/L    CO2 26 22 - 29 mmol/L    Anion Gap 12 7 - 16 mmol/L    Glucose 243 (H) 74 - 99 mg/dL    BUN 26 (H) 6 - 23 mg/dL    Creatinine 1.1 (H) 0.5 - 1.0 mg/dL    Est, Glom Filt Rate 48 >=60 mL/min/1.73    Calcium 9.3 8.6 - 10.2 mg/dL    Total Protein 6.5 6.4 - 8.3 g/dL    Albumin 3.6 3.5 - 5.2 g/dL    Total Bilirubin 0.4 0.0 - 1.2 mg/dL    Alkaline Phosphatase 79 35 - 104 U/L    ALT 34 (H) 0 - 32 U/L    AST 31 0 - 31 U/L   Troponin   Result Value Ref Range    Troponin, High Sensitivity 69 (H) 0 - 9 ng/L   Brain Natriuretic Peptide   Result Value Ref Range    Pro-BNP 3,109 (H) 0 - 450 pg/mL   Blood Gas, Arterial   Result Value Ref Range    Date Analyzed [id number]     Time Analyzed 0956     Source: Blood Arterial     pH, Blood Gas 7.373 7.350 - 7.450    PCO2 44.0 35.0 - 45.0 mmHg    PO2 407.8 (H) 75.0 - 100.0 mmHg    HCO3 25.0 22.0 - 26.0 mmol/L    B.E. -0.4 -3.0 - 3.0 mmol/L    O2 Sat 99.6 (H) 92.0 - 98.5 %    PO2/FIO2 6.80 mmHg/%    O2Hb 98.5 (H) 94.0 - 97.0 %    COHb 0.8 0.0 - 1.5 %    MetHb 0.3 0.0 - 1.5 %    O2 Content 18.1 mL/dL    HHb 0.4 0.0 - 5.0 %    tHb (est) 12.3 11.5 - 16.5 g/dL    Mode NIV PAP     FIO2 60.0 %    Rr Mechanical 16.0 b/min    Peep/Cpap 6.0 cmH2O    PS 12 cmH20    Date Of Collection      Time Collected      Pt Temp 37.0 C     ID 1741     Lab [id number]     Critical(s) Notified .  No Critical Values    Troponin   Result Value Ref Range    Troponin, High Sensitivity 77 (H) 0 - 9 ng/L   Hemoglobin A1c   Result Value Ref Range    Hemoglobin A1C 7.0 (H) 4.0 - 5.6 %   POCT Glucose   Result Value Ref Range    Meter Glucose 202 (H) 74 - 99 mg/dL   POCT Glucose   Result Value Ref Range    Meter Glucose 224 (H) 74 - 99 mg/dL   EKG 12 Lead   Result Value Ref Range    Ventricular Rate 122 BPM    Atrial Rate 115 BPM    QRS Duration 82 ms    Q-T Interval 344 ms    QTc Calculation (Bazett) 490 ms    R Axis -28 degrees    T Axis 171 degrees       RADIOLOGY:  XR CHEST PORTABLE   Final Result   No acute cardiopulmonary process. ------------------------- NURSING NOTES AND VITALS REVIEWED ---------------------------  Date / Time Roomed:  2/4/2023  9:45 AM  ED Bed Assignment:  [id number]    The nursing notes within the ED encounter and vital signs as below have been reviewed.      Patient Vitals for the past 24 hrs:   BP Temp Temp src Pulse Resp SpO2 Height Weight   02/04/23 1730 108/82 -- -- 97 -- 98 % -- --   02/04/23 1645 -- -- -- -- -- 100 % -- --   02/04/23 1615 -- -- -- -- -- 100 % -- --   02/04/23 1600 -- -- -- -- -- 100 % -- --   02/04/23 1545 -- -- -- -- -- 100 % -- --   02/04/23 1500 -- -- -- -- -- -- 5' (1.524 m) 180 lb 9.6 oz (81.9 kg)   02/04/23 1445 108/69 98.2 °F (36.8 °C) Axillary (!) 108 20 99 % -- --   02/04/23 1346 -- -- -- (!) 104 19 100 % -- --   02/04/23 1330 -- -- -- (!) 107 23 100 % -- --   02/04/23 1315 -- -- -- (!) 118 16 100 % -- --   02/04/23 1303 -- -- -- -- 19 -- -- --   02/04/23 1300 (!) 146/90 -- -- (!) 127 13 100 % -- --   02/04/23 1245 -- -- -- (!) 117 15 100 % -- --   02/04/23 1230 -- -- -- (!) 118 17 100 % -- --   02/04/23 1200 (!) 128/105 -- -- (!) 120 18 100 % -- --   02/04/23 1145 -- -- -- (!) 117 19 100 % -- --   02/04/23 1130 -- -- -- (!) 115 20 100 % -- --   02/04/23 1124 -- -- -- -- 19 -- -- --   02/04/23 1115 -- -- -- (!) 122 23 100 % -- --   02/04/23 1100 (!) 93/59 -- -- (!) 110 24 100 % -- --   02/04/23 1045 -- -- -- (!) 120 26 100 % -- --   02/04/23 1040 -- -- -- -- 25 -- -- --   02/04/23 1030 -- -- -- 91 21 100 % -- --   02/04/23 1015 105/79 -- -- (!) 124 24 100 % -- --   02/04/23 1004 -- -- -- (!) 113 25 100 % -- 195 lb 14.4 oz (88.9 kg)   02/04/23 0951 -- -- -- (!) 126 (!) 40 90 % -- --   02/04/23 0950 -- -- -- (!) 136 (!) 31 99 % -- --   02/04/23 0949 -- -- -- (!) 140 21 100 % -- --   02/04/23 0945 (!) 115/58 97.9 °F (36.6 °C) -- (!) 110 30 97 % -- --       Oxygen Saturation Interpretation: Abnormal and Improved after treatment    ------------------------------------------ PROGRESS NOTES ------------------------------------------  Counseling:  I have spoken with the patient and discussed todays results, in addition to providing specific details for the plan of care and counseling regarding the diagnosis and prognosis. Their questions are answered at this time and they are agreeable with the plan of admission.    --------------------------------- ADDITIONAL PROVIDER NOTES ---------------------------------  Consultations:   Spoke with [facility] for Dr. Chauncey Curry. Discussed case. They will admit the patient. This patient's ED course included: a personal history and physicial examination, re-evaluation prior to disposition, multiple bedside re-evaluations, IV medications, cardiac monitoring, continuous pulse oximetry, and complex medical decision making and emergency management    This patient has remained hemodynamically stable during their ED course. Diagnosis:  1. Acute respiratory failure with hypoxia ([address]    2. Respiratory syncytial virus (RSV)    3. Anemia, unspecified type    4. Hyperglycemia    5. Acute hyponatremia    6. Acute hyperkalemia        Disposition:  Patient's disposition: Admit to telemetry  Patient's condition is stable.          Darrick Bonilla DO  Resident  02/04/23 9066

## 2023-02-04 NOTE — LETTER
[place] Department Medicaid  CERTIFICATION OF NECESSITY  FOR NON-EMERGENCY TRANSPORTATION   BY GROUND AMBULANCE      Individual Information   1. Name: Idania De León 2. [place] Medicaid Billing Number:    3. Address: [address]      Transportation Provider Information   4. Provider Name: TIFFANIE   5. [place] Medicaid Provider Number:  National Provider Identifier (NPI):      Certification  7. Criteria:  During transport, this individual requires:  [] Medical treatment or continuous     supervision by an EMT. [x] The administration or regulation of oxygen by another person. [] Supervised protective restraint. 8. Period Beginning Date: 2/15/2023     9. Length  [x] Not more than 1 day(s)  [] One Year     Additional Information Relevant to Certification   10. Comments or Explanations, If Necessary or Appropriate   Respiratory syncytial virus (RSV), Acute respiratory failure with hypoxia, on 2L NC, alert and oriented x2, max assist, generalized weakness and strength     Certifying Practitioner Information   11. Name of Practitioner: Purnima Palacios   12. [place] Medicaid Provider Number, If Applicable:  [address] Provider Identifier (NPI):      Signature Information   14. Date of Signature: 2/15/2023   15. Name of Person Signing: Aniyah Tejada RN     16. Signature and Professional Designation: Electronically signed by Aniyah Tejada RN on 2/15/2023 at 11:35 AM     [facility] [id number]  Rev. 7/2015          [address] [place] [address] Encounter Date/Time: 2/4/2023 [address] Account: [de-identified]    MRN: [id number]    Patient: Idania De León    Contact Serial #: [id number]      ENCOUNTER          Patient Class: I Private Enc? No Unit [facility] [id number]/[id number]   Hospital Service:  INM   Encounter DX: Respiratory syncytial vi*   ADM Provider:     Procedure:     ATT Provider: Purnima Palacios DO   REF Provider:        Admission DX: Respiratory syncytial virus (RSV), Acute respiratory failure with hypoxia ([facility] and DX codes: B33.8, J96.01      PATIENT                 Name: Karishma Lara : 1932 (89 yrs)   Address: [address] [place] [address] Sex: Female   [place]aryn [place] [address]         Marital Status:    Employer: RETIRED         Orthodox: [facility]*   Primary Care Provider: Eder Diaz MD         Primary Phone: [phone]   EMERGENCY CONTACT   Contact Name Legal Guardian? Relationship to Patient Home Phone Work Phone   1. John Manley  2. Damaris Small \"Sissy\"      Spouse  Child [phone]              GUARANTOR            Guarantor: Karishma Lara     : 1932   Address: [address] [place] [address] Sex: Female   [place] [address]     Relation to Patient: Self       Home Phone: [phone]   Guarantor ID: [id number]       Work Phone:     Guarantor Employer: RETIRED         Status: RETIRED      COVERAGE        PRIMARY INSURANCE   Payor: [facility] MEDICARE Plan: AMEYA PACKER*   Payor Address: [facility] [id number] [place] [address]       Group Number: [id number] Type: INDEMNITY   Subscriber Name: Miranda Arredondo : 1932   Subscriber ID: [id number] Yimi . Rel. to Sub: Self   SECONDARY INSURANCE   Payor:   Plan:     Payor Address:  ,           Group Number:   Insurance Type:     Subscriber Name:   Subscriber :     Subscriber ID:   Pat.  Rel. to Sub:        CSN: [id number]

## 2023-02-05 LAB
ANION GAP SERPL CALCULATED.3IONS-SCNC: 12 MMOL/L (ref 7–16)
BASOPHILS ABSOLUTE: 0.01 E9/L (ref 0–0.2)
BASOPHILS RELATIVE PERCENT: 0.1 % (ref 0–2)
BUN BLDV-MCNC: 31 MG/DL (ref 6–23)
CALCIUM SERPL-MCNC: 8.3 MG/DL (ref 8.6–10.2)
CHLORIDE BLD-SCNC: 91 MMOL/L (ref 98–107)
CO2: 23 MMOL/L (ref 22–29)
CREAT SERPL-MCNC: 1.2 MG/DL (ref 0.5–1)
EOSINOPHILS ABSOLUTE: 0 E9/L (ref 0.05–0.5)
EOSINOPHILS RELATIVE PERCENT: 0 % (ref 0–6)
GFR SERPL CREATININE-BSD FRML MDRD: 43 ML/MIN/1.73
GLUCOSE BLD-MCNC: 202 MG/DL (ref 74–99)
HCT VFR BLD CALC: 29.3 % (ref 34–48)
HEMOGLOBIN: 9.7 G/DL (ref 11.5–15.5)
IMMATURE GRANULOCYTES #: 0.13 E9/L
IMMATURE GRANULOCYTES %: 1.8 % (ref 0–5)
LYMPHOCYTES ABSOLUTE: 0.75 E9/L (ref 1.5–4)
LYMPHOCYTES RELATIVE PERCENT: 10.6 % (ref 20–42)
MCH RBC QN AUTO: 28.4 PG (ref 26–35)
MCHC RBC AUTO-ENTMCNC: 33.1 % (ref 32–34.5)
MCV RBC AUTO: 85.7 FL (ref 80–99.9)
METER GLUCOSE: 210 MG/DL (ref 74–99)
METER GLUCOSE: 253 MG/DL (ref 74–99)
METER GLUCOSE: 254 MG/DL (ref 74–99)
METER GLUCOSE: 326 MG/DL (ref 74–99)
MONOCYTES ABSOLUTE: 0.29 E9/L (ref 0.1–0.95)
MONOCYTES RELATIVE PERCENT: 4.1 % (ref 2–12)
NEUTROPHILS ABSOLUTE: 5.88 E9/L (ref 1.8–7.3)
NEUTROPHILS RELATIVE PERCENT: 83.4 % (ref 43–80)
PDW BLD-RTO: 15.7 FL (ref 11.5–15)
PLATELET # BLD: 212 E9/L (ref 130–450)
PMV BLD AUTO: 9.8 FL (ref 7–12)
POTASSIUM REFLEX MAGNESIUM: 5.2 MMOL/L (ref 3.5–5)
PROCALCITONIN: 0.09 NG/ML (ref 0–0.08)
RBC # BLD: 3.42 E12/L (ref 3.5–5.5)
SODIUM BLD-SCNC: 126 MMOL/L (ref 132–146)
WBC # BLD: 7.1 E9/L (ref 4.5–11.5)

## 2023-02-05 PROCEDURE — 6370000000 HC RX 637 (ALT 250 FOR IP): Performed by: INTERNAL MEDICINE

## 2023-02-05 PROCEDURE — 94640 AIRWAY INHALATION TREATMENT: CPT

## 2023-02-05 PROCEDURE — 6370000000 HC RX 637 (ALT 250 FOR IP): Performed by: NURSE PRACTITIONER

## 2023-02-05 PROCEDURE — 36415 COLL VENOUS BLD VENIPUNCTURE: CPT

## 2023-02-05 PROCEDURE — 6360000002 HC RX W HCPCS: Performed by: NURSE PRACTITIONER

## 2023-02-05 PROCEDURE — 2580000003 HC RX 258: Performed by: INTERNAL MEDICINE

## 2023-02-05 PROCEDURE — 82962 GLUCOSE BLOOD TEST: CPT

## 2023-02-05 PROCEDURE — 80048 BASIC METABOLIC PNL TOTAL CA: CPT

## 2023-02-05 PROCEDURE — 85025 COMPLETE CBC W/AUTO DIFF WBC: CPT

## 2023-02-05 PROCEDURE — 2140000000 HC CCU INTERMEDIATE R&B

## 2023-02-05 PROCEDURE — 2700000000 HC OXYGEN THERAPY PER DAY

## 2023-02-05 PROCEDURE — 94660 CPAP INITIATION&MGMT: CPT

## 2023-02-05 PROCEDURE — 2580000003 HC RX 258: Performed by: NURSE PRACTITIONER

## 2023-02-05 PROCEDURE — 84145 PROCALCITONIN (PCT): CPT

## 2023-02-05 RX ORDER — SODIUM CHLORIDE 9 MG/ML
INJECTION, SOLUTION INTRAVENOUS CONTINUOUS
Status: DISCONTINUED | OUTPATIENT
Start: 2023-02-05 | End: 2023-02-06

## 2023-02-05 RX ORDER — DONEPEZIL HYDROCHLORIDE 5 MG/1
5 TABLET, FILM COATED ORAL NIGHTLY
Status: DISCONTINUED | OUTPATIENT
Start: 2023-02-05 | End: 2023-02-15 | Stop reason: HOSPADM

## 2023-02-05 RX ADMIN — INSULIN LISPRO 4 UNITS: 100 INJECTION, SOLUTION INTRAVENOUS; SUBCUTANEOUS at 20:42

## 2023-02-05 RX ADMIN — METHYLPREDNISOLONE SODIUM SUCCINATE 40 MG: 40 INJECTION, POWDER, FOR SOLUTION INTRAMUSCULAR; INTRAVENOUS at 20:42

## 2023-02-05 RX ADMIN — Medication 10 ML: at 09:20

## 2023-02-05 RX ADMIN — SODIUM CHLORIDE: 9 INJECTION, SOLUTION INTRAVENOUS at 09:49

## 2023-02-05 RX ADMIN — APIXABAN 5 MG: 5 TABLET, FILM COATED ORAL at 09:20

## 2023-02-05 RX ADMIN — IPRATROPIUM BROMIDE AND ALBUTEROL SULFATE 1 AMPULE: .5; 2.5 SOLUTION RESPIRATORY (INHALATION) at 11:19

## 2023-02-05 RX ADMIN — INSULIN LISPRO 2 UNITS: 100 INJECTION, SOLUTION INTRAVENOUS; SUBCUTANEOUS at 09:21

## 2023-02-05 RX ADMIN — CARVEDILOL 12.5 MG: 6.25 TABLET, FILM COATED ORAL at 10:51

## 2023-02-05 RX ADMIN — IPRATROPIUM BROMIDE AND ALBUTEROL SULFATE 1 AMPULE: .5; 2.5 SOLUTION RESPIRATORY (INHALATION) at 15:59

## 2023-02-05 RX ADMIN — APIXABAN 5 MG: 5 TABLET, FILM COATED ORAL at 20:42

## 2023-02-05 RX ADMIN — IPRATROPIUM BROMIDE AND ALBUTEROL SULFATE 1 AMPULE: .5; 2.5 SOLUTION RESPIRATORY (INHALATION) at 19:08

## 2023-02-05 RX ADMIN — INSULIN LISPRO 4 UNITS: 100 INJECTION, SOLUTION INTRAVENOUS; SUBCUTANEOUS at 12:16

## 2023-02-05 RX ADMIN — IPRATROPIUM BROMIDE AND ALBUTEROL SULFATE 1 AMPULE: .5; 2.5 SOLUTION RESPIRATORY (INHALATION) at 07:41

## 2023-02-05 RX ADMIN — METHYLPREDNISOLONE SODIUM SUCCINATE 40 MG: 40 INJECTION, POWDER, FOR SOLUTION INTRAMUSCULAR; INTRAVENOUS at 10:53

## 2023-02-05 RX ADMIN — CARVEDILOL 12.5 MG: 6.25 TABLET, FILM COATED ORAL at 17:04

## 2023-02-05 RX ADMIN — DONEPEZIL HYDROCHLORIDE 5 MG: 5 TABLET ORAL at 20:42

## 2023-02-05 RX ADMIN — MAGNESIUM HYDROXIDE 30 ML: 1200 LIQUID ORAL at 20:42

## 2023-02-05 RX ADMIN — INSULIN LISPRO 4 UNITS: 100 INJECTION, SOLUTION INTRAVENOUS; SUBCUTANEOUS at 17:15

## 2023-02-05 RX ADMIN — LEVOTHYROXINE SODIUM 75 MCG: 0.07 TABLET ORAL at 09:20

## 2023-02-05 NOTE — PROGRESS NOTES
Notified Dr. Marce Ramos that patient's aricept dosage is too high per family and patient should be on 5mg of aricept and not on 10 mg of aricept via perfect serve.

## 2023-02-05 NOTE — H&P
[facility]  History and Physical      CHIEF COMPLAINT:    Chief Complaint   Patient presents with    Shortness of Breath     Pt 58% RA per EMS, 100% NRB, pt breathing labored upon arrival. Hx COPD        Patient of Joanna Urena MD presents with:  Acute respiratory failure with hypoxia ([facility]    History of Present Illness:   Patient is a 25-year-old female with past medical history of COPD, DM, HLD, HTN, atrial fibrillation on Eliquis, hypothyroidism who presents to the emergency room for shortness of breath. Patient was short of breath for last few days with a nonproductive cough EMS was called and patient was found to be 50% on room air. Patient was placed on nonrebreather and brought to the emergency room where she was placed on BiPAP. Chest x-ray was obtained which revealed no acute cardiopulmonary abnormality. Her labs were revealing of mild hyponatremia of 125, mild MERYL 26/1.1, hyperglycemia of 243, proBNP 3,109, and a respiratory panel that radiology was positive for RSV. Patient is admitted to intermediate telemetry unit for further work-up and treatment. On evaluation,  is at bedside. Patient resting comfortably and indicates her breathing is much improved today. REVIEW OF SYSTEMS:  Pertinent negatives are above in HPI. 10 point ROS otherwise negative.       Past Medical History:   Diagnosis Date    Arthritis     COPD (chronic obstructive pulmonary disease) ([facility] [address]     Diabetes mellitus ([facility] [address]     Hyperlipidemia     Hypertension          Past Surgical History:   Procedure Laterality Date    CARPAL TUNNEL RELEASE      CHOLECYSTECTOMY      HC INSERT PICC CATH, 5/> YRS  01/21/2020         HYSTERECTOMY (CERVIX STATUS UNKNOWN)      PACEMAKER INSERTION Left 12/21/2022    [employer] Single PPM (Dr. Caity Nava)       Medications Prior to Admission:    Medications Prior to Admission: Multiple Vitamin (MULTIVITAMIN ADULT PO), Take by mouth daily  lisinopril (PRINIVIL;ZESTRIL) 10 MG tablet, Take 1 tablet by mouth daily  furosemide (LASIX) 20 MG tablet, Take 20 mg by mouth daily  donepezil (ARICEPT) 10 MG tablet, Take 10 mg by mouth at bedtime  carvedilol (COREG) 12.5 MG tablet, Take 1 tablet by mouth 2 times daily (with meals) (Patient taking differently: Take 12.5 mg by mouth 2 times daily (with meals) Takes one and a half tablet)  amLODIPine (NORVASC) 5 MG tablet, Take 1 tablet by mouth daily  ipratropium-albuterol (DUONEB) 0.5-2.5 (3) MG/3ML SOLN nebulizer solution, Inhale 1 vial into the lungs every 6 hours as needed for Shortness of Breath  levothyroxine (SYNTHROID) 75 MCG tablet, Take 75 mcg by mouth Daily  predniSONE (DELTASONE) 5 MG tablet, Take 5 mg by mouth 2 times daily  metFORMIN (GLUCOPHAGE-XR) 500 MG extended release tablet, Take 500 mg by mouth daily (with breakfast)  apixaban (ELIQUIS) 5 MG TABS tablet, Take 5 mg by mouth 2 times daily    Note that the patient's home medications were reviewed and the above list is accurate to the best of my knowledge at the time of the exam.    Allergies:    Patient has no known allergies. Social History:    reports that she has never smoked. She has never used smokeless tobacco. She reports that she does not drink alcohol. Family History:   family history is not on file. PHYSICAL EXAM:    Vitals:  BP 95/61   Pulse 78   Temp 98.6 °F (37 °C) (Axillary)   Resp 20   Ht 5' (1.524 m)   Wt 180 lb 9.6 oz (81.9 kg)   SpO2 100%   BMI 35.27 kg/m²       General appearance: NAD, conversant  Eyes: Sclerae anicteric, PERRLA  HEENT: AT/NC, MMM  Neck: FROM, supple, no thyromegaly  Lymph: No cervical / supraclavicular lymphadenopathy  Lungs: No significant wheezing noted on lung exam  CV: RRR, no MRGs, no lower extremity edema  Abdomen: Soft, non-tender; no masses or HSM, +BS  Extremities: FROM without synovitis. No clubbing or cyanosis of the hands. Skin: no rash, induration, lesions, or ulcers  Psych: Calm and cooperative.   Normal judgement and insight. Normal mood and affect. Neuro: Alert and interactive, face symmetric, speech fluent. LABS:  All labs reviewed. Of note:  CBC:   Lab Results   Component Value Date/Time    WBC 7.1 02/05/2023 06:40 AM    RBC 3.42 02/05/2023 06:40 AM    HGB 9.7 02/05/2023 06:40 AM    HCT 29.3 02/05/2023 06:40 AM    MCV 85.7 02/05/2023 06:40 AM    MCH 28.4 02/05/2023 06:40 AM    MCHC 33.1 02/05/2023 06:40 AM    RDW 15.7 02/05/2023 06:40 AM     02/05/2023 06:40 AM    MPV 9.8 02/05/2023 06:40 AM     CMP:    Lab Results   Component Value Date/Time     02/05/2023 06:40 AM    K 5.2 02/05/2023 06:40 AM    CL 91 02/05/2023 06:40 AM    CO2 23 02/05/2023 06:40 AM    BUN 31 02/05/2023 06:40 AM    CREATININE 1.2 02/05/2023 06:40 AM    GFRAA >60 09/12/2020 04:39 AM    LABGLOM 43 02/05/2023 06:40 AM    GLUCOSE 202 02/05/2023 06:40 AM    PROT 6.5 02/04/2023 09:55 AM    LABALBU 3.6 02/04/2023 09:55 AM    CALCIUM 8.3 02/05/2023 06:40 AM    BILITOT 0.4 02/04/2023 09:55 AM    ALKPHOS 79 02/04/2023 09:55 AM    AST 31 02/04/2023 09:55 AM    ALT 34 02/04/2023 09:55 AM       Imaging:  CXR: No acute cardiopulmonary process    EKG:  Atrial fibrillation with rapid ventricular response  Nonspecific T wave abnormality    Telemetry:  Afib     ASSESSMENT/PLAN:  Principal Problem:    Acute respiratory failure with hypoxia (HCC)  Resolved Problems:    * No resolved hospital problems.  *    Patient is a 44-year-old female with known history of coronary artery disease with pacemaker in place which was placed approximately 2 months ago admitted to [facility] for  Acute respiratory failure with hypoxia due to RSV infection  -Monitor labs  -Respiratory panel + RSV  -Procalcitonin 0.09  -IV Solu-Medrol 40 mg every 12  -DuoNebs scheduled  -Supportive care  -When off BiPAP patient saturates well on 2 L at 98%  -IVF NS @ 100, monitor closely d/t hx of CHF    A. fib with RVR likely due to respiratory status/viral infection  -Elevated troponins-no chest pain, continue to monitor  -BNP 3,109  -Monitor labs  -Continue p.o. Coreg, Eliquis  -Rate now controlled    Mild MERYL with mild electrolyte imbalances   -Monitor labs   -26/1.1 > 31/1.2  -Na+ 125 > 126  -IVF NS @ 50  -Hold lasix     Diabetes mellitus  -Continue to monitor blood glucose levels  -Continue medium dose ISS, monitor closely due to steroids  -A1c 7.0    Medication for other comorbidities continue as appropriate dose adjustment as necessary.     DVT prophylaxis Eliquis   PT OT  Discharge planning    Code status: Full  Requires Inpatient level of care    Melodie Phillip MD

## 2023-02-05 NOTE — PROGRESS NOTES
Date: 2/4/2023    Time: 9:02 PM    Patient Placed On BIPAP/CPAP/ Non-Invasive Ventilation? Yes    If no must comment. Facial area red/color change? No           If YES are Blister/Lesion present? No   If yes must notify nursing staff  BIPAP/CPAP skin barrier?   Yes    Skin barrier type:mepilexlite       Comments:        Laura Max RCP

## 2023-02-06 LAB
ANION GAP SERPL CALCULATED.3IONS-SCNC: 11 MMOL/L (ref 7–16)
BASOPHILS ABSOLUTE: 0.01 E9/L (ref 0–0.2)
BASOPHILS RELATIVE PERCENT: 0.1 % (ref 0–2)
BUN BLDV-MCNC: 39 MG/DL (ref 6–23)
CALCIUM SERPL-MCNC: 8 MG/DL (ref 8.6–10.2)
CHLORIDE BLD-SCNC: 90 MMOL/L (ref 98–107)
CO2: 22 MMOL/L (ref 22–29)
CREAT SERPL-MCNC: 1.1 MG/DL (ref 0.5–1)
EKG ATRIAL RATE: 115 BPM
EKG Q-T INTERVAL: 344 MS
EKG QRS DURATION: 82 MS
EKG QTC CALCULATION (BAZETT): 490 MS
EKG R AXIS: -28 DEGREES
EKG T AXIS: 171 DEGREES
EKG VENTRICULAR RATE: 122 BPM
EOSINOPHILS ABSOLUTE: 0 E9/L (ref 0.05–0.5)
EOSINOPHILS RELATIVE PERCENT: 0 % (ref 0–6)
GFR SERPL CREATININE-BSD FRML MDRD: 48 ML/MIN/1.73
GLUCOSE BLD-MCNC: 218 MG/DL (ref 74–99)
HCT VFR BLD CALC: 29.6 % (ref 34–48)
HEMOGLOBIN: 9.7 G/DL (ref 11.5–15.5)
IMMATURE GRANULOCYTES #: 0.13 E9/L
IMMATURE GRANULOCYTES %: 1.6 % (ref 0–5)
LYMPHOCYTES ABSOLUTE: 0.63 E9/L (ref 1.5–4)
LYMPHOCYTES RELATIVE PERCENT: 7.7 % (ref 20–42)
MCH RBC QN AUTO: 28.4 PG (ref 26–35)
MCHC RBC AUTO-ENTMCNC: 32.8 % (ref 32–34.5)
MCV RBC AUTO: 86.5 FL (ref 80–99.9)
METER GLUCOSE: 228 MG/DL (ref 74–99)
METER GLUCOSE: 242 MG/DL (ref 74–99)
METER GLUCOSE: 259 MG/DL (ref 74–99)
METER GLUCOSE: 267 MG/DL (ref 74–99)
MONOCYTES ABSOLUTE: 0.38 E9/L (ref 0.1–0.95)
MONOCYTES RELATIVE PERCENT: 4.7 % (ref 2–12)
NEUTROPHILS ABSOLUTE: 7 E9/L (ref 1.8–7.3)
NEUTROPHILS RELATIVE PERCENT: 85.9 % (ref 43–80)
PDW BLD-RTO: 15.6 FL (ref 11.5–15)
PLATELET # BLD: 219 E9/L (ref 130–450)
PMV BLD AUTO: 10.4 FL (ref 7–12)
POTASSIUM REFLEX MAGNESIUM: 5.8 MMOL/L (ref 3.5–5)
POTASSIUM SERPL-SCNC: 5.2 MMOL/L (ref 3.5–5)
RBC # BLD: 3.42 E12/L (ref 3.5–5.5)
SODIUM BLD-SCNC: 123 MMOL/L (ref 132–146)
WBC # BLD: 8.2 E9/L (ref 4.5–11.5)

## 2023-02-06 PROCEDURE — 94660 CPAP INITIATION&MGMT: CPT

## 2023-02-06 PROCEDURE — 93010 ELECTROCARDIOGRAM REPORT: CPT | Performed by: INTERNAL MEDICINE

## 2023-02-06 PROCEDURE — 94640 AIRWAY INHALATION TREATMENT: CPT

## 2023-02-06 PROCEDURE — 36415 COLL VENOUS BLD VENIPUNCTURE: CPT

## 2023-02-06 PROCEDURE — 97530 THERAPEUTIC ACTIVITIES: CPT

## 2023-02-06 PROCEDURE — 80048 BASIC METABOLIC PNL TOTAL CA: CPT

## 2023-02-06 PROCEDURE — 82962 GLUCOSE BLOOD TEST: CPT

## 2023-02-06 PROCEDURE — 6370000000 HC RX 637 (ALT 250 FOR IP): Performed by: INTERNAL MEDICINE

## 2023-02-06 PROCEDURE — 6370000000 HC RX 637 (ALT 250 FOR IP): Performed by: NURSE PRACTITIONER

## 2023-02-06 PROCEDURE — 97165 OT EVAL LOW COMPLEX 30 MIN: CPT

## 2023-02-06 PROCEDURE — 97161 PT EVAL LOW COMPLEX 20 MIN: CPT

## 2023-02-06 PROCEDURE — 85025 COMPLETE CBC W/AUTO DIFF WBC: CPT

## 2023-02-06 PROCEDURE — 6360000002 HC RX W HCPCS: Performed by: NURSE PRACTITIONER

## 2023-02-06 PROCEDURE — 2140000000 HC CCU INTERMEDIATE R&B

## 2023-02-06 PROCEDURE — 2580000003 HC RX 258: Performed by: NURSE PRACTITIONER

## 2023-02-06 PROCEDURE — 84132 ASSAY OF SERUM POTASSIUM: CPT

## 2023-02-06 PROCEDURE — 2700000000 HC OXYGEN THERAPY PER DAY

## 2023-02-06 RX ORDER — GUAIFENESIN/DEXTROMETHORPHAN 100-10MG/5
5 SYRUP ORAL EVERY 4 HOURS PRN
Status: DISCONTINUED | OUTPATIENT
Start: 2023-02-06 | End: 2023-02-15 | Stop reason: HOSPADM

## 2023-02-06 RX ADMIN — DONEPEZIL HYDROCHLORIDE 5 MG: 5 TABLET ORAL at 20:09

## 2023-02-06 RX ADMIN — METHYLPREDNISOLONE SODIUM SUCCINATE 40 MG: 40 INJECTION, POWDER, FOR SOLUTION INTRAMUSCULAR; INTRAVENOUS at 09:46

## 2023-02-06 RX ADMIN — INSULIN LISPRO 4 UNITS: 100 INJECTION, SOLUTION INTRAVENOUS; SUBCUTANEOUS at 12:42

## 2023-02-06 RX ADMIN — IPRATROPIUM BROMIDE AND ALBUTEROL SULFATE 1 AMPULE: .5; 2.5 SOLUTION RESPIRATORY (INHALATION) at 20:17

## 2023-02-06 RX ADMIN — CARVEDILOL 12.5 MG: 6.25 TABLET, FILM COATED ORAL at 09:46

## 2023-02-06 RX ADMIN — IPRATROPIUM BROMIDE AND ALBUTEROL SULFATE 1 AMPULE: .5; 2.5 SOLUTION RESPIRATORY (INHALATION) at 07:38

## 2023-02-06 RX ADMIN — AMLODIPINE BESYLATE 5 MG: 5 TABLET ORAL at 09:46

## 2023-02-06 RX ADMIN — LEVOTHYROXINE SODIUM 75 MCG: 0.07 TABLET ORAL at 06:04

## 2023-02-06 RX ADMIN — INSULIN LISPRO 4 UNITS: 100 INJECTION, SOLUTION INTRAVENOUS; SUBCUTANEOUS at 17:35

## 2023-02-06 RX ADMIN — GUAIFENESIN SYRUP AND DEXTROMETHORPHAN 5 ML: 100; 10 SYRUP ORAL at 17:25

## 2023-02-06 RX ADMIN — APIXABAN 5 MG: 5 TABLET, FILM COATED ORAL at 09:46

## 2023-02-06 RX ADMIN — IPRATROPIUM BROMIDE AND ALBUTEROL SULFATE 1 AMPULE: .5; 2.5 SOLUTION RESPIRATORY (INHALATION) at 16:12

## 2023-02-06 RX ADMIN — ACETAMINOPHEN 650 MG: 325 TABLET ORAL at 12:25

## 2023-02-06 RX ADMIN — INSULIN LISPRO 2 UNITS: 100 INJECTION, SOLUTION INTRAVENOUS; SUBCUTANEOUS at 09:49

## 2023-02-06 RX ADMIN — Medication 10 ML: at 09:49

## 2023-02-06 RX ADMIN — PREDNISONE 20 MG: 20 TABLET ORAL at 20:09

## 2023-02-06 RX ADMIN — IPRATROPIUM BROMIDE AND ALBUTEROL SULFATE 1 AMPULE: .5; 2.5 SOLUTION RESPIRATORY (INHALATION) at 11:47

## 2023-02-06 RX ADMIN — APIXABAN 5 MG: 5 TABLET, FILM COATED ORAL at 20:09

## 2023-02-06 RX ADMIN — ACETAMINOPHEN 650 MG: 325 TABLET ORAL at 06:04

## 2023-02-06 ASSESSMENT — PAIN SCALES - GENERAL
PAINLEVEL_OUTOF10: 10
PAINLEVEL_OUTOF10: 0
PAINLEVEL_OUTOF10: 3
PAINLEVEL_OUTOF10: 0

## 2023-02-06 ASSESSMENT — PAIN - FUNCTIONAL ASSESSMENT: PAIN_FUNCTIONAL_ASSESSMENT: PREVENTS OR INTERFERES SOME ACTIVE ACTIVITIES AND ADLS

## 2023-02-06 ASSESSMENT — PAIN DESCRIPTION - ORIENTATION: ORIENTATION: RIGHT

## 2023-02-06 ASSESSMENT — PAIN DESCRIPTION - DESCRIPTORS: DESCRIPTORS: ACHING;CRAMPING;DISCOMFORT

## 2023-02-06 ASSESSMENT — PAIN DESCRIPTION - LOCATION
LOCATION: BACK
LOCATION: GENERALIZED

## 2023-02-06 NOTE — PLAN OF CARE
Patient's chart updated to reflect:      . - HF care plan, HF education points and HF discharge instructions.  -Orders: 2 gram sodium diet, daily weights, I/O.  -PCP and/or Cardiologist appointment to be scheduled within 7 days of hospital discharge.  -History of HF, not primary admission Dx.   Patient admitted for treatment of   Acute respiratory failure with hypoxia due to RSV infection  -Monitor labs  -Respiratory panel + RSV  Hugo Newton RN RN, BSN  Heart Failure Navigator

## 2023-02-06 NOTE — PROGRESS NOTES
Occupational Therapy  OCCUPATIONAL THERAPY INITIAL EVALUATION    [address] [address] [address]      Date:2023                                                Patient Name: Robi Dunlap  MRN: [id number]  : 1932  Room: [address] [place] [address]    Evaluating OT: Jojo Rojas OTR/L #[id number]     Referring Provider: MICHA Xiong CNP  Specific Provider Orders/Date: OT eval and treat 23    Diagnosis: Respiratory syncytial virus (RSV) [B33.8]  Acute respiratory failure with hypoxia ([facility] [address] [J96.01]   Pt admitted to hospital with SOB (O2 sat 58%). Pertinent Medical History:  has a past medical history of Arthritis, COPD (chronic obstructive pulmonary disease) ([facility] [address] Diabetes mellitus ([facility] [address] Hyperlipidemia, and Hypertension.        Precautions:  Fall Risk, O2, continuous pulse ox, contact / droplet precautions    Assessment of current deficits    [x] Functional mobility  [x]ADLs  [x] Strength               [x]Cognition    [x] Functional transfers   [x] IADLs         [x] Safety Awareness   [x]Endurance    [] Fine Coordination              [x] Balance      [] Vision/perception   []Sensation     []Gross Motor Coordination  [] ROM  [] Delirium                   [] Motor Control     OT PLAN OF CARE   OT POC based on physician orders, patient diagnosis and results of clinical assessment    Frequency/Duration 1-3 days/wk for 2 weeks PRN   Specific OT Treatment Interventions to include:   * Instruction/training on adapted ADL techniques and AE recommendations to increase functional independence within precautions       * Training on energy conservation strategies, correct breathing pattern and techniques to improve independence/tolerance for self-care routine  * Functional transfer/mobility training/DME recommendations for increased independence, safety, and fall prevention  * Patient/Family education to increase follow through with safety techniques and functional independence  * Recommendation of environmental modifications for increased safety with functional transfers/mobility and ADLs  * Therapeutic exercise to improve motor endurance, ROM, and functional strength for ADLs/functional transfers  * Therapeutic activities to facilitate/challenge dynamic balance, stand tolerance for increased safety and independence with ADLs  * Therapeutic activities to facilitate gross/fine motor skills for increased independence with ADLs  * Neuro-muscular re-education: facilitation of righting/equilibrium reactions, midline orientation, scapular stability/mobility, normalization of muscle tone, and facilitation of volitional active controled movement    Recommended Adaptive Equipment:  TBD     Home Living: Pt lives with  in a 1 story home with 4 LACY   Bathroom setup: sponge bathes with assist from     Equipment owned: none     Prior Level of Function: assist with ADLs , assist with IADLs; ambulated with w/w (pt reports she has been bedbound and nonambulatory for the past 3 weeks following fall)   Driving: yes   Occupation: na    Pain Level: Pt denies pain this session    Cognition: A&O: x3 Follows 1 step directions   Memory:  fair-   Sequencing:  fair-   Problem solving:  fair-   Judgement/safety:  fair-     Functional Assessment:  AM-PAC Daily Activity Raw Score: 11/24   Initial Eval Status  Date: 2/6/23 Treatment Status  Date: STGs = LTGs  Time frame: 10-14 days   Feeding Set-up  Mod I   Grooming Minimal Assist   Supervision    UB Dressing Moderate Assist   Supervision    LB Dressing Dependent   Maximal Assist    Bathing Dependent  Moderate Assist    Toileting Dependent   Maximal Assist    Bed Mobility  Rolling Moderate Assist   Supine<>sit: NT  Pt declined this session   Rolling: SBA  Supine to sit: Moderate Assist   Sit to supine:  Moderate Assist    Functional Transfers NT   Maximal Assist    Functional Mobility NT       Balance Sitting: NT     Activity Tolerance P+    Limited due to fatigue and weakness  F+   Visual/  Perceptual wfl                    Hand Dominance right   Strength ROM Additional Info:    RUE   3+/5 wfl good  and wfl FMC/dexterity noted during ADL tasks     LUE 3+/5 wfl good  and wfl FMC/dexterity noted during ADL tasks     Hearing: wfl  Sensation:wfl  Tone: wfl  Edema:none noted     Comments: Upon arrival patient supine in bed and agreeable to OT Session. Therapist educated pt on role of OT. At end of session, patient semi-supine in bed with call light and phone within reach, all lines and tubes intact. Overall patient demonstrated decreased independence and safety during completion of ADL/functional transfer/mobility tasks. Pt would benefit from continued skilled OT to increase safety and independence with completion of ADLs for functional independence and quality of life. Treatment: OT treatment provided this date includes: Facilitation of bed mobility (rolling), B UE ROM in all planes and skilled repositioning in bed addressing joint and skin integrity - skilled cuing on sequencing, hand placement, posture, body mechanics, energy conservation techniques and safety. Therapist facilitated self-care retraining: simulated UB/LB self-care tasks and grooming tasks while educating pt on modified techniques, posture, safety and energy conservation techniques. Skilled monitoring of HR, O2 sats and pts response to treatment (O2 sat greater than 96%, -140 bopm). Rehab Potential: Good  for established goals     Patient / Family Goal: return home      Patient and/or family were instructed on functional diagnosis, prognosis/goals and OT plan of care. Demonstrated fair understanding.      Eval Complexity: Low    Time In: 1020  Time Out: 1045  Total Treatment Time: 10 minutes    Min Units   OT Eval Low 97165  x  1   OT Eval Medium 20242      OT Eval High 17405      OT Re-Eval [id number]       Therapeutic Ex 04884       Therapeutic Activities 28005  8  1   ADL/Self Care 23586  2     Orthotic Management 45009       Manual 36761     Neuro Re-Ed 27219       Non-Billable Time          Evaluation Time additionally includes thorough review of current medical information, gathering information on past medical history/social history and prior level of function, interpretation of standardized testing/informal observation of tasks, assessment of data and development of plan of care and goals.           Jojo Rojas OTR/L #[id number]

## 2023-02-06 NOTE — ACP (ADVANCE CARE PLANNING)
Advance Care Planning   Healthcare Decision Maker:    Primary Decision Maker: Kathy Kim" - Child - [phone]    Secondary Decision Maker: Katia Watson - [phone]    Today we documented Decision Maker(s). The patient will provide ACP documents. Spoke with patient's daughter, Kole Alvarez and she reports she is the patient's POA. She will bring in documents to be scanned into Epic.     Igor Portillo, MSW, LSW [phone]

## 2023-02-06 NOTE — PROGRESS NOTES
Hospitalist Progress Note      PCP: Arun Ferguson MD    Date of Admission: 2/4/2023        Hospital Course:   PRESENT WITH SOB, AND FOUND TO HAVE HYPONATREMIA.    DIURETICS HELD, WILL CONSULT DR DELAROSA         Subjective:  NO COMPLAINTS           Medications:  Reviewed    Infusion Medications    sodium chloride 50 mL/hr at 02/05/23 0949    dextrose      sodium chloride       Scheduled Medications    donepezil  5 mg Oral Nightly    amLODIPine  5 mg Oral Daily    carvedilol  12.5 mg Oral BID WC    levothyroxine  75 mcg Oral Daily    lisinopril  10 mg Oral Daily    [Held by provider] furosemide  20 mg Oral Daily    sodium chloride flush  5-40 mL IntraVENous 2 times per day    ipratropium-albuterol  1 ampule Inhalation Q4H WA    predniSONE  20 mg Oral BID    insulin lispro  0-8 Units SubCUTAneous TID WC    insulin lispro  0-4 Units SubCUTAneous Nightly    apixaban  5 mg Oral BID     PRN Meds: guaiFENesin-dextromethorphan, ipratropium-albuterol, glucose, dextrose bolus **OR** dextrose bolus, glucagon (rDNA), dextrose, sodium chloride flush, sodium chloride, ondansetron **OR** ondansetron, magnesium hydroxide, acetaminophen **OR** acetaminophen      Intake/Output Summary (Last 24 hours) at 2/6/2023 1615  Last data filed at 2/5/2023 2034  Gross per 24 hour   Intake --   Output 300 ml   Net -300 ml       Exam:    /76   Pulse 94   Temp 97.5 °F (36.4 °C) (Oral)   Resp 17   Ht 5' (1.524 m)   Wt 180 lb 9.6 oz (81.9 kg)   SpO2 100%   BMI 35.27 kg/m²           Gen:  WELL DEVELOPED  HEENT: NC/AT, moist mucous membranes, no oropharyngeal erythema or exudate  Neck: supple, trachea midline, no anterior cervical or SC LAD  Heart:  IRREG  Lungs:   DIMINISHED  bilaterally,  Abd: bowel sounds present, soft, nontender, nondistended, no masses  Extrem:  No clubbing, cyanosis,  EDEMA OF ARMS AND LEGS edema  Skin: no rashes or lesions  Psych: Not oriented to date or Not oriented to place  Neuro: grossly intact, moves all four extremities. Labs:   Recent Labs     02/04/23  0955 02/05/23  0640 02/06/23  0425   WBC 10.1 7.1 8.2   HGB 11.1* 9.7* 9.7*   HCT 34.3 29.3* 29.6*    212 219     Recent Labs     02/04/23  0955 02/05/23  0640 02/06/23  0425 02/06/23  1110   * 126* 123*  --    K 5.2* 5.2* 5.8* 5.2*   CL 87* 91* 90*  --    CO2 26 23 22  --    BUN 26* 31* 39*  --    CREATININE 1.1* 1.2* 1.1*  --    CALCIUM 9.3 8.3* 8.0*  --      Recent Labs     02/04/23  0955   AST 31   ALT 34*   BILITOT 0.4   ALKPHOS 79     No results for input(s): INR in the last 72 hours. No results for input(s): Roslynn Angst in the last 72 hours. Recent Labs     02/04/23  0955   AST 31   ALT 34*   BILITOT 0.4   ALKPHOS 79     No results for input(s): LACTA in the last 72 hours. No results found for: Wilfred Melena Results   Component Value Date    AMMONIA <10.0 (L) 09/08/2020    AMMONIA 28.3 03/05/2020    AMMONIA 24.2 01/17/2020       Assessment:    Active Hospital Problems    Diagnosis Date Noted    Acute respiratory failure with hypoxia ([facility] [address] [J96.01] 02/04/2023     Priority: Medium   ANASARCA   IIDM   HYPOTHYROID   DEMENTIA   PACEMAKER   PAF  HYPONATREMIA  Plan:   HOLD LASIX   CONSULT NEPHROLOGY   SSI   SYNTHROID         DVT Prophylaxis: ELIQUIS  Diet: ADULT DIET; Regular; 4 carb choices (60 gm/meal);  Low Sodium (2 gm)  Code Status: Full Code    PT/OT Eval Status: ORDERED    Dispo - HOME      Electronically signed by Jarad Pinto DO on 2/6/2023 at [address] [place] [address]

## 2023-02-06 NOTE — PROGRESS NOTES
Physical Therapy    Physical Therapy Initial Assessment     Name: Karishma Lara  : 1932  MRN: [id number]      Date of Service: 2023    Evaluating PT:  Lorenzo Barr, PT, DPT  [id number]     Room #:  4689/9189-D  Diagnosis:  Respiratory syncytial virus (RSV) [B33.8]  Acute respiratory failure with hypoxia ([facility] [J96.01]  PMHx/PSHx:   has a past medical history of Arthritis, COPD (chronic obstructive pulmonary disease) ([facility] Diabetes mellitus ([facility] Hyperlipidemia, and Hypertension. Procedure/Surgery:  None  Precautions:  Falls, Contact/Droplet - RSV, O2  Equipment Needs:  TBD    SUBJECTIVE:    Pt lives with her  and dtr in a 1 story home with 2 steps and 1 rail. Pt reports that she is non-ambulatory and bed bound at baseline. Reports hospital bed at home. Caregivers come two days/week on  and .  and dtr provide  assistance. OBJECTIVE:   Initial Evaluation  Date: 23 Treatment Short Term/ Long Term   Goals   AM-PAC 6 Clicks      Was pt agreeable to Eval/treatment? Yes      Does pt have pain? No c/o pain      Bed Mobility  Rolling: Mod A  Supine to sit: Mod A  Sit to supine: Max A  Scooting: Dep  Rolling: Min A  Supine to sit: Min A  Sit to supine: Min A  Scooting: Min A   Transfers Sit to stand: NT/refused  Stand to sit: NT  Stand pivot: NT/refused  Sit to stand: Mod A  Stand to sit: Mod A  Stand pivot: Mod A with AAD   Ambulation    NT  >3 feet with AAD Mod A   Stair negotiation: ascended and descended  NT  NA   ROM BUE:  mostly WFL - limited B shoulder flexion  BLE:  WFL     Strength BUE:  grossly 2/5  BLE:  grossly 2/5     Balance Sitting EOB:  SBA  Dynamic Standing:  NT/refused  Sitting EOB:  SBA  Dynamic Standing: Mod A     Pt is A & O x self and location   Sensation:  Pt denies numbness and tingling to extremities  Edema:  Unremarkable    Vitals:   At EOB:  SpO2 93%,  bpm    Therapeutic Exercises:    BUE and BLE AAROM in supine  BLE AROM at EOB: LAQs and ankle pumps x10 reps     Patient education  Pt educated on PT role, safety during functional mobility    Patient response to education:   Pt verbalized understanding Pt demonstrated skill Pt requires further education in this area   Yes  Yes  Reinforce      ASSESSMENT:    Conditions Requiring Skilled Therapeutic Intervention:    [x]Decreased strength     []Decreased ROM  [x]Decreased functional mobility  [x]Decreased balance   [x]Decreased endurance   []Decreased posture  []Decreased sensation  []Decreased coordination   []Decreased vision  []Decreased safety awareness   []Increased pain       Comments:  Pt received supine and agreeable to PT evaluation. Vitals monitored during session. Pt reports hx of bed bound since last hospital visit. Reports arthritis. Requires assistance of trunk and LE during supine<>sit. Demonstrates generalized weakness and deconditioning. Pt given time to sit up at EOB, refused STS or transfer to chair at this time. Assisted back to bed and repositioned with pillows under extremities. Pt left with call button in reach, lines attached, and needs met. Pt would benefit from continued PT services at discharge. Treatment:  Patient practiced and was instructed in the following treatment:    Bed mobility training - pt given verbal and tactile cues to facilitate proper sequencing and safety during rolling and supine<>sit as well as provided with physical assistance to complete task    Sitting EOB for >10 minutes for upright tolerance, postural awareness and BLE ROM   Skilled positioning - Pt placed in the semi fowlers position with pillows utilized to facilitate upright posture, joint and skin integrity, and interaction with environment. Pt's/ family goals   1. Home with family support     Prognosis is fair for reaching above PT goals. Patient and or family understand(s) diagnosis, prognosis, and plan of care.   Yes     PHYSICAL THERAPY PLAN OF CARE:    PT POC is established based on physician order and patient diagnosis     Referring provider/PT Order:        MICHA Linares - CNP    / PT eval and treat   Diagnosis:  Respiratory syncytial virus (RSV) [B33.8]  Acute respiratory failure with hypoxia ([facility] [address] [J96.01]  Specific instructions for next treatment:  Increase EOB activity, Transfer training bed to chair as able    Current Treatment Recommendations:     [x] Strengthening to improve independence with functional mobility   [] ROM to improve independence with functional mobility   [x] Balance Training to improve static/dynamic balance and to reduce fall risk  [x] Endurance Training to improve activity tolerance during functional mobility   [x] Transfer Training to improve safety and independence with all functional transfers   [x] Gait Training to improve gait mechanics, endurance and asses need for appropriate assistive device  [] Stair Training in preparation for safe discharge home and/or into the community   [x] Positioning to prevent skin breakdown and contractures  [x] Safety and Education Training   [x] Patient/Caregiver Education   [x] HEP  [] Other     PT long term treatment goals are located in above grid    Frequency of treatments: 2-5x/week x 1-2 weeks. Time in  0745  Time out  0810    Total Treatment Time  10 minutes     Evaluation Time includes thorough review of current medical information, gathering information on past medical history/social history and prior level of function, completion of standardized testing/informal observation of tasks, assessment of data and education on plan of care and goals.     CPT codes:  [x] Low Complexity PT evaluation 12459  [] Moderate Complexity PT evaluation 19248  [] High Complexity PT evaluation 89681  [] PT Re-evaluation 31670  [] Gait training 81359 -- minutes  [] Manual therapy [phone] -- minutes  [x] Therapeutic activities 26889 10 minutes  [] Therapeutic exercises 81834 - minutes  [] Neuromuscular reeducation 92283 -- minutes     Nupur Bruce, PT, DPT  [id number]

## 2023-02-06 NOTE — PLAN OF CARE
Problem: Chronic Conditions and Co-morbidities  Goal: Patient's chronic conditions and co-morbidity symptoms are monitored and maintained or improved  2/6/2023 1039 by Sumi Mesa RN  Outcome: Progressing  2/6/2023 1039 by Sumi Mesa RN  Outcome: Progressing     Problem: Discharge Planning  Goal: Discharge to home or other facility with appropriate resources  2/6/2023 1039 by Sumi Mesa RN  Outcome: Progressing  2/6/2023 1039 by Sumi Mesa RN  Outcome: Progressing     Problem: Safety - Adult  Goal: Free from fall injury  2/6/2023 1039 by Sumi Mesa RN  Outcome: Progressing  2/6/2023 1039 by Sumi Mesa RN  Outcome: Progressing     Problem: ABCDS Injury Assessment  Goal: Absence of physical injury  Outcome: Progressing     Problem: Skin/Tissue Integrity  Goal: Absence of new skin breakdown  Description: 1. Monitor for areas of redness and/or skin breakdown  2. Assess vascular access sites hourly  3. Every 4-6 hours minimum:  Change oxygen saturation probe site  4. Every 4-6 hours:  If on nasal continuous positive airway pressure, respiratory therapy assess nares and determine need for appliance change or resting period.   Outcome: Progressing     Problem: Cardiovascular - Adult  Goal: Maintains optimal cardiac output and hemodynamic stability  Outcome: Progressing     Problem: Metabolic/Fluid and Electrolytes - Adult  Goal: Hemodynamic stability and optimal renal function maintained  Outcome: Progressing

## 2023-02-07 LAB
ANION GAP SERPL CALCULATED.3IONS-SCNC: 10 MMOL/L (ref 7–16)
ANION GAP SERPL CALCULATED.3IONS-SCNC: 12 MMOL/L (ref 7–16)
ANISOCYTOSIS: ABNORMAL
BASOPHILS ABSOLUTE: 0 E9/L (ref 0–0.2)
BASOPHILS RELATIVE PERCENT: 0.1 % (ref 0–2)
BUN BLDV-MCNC: 43 MG/DL (ref 6–23)
BUN BLDV-MCNC: 43 MG/DL (ref 6–23)
CALCIUM SERPL-MCNC: 7.6 MG/DL (ref 8.6–10.2)
CALCIUM SERPL-MCNC: 7.9 MG/DL (ref 8.6–10.2)
CHLORIDE BLD-SCNC: 89 MMOL/L (ref 98–107)
CHLORIDE BLD-SCNC: 89 MMOL/L (ref 98–107)
CHLORIDE URINE RANDOM: <20 MMOL/L
CO2: 23 MMOL/L (ref 22–29)
CO2: 23 MMOL/L (ref 22–29)
CORTISOL TOTAL: 6.01 MCG/DL (ref 2.68–18.4)
CREAT SERPL-MCNC: 1.1 MG/DL (ref 0.5–1)
CREAT SERPL-MCNC: 1.2 MG/DL (ref 0.5–1)
CREATININE URINE: 67 MG/DL (ref 29–226)
EOSINOPHILS ABSOLUTE: 0 E9/L (ref 0.05–0.5)
EOSINOPHILS RELATIVE PERCENT: 0 % (ref 0–6)
GFR SERPL CREATININE-BSD FRML MDRD: 43 ML/MIN/1.73
GFR SERPL CREATININE-BSD FRML MDRD: 48 ML/MIN/1.73
GLUCOSE BLD-MCNC: 227 MG/DL (ref 74–99)
GLUCOSE BLD-MCNC: 243 MG/DL (ref 74–99)
HCT VFR BLD CALC: 31.2 % (ref 34–48)
HEMOGLOBIN: 10 G/DL (ref 11.5–15.5)
HYPOCHROMIA: ABNORMAL
LYMPHOCYTES ABSOLUTE: 0.32 E9/L (ref 1.5–4)
LYMPHOCYTES RELATIVE PERCENT: 3.5 % (ref 20–42)
MCH RBC QN AUTO: 28.5 PG (ref 26–35)
MCHC RBC AUTO-ENTMCNC: 32.1 % (ref 32–34.5)
MCV RBC AUTO: 88.9 FL (ref 80–99.9)
METER GLUCOSE: 214 MG/DL (ref 74–99)
METER GLUCOSE: 230 MG/DL (ref 74–99)
METER GLUCOSE: 234 MG/DL (ref 74–99)
METER GLUCOSE: 240 MG/DL (ref 74–99)
MONOCYTES ABSOLUTE: 0.32 E9/L (ref 0.1–0.95)
MONOCYTES RELATIVE PERCENT: 3.5 % (ref 2–12)
MYELOCYTE PERCENT: 0.8 % (ref 0–0)
NEUTROPHILS ABSOLUTE: 7.53 E9/L (ref 1.8–7.3)
NEUTROPHILS RELATIVE PERCENT: 92.2 % (ref 43–80)
NUCLEATED RED BLOOD CELLS: 0.9 /100 WBC
OSMOLALITY URINE: 452 MOSM/KG (ref 300–900)
OSMOLALITY: 282 MOSM/KG (ref 285–310)
PDW BLD-RTO: 15.2 FL (ref 11.5–15)
PLATELET # BLD: 217 E9/L (ref 130–450)
PMV BLD AUTO: 10.2 FL (ref 7–12)
POIKILOCYTES: ABNORMAL
POLYCHROMASIA: ABNORMAL
POTASSIUM REFLEX MAGNESIUM: 5.3 MMOL/L (ref 3.5–5)
POTASSIUM SERPL-SCNC: 5.2 MMOL/L (ref 3.5–5)
POTASSIUM, UR: 33 MMOL/L
RBC # BLD: 3.51 E12/L (ref 3.5–5.5)
SODIUM BLD-SCNC: 122 MMOL/L (ref 132–146)
SODIUM BLD-SCNC: 124 MMOL/L (ref 132–146)
SODIUM URINE: <20 MMOL/L
TARGET CELLS: ABNORMAL
TSH SERPL DL<=0.05 MIU/L-ACNC: 0.4 UIU/ML (ref 0.27–4.2)
UREA NITROGEN, UR: 838 MG/DL (ref 800–1666)
URIC ACID, SERUM: 9.6 MG/DL (ref 2.4–5.7)
WBC # BLD: 8.1 E9/L (ref 4.5–11.5)

## 2023-02-07 PROCEDURE — 84540 ASSAY OF URINE/UREA-N: CPT

## 2023-02-07 PROCEDURE — 82962 GLUCOSE BLOOD TEST: CPT

## 2023-02-07 PROCEDURE — 82436 ASSAY OF URINE CHLORIDE: CPT

## 2023-02-07 PROCEDURE — 85025 COMPLETE CBC W/AUTO DIFF WBC: CPT

## 2023-02-07 PROCEDURE — 84300 ASSAY OF URINE SODIUM: CPT

## 2023-02-07 PROCEDURE — 94640 AIRWAY INHALATION TREATMENT: CPT

## 2023-02-07 PROCEDURE — 82533 TOTAL CORTISOL: CPT

## 2023-02-07 PROCEDURE — 6370000000 HC RX 637 (ALT 250 FOR IP)

## 2023-02-07 PROCEDURE — 97535 SELF CARE MNGMENT TRAINING: CPT

## 2023-02-07 PROCEDURE — 84133 ASSAY OF URINE POTASSIUM: CPT

## 2023-02-07 PROCEDURE — 2140000000 HC CCU INTERMEDIATE R&B

## 2023-02-07 PROCEDURE — 83935 ASSAY OF URINE OSMOLALITY: CPT

## 2023-02-07 PROCEDURE — 83930 ASSAY OF BLOOD OSMOLALITY: CPT

## 2023-02-07 PROCEDURE — 2580000003 HC RX 258: Performed by: NURSE PRACTITIONER

## 2023-02-07 PROCEDURE — 36415 COLL VENOUS BLD VENIPUNCTURE: CPT

## 2023-02-07 PROCEDURE — 80048 BASIC METABOLIC PNL TOTAL CA: CPT

## 2023-02-07 PROCEDURE — 94660 CPAP INITIATION&MGMT: CPT

## 2023-02-07 PROCEDURE — 82570 ASSAY OF URINE CREATININE: CPT

## 2023-02-07 PROCEDURE — 84550 ASSAY OF BLOOD/URIC ACID: CPT

## 2023-02-07 PROCEDURE — 6370000000 HC RX 637 (ALT 250 FOR IP): Performed by: INTERNAL MEDICINE

## 2023-02-07 PROCEDURE — 6370000000 HC RX 637 (ALT 250 FOR IP): Performed by: NURSE PRACTITIONER

## 2023-02-07 PROCEDURE — 2700000000 HC OXYGEN THERAPY PER DAY

## 2023-02-07 PROCEDURE — 97530 THERAPEUTIC ACTIVITIES: CPT

## 2023-02-07 PROCEDURE — 2580000003 HC RX 258

## 2023-02-07 PROCEDURE — 84443 ASSAY THYROID STIM HORMONE: CPT

## 2023-02-07 RX ORDER — SODIUM CHLORIDE 9 MG/ML
INJECTION, SOLUTION INTRAVENOUS CONTINUOUS
Status: DISCONTINUED | OUTPATIENT
Start: 2023-02-07 | End: 2023-02-08

## 2023-02-07 RX ORDER — GUAIFENESIN 400 MG/1
400 TABLET ORAL 3 TIMES DAILY
Status: DISCONTINUED | OUTPATIENT
Start: 2023-02-07 | End: 2023-02-15 | Stop reason: HOSPADM

## 2023-02-07 RX ADMIN — INSULIN LISPRO 2 UNITS: 100 INJECTION, SOLUTION INTRAVENOUS; SUBCUTANEOUS at 12:30

## 2023-02-07 RX ADMIN — GUAIFENESIN 400 MG: 400 TABLET ORAL at 21:05

## 2023-02-07 RX ADMIN — PREDNISONE 20 MG: 20 TABLET ORAL at 08:50

## 2023-02-07 RX ADMIN — CARVEDILOL 12.5 MG: 6.25 TABLET, FILM COATED ORAL at 08:50

## 2023-02-07 RX ADMIN — IPRATROPIUM BROMIDE AND ALBUTEROL SULFATE 1 AMPULE: .5; 2.5 SOLUTION RESPIRATORY (INHALATION) at 08:45

## 2023-02-07 RX ADMIN — Medication 10 ML: at 08:58

## 2023-02-07 RX ADMIN — LEVOTHYROXINE SODIUM 75 MCG: 0.07 TABLET ORAL at 05:26

## 2023-02-07 RX ADMIN — DONEPEZIL HYDROCHLORIDE 5 MG: 5 TABLET ORAL at 21:06

## 2023-02-07 RX ADMIN — SODIUM ZIRCONIUM CYCLOSILICATE 10 G: 10 POWDER, FOR SUSPENSION ORAL at 11:00

## 2023-02-07 RX ADMIN — INSULIN LISPRO 2 UNITS: 100 INJECTION, SOLUTION INTRAVENOUS; SUBCUTANEOUS at 08:51

## 2023-02-07 RX ADMIN — AMLODIPINE BESYLATE 5 MG: 5 TABLET ORAL at 08:55

## 2023-02-07 RX ADMIN — APIXABAN 5 MG: 5 TABLET, FILM COATED ORAL at 21:05

## 2023-02-07 RX ADMIN — PREDNISONE 20 MG: 20 TABLET ORAL at 21:05

## 2023-02-07 RX ADMIN — ACETAMINOPHEN 650 MG: 325 TABLET ORAL at 17:08

## 2023-02-07 RX ADMIN — INSULIN LISPRO 2 UNITS: 100 INJECTION, SOLUTION INTRAVENOUS; SUBCUTANEOUS at 17:11

## 2023-02-07 RX ADMIN — SODIUM CHLORIDE: 9 INJECTION, SOLUTION INTRAVENOUS at 11:50

## 2023-02-07 RX ADMIN — APIXABAN 5 MG: 5 TABLET, FILM COATED ORAL at 08:50

## 2023-02-07 RX ADMIN — GUAIFENESIN SYRUP AND DEXTROMETHORPHAN 5 ML: 100; 10 SYRUP ORAL at 05:26

## 2023-02-07 RX ADMIN — IPRATROPIUM BROMIDE AND ALBUTEROL SULFATE 1 AMPULE: .5; 2.5 SOLUTION RESPIRATORY (INHALATION) at 16:00

## 2023-02-07 RX ADMIN — Medication 10 ML: at 22:56

## 2023-02-07 RX ADMIN — IPRATROPIUM BROMIDE AND ALBUTEROL SULFATE 1 AMPULE: .5; 2.5 SOLUTION RESPIRATORY (INHALATION) at 12:57

## 2023-02-07 RX ADMIN — ACETAMINOPHEN 650 MG: 325 TABLET ORAL at 08:50

## 2023-02-07 RX ADMIN — GUAIFENESIN SYRUP AND DEXTROMETHORPHAN 5 ML: 100; 10 SYRUP ORAL at 11:31

## 2023-02-07 RX ADMIN — GUAIFENESIN 400 MG: 400 TABLET ORAL at 14:41

## 2023-02-07 RX ADMIN — IPRATROPIUM BROMIDE AND ALBUTEROL SULFATE 1 AMPULE: .5; 2.5 SOLUTION RESPIRATORY (INHALATION) at 19:01

## 2023-02-07 ASSESSMENT — PAIN DESCRIPTION - LOCATION
LOCATION: ABDOMEN
LOCATION: BACK;NECK

## 2023-02-07 ASSESSMENT — PAIN DESCRIPTION - DESCRIPTORS
DESCRIPTORS: ACHING
DESCRIPTORS: ACHING;CRAMPING;DISCOMFORT

## 2023-02-07 ASSESSMENT — PAIN DESCRIPTION - ORIENTATION
ORIENTATION: MID
ORIENTATION: MID

## 2023-02-07 ASSESSMENT — PAIN - FUNCTIONAL ASSESSMENT: PAIN_FUNCTIONAL_ASSESSMENT: PREVENTS OR INTERFERES WITH ALL ACTIVE AND SOME PASSIVE ACTIVITIES

## 2023-02-07 ASSESSMENT — PAIN SCALES - GENERAL
PAINLEVEL_OUTOF10: 5
PAINLEVEL_OUTOF10: 0

## 2023-02-07 NOTE — PROGRESS NOTES
Occupational Therapy  OT BEDSIDE TREATMENT NOTE   [facility] [address]  [address] [place] [address]      Date:2023  Patient Name: Lalit Guerra  MRN: [id number]  : 1932  Room: [address] [place] [address]     Evaluating OT: Tania Corralescharles OTR/L #[id number]      Referring Provider: MICHA Morales CNP  Specific Provider Orders/Date: OT eval and treat 23     Diagnosis: Respiratory syncytial virus (RSV) [B33.8]  Acute respiratory failure with hypoxia ([facility] [J96.01]   Pt admitted to hospital with SOB (O2 sat 58%). Pertinent Medical History:  has a past medical history of Arthritis, COPD (chronic obstructive pulmonary disease) ([facility] Diabetes mellitus ([facility] [facility] Hyperlipidemia, and Hypertension.          Precautions:  Fall Risk, O2, continuous pulse ox, contact / droplet precautions     Assessment of current deficits    [x] Functional mobility          [x]ADLs           [x] Strength                  [x]Cognition    [x] Functional transfers        [x] IADLs         [x] Safety Awareness   [x]Endurance    [] Fine Coordination                        [x] Balance      [] Vision/perception   []Sensation      []Gross Motor Coordination            [] ROM           [] Delirium                   [] Motor Control      OT PLAN OF CARE   OT POC based on physician orders, patient diagnosis and results of clinical assessment     Frequency/Duration 1-3 days/wk for 2 weeks PRN   Specific OT Treatment Interventions to include:   * Instruction/training on adapted ADL techniques and AE recommendations to increase functional independence within precautions       * Training on energy conservation strategies, correct breathing pattern and techniques to improve independence/tolerance for self-care routine  * Functional transfer/mobility training/DME recommendations for increased independence, safety, and fall prevention  * Patient/Family education to increase follow through with safety techniques and functional independence  * Recommendation of environmental modifications for increased safety with functional transfers/mobility and ADLs  * Therapeutic exercise to improve motor endurance, ROM, and functional strength for ADLs/functional transfers  * Therapeutic activities to facilitate/challenge dynamic balance, stand tolerance for increased safety and independence with ADLs  * Therapeutic activities to facilitate gross/fine motor skills for increased independence with ADLs  * Neuro-muscular re-education: facilitation of righting/equilibrium reactions, midline orientation, scapular stability/mobility, normalization of muscle tone, and facilitation of volitional active controled movement     Recommended Adaptive Equipment:  TBD      Home Living: Pt lives with  in a 1 story home with 4 LACY   Bathroom setup: sponge bathes with assist from     Equipment owned: none     Prior Level of Function: assist with ADLs , assist with IADLs; ambulated with w/w (pt reports she has been bedbound and nonambulatory for the past 3 weeks following fall)   Driving: yes   Occupation: na     Pain Level: Pt denies pain this session     Cognition: A&O: x3 Follows 1 step directions             Memory:  fair-             Sequencing:  fair-             Problem solving:  fair-             Judgement/safety:  fair-               Functional Assessment:  AM-PAC Daily Activity Raw Score: 11/24    Initial Eval Status  Date: 2/6/23 Treatment Status  Date:2/7/23 STGs = LTGs  Time frame: 10-14 days   Feeding Set-up  set up Mod I   Grooming Minimal Assist   Mod A  To wash face and comb hair sitting EOB Supervision    UB Dressing Moderate Assist  Mod A  To dof/don gown sitting EOB  Supervision    LB Dressing Dependent  Dependent   To don/dof socks Maximal Assist    Bathing Dependent Max A  simulated sitting EOB Moderate Assist    Toileting Dependent   Dependent  Due to poor stand balance/tolerance, pt incontinent.  Maximal Assist    Bed Mobility  Rolling Moderate Assist   Supine<>sit: NT  Pt declined this session  Supine->sit: Max A  Sit->supine: Dependent Rolling: SBA  Supine to sit: Moderate Assist   Sit to supine: Moderate Assist    Functional Transfers NT  Max A  STS from EOB, pt presenting with poor flexed posture  Maximal Assist    Functional Mobility NT  Max A  Using ww taking 1-2 side steps to HOB, bilat knees buckling     Balance Sitting: NT Sitting: Min A  EOB for approx 5 min, min A to correct posterior lean  Standing: max A with ww  Approx 10 second stand tolerance      Activity Tolerance P+     Limited due to fatigue and weakness P+     Limited due to fatigue and weakness. O2 99% on 2L, -130bpm with light activity  F+   Visual/  Perceptual wfl                             Hand Dominance right    Strength ROM Additional Info:    RUE   3+/5 wfl good  and wfl FMC/dexterity noted during ADL tasks      LUE 3+/5 wfl good  and wfl FMC/dexterity noted during ADL tasks      Hearing: wfl  Sensation:wfl  Tone: wfl  Edema:none noted       Comments: Upon arrival pt supine in bed. ADL retraining to increase safety and indep in dressing and toileting tasks, balance and trf training to increase participation in functional mobility and standing aspects of ADLs with increased safety. Pt educated on techniques to increase independence and safety during ADLs, bed mobility, and functional transfers. Pt would benefit from continued skilled OT to increase safety and independence with completion of ADL/IADL tasks for functional independence and quality of life. At end of session pt returned to supine, call light within reach, bed alarm on, family present. Pt has made slow progress towards set goals.      Continue with current plan of care    Treatment Time In:1030            Treatment Time Out: 1053             Treatment Charges: Mins Units   Ther Ex  [id number]     Manual Therapy [address] [place] [address] 13 1   ADL/Home Mgt [id number] 10 1 Neuro Re-ed [id number]     Group Therapy      Orthotic manage/training  [id number]     Non-Billable Time     Total Timed Treatment 23 2     [address] RAYMUNDO/L [id number]

## 2023-02-07 NOTE — PROGRESS NOTES
M      Hospitalist Progress Note      PCP: Rupa Martinez MD    Date of Admission: 2/4/2023        Hospital Course:  PRESENT WITH SOB, AND FOUND TO HAVE HYPONATREMIA. DIURETICS HELD, WILL CONSULT DR DELAROSA ** SODIUM STILL LOW            Subjective:  DAUGHTER UPSET BC NO BATTERIES WERE IN HEART MONITOR. .  STATES HE CANNOT COUGH UP  HER MUCUS          Medications:  Reviewed    Infusion Medications    sodium chloride 50 mL/hr at 02/07/23 1150    dextrose      sodium chloride       Scheduled Medications    guaiFENesin  400 mg Oral TID    donepezil  5 mg Oral Nightly    amLODIPine  5 mg Oral Daily    carvedilol  12.5 mg Oral BID WC    levothyroxine  75 mcg Oral Daily    [Held by provider] lisinopril  10 mg Oral Daily    [Held by provider] furosemide  20 mg Oral Daily    sodium chloride flush  5-40 mL IntraVENous 2 times per day    ipratropium-albuterol  1 ampule Inhalation Q4H WA    predniSONE  20 mg Oral BID    insulin lispro  0-8 Units SubCUTAneous TID WC    insulin lispro  0-4 Units SubCUTAneous Nightly    apixaban  5 mg Oral BID     PRN Meds: guaiFENesin-dextromethorphan, ipratropium-albuterol, glucose, dextrose bolus **OR** dextrose bolus, glucagon (rDNA), dextrose, sodium chloride flush, sodium chloride, ondansetron **OR** ondansetron, magnesium hydroxide, acetaminophen **OR** acetaminophen      Intake/Output Summary (Last 24 hours) at 2/7/2023 1610  Last data filed at 2/7/2023 1445  Gross per 24 hour   Intake 360 ml   Output 200 ml   Net 160 ml       Exam:    /61   Pulse 94   Temp 98.6 °F (37 °C) (Oral)   Resp 20   Ht 5' (1.524 m)   Wt 180 lb 9.6 oz (81.9 kg)   SpO2 100%   BMI 35.27 kg/m²       Gen:  WELL DEVELOPED  HEENT: NC/AT, moist mucous membranes, no oropharyngeal erythema or exudate  Neck: supple, trachea midline, no anterior cervical or SC LAD  Heart:  IRREG  Lungs:   RHONCHI   bilaterally,  Abd: bowel sounds present, soft, nontender, nondistended, no masses  Extrem:  No clubbing, cyanosis, EDEMA OF ARMS AND LEGS edema  Skin: no rashes or lesions  Psych: Not oriented to date or Not oriented to place  Neuro: grossly intact, moves all four extremities. Labs:   Recent Labs     02/05/23  0640 02/06/23  0425 02/07/23  0435   WBC 7.1 8.2 8.1   HGB 9.7* 9.7* 10.0*   HCT 29.3* 29.6* 31.2*    219 217     Recent Labs     02/05/23  0640 02/06/23  0425 02/06/23  1110 02/07/23  0435   * 123*  --  124*   K 5.2* 5.8* 5.2* 5.3*   CL 91* 90*  --  89*   CO2 23 22  --  23   BUN 31* 39*  --  43*   CREATININE 1.2* 1.1*  --  1.2*   CALCIUM 8.3* 8.0*  --  7.9*     No results for input(s): AST, ALT, BILIDIR, BILITOT, ALKPHOS in the last 72 hours. No results for input(s): INR in the last 72 hours. No results for input(s): Melyssa Barnstable in the last 72 hours. No results for input(s): AST, ALT, ALB, BILIDIR, BILITOT, ALKPHOS in the last 72 hours. No results for input(s): LACTA in the last 72 hours. Lab Results   Component Value Date    LABURIC 9.6 (H) 02/07/2023     Lab Results   Component Value Date    AMMONIA <10.0 (L) 09/08/2020    AMMONIA 28.3 03/05/2020    AMMONIA 24.2 01/17/2020       Assessment:    Active Hospital Problems    Diagnosis Date Noted    Acute respiratory failure with hypoxia ([facility] [address] [J96.01] 02/04/2023     Priority: Medium   ANASARCA   IIDM   HYPOTHYROID   DEMENTIA   PACEMAKER   PAF  HYPONATREMIA  Plan:   HOLD LASIX   CONSULT NEPHROLOGY   SSI   SYNTHROID           DVT Prophylaxis: ELIQUIS  Diet: ADULT DIET; Regular; 4 carb choices (60 gm/meal);  Low Sodium (2 gm)  Code Status: Full Code     PT/OT Eval Status: ORDERED     Dispo - HOME     Electronically signed by Ricardo Stallings DO on 2/7/2023 at 4:10 PM [facility]

## 2023-02-07 NOTE — CONSULTS
[facility]  Nephrology Consult Note    Patient's Name: Tracie Hawthorne    Reason for Consult: Anasarca and hyponatremia    Chief Complaint: Shortness of breath  History Obtained From:  patient, past medical records, and EMR    History of Present Illness:    Tracei Hawthorne is a 89 [address] y.o. female with a past medical history of arthritis, COPD, hypertension, hyperlipidemia, and diabetes mellitus. She presented to the ED on 2/4 with complaints of shortness of breath and was found to be hypoxemic and in respiratory distress per EMS. Vital signs on 2/4 included temperature 97.9, respirations 30, pulse 110, /58, and she was 97% on nonrebreather. Lab data on 2/4 include sodium 125, potassium 5.2, BUN 26, creatinine 1.1, proBNP 3109, hemoglobin 11.1, and she was found to be positive for RSV. She had a chest x-ray on 2/4 which showed no acute cardiopulmonary process. We were consulted to see the patient for anasarca and hyponatremia. Patient's sodium was noted to be 125 on 2/4 and is 124 today. At present, patient was seen and examined. She reports that she feels pretty good. She explained that she came in due to shortness of breath. She also reports decreased appetite prior to admission. She reports that she had urinary frequency. She reports nausea, but denies any abdominal pain, vomiting, or diarrhea. She denies any chest pain. Her  and daughter are at the bedside.     PMH:    Past Medical History:   Diagnosis Date    Arthritis     COPD (chronic obstructive pulmonary disease) ([address]     Diabetes mellitus ([address]     Hyperlipidemia     Hypertension        Patient Active Problem List   Diagnosis    Acute appendicitis    Hypertension    Inadequately controlled diabetes mellitus ([address]    Metabolic encephalopathy    Sepsis associated hypotension ([address]    Sepsis ([address]    Relapsing appendicitis    Saddle embolus of pulmonary artery (HCC)    DVT of deep femoral vein, right (HCC)    History of pulmonary embolism    Generalized weakness    Acute kidney injury ([facility] [address]    Left knee pain    Baker's cyst    Atrial fibrillation with RVR (HCC)    New onset a-fib (HCC)    Bradycardia    Acute respiratory failure with hypoxia ([facility])       Diet:    ADULT DIET; Regular; 4 carb choices (60 gm/meal); Low Sodium (2 gm)    Meds:     donepezil  5 mg Oral Nightly    amLODIPine  5 mg Oral Daily    carvedilol  12.5 mg Oral BID WC    levothyroxine  75 mcg Oral Daily    lisinopril  10 mg Oral Daily    [Held by provider] furosemide  20 mg Oral Daily    sodium chloride flush  5-40 mL IntraVENous 2 times per day    ipratropium-albuterol  1 ampule Inhalation Q4H WA    predniSONE  20 mg Oral BID    insulin lispro  0-8 Units SubCUTAneous TID     insulin lispro  0-4 Units SubCUTAneous Nightly    apixaban  5 mg Oral BID        dextrose      sodium chloride         Meds prn:     guaiFENesin-dextromethorphan, ipratropium-albuterol, glucose, dextrose bolus **OR** dextrose bolus, glucagon (rDNA), dextrose, sodium chloride flush, sodium chloride, ondansetron **OR** ondansetron, magnesium hydroxide, acetaminophen **OR** acetaminophen    Meds prior to admission:     No current facility-administered medications on file prior to encounter.      Current Outpatient Medications on File Prior to Encounter   Medication Sig Dispense Refill    Multiple Vitamin (MULTIVITAMIN ADULT PO) Take by mouth daily      lisinopril (PRINIVIL;ZESTRIL) 10 MG tablet Take 1 tablet by mouth daily 30 tablet 3    furosemide (LASIX) 20 MG tablet Take 20 mg by mouth daily      donepezil (ARICEPT) 10 MG tablet Take 10 mg by mouth at bedtime      carvedilol (COREG) 12.5 MG tablet Take 1 tablet by mouth 2 times daily (with meals) (Patient taking differently: Take 12.5 mg by mouth 2 times daily (with meals) Takes one and a half tablet) 60 tablet 3    amLODIPine (NORVASC) 5 MG tablet Take 1 tablet by mouth daily 30 tablet 3    ipratropium-albuterol (DUONEB) 0.5-2.5 (3) MG/3ML SOLN nebulizer solution Inhale 1 vial into the lungs every 6 hours as needed for Shortness of Breath      levothyroxine (SYNTHROID) 75 MCG tablet Take 75 mcg by mouth Daily      predniSONE (DELTASONE) 5 MG tablet Take 5 mg by mouth 2 times daily      metFORMIN (GLUCOPHAGE-XR) 500 MG extended release tablet Take 500 mg by mouth daily (with breakfast)      apixaban (ELIQUIS) 5 MG TABS tablet Take 5 mg by mouth 2 times daily         Allergies:    Patient has no known allergies. Social History:     reports that she has never smoked. She has never used smokeless tobacco. She reports that she does not drink alcohol. Family History:     History reviewed. No pertinent family history. Review of Systems:   Pertinent items are noted in HPI. Physical Exam:      Patient Vitals for the past 24 hrs:   BP Temp Temp src Pulse Resp SpO2   02/07/23 0853 107/78 98.6 °F (37 °C) Oral 89 20 98 %   02/07/23 0222 113/75 97.7 °F (36.5 °C) Oral (!) 111 21 98 %   02/06/23 2150 -- 97.6 °F (36.4 °C) Oral -- -- --   02/06/23 2017 -- -- -- -- -- 97 %   02/06/23 2000 121/73 -- -- 99 18 98 %   02/06/23 1715 100/73 -- -- (!) 101 20 --   02/06/23 1612 -- -- -- -- -- 100 %   02/06/23 1147 -- -- -- 94 17 99 %         Intake/Output Summary (Last 24 hours) at 2/7/2023 4147  Last data filed at 2/6/2023 2151  Gross per 24 hour   Intake --   Output 200 ml   Net -200 ml       General: Awake, alert, no acute distress  Neck: No JVD noted  Lungs: Clear bilaterally upper, diminished to the bases bilaterally. Unlabored  CV: Regular rate and rhythm. No rub  Abd: Soft, nontender, nondistended. Active bowel sounds  Skin: Warm and dry.   No rash on exposed extremities  Ext: 1+ BUE/BLE edema   Neuro: Awake, answers questions appropriately    Data:    Recent Labs     02/05/23  0640 02/06/23  0425 02/07/23  0435   WBC 7.1 8.2 8.1   HGB 9.7* 9.7* 10.0*   HCT 29.3* 29.6* 31.2*   MCV 85.7 86.5 88.9    219 217       Recent Labs     02/05/23  0640 02/06/23  0425 02/06/23  1110 02/07/23  0435   * 123*  --  124*   K 5.2* 5.8* 5.2* 5.3*   CL 91* 90*  --  89*   CO2 23 22  --  23   CREATININE 1.2* 1.1*  --  1.2*   BUN 31* 39*  --  43*   LABGLOM 43 48  --  43   GLUCOSE 202* 218*  --  243*   CALCIUM 8.3* 8.0*  --  7.9*       No results found for: VITD25    No results found for: PTH    Recent Labs     02/04/23  0955   ALT 34*   AST 31   ALKPHOS 79   BILITOT 0.4       Recent Labs     02/04/23  0955   LABALBU 3.6       Ferritin   Date Value Ref Range Status   03/12/2020 543 ng/mL Final     Comment:     FERRITIN Reference Ranges:  Adult Males   20 - 60 yrars:    30 - 400 ng/mL  Adult females 17 - 60 years:    13 - 150 ng/mL  Adults greater than 60 years:   no established reference range  Pediatrics:                     no established reference range       Iron   Date Value Ref Range Status   03/12/2020 16 (L) 37 - 145 mcg/dL Final     TIBC   Date Value Ref Range Status   03/12/2020 142 (L) 250 - 450 mcg/dL Final       Vitamin B-12   Date Value Ref Range Status   09/12/2020 810 211 - 946 pg/mL Final       Folate   Date Value Ref Range Status   03/12/2020 10.2 4.8 - 24.2 ng/mL Final       Lab Results   Component Value Date/Time    COLORU Yellow 12/11/2022 10:44 PM    NITRU Negative 12/11/2022 10:44 PM    GLUCOSEU Negative 12/11/2022 10:44 PM    KETUA TRACE 12/11/2022 10:44 PM    UROBILINOGEN 0.2 12/11/2022 10:44 PM    BILIRUBINUR Negative 12/11/2022 10:44 PM       Lab Results   Component Value Date/Time    OSMOU 452 02/07/2023 05:40 AM       No components found for: URIC    No results found for: LIPIDPAN    Assessment and Plans:    1. Hyponatremia  Likely hypovolemic/dehydration   Na 123 on 2/6--> 124 today   Serum osmolality 282  TSH WNL 0.395  Uric acid elevated 9.6 on 2/7  Urine sodium<20, urine chloride<20  IVF NS @50 mL/hour  Strict I&O  Lasix on hold  Monitor labs    2.  Hyperkalemia  Likely in the setting of MERYL /CKD  Potassium 5.8 on 2/6--> 5.3 today  Lokelma 10 g oral once  Low potassium diet  Repeat BMP this afternoon  Monitor labs    3. MERYL stage I on CKD 3a  Likely in the setting of decreased effective renal perfusion given poor oral intake prior to admission; exacerbated by diuretics and lisinopril  Baseline creatinine 0.8-1  Creatinine peaked at 1.2 today  FeNa 0.2% and FeUrea 34.9% supports prerenal etiology  Bladder scan  Hold Lasix and lisinopril for now  Avoid nephrotoxins/NSAIDs  Strict I's and O's, daily weights  Monitor labs    4. RSV/shortness of breath  History of COPD  chest x-ray on 2/4 which showed no acute cardiopulmonary process  Positive for RSV  Management per primary service    5. Diabetes mellitus  Hemoglobin A1c 7% on 2/4/2023  On insulin lispro  Management per primary service    6. Hypertension  BP goal<120/80  BP at goal  Monitor on current regimen    7. Anemia  Not likely related to CKD with creatinine 1.2  will check iron studies  Monitor H&H    MICHA Salguero - CNP    Patient seen and examined all key components of the physical performed independently , case discussed with NP, all pertinent labs and radiologic tests personally reviewed agree with above.    Hyponatremia, isotonic versus possibility of pseudohyponatremia  Check lipid profile  Continue current gentle hydration as patient intake poor    Updated patient daughter and  at bedside    Diana Ashraf MD

## 2023-02-07 NOTE — CARE COORDINATION
Per nursing rounds, patient currently on room air, sodium 124, creatinine 1.2 today (baseline 08.-1) and nephrology was consulted for anasarca and hyponatremia. Patient active with [facility]; spoke with Telma and patient will need resumption orders. Plan is home and will need ambulance transport home set up. Ambulance form completed and envelope placed on the soft chart.     Yelena Shelton, MSW, LSW [phone]

## 2023-02-08 LAB
ANION GAP SERPL CALCULATED.3IONS-SCNC: 14 MMOL/L (ref 7–16)
ANION GAP SERPL CALCULATED.3IONS-SCNC: 6 MMOL/L (ref 7–16)
ANION GAP SERPL CALCULATED.3IONS-SCNC: 8 MMOL/L (ref 7–16)
BUN BLDV-MCNC: 38 MG/DL (ref 6–23)
BUN BLDV-MCNC: 39 MG/DL (ref 6–23)
BUN BLDV-MCNC: 41 MG/DL (ref 6–23)
CALCIUM SERPL-MCNC: 7.5 MG/DL (ref 8.6–10.2)
CALCIUM SERPL-MCNC: 7.7 MG/DL (ref 8.6–10.2)
CALCIUM SERPL-MCNC: 7.9 MG/DL (ref 8.6–10.2)
CHLORIDE BLD-SCNC: 85 MMOL/L (ref 98–107)
CHLORIDE BLD-SCNC: 87 MMOL/L (ref 98–107)
CHLORIDE BLD-SCNC: 88 MMOL/L (ref 98–107)
CHOLESTEROL, TOTAL: 198 MG/DL (ref 0–199)
CO2: 22 MMOL/L (ref 22–29)
CO2: 25 MMOL/L (ref 22–29)
CO2: 25 MMOL/L (ref 22–29)
CREAT SERPL-MCNC: 0.9 MG/DL (ref 0.5–1)
CREAT SERPL-MCNC: 1 MG/DL (ref 0.5–1)
CREAT SERPL-MCNC: 1 MG/DL (ref 0.5–1)
CREATININE URINE: 37 MG/DL (ref 29–226)
FERRITIN: 297 NG/ML
FOLATE: 19.5 NG/ML (ref 4.8–24.2)
GFR SERPL CREATININE-BSD FRML MDRD: 53 ML/MIN/1.73
GFR SERPL CREATININE-BSD FRML MDRD: 53 ML/MIN/1.73
GFR SERPL CREATININE-BSD FRML MDRD: >60 ML/MIN/1.73
GLUCOSE BLD-MCNC: 201 MG/DL (ref 74–99)
GLUCOSE BLD-MCNC: 253 MG/DL (ref 74–99)
GLUCOSE BLD-MCNC: 267 MG/DL (ref 74–99)
HDLC SERPL-MCNC: 52 MG/DL
IRON SATURATION: 21 % (ref 15–50)
IRON: 53 MCG/DL (ref 37–145)
LDL CHOLESTEROL CALCULATED: 107 MG/DL (ref 0–99)
MAGNESIUM: 2.7 MG/DL (ref 1.6–2.6)
METER GLUCOSE: 188 MG/DL (ref 74–99)
METER GLUCOSE: 220 MG/DL (ref 74–99)
METER GLUCOSE: 230 MG/DL (ref 74–99)
METER GLUCOSE: 245 MG/DL (ref 74–99)
PHOSPHORUS: 3.4 MG/DL (ref 2.5–4.5)
POTASSIUM SERPL-SCNC: 5 MMOL/L (ref 3.5–5)
POTASSIUM SERPL-SCNC: 5.1 MMOL/L (ref 3.5–5)
POTASSIUM SERPL-SCNC: 5.3 MMOL/L (ref 3.5–5)
SODIUM BLD-SCNC: 118 MMOL/L (ref 132–146)
SODIUM BLD-SCNC: 124 MMOL/L (ref 132–146)
SODIUM URINE: <20 MMOL/L
TOTAL IRON BINDING CAPACITY: 249 MCG/DL (ref 250–450)
TRIGL SERPL-MCNC: 193 MG/DL (ref 0–149)
VITAMIN B-12: 1736 PG/ML (ref 211–946)
VLDLC SERPL CALC-MCNC: 39 MG/DL

## 2023-02-08 PROCEDURE — 2700000000 HC OXYGEN THERAPY PER DAY

## 2023-02-08 PROCEDURE — 82962 GLUCOSE BLOOD TEST: CPT

## 2023-02-08 PROCEDURE — 83735 ASSAY OF MAGNESIUM: CPT

## 2023-02-08 PROCEDURE — 80061 LIPID PANEL: CPT

## 2023-02-08 PROCEDURE — 82570 ASSAY OF URINE CREATININE: CPT

## 2023-02-08 PROCEDURE — 2580000003 HC RX 258: Performed by: INTERNAL MEDICINE

## 2023-02-08 PROCEDURE — 2140000000 HC CCU INTERMEDIATE R&B

## 2023-02-08 PROCEDURE — 83540 ASSAY OF IRON: CPT

## 2023-02-08 PROCEDURE — 2580000003 HC RX 258: Performed by: NURSE PRACTITIONER

## 2023-02-08 PROCEDURE — 6370000000 HC RX 637 (ALT 250 FOR IP): Performed by: NURSE PRACTITIONER

## 2023-02-08 PROCEDURE — 84300 ASSAY OF URINE SODIUM: CPT

## 2023-02-08 PROCEDURE — 6370000000 HC RX 637 (ALT 250 FOR IP)

## 2023-02-08 PROCEDURE — 83550 IRON BINDING TEST: CPT

## 2023-02-08 PROCEDURE — 6370000000 HC RX 637 (ALT 250 FOR IP): Performed by: INTERNAL MEDICINE

## 2023-02-08 PROCEDURE — 80048 BASIC METABOLIC PNL TOTAL CA: CPT

## 2023-02-08 PROCEDURE — 82607 VITAMIN B-12: CPT

## 2023-02-08 PROCEDURE — 82728 ASSAY OF FERRITIN: CPT

## 2023-02-08 PROCEDURE — 94640 AIRWAY INHALATION TREATMENT: CPT

## 2023-02-08 PROCEDURE — 94660 CPAP INITIATION&MGMT: CPT

## 2023-02-08 PROCEDURE — 84295 ASSAY OF SERUM SODIUM: CPT

## 2023-02-08 PROCEDURE — 36415 COLL VENOUS BLD VENIPUNCTURE: CPT

## 2023-02-08 PROCEDURE — 84100 ASSAY OF PHOSPHORUS: CPT

## 2023-02-08 PROCEDURE — 82746 ASSAY OF FOLIC ACID SERUM: CPT

## 2023-02-08 PROCEDURE — 51702 INSERT TEMP BLADDER CATH: CPT

## 2023-02-08 RX ORDER — SODIUM CHLORIDE 9 MG/ML
INJECTION, SOLUTION INTRAVENOUS CONTINUOUS
Status: DISCONTINUED | OUTPATIENT
Start: 2023-02-08 | End: 2023-02-08

## 2023-02-08 RX ORDER — 3% SODIUM CHLORIDE 3 G/100ML
30 INJECTION, SOLUTION INTRAVENOUS CONTINUOUS
Status: DISPENSED | OUTPATIENT
Start: 2023-02-08 | End: 2023-02-08

## 2023-02-08 RX ADMIN — GUAIFENESIN 400 MG: 400 TABLET ORAL at 08:57

## 2023-02-08 RX ADMIN — IPRATROPIUM BROMIDE AND ALBUTEROL SULFATE 1 AMPULE: .5; 2.5 SOLUTION RESPIRATORY (INHALATION) at 07:50

## 2023-02-08 RX ADMIN — DONEPEZIL HYDROCHLORIDE 5 MG: 5 TABLET ORAL at 22:54

## 2023-02-08 RX ADMIN — CARVEDILOL 12.5 MG: 6.25 TABLET, FILM COATED ORAL at 08:58

## 2023-02-08 RX ADMIN — SODIUM CHLORIDE 30 ML/HR: 3 INJECTION, SOLUTION INTRAVENOUS at 23:05

## 2023-02-08 RX ADMIN — GUAIFENESIN SYRUP AND DEXTROMETHORPHAN 5 ML: 100; 10 SYRUP ORAL at 22:54

## 2023-02-08 RX ADMIN — PREDNISONE 20 MG: 20 TABLET ORAL at 08:57

## 2023-02-08 RX ADMIN — SODIUM CHLORIDE: 9 INJECTION, SOLUTION INTRAVENOUS at 17:44

## 2023-02-08 RX ADMIN — ACETAMINOPHEN 650 MG: 325 TABLET ORAL at 11:57

## 2023-02-08 RX ADMIN — Medication 10 ML: at 08:58

## 2023-02-08 RX ADMIN — SODIUM ZIRCONIUM CYCLOSILICATE 10 G: 10 POWDER, FOR SUSPENSION ORAL at 12:14

## 2023-02-08 RX ADMIN — GUAIFENESIN SYRUP AND DEXTROMETHORPHAN 5 ML: 100; 10 SYRUP ORAL at 11:57

## 2023-02-08 RX ADMIN — GUAIFENESIN 400 MG: 400 TABLET ORAL at 16:20

## 2023-02-08 RX ADMIN — IPRATROPIUM BROMIDE AND ALBUTEROL SULFATE 1 AMPULE: .5; 2.5 SOLUTION RESPIRATORY (INHALATION) at 19:40

## 2023-02-08 RX ADMIN — INSULIN LISPRO 2 UNITS: 100 INJECTION, SOLUTION INTRAVENOUS; SUBCUTANEOUS at 12:25

## 2023-02-08 RX ADMIN — IPRATROPIUM BROMIDE AND ALBUTEROL SULFATE 1 AMPULE: .5; 2.5 SOLUTION RESPIRATORY (INHALATION) at 12:15

## 2023-02-08 RX ADMIN — APIXABAN 5 MG: 5 TABLET, FILM COATED ORAL at 22:54

## 2023-02-08 RX ADMIN — PREDNISONE 20 MG: 20 TABLET ORAL at 22:54

## 2023-02-08 RX ADMIN — IPRATROPIUM BROMIDE AND ALBUTEROL SULFATE 1 AMPULE: .5; 2.5 SOLUTION RESPIRATORY (INHALATION) at 16:37

## 2023-02-08 RX ADMIN — AMLODIPINE BESYLATE 5 MG: 5 TABLET ORAL at 08:58

## 2023-02-08 RX ADMIN — INSULIN LISPRO 2 UNITS: 100 INJECTION, SOLUTION INTRAVENOUS; SUBCUTANEOUS at 17:20

## 2023-02-08 RX ADMIN — LEVOTHYROXINE SODIUM 75 MCG: 0.07 TABLET ORAL at 05:56

## 2023-02-08 RX ADMIN — APIXABAN 5 MG: 5 TABLET, FILM COATED ORAL at 08:58

## 2023-02-08 RX ADMIN — CARVEDILOL 12.5 MG: 6.25 TABLET, FILM COATED ORAL at 16:20

## 2023-02-08 ASSESSMENT — PAIN SCALES - GENERAL
PAINLEVEL_OUTOF10: 0
PAINLEVEL_OUTOF10: 6

## 2023-02-08 ASSESSMENT — PAIN SCALES - WONG BAKER: WONGBAKER_NUMERICALRESPONSE: 0

## 2023-02-08 ASSESSMENT — PAIN DESCRIPTION - LOCATION: LOCATION: BACK;ABDOMEN

## 2023-02-08 ASSESSMENT — PAIN DESCRIPTION - ORIENTATION: ORIENTATION: LOWER

## 2023-02-08 NOTE — PROGRESS NOTES
[facility]  Nephrology Progress Note    Patient's Name: Fawn Urena    History of Present Illness from 2/7 Consult Note:    Sheri Carlton is a 80 y.o. female with a past medical history of arthritis, COPD, hypertension, hyperlipidemia, and diabetes mellitus. She presented to the ED on 2/4 with complaints of shortness of breath and was found to be hypoxemic and in respiratory distress per EMS. Vital signs on 2/4 included temperature 97.9, respirations 30, pulse 110, /58, and she was 97% on nonrebreather. Lab data on 2/4 include sodium 125, potassium 5.2, BUN 26, creatinine 1.1, proBNP 3109, hemoglobin 11.1, and she was found to be positive for RSV. She had a chest x-ray on 2/4 which showed no acute cardiopulmonary process. We were consulted to see the patient for anasarca and hyponatremia. Patient's sodium was noted to be 125 on 2/4 and is 124 today. At present, patient was seen and examined. She reports that she feels pretty good. She explained that she came in due to shortness of breath. She also reports decreased appetite prior to admission. She reports that she had urinary frequency. She reports nausea, but denies any abdominal pain, vomiting, or diarrhea. She denies any chest pain. Her  and daughter are at the bedside. \"    Subjective:    2/8: Patient was seen and examined. She reports that she feels pretty good. She denies any chest pain or abdominal pain.       PMH:    Past Medical History:   Diagnosis Date    Arthritis     COPD (chronic obstructive pulmonary disease) ([address]     Diabetes mellitus ([address]     Hyperlipidemia     Hypertension        Patient Active Problem List   Diagnosis    Acute appendicitis    Hypertension    Inadequately controlled diabetes mellitus ([address]    Metabolic encephalopathy    Sepsis associated hypotension ([address]    Sepsis ([address]    Relapsing appendicitis    Saddle embolus of pulmonary artery ([address]    DVT of deep femoral vein, right ([address]    History of pulmonary embolism    Generalized weakness    Acute kidney injury ([facility]    Left knee pain    Baker's cyst    Atrial fibrillation with RVR (HCC)    New onset a-fib (HCC)    Bradycardia    Acute respiratory failure with hypoxia ([facility])       Diet:    ADULT DIET; Regular; 4 carb choices (60 gm/meal); Low Sodium (2 gm); Low Potassium (Less than 3000 mg/day)    Meds:     sodium zirconium cyclosilicate  10 g Oral Once    guaiFENesin  400 mg Oral TID    donepezil  5 mg Oral Nightly    amLODIPine  5 mg Oral Daily    carvedilol  12.5 mg Oral BID WC    levothyroxine  75 mcg Oral Daily    [Held by provider] lisinopril  10 mg Oral Daily    [Held by provider] furosemide  20 mg Oral Daily    sodium chloride flush  5-40 mL IntraVENous 2 times per day    ipratropium-albuterol  1 ampule Inhalation Q4H WA    predniSONE  20 mg Oral BID    insulin lispro  0-8 Units SubCUTAneous TID WC    insulin lispro  0-4 Units SubCUTAneous Nightly    apixaban  5 mg Oral BID        sodium chloride 50 mL/hr at 02/07/23 1150    dextrose      sodium chloride         Meds prn:     guaiFENesin-dextromethorphan, ipratropium-albuterol, glucose, dextrose bolus **OR** dextrose bolus, glucagon (rDNA), dextrose, sodium chloride flush, sodium chloride, ondansetron **OR** ondansetron, magnesium hydroxide, acetaminophen **OR** acetaminophen    Meds prior to admission:     No current facility-administered medications on file prior to encounter.      Current Outpatient Medications on File Prior to Encounter   Medication Sig Dispense Refill    Multiple Vitamin (MULTIVITAMIN ADULT PO) Take by mouth daily      lisinopril (PRINIVIL;ZESTRIL) 10 MG tablet Take 1 tablet by mouth daily 30 tablet 3    furosemide (LASIX) 20 MG tablet Take 20 mg by mouth daily      donepezil (ARICEPT) 10 MG tablet Take 10 mg by mouth at bedtime      carvedilol (COREG) 12.5 MG tablet Take 1 tablet by mouth 2 times daily (with meals) (Patient taking differently: Take 12.5 mg by mouth 2 times daily (with meals) Takes one and a half tablet) 60 tablet 3    amLODIPine (NORVASC) 5 MG tablet Take 1 tablet by mouth daily 30 tablet 3    ipratropium-albuterol (DUONEB) 0.5-2.5 (3) MG/3ML SOLN nebulizer solution Inhale 1 vial into the lungs every 6 hours as needed for Shortness of Breath      levothyroxine (SYNTHROID) 75 MCG tablet Take 75 mcg by mouth Daily      predniSONE (DELTASONE) 5 MG tablet Take 5 mg by mouth 2 times daily      metFORMIN (GLUCOPHAGE-XR) 500 MG extended release tablet Take 500 mg by mouth daily (with breakfast)      apixaban (ELIQUIS) 5 MG TABS tablet Take 5 mg by mouth 2 times daily         Allergies:    Patient has no known allergies. Social History:     reports that she has never smoked. She has never used smokeless tobacco. She reports that she does not drink alcohol. Family History:     History reviewed. No pertinent family history. Physical Exam:      Patient Vitals for the past 24 hrs:   BP Temp Temp src Pulse Resp SpO2 Weight   02/08/23 0736 127/87 97.6 °F (36.4 °C) Oral 98 18 98 % --   02/08/23 0500 -- -- -- -- -- 96 % --   02/08/23 0456 -- -- -- -- -- -- 190 lb (86.2 kg)   02/08/23 0215 115/75 97.8 °F (36.6 °C) Oral 77 18 95 % --   02/08/23 0100 -- -- -- -- -- 98 % --   02/07/23 2009 (!) 98/59 97.5 °F (36.4 °C) Oral 98 23 99 % --   02/07/23 1445 105/61 -- -- 94 20 100 % --         Intake/Output Summary (Last 24 hours) at 2/8/2023 1056  Last data filed at 2/8/2023 0958  Gross per 24 hour   Intake 630 ml   Output 488 ml   Net 142 ml     General: Awake, alert, no acute distress  Neck: No JVD noted  Lungs: Clear bilaterally upper, diminished to the bases bilaterally. Unlabored  CV: Regular rate and rhythm. No rub  Abd: Soft, nontender, nondistended. Active bowel sounds  Skin: Warm and dry.   No rash on exposed extremities  Ext: 1+ BUE/BLE edema   Neuro: Awake, answers questions appropriately    Data:    Recent Labs     02/06/23  0425 02/07/23  0435   WBC 8.2 8.1   HGB 9.7* 10.0* HCT 29.6* 31.2*   MCV 86.5 88.9    217       Recent Labs     02/07/23  0435 02/07/23  1538 02/08/23  0622   * 122* 124*   K 5.3* 5.2* 5.3*   CL 89* 89* 88*   CO2 23 23 22   CREATININE 1.2* 1.1* 1.0   BUN 43* 43* 41*   LABGLOM 43 48 53   GLUCOSE 243* 227* 201*   CALCIUM 7.9* 7.6* 7.9*   PHOS  --   --  3.4   MG  --   --  2.7*       No results found for: VITD25    No results found for: PTH    No results for input(s): ALT, AST, ALKPHOS, BILITOT, BILIDIR in the last 72 hours. No results for input(s): LABALBU in the last 72 hours. Ferritin   Date Value Ref Range Status   02/08/2023 297 ng/mL Final     Comment:     FERRITIN Reference Ranges:  Adult Males   20 - 60 years:    27 - 400 ng/mL  Adult females 16 - 61 years:    15 - 150 ng/mL  Adults greater than 60 years:   no established reference range  Pediatrics:                     no established reference range       Iron   Date Value Ref Range Status   02/08/2023 53 37 - 145 mcg/dL Final     TIBC   Date Value Ref Range Status   02/08/2023 249 (L) 250 - 450 mcg/dL Final       Vitamin B-12   Date Value Ref Range Status   09/12/2020 810 211 - 946 pg/mL Final       Folate   Date Value Ref Range Status   03/12/2020 10.2 4.8 - 24.2 ng/mL Final       Lab Results   Component Value Date/Time    COLORU Yellow 12/11/2022 10:44 PM    NITRU Negative 12/11/2022 10:44 PM    GLUCOSEU Negative 12/11/2022 10:44 PM    KETUA TRACE 12/11/2022 10:44 PM    UROBILINOGEN 0.2 12/11/2022 10:44 PM    BILIRUBINUR Negative 12/11/2022 10:44 PM       Lab Results   Component Value Date/Time    OSMOU 452 02/07/2023 05:40 AM       No components found for: URIC    No results found for: LIPIDPAN    Assessment and Plans:    1.  Hyponatremia  Isotonic vs. Pseudohyponatremia   Likely hypovolemic/dehydration   Na 123 on 2/6--> 124 today   Serum osmolality 282  TSH WNL 0.395  Uric acid elevated 9.6 on 2/7  Urine sodium<20, urine chloride<20  S/p IVF   will repeat lab work and urine studies in the morning  Strict I&O  Lasix on hold  Monitor labs     2. Hyperkalemia  Likely in the setting of MERYL /CKD  Potassium 5.8 on 2/6--> 5.3 today  Lokelma 10 g oral once  Low potassium diet  Repeat BMP this afternoon  Monitor labs     3. MERYL stage I on CKD 3a  Likely in the setting of decreased effective renal perfusion given poor oral intake prior to admission; exacerbated by diuretics and lisinopril  Baseline creatinine 0.8-1  Creatinine peaked at 1.2 on 2/5--> 1 today  FeNa 0.2% and FeUrea 34.9% supports prerenal etiology  Hold Lasix and lisinopril for now  Avoid nephrotoxins/NSAIDs  Strict I& O's, daily weights  Monitor labs     4. RSV/shortness of breath  History of COPD  chest x-ray on 2/4 which showed no acute cardiopulmonary process  Positive for RSV  Management per primary service     5. Diabetes mellitus  Hemoglobin A1c 7% on 2/4/2023  On insulin lispro  Management per primary service     6. Hypertension  BP goal<120/80  BP at goal  Monitor on current regimen     7. Anemia  Not likely related to CKD with creatinine 1  Iron sat 21%, iron 53, TIBC 249  Monitor H&H    Gideon Leija, APRN - CNP    Patient seen and examined all key components of the physical performed independently , case discussed with NP, all pertinent labs and radiologic tests personally reviewed agree with above.     Hyponatremia, clinically some mild peripheral edema, but may be intravascularly hypovolemic  Indices being repeated in am  Will give short course of hypertonic saline    Drea Puentes MD

## 2023-02-08 NOTE — PROGRESS NOTES
Pt strongly refusing bipap tonight. Pt understood to call if she changes her mind. Unit remains at pts bedside.

## 2023-02-08 NOTE — PROGRESS NOTES
Hospitalist Progress Note      PCP: Bear Gann MD    Date of Admission: 2/4/2023        Hospital Course:    PRESENT WITH SOB, AND FOUND TO HAVE HYPONATREMIA.    DIURETICS HELD,  FEELING BETTER      Subjective:  NO COMPLAINTS           Medications:  Reviewed    Infusion Medications    dextrose      sodium chloride       Scheduled Medications    guaiFENesin  400 mg Oral TID    donepezil  5 mg Oral Nightly    amLODIPine  5 mg Oral Daily    carvedilol  12.5 mg Oral BID WC    levothyroxine  75 mcg Oral Daily    [Held by provider] lisinopril  10 mg Oral Daily    [Held by provider] furosemide  20 mg Oral Daily    sodium chloride flush  5-40 mL IntraVENous 2 times per day    ipratropium-albuterol  1 ampule Inhalation Q4H WA    predniSONE  20 mg Oral BID    insulin lispro  0-8 Units SubCUTAneous TID WC    insulin lispro  0-4 Units SubCUTAneous Nightly    apixaban  5 mg Oral BID     PRN Meds: guaiFENesin-dextromethorphan, ipratropium-albuterol, glucose, dextrose bolus **OR** dextrose bolus, glucagon (rDNA), dextrose, sodium chloride flush, sodium chloride, ondansetron **OR** ondansetron, magnesium hydroxide, acetaminophen **OR** acetaminophen      Intake/Output Summary (Last 24 hours) at 2/8/2023 1556  Last data filed at 2/8/2023 2083  Gross per 24 hour   Intake 270 ml   Output 488 ml   Net -218 ml       Exam:    /86   Pulse (!) 126   Temp 98.2 °F (36.8 °C) (Oral)   Resp 22   Ht 5' (1.524 m)   Wt 190 lb (86.2 kg)   SpO2 99%   BMI 37.11 kg/m²       Gen:  WELL DEVELOPED  HEENT: NC/AT, moist mucous membranes, no oropharyngeal erythema or exudate  Neck: supple, trachea midline, no anterior cervical or SC LAD  Heart:  IRREG  Lungs:   DIMINISHED  bilaterally, FEW RHONCHI  Abd: bowel sounds present, soft, nontender, nondistended, no masses  Extrem:  No clubbing, cyanosis,  EDEMA OF ARMS AND LEGS edema  Skin: no rashes or lesions  Psych: Not oriented to date or Not oriented to place  Neuro: grossly intact, moves all four extremities. Labs:   Recent Labs     02/06/23  0425 02/07/23  0435   WBC 8.2 8.1   HGB 9.7* 10.0*   HCT 29.6* 31.2*    217     Recent Labs     02/07/23  0435 02/07/23  1538 02/08/23  0622   * 122* 124*   K 5.3* 5.2* 5.3*   CL 89* 89* 88*   CO2 23 23 22   BUN 43* 43* 41*   CREATININE 1.2* 1.1* 1.0   CALCIUM 7.9* 7.6* 7.9*   PHOS  --   --  3.4     No results for input(s): AST, ALT, BILIDIR, BILITOT, ALKPHOS in the last 72 hours. No results for input(s): INR in the last 72 hours. No results for input(s): Inga Gazella in the last 72 hours. No results for input(s): AST, ALT, ALB, BILIDIR, BILITOT, ALKPHOS in the last 72 hours. No results for input(s): LACTA in the last 72 hours. Lab Results   Component Value Date    LABURIC 9.6 (H) 02/07/2023     Lab Results   Component Value Date    AMMONIA <10.0 (L) 09/08/2020    AMMONIA 28.3 03/05/2020    AMMONIA 24.2 01/17/2020       Assessment:    Active Hospital Problems    Diagnosis Date Noted    Acute respiratory failure with hypoxia ([facility] [address] [J96.01] 02/04/2023     Priority: Medium   RSV  ANASARCA   IIDM   HYPOTHYROID   DEMENTIA   PACEMAKER   PAF  HYPONATREMIA  Plan:   HOLD LASIX   CONSULT NEPHROLOGY   SSI   SYNTHROID           DVT Prophylaxis: ELIQUIS  Diet: ADULT DIET; Regular; 4 carb choices (60 gm/meal);  Low Sodium (2 gm)  Code Status: Full Code     PT/OT Eval Status: ORDERED     Dispo - HOME  Electronically signed by Mardy Angelucci, DO on 2/8/2023 at 3:56 PM [facility]

## 2023-02-09 LAB
ANION GAP SERPL CALCULATED.3IONS-SCNC: 11 MMOL/L (ref 7–16)
ANION GAP SERPL CALCULATED.3IONS-SCNC: 7 MMOL/L (ref 7–16)
BUN BLDV-MCNC: 31 MG/DL (ref 6–23)
BUN BLDV-MCNC: 33 MG/DL (ref 6–23)
CALCIUM SERPL-MCNC: 7.6 MG/DL (ref 8.6–10.2)
CALCIUM SERPL-MCNC: 7.8 MG/DL (ref 8.6–10.2)
CHLORIDE BLD-SCNC: 95 MMOL/L (ref 98–107)
CHLORIDE BLD-SCNC: 99 MMOL/L (ref 98–107)
CO2: 22 MMOL/L (ref 22–29)
CO2: 26 MMOL/L (ref 22–29)
CORTISOL TOTAL: 3.15 MCG/DL (ref 2.68–18.4)
CREAT SERPL-MCNC: 0.8 MG/DL (ref 0.5–1)
CREAT SERPL-MCNC: 0.9 MG/DL (ref 0.5–1)
GFR SERPL CREATININE-BSD FRML MDRD: >60 ML/MIN/1.73
GFR SERPL CREATININE-BSD FRML MDRD: >60 ML/MIN/1.73
GLUCOSE BLD-MCNC: 197 MG/DL (ref 74–99)
GLUCOSE BLD-MCNC: 245 MG/DL (ref 74–99)
METER GLUCOSE: 136 MG/DL (ref 74–99)
METER GLUCOSE: 196 MG/DL (ref 74–99)
METER GLUCOSE: 206 MG/DL (ref 74–99)
METER GLUCOSE: 261 MG/DL (ref 74–99)
OSMOLALITY: 289 MOSM/KG (ref 285–310)
POTASSIUM SERPL-SCNC: 4.7 MMOL/L (ref 3.5–5)
POTASSIUM SERPL-SCNC: 4.8 MMOL/L (ref 3.5–5)
PRO-BNP: 3485 PG/ML (ref 0–450)
SODIUM BLD-SCNC: 128 MMOL/L (ref 132–146)
SODIUM BLD-SCNC: 132 MMOL/L (ref 132–146)
T4 TOTAL: 5.8 MCG/DL (ref 4.5–11.7)
TSH SERPL DL<=0.05 MIU/L-ACNC: 0.54 UIU/ML (ref 0.27–4.2)
URIC ACID, SERUM: 8 MG/DL (ref 2.4–5.7)

## 2023-02-09 PROCEDURE — 82533 TOTAL CORTISOL: CPT

## 2023-02-09 PROCEDURE — 6370000000 HC RX 637 (ALT 250 FOR IP): Performed by: INTERNAL MEDICINE

## 2023-02-09 PROCEDURE — 83930 ASSAY OF BLOOD OSMOLALITY: CPT

## 2023-02-09 PROCEDURE — 83880 ASSAY OF NATRIURETIC PEPTIDE: CPT

## 2023-02-09 PROCEDURE — 84443 ASSAY THYROID STIM HORMONE: CPT

## 2023-02-09 PROCEDURE — 36415 COLL VENOUS BLD VENIPUNCTURE: CPT

## 2023-02-09 PROCEDURE — 82962 GLUCOSE BLOOD TEST: CPT

## 2023-02-09 PROCEDURE — 94660 CPAP INITIATION&MGMT: CPT

## 2023-02-09 PROCEDURE — 94640 AIRWAY INHALATION TREATMENT: CPT

## 2023-02-09 PROCEDURE — 2580000003 HC RX 258: Performed by: NURSE PRACTITIONER

## 2023-02-09 PROCEDURE — 84436 ASSAY OF TOTAL THYROXINE: CPT

## 2023-02-09 PROCEDURE — 6370000000 HC RX 637 (ALT 250 FOR IP): Performed by: NURSE PRACTITIONER

## 2023-02-09 PROCEDURE — 6360000002 HC RX W HCPCS: Performed by: INTERNAL MEDICINE

## 2023-02-09 PROCEDURE — 51702 INSERT TEMP BLADDER CATH: CPT

## 2023-02-09 PROCEDURE — 80048 BASIC METABOLIC PNL TOTAL CA: CPT

## 2023-02-09 PROCEDURE — 2700000000 HC OXYGEN THERAPY PER DAY

## 2023-02-09 PROCEDURE — 2580000003 HC RX 258: Performed by: INTERNAL MEDICINE

## 2023-02-09 PROCEDURE — 84550 ASSAY OF BLOOD/URIC ACID: CPT

## 2023-02-09 PROCEDURE — 2140000000 HC CCU INTERMEDIATE R&B

## 2023-02-09 RX ORDER — DESMOPRESSIN ACETATE 4 UG/ML
1 INJECTION, SOLUTION INTRAVENOUS; SUBCUTANEOUS ONCE
Status: COMPLETED | OUTPATIENT
Start: 2023-02-09 | End: 2023-02-09

## 2023-02-09 RX ADMIN — CARVEDILOL 12.5 MG: 6.25 TABLET, FILM COATED ORAL at 16:46

## 2023-02-09 RX ADMIN — Medication 10 ML: at 09:25

## 2023-02-09 RX ADMIN — IPRATROPIUM BROMIDE AND ALBUTEROL SULFATE 1 AMPULE: .5; 2.5 SOLUTION RESPIRATORY (INHALATION) at 15:54

## 2023-02-09 RX ADMIN — AMLODIPINE BESYLATE 5 MG: 5 TABLET ORAL at 09:24

## 2023-02-09 RX ADMIN — PREDNISONE 20 MG: 20 TABLET ORAL at 09:23

## 2023-02-09 RX ADMIN — Medication 10 ML: at 22:11

## 2023-02-09 RX ADMIN — PREDNISONE 20 MG: 20 TABLET ORAL at 22:12

## 2023-02-09 RX ADMIN — IPRATROPIUM BROMIDE AND ALBUTEROL SULFATE 1 AMPULE: .5; 2.5 SOLUTION RESPIRATORY (INHALATION) at 10:35

## 2023-02-09 RX ADMIN — INSULIN LISPRO 2 UNITS: 100 INJECTION, SOLUTION INTRAVENOUS; SUBCUTANEOUS at 17:28

## 2023-02-09 RX ADMIN — DEXTROSE MONOHYDRATE 300 ML: 50 INJECTION, SOLUTION INTRAVENOUS at 10:12

## 2023-02-09 RX ADMIN — CARVEDILOL 12.5 MG: 6.25 TABLET, FILM COATED ORAL at 09:24

## 2023-02-09 RX ADMIN — INSULIN LISPRO 4 UNITS: 100 INJECTION, SOLUTION INTRAVENOUS; SUBCUTANEOUS at 12:58

## 2023-02-09 RX ADMIN — ACETAMINOPHEN 650 MG: 325 TABLET ORAL at 10:14

## 2023-02-09 RX ADMIN — GUAIFENESIN 400 MG: 400 TABLET ORAL at 09:23

## 2023-02-09 RX ADMIN — GUAIFENESIN 400 MG: 400 TABLET ORAL at 22:12

## 2023-02-09 RX ADMIN — APIXABAN 5 MG: 5 TABLET, FILM COATED ORAL at 22:12

## 2023-02-09 RX ADMIN — DONEPEZIL HYDROCHLORIDE 5 MG: 5 TABLET ORAL at 22:12

## 2023-02-09 RX ADMIN — SODIUM CHLORIDE, PRESERVATIVE FREE 10 ML: 5 INJECTION INTRAVENOUS at 09:25

## 2023-02-09 RX ADMIN — GUAIFENESIN SYRUP AND DEXTROMETHORPHAN 5 ML: 100; 10 SYRUP ORAL at 10:14

## 2023-02-09 RX ADMIN — LEVOTHYROXINE SODIUM 75 MCG: 0.07 TABLET ORAL at 06:35

## 2023-02-09 RX ADMIN — IPRATROPIUM BROMIDE AND ALBUTEROL SULFATE 1 AMPULE: .5; 2.5 SOLUTION RESPIRATORY (INHALATION) at 13:42

## 2023-02-09 RX ADMIN — IPRATROPIUM BROMIDE AND ALBUTEROL SULFATE 1 AMPULE: .5; 2.5 SOLUTION RESPIRATORY (INHALATION) at 20:58

## 2023-02-09 RX ADMIN — GUAIFENESIN 400 MG: 400 TABLET ORAL at 16:46

## 2023-02-09 RX ADMIN — DESMOPRESSIN ACETATE 1 MCG: 4 SOLUTION INTRAVENOUS at 11:01

## 2023-02-09 RX ADMIN — APIXABAN 5 MG: 5 TABLET, FILM COATED ORAL at 09:23

## 2023-02-09 ASSESSMENT — PAIN SCALES - GENERAL
PAINLEVEL_OUTOF10: 3
PAINLEVEL_OUTOF10: 0

## 2023-02-09 ASSESSMENT — PAIN - FUNCTIONAL ASSESSMENT: PAIN_FUNCTIONAL_ASSESSMENT: ACTIVITIES ARE NOT PREVENTED

## 2023-02-09 ASSESSMENT — PAIN SCALES - WONG BAKER: WONGBAKER_NUMERICALRESPONSE: 0

## 2023-02-09 ASSESSMENT — PAIN DESCRIPTION - DESCRIPTORS: DESCRIPTORS: ACHING;SORE;DISCOMFORT

## 2023-02-09 NOTE — PROGRESS NOTES
[facility]  Nephrology Progress Note    Patient's Name: Fredda Kehr    History of Present Illness from 2/7 Consult Note:    Genaro Rivas is a 80 y.o. female with a past medical history of arthritis, COPD, hypertension, hyperlipidemia, and diabetes mellitus. She presented to the ED on 2/4 with complaints of shortness of breath and was found to be hypoxemic and in respiratory distress per EMS. Vital signs on 2/4 included temperature 97.9, respirations 30, pulse 110, /58, and she was 97% on nonrebreather. Lab data on 2/4 include sodium 125, potassium 5.2, BUN 26, creatinine 1.1, proBNP 3109, hemoglobin 11.1, and she was found to be positive for RSV. She had a chest x-ray on 2/4 which showed no acute cardiopulmonary process. We were consulted to see the patient for anasarca and hyponatremia. Patient's sodium was noted to be 125 on 2/4 and is 124 today. At present, patient was seen and examined. She reports that she feels pretty good. She explained that she came in due to shortness of breath. She also reports decreased appetite prior to admission. She reports that she had urinary frequency. She reports nausea, but denies any abdominal pain, vomiting, or diarrhea. She denies any chest pain. Her  and daughter are at the bedside. \"    Subjective:    2/9: Patient was seen and examined. She reports nausea and abdominal pain today. She denies any shortness of breath. She has a visitor at the bedside.     PMH:    Past Medical History:   Diagnosis Date    Arthritis     COPD (chronic obstructive pulmonary disease) ([address]     Diabetes mellitus ([address]     Hyperlipidemia     Hypertension        Patient Active Problem List   Diagnosis    Acute appendicitis    Hypertension    Inadequately controlled diabetes mellitus ([address]    Metabolic encephalopathy    Sepsis associated hypotension ([address]    Sepsis ([address]    Relapsing appendicitis    Saddle embolus of pulmonary artery (HCC)    DVT of deep femoral vein, right [facility])    History of pulmonary embolism    Generalized weakness    Acute kidney injury ([address]    Left knee pain    Baker's cyst    Atrial fibrillation with RVR (HCC)    New onset a-fib (HCC)    Bradycardia    Acute respiratory failure with hypoxia ([facility])       Diet:    ADULT DIET; Regular; 4 carb choices (60 gm/meal); Low Sodium (2 gm); Low Potassium (Less than 3000 mg/day)    Meds:     desmopressin  1 mcg SubCUTAneous Once    guaiFENesin  400 mg Oral TID    donepezil  5 mg Oral Nightly    amLODIPine  5 mg Oral Daily    carvedilol  12.5 mg Oral BID WC    levothyroxine  75 mcg Oral Daily    [Held by provider] lisinopril  10 mg Oral Daily    [Held by provider] furosemide  20 mg Oral Daily    sodium chloride flush  5-40 mL IntraVENous 2 times per day    ipratropium-albuterol  1 ampule Inhalation Q4H WA    predniSONE  20 mg Oral BID    insulin lispro  0-8 Units SubCUTAneous TID WC    insulin lispro  0-4 Units SubCUTAneous Nightly    apixaban  5 mg Oral BID        dextrose      sodium chloride         Meds prn:     guaiFENesin-dextromethorphan, ipratropium-albuterol, glucose, dextrose bolus **OR** dextrose bolus, glucagon (rDNA), dextrose, sodium chloride flush, sodium chloride, ondansetron **OR** ondansetron, magnesium hydroxide, acetaminophen **OR** acetaminophen    Meds prior to admission:     No current facility-administered medications on file prior to encounter.      Current Outpatient Medications on File Prior to Encounter   Medication Sig Dispense Refill    Multiple Vitamin (MULTIVITAMIN ADULT PO) Take by mouth daily      lisinopril (PRINIVIL;ZESTRIL) 10 MG tablet Take 1 tablet by mouth daily 30 tablet 3    furosemide (LASIX) 20 MG tablet Take 20 mg by mouth daily      donepezil (ARICEPT) 10 MG tablet Take 10 mg by mouth at bedtime      carvedilol (COREG) 12.5 MG tablet Take 1 tablet by mouth 2 times daily (with meals) (Patient taking differently: Take 12.5 mg by mouth 2 times daily (with meals) Takes one and a half tablet) 60 tablet 3    amLODIPine (NORVASC) 5 MG tablet Take 1 tablet by mouth daily 30 tablet 3    ipratropium-albuterol (DUONEB) 0.5-2.5 (3) MG/3ML SOLN nebulizer solution Inhale 1 vial into the lungs every 6 hours as needed for Shortness of Breath      levothyroxine (SYNTHROID) 75 MCG tablet Take 75 mcg by mouth Daily      predniSONE (DELTASONE) 5 MG tablet Take 5 mg by mouth 2 times daily      metFORMIN (GLUCOPHAGE-XR) 500 MG extended release tablet Take 500 mg by mouth daily (with breakfast)      apixaban (ELIQUIS) 5 MG TABS tablet Take 5 mg by mouth 2 times daily         Allergies:    Patient has no known allergies. Social History:     reports that she has never smoked. She has never used smokeless tobacco. She reports that she does not drink alcohol. Family History:     History reviewed. No pertinent family history. Physical Exam:      Patient Vitals for the past 24 hrs:   BP Temp Temp src Pulse Resp SpO2 Weight   02/09/23 0813 108/73 97.3 °F (36.3 °C) Oral (!) 115 20 97 % --   02/09/23 0541 136/77 97.4 °F (36.3 °C) Oral 79 23 94 % --   02/09/23 0400 -- -- -- -- -- 96 % --   02/09/23 0149 -- -- -- -- -- 95 % 198 lb 1 oz (89.8 kg)   02/08/23 2243 120/87 -- -- -- -- -- --   02/08/23 2100 -- -- -- -- -- 95 % --   02/08/23 1943 -- -- -- 100 22 99 % --   02/08/23 1544 123/86 98.2 °F (36.8 °C) Oral (!) 126 22 99 % --           Intake/Output Summary (Last 24 hours) at 2/9/2023 0816  Last data filed at 2/9/2023 0538  Gross per 24 hour   Intake 1250 ml   Output 800 ml   Net 450 ml       General: Awake, alert, no acute distress  Neck: No JVD noted  Lungs: Clear bilaterally upper, diminished to the bases bilaterally. Unlabored  CV: Regular rate and rhythm. No rub  Abd: Soft, nontender, nondistended. Active bowel sounds  Skin: Warm and dry.   No rash on exposed extremities  Ext: 1+ BUE/BLE edema   Neuro: Awake, answers questions appropriately    Data:    Recent Labs     02/07/23  0435   WBC 8.1   HGB 10.0*   HCT 31.2*   MCV 88.9            Recent Labs     02/08/23  0622 02/08/23  1633 02/08/23  1740 02/08/23  1759 02/09/23  0422   * 118* 118* 118* 132   K 5.3* 5.0 5.1*  --  4.8   CL 88* 85* 87*  --  99   CO2 22 25 25  --  22   CREATININE 1.0 1.0 0.9  --  0.9   BUN 41* 39* 38*  --  33*   LABGLOM 53 53 >60  --  >60   GLUCOSE 201* 267* 253*  --  197*   CALCIUM 7.9* 7.7* 7.5*  --  7.8*   PHOS 3.4  --   --   --   --    MG 2.7*  --   --   --   --          No results found for: VITD25    No results found for: PTH    No results for input(s): ALT, AST, ALKPHOS, BILITOT, BILIDIR in the last 72 hours. No results for input(s): LABALBU in the last 72 hours. Ferritin   Date Value Ref Range Status   02/08/2023 297 ng/mL Final     Comment:     FERRITIN Reference Ranges:  Adult Males   20 - 60 years:    27 - 400 ng/mL  Adult females 16 - 61 years:    15 - 150 ng/mL  Adults greater than 60 years:   no established reference range  Pediatrics:                     no established reference range       Iron   Date Value Ref Range Status   02/08/2023 53 37 - 145 mcg/dL Final     TIBC   Date Value Ref Range Status   02/08/2023 249 (L) 250 - 450 mcg/dL Final       Vitamin B-12   Date Value Ref Range Status   02/08/2023 1736 (H) 211 - 946 pg/mL Final       Folate   Date Value Ref Range Status   02/08/2023 19.5 4.8 - 24.2 ng/mL Final       Lab Results   Component Value Date/Time    COLORU Yellow 12/11/2022 10:44 PM    NITRU Negative 12/11/2022 10:44 PM    GLUCOSEU Negative 12/11/2022 10:44 PM    KETUA TRACE 12/11/2022 10:44 PM    UROBILINOGEN 0.2 12/11/2022 10:44 PM    BILIRUBINUR Negative 12/11/2022 10:44 PM       Lab Results   Component Value Date/Time    OSMOU 45Genoveva 02/07/2023 05:40 AM       No components found for: URIC    No results found for: LIPIDPAN    Assessment and Plans:    1.  Hyponatremia  Isotonic vs. Pseudohyponatremia   Likely hypovolemic/dehydration   Na 123 on 2/6--> 118 on 2/8--> 132 today   Serum osmolality 289  TSH WNL 0.395--> TSH 0.541, cortisol 3.15 on 3/9  Uric acid elevated 9.6 on 2/7--> 8 on 2/9  Urine sodium<20, urine chloride<20--> Isa<20 Ucr 37  S/p IVF   S/p 3% NaCl  For DDAVP 2/9  Strict I&O  Lasix on hold  Monitor labs     2. Hyperkalemia  Likely in the setting of MERYL /CKD  Potassium 5.8 on 2/6--> 4.8 today  Low potassium diet  Monitor labs     3. MERYL stage I on CKD 3a  Likely in the setting of decreased effective renal perfusion given poor oral intake prior to admission; exacerbated by diuretics and lisinopril  Baseline creatinine 0.8-1  Creatinine peaked at 1.2 on 2/5--> 0.9 today  FeNa 0.2% and FeUrea 34.9% supports prerenal etiology  Hold Lasix and lisinopril for now  Avoid nephrotoxins/NSAIDs  Strict I& O's, daily weights  Monitor labs     4. RSV/shortness of breath  History of COPD  chest x-ray on 2/4 which showed no acute cardiopulmonary process  Positive for RSV  Management per primary service     5. Diabetes mellitus  Hemoglobin A1c 7% on 2/4/2023  On insulin lispro  Management per primary service     6. Hypertension  BP goal<120/80  BP at goal  Monitor on current regimen     7. Anemia  Not likely related to CKD with creatinine 1  Iron sat 21%, iron 53, TIBC 249  Monitor H&H    MICHA Vergara - CNP    Patient seen and examined all key components of the physical performed independently , case discussed with NP, all pertinent labs and radiologic tests personally reviewed agree with above.   Persistently normal to high serum osmolality, raising possibility of pseudohyponatremia, will check serum and urine protein electrophoresis  Lipid profile unremarkable to explain low sodium        Gaston Snellen, MD

## 2023-02-09 NOTE — CARE COORDINATION
Per nursing rounds, Na 132 today, lisinopril and Lasix on hold; nephrology following. Plan remains home and patient will need ambulance transport home set up; ambulance form completed and envelope placed on the soft chart. Patient active with [facility] care and will need resumption of care orders.     Dee Dee Trinh, MSW, LSW [phone]

## 2023-02-09 NOTE — PROGRESS NOTES
Occupational Therapy    Attempted OT tx 2x this date. Pt refused. Will re-attempt when appropriate.     Sola Herrera, [address] [facility], OTR/L [id number]

## 2023-02-09 NOTE — PROGRESS NOTES
Hospitalist Progress Note      PCP: Joseph Doe MD    Date of Admission: 2/4/2023        Hospital Course:    PRESENT WITH SOB, AND FOUND TO HAVE HYPONATREMIA.    DIURETICS HELD,  FEELING BETTER      Subjective:  NO COMPLAINTS           Medications:  Reviewed    Infusion Medications    dextrose      sodium chloride       Scheduled Medications    guaiFENesin  400 mg Oral TID    donepezil  5 mg Oral Nightly    amLODIPine  5 mg Oral Daily    carvedilol  12.5 mg Oral BID WC    levothyroxine  75 mcg Oral Daily    [Held by provider] lisinopril  10 mg Oral Daily    [Held by provider] furosemide  20 mg Oral Daily    sodium chloride flush  5-40 mL IntraVENous 2 times per day    ipratropium-albuterol  1 ampule Inhalation Q4H WA    predniSONE  20 mg Oral BID    insulin lispro  0-8 Units SubCUTAneous TID WC    insulin lispro  0-4 Units SubCUTAneous Nightly    apixaban  5 mg Oral BID     PRN Meds: guaiFENesin-dextromethorphan, ipratropium-albuterol, glucose, dextrose bolus **OR** dextrose bolus, glucagon (rDNA), dextrose, sodium chloride flush, sodium chloride, ondansetron **OR** ondansetron, magnesium hydroxide, acetaminophen **OR** acetaminophen      Intake/Output Summary (Last 24 hours) at 2/9/2023 1548  Last data filed at 2/9/2023 1148  Gross per 24 hour   Intake 1350 ml   Output 800 ml   Net 550 ml         Exam:    /84   Pulse 89   Temp 97.6 °F (36.4 °C) (Oral)   Resp 22   Ht 5' (1.524 m)   Wt 198 lb 1 oz (89.8 kg)   SpO2 98%   BMI 38.68 kg/m²       Gen:  WELL DEVELOPED  HEENT: NC/AT, moist mucous membranes, no oropharyngeal erythema or exudate  Neck: supple, trachea midline, no anterior cervical or SC LAD  Heart:  IRREG  Lungs:   DIMINISHED  bilaterally, FEW RHONCHI  Abd: bowel sounds present, soft, nontender, nondistended, no masses  Extrem:  No clubbing, cyanosis,  EDEMA OF ARMS AND LEGS edema  Skin: no rashes or lesions  Psych: Not oriented to date or Not oriented to place  Neuro: grossly intact, moves all four extremities. Labs:   Recent Labs     02/07/23  0435   WBC 8.1   HGB 10.0*   HCT 31.2*          Recent Labs     02/08/23  0622 02/08/23  1633 02/08/23  1740 02/08/23  1759 02/09/23  0422 02/09/23  1424   *   < > 118* 118* 132 128*   K 5.3*   < > 5.1*  --  4.8 4.7   CL 88*   < > 87*  --  99 95*   CO2 22   < > 25  --  22 26   BUN 41*   < > 38*  --  33* 31*   CREATININE 1.0   < > 0.9  --  0.9 0.8   CALCIUM 7.9*   < > 7.5*  --  7.8* 7.6*   PHOS 3.4  --   --   --   --   --     < > = values in this interval not displayed. No results for input(s): AST, ALT, BILIDIR, BILITOT, ALKPHOS in the last 72 hours. No results for input(s): INR in the last 72 hours. No results for input(s): Patrisha Meigs in the last 72 hours. No results for input(s): AST, ALT, ALB, BILIDIR, BILITOT, ALKPHOS in the last 72 hours. No results for input(s): LACTA in the last 72 hours. Lab Results   Component Value Date    LABURIC 8.0 (H) 02/09/2023     Lab Results   Component Value Date    AMMONIA <10.0 (L) 09/08/2020    AMMONIA 28.3 03/05/2020    AMMONIA 24.2 01/17/2020       Assessment:    Active Hospital Problems    Diagnosis Date Noted    Acute respiratory failure with hypoxia ([facility] [address] [J96.01] 02/04/2023     Priority: Medium   RSV  ANASARCA   IIDM   HYPOTHYROID   DEMENTIA   PACEMAKER   PAF  HYPONATREMIA    Plan:  2/9/23:  -Improving hyponatremia, 118 > 3% given per nephro > 132  -Remains on 3L NC, 96%  -Continue to hold lasix   -PT/OT        DVT Prophylaxis: ELIQUIS  Diet: ADULT DIET; Regular; 4 carb choices (60 gm/meal);  Low Sodium (2 gm)  Code Status: Full Code     PT/OT Eval Status: ORDERED     Dispo - HOME  Electronically signed by MICHA Gaytan CNP on 2/9/2023 at 3:48 PM

## 2023-02-09 NOTE — PLAN OF CARE
Problem: Chronic Conditions and Co-morbidities  Goal: Patient's chronic conditions and co-morbidity symptoms are monitored and maintained or improved  Outcome: Progressing     Problem: ABCDS Injury Assessment  Goal: Absence of physical injury  Outcome: Progressing     Problem: Safety - Adult  Goal: Free from fall injury  Outcome: Progressing

## 2023-02-10 LAB
ANION GAP SERPL CALCULATED.3IONS-SCNC: 7 MMOL/L (ref 7–16)
ANION GAP SERPL CALCULATED.3IONS-SCNC: 9 MMOL/L (ref 7–16)
BUN BLDV-MCNC: 28 MG/DL (ref 6–23)
BUN BLDV-MCNC: 29 MG/DL (ref 6–23)
CALCIUM SERPL-MCNC: 7.6 MG/DL (ref 8.6–10.2)
CALCIUM SERPL-MCNC: 7.8 MG/DL (ref 8.6–10.2)
CALCIUM SERPL-MCNC: 7.9 MG/DL (ref 8.6–10.2)
CALCIUM SERPL-MCNC: 8 MG/DL (ref 8.6–10.2)
CHLORIDE BLD-SCNC: 92 MMOL/L (ref 98–107)
CHLORIDE BLD-SCNC: 93 MMOL/L (ref 98–107)
CHLORIDE BLD-SCNC: 93 MMOL/L (ref 98–107)
CHLORIDE BLD-SCNC: 96 MMOL/L (ref 98–107)
CO2: 24 MMOL/L (ref 22–29)
CO2: 25 MMOL/L (ref 22–29)
CREAT SERPL-MCNC: 0.8 MG/DL (ref 0.5–1)
GFR SERPL CREATININE-BSD FRML MDRD: >60 ML/MIN/1.73
GLUCOSE BLD-MCNC: 158 MG/DL (ref 74–99)
GLUCOSE BLD-MCNC: 170 MG/DL (ref 74–99)
GLUCOSE BLD-MCNC: 170 MG/DL (ref 74–99)
GLUCOSE BLD-MCNC: 229 MG/DL (ref 74–99)
METER GLUCOSE: 170 MG/DL (ref 74–99)
METER GLUCOSE: 179 MG/DL (ref 74–99)
METER GLUCOSE: 228 MG/DL (ref 74–99)
METER GLUCOSE: 245 MG/DL (ref 74–99)
POTASSIUM SERPL-SCNC: 4.8 MMOL/L (ref 3.5–5)
POTASSIUM SERPL-SCNC: 5.1 MMOL/L (ref 3.5–5)
POTASSIUM SERPL-SCNC: 5.1 MMOL/L (ref 3.5–5)
POTASSIUM SERPL-SCNC: 5.5 MMOL/L (ref 3.5–5)
SODIUM BLD-SCNC: 125 MMOL/L (ref 132–146)
SODIUM BLD-SCNC: 126 MMOL/L (ref 132–146)
SODIUM BLD-SCNC: 127 MMOL/L (ref 132–146)
SODIUM BLD-SCNC: 127 MMOL/L (ref 132–146)

## 2023-02-10 PROCEDURE — 2140000000 HC CCU INTERMEDIATE R&B

## 2023-02-10 PROCEDURE — 6370000000 HC RX 637 (ALT 250 FOR IP): Performed by: INTERNAL MEDICINE

## 2023-02-10 PROCEDURE — 6370000000 HC RX 637 (ALT 250 FOR IP): Performed by: NURSE PRACTITIONER

## 2023-02-10 PROCEDURE — 94660 CPAP INITIATION&MGMT: CPT

## 2023-02-10 PROCEDURE — 6370000000 HC RX 637 (ALT 250 FOR IP)

## 2023-02-10 PROCEDURE — 82962 GLUCOSE BLOOD TEST: CPT

## 2023-02-10 PROCEDURE — 80048 BASIC METABOLIC PNL TOTAL CA: CPT

## 2023-02-10 PROCEDURE — 2700000000 HC OXYGEN THERAPY PER DAY

## 2023-02-10 PROCEDURE — 2580000003 HC RX 258: Performed by: NURSE PRACTITIONER

## 2023-02-10 PROCEDURE — 36415 COLL VENOUS BLD VENIPUNCTURE: CPT

## 2023-02-10 PROCEDURE — 94640 AIRWAY INHALATION TREATMENT: CPT

## 2023-02-10 RX ADMIN — Medication 10 ML: at 20:22

## 2023-02-10 RX ADMIN — IPRATROPIUM BROMIDE AND ALBUTEROL SULFATE 1 AMPULE: .5; 2.5 SOLUTION RESPIRATORY (INHALATION) at 20:30

## 2023-02-10 RX ADMIN — IPRATROPIUM BROMIDE AND ALBUTEROL SULFATE 1 AMPULE: .5; 2.5 SOLUTION RESPIRATORY (INHALATION) at 17:02

## 2023-02-10 RX ADMIN — CARVEDILOL 12.5 MG: 6.25 TABLET, FILM COATED ORAL at 18:06

## 2023-02-10 RX ADMIN — LEVOTHYROXINE SODIUM 75 MCG: 0.07 TABLET ORAL at 06:23

## 2023-02-10 RX ADMIN — IPRATROPIUM BROMIDE AND ALBUTEROL SULFATE 1 AMPULE: .5; 2.5 SOLUTION RESPIRATORY (INHALATION) at 12:26

## 2023-02-10 RX ADMIN — APIXABAN 5 MG: 5 TABLET, FILM COATED ORAL at 20:21

## 2023-02-10 RX ADMIN — ACETAMINOPHEN 650 MG: 325 TABLET ORAL at 20:21

## 2023-02-10 RX ADMIN — Medication 10 ML: at 11:18

## 2023-02-10 RX ADMIN — GUAIFENESIN 400 MG: 400 TABLET ORAL at 20:21

## 2023-02-10 RX ADMIN — PREDNISONE 20 MG: 20 TABLET ORAL at 20:21

## 2023-02-10 RX ADMIN — INSULIN LISPRO 2 UNITS: 100 INJECTION, SOLUTION INTRAVENOUS; SUBCUTANEOUS at 18:06

## 2023-02-10 RX ADMIN — DONEPEZIL HYDROCHLORIDE 5 MG: 5 TABLET ORAL at 20:21

## 2023-02-10 RX ADMIN — IPRATROPIUM BROMIDE AND ALBUTEROL SULFATE 1 AMPULE: .5; 2.5 SOLUTION RESPIRATORY (INHALATION) at 09:53

## 2023-02-10 RX ADMIN — GUAIFENESIN 400 MG: 400 TABLET ORAL at 11:17

## 2023-02-10 RX ADMIN — GUAIFENESIN 400 MG: 400 TABLET ORAL at 18:06

## 2023-02-10 RX ADMIN — SODIUM ZIRCONIUM CYCLOSILICATE 10 G: 10 POWDER, FOR SUSPENSION ORAL at 18:06

## 2023-02-10 RX ADMIN — PREDNISONE 20 MG: 20 TABLET ORAL at 11:17

## 2023-02-10 RX ADMIN — GUAIFENESIN SYRUP AND DEXTROMETHORPHAN 5 ML: 100; 10 SYRUP ORAL at 20:23

## 2023-02-10 RX ADMIN — AMLODIPINE BESYLATE 5 MG: 5 TABLET ORAL at 11:17

## 2023-02-10 RX ADMIN — CARVEDILOL 12.5 MG: 6.25 TABLET, FILM COATED ORAL at 06:23

## 2023-02-10 RX ADMIN — GUAIFENESIN SYRUP AND DEXTROMETHORPHAN 5 ML: 100; 10 SYRUP ORAL at 11:25

## 2023-02-10 RX ADMIN — APIXABAN 5 MG: 5 TABLET, FILM COATED ORAL at 11:17

## 2023-02-10 ASSESSMENT — PAIN SCALES - GENERAL
PAINLEVEL_OUTOF10: 0
PAINLEVEL_OUTOF10: 6
PAINLEVEL_OUTOF10: 0

## 2023-02-10 ASSESSMENT — PAIN DESCRIPTION - LOCATION: LOCATION: BACK

## 2023-02-10 ASSESSMENT — PAIN DESCRIPTION - DESCRIPTORS: DESCRIPTORS: DULL;ACHING

## 2023-02-10 ASSESSMENT — PAIN SCALES - WONG BAKER: WONGBAKER_NUMERICALRESPONSE: 0

## 2023-02-10 NOTE — PROGRESS NOTES
Hospitalist Progress Note      PCP: Santino Fagan MD    Date of Admission: 2/4/2023        Hospital Course:    PRESENT WITH SOB, AND FOUND TO HAVE HYPONATREMIA.    DIURETICS HELD,  FEELING BETTER      Subjective:     Feels better, still somewhat chilled          Medications:  Reviewed    Infusion Medications    dextrose      sodium chloride       Scheduled Medications    sodium zirconium cyclosilicate  10 g Oral Once    guaiFENesin  400 mg Oral TID    donepezil  5 mg Oral Nightly    amLODIPine  5 mg Oral Daily    carvedilol  12.5 mg Oral BID WC    levothyroxine  75 mcg Oral Daily    [Held by provider] lisinopril  10 mg Oral Daily    [Held by provider] furosemide  20 mg Oral Daily    sodium chloride flush  5-40 mL IntraVENous 2 times per day    ipratropium-albuterol  1 ampule Inhalation Q4H WA    predniSONE  20 mg Oral BID    insulin lispro  0-8 Units SubCUTAneous TID WC    insulin lispro  0-4 Units SubCUTAneous Nightly    apixaban  5 mg Oral BID     PRN Meds: guaiFENesin-dextromethorphan, ipratropium-albuterol, glucose, dextrose bolus **OR** dextrose bolus, glucagon (rDNA), dextrose, sodium chloride flush, sodium chloride, ondansetron **OR** ondansetron, magnesium hydroxide, acetaminophen **OR** acetaminophen      Intake/Output Summary (Last 24 hours) at 2/10/2023 1423  Last data filed at 2/10/2023 1047  Gross per 24 hour   Intake 360 ml   Output 751 ml   Net -391 ml       Exam:    /69   Pulse 98   Temp 97.5 °F (36.4 °C) (Oral)   Resp 22   Ht 5' (1.524 m)   Wt 198 lb 1 oz (89.8 kg)   SpO2 98%   BMI 38.68 kg/m²       Gen:  WELL DEVELOPED  HEENT: NC/AT, moist mucous membranes, no oropharyngeal erythema or exudate  Neck: supple, trachea midline, no anterior cervical or SC LAD  Heart:  IRREG  Lungs:   DIMINISHED  bilaterally, FEW RHONCHI  Abd: bowel sounds present, soft, nontender, nondistended, no masses  Extrem:  No clubbing, cyanosis,  EDEMA OF ARMS AND LEGS edema  Skin: no rashes or lesions  Psych: Not oriented to date or Not oriented to place  Neuro: grossly intact, moves all four extremities. Labs:   No results for input(s): WBC, HGB, HCT, PLT in the last 72 hours. Recent Labs     02/08/23  0622 02/08/23  1633 02/09/23  1424 02/09/23  2218 02/10/23  0838   *   < > 128* 127* 127*  126*   K 5.3*   < > 4.7 4.8 5.1*  5.1*   CL 88*   < > 95* 96* 93*  93*   CO2 22   < > 26 24 25  24   BUN 41*   < > 31* 29* 29*  29*   CREATININE 1.0   < > 0.8 0.8 0.8  0.8   CALCIUM 7.9*   < > 7.6* 7.6* 7.9*  7.8*   PHOS 3.4  --   --   --   --     < > = values in this interval not displayed. No results for input(s): AST, ALT, BILIDIR, BILITOT, ALKPHOS in the last 72 hours. No results for input(s): INR in the last 72 hours. No results for input(s): Ardelle Chalk in the last 72 hours. No results for input(s): AST, ALT, ALB, BILIDIR, BILITOT, ALKPHOS in the last 72 hours. No results for input(s): LACTA in the last 72 hours. Lab Results   Component Value Date    LABURIC 8.0 (H) 02/09/2023     Lab Results   Component Value Date    AMMONIA <10.0 (L) 09/08/2020    AMMONIA 28.3 03/05/2020    AMMONIA 24.2 01/17/2020       Assessment:    Active Hospital Problems    Diagnosis Date Noted    Acute respiratory failure with hypoxia ([facility] [address] [J96.01] 02/04/2023     Priority: Medium   RSV  ANASARCA   IIDM   HYPOTHYROID   DEMENTIA   PACEMAKER   PAF  HYPONATREMIA    Plan:  2/9/23:  -Improving hyponatremia, 118 > 3% given per nephro > 132  -Remains on 3L NC, 96%  -Continue to hold lasix   -PT/OT    2/10/2023  Continue to monitor sodium level  RSV symptoms improved-continue supportive care  Mild hypokalemia at 5.1  Case discussed with family at bedside  Monitor daily labs  Oxygen saturation improved-98% on 3 L-continue to wean off     DVT Prophylaxis: ELIQUIS  Diet: ADULT DIET; Regular; 4 carb choices (60 gm/meal);  Low Sodium (2 gm)  Code Status: Full Code     PT/OT Eval Status: ORDERED     Dispo - HOME  Electronically signed by Ngoc Burroughs MD on 2/10/2023 at 2:23 PM

## 2023-02-10 NOTE — PROGRESS NOTES
Occupational Therapy  OT BEDSIDE TREATMENT NOTE   [address] [facility] [address] [facility]  [address] [facility], [address]      Date:2/10/2023  Patient Name: Jacqueline Gomez  MRN: [id number]  : 1932  Room: [address] [place] [address]A         Attempted OT session this date:    [] unavailable due to other medical staff currently with pt   [] on hold per nursing staff   [] on hold per nursing staff secondary to lab / radiology results    [x] declined treatment  this date despite education/encouragement. Benefits of participation in therapy reviewed with pt.    [] off unit   [] Other:      Continue with current OT P. [address] [place]/L [address]

## 2023-02-10 NOTE — PROGRESS NOTES
Comprehensive Nutrition Assessment    Type and Reason for Visit:  Initial, RD Nutrition Re-Screen/LOS (LOS)    Nutrition Recommendations/Plan:   Continue current diet; will begin Gelatein-20 once/d (does not count towards fluid); encourage oral intake  Consider alternate ONS if /when fluid restriction liberalized - will monitor. Malnutrition Assessment:  Malnutrition Status:  Insufficient data (02/10/23 0527)    Context:  Acute Illness     Findings of the 6 clinical characteristics of malnutrition:  Energy Intake:  50% or less of estimated energy requirements for 5 or more days (poor oral intakes, <50% this admissions)  Weight Loss:  Unable to assess (d/t fluid shifts)     Body Fat Loss:  Unable to assess (droplet iso)     Muscle Mass Loss:  Unable to assess    Fluid Accumulation:  Unable to assess     Strength:  Not Performed    Nutrition Assessment:    Pt admitted with SOB,  hyponatremia; acute respiratory failure. PMHx COPD, DM, HLD, HTN. Oral intakes poor; noted 600mL fluid restriction. Will begin Gelatein ONS once/d (not counted as fluid) and monitor. Nutrition Related Findings:    A/O x 4; I/Os WDL; missing teeth; +3L NC O2; abd WDL, active BS; +2/+3 edema; hyponatremia; hyperkalemia; GLUC 136-220. Wound Type: None       Current Nutrition Intake & Therapies:    Average Meal Intake: 1-25%  Average Supplements Intake: None Ordered  ADULT DIET; Regular; 4 carb choices (60 gm/meal); Low Sodium (2 gm); Low Potassium (Less than 3000 mg/day)    Anthropometric Measures:  Height: 5' (152.4 cm)  Ideal Body Weight (IBW): 100 lbs (45 kg)    Admission Body Weight: 180 lb 8.9 oz (81.9 kg) (2/4; actual/bed scale)  Current Body Weight: 197 lb 15.6 oz (89.8 kg) (2/9), 198 % IBW.  Weight Source: Bed Scale  Current BMI (kg/m2): 38.7  Usual Body Weight:  (WILFRIDO: 180-198# per EMR x 2 mos; fluid shifts likely, noted + anasarca; 173 lb 7.3 oz (78.7 kg) (12/14/22 bedscale))     Weight Adjustment For: No Adjustment BMI Categories: Obese Class 2 (BMI 35.0 -39.9)    Estimated Daily Nutrient Needs:  Energy Requirements Based On: Formula  Weight Used for Energy Requirements: Admission  Energy (kcal/day):  kcal/d (MSJ 1161 x SF 1.2)  Weight Used for Protein Requirements: Ideal  Protein (g/day): 55-70 gm (1.3-1.5 gm pro/kg IBW) (monitor renal labs)  Method Used for Fluid Requirements: Other (Comment)  Fluid (ml/day): per medical team, 600mL    Nutrition Diagnosis:   Inadequate oral intake related to impaired respiratory function as evidenced by intake 0-25%, poor intake prior to admission    Nutrition Interventions:   Food and/or Nutrient Delivery: Continue Current Diet, Start Oral Nutrition Supplement (Gelatein 20 once per day (does not count towards fluids))  Nutrition Education/Counseling: No recommendation at this time  Coordination of Nutrition Care: Continue to monitor while inpatient       Goals:     Goals: PO intake 50% or greater, by next RD assessment       Nutrition Monitoring and Evaluation:   Behavioral-Environmental Outcomes: None Identified  Food/Nutrient Intake Outcomes: Food and Nutrient Intake, Supplement Intake  Physical Signs/Symptoms Outcomes: Biochemical Data, GI Status, Fluid Status or Edema, Nutrition Focused Physical Findings, Skin, Weight    Discharge Planning:     Too soon to determine     [employer]ite, RD  Contact: [phone]

## 2023-02-10 NOTE — PLAN OF CARE
Problem: Chronic Conditions and Co-morbidities  Goal: Patient's chronic conditions and co-morbidity symptoms are monitored and maintained or improved  Outcome: Progressing     Problem: Discharge Planning  Goal: Discharge to home or other facility with appropriate resources  Outcome: Progressing     Problem: Safety - Adult  Goal: Free from fall injury  Outcome: Progressing     Problem: ABCDS Injury Assessment  Goal: Absence of physical injury  Outcome: Progressing     Problem: Skin/Tissue Integrity  Goal: Absence of new skin breakdown  Description: 1. Monitor for areas of redness and/or skin breakdown  2. Assess vascular access sites hourly  3. Every 4-6 hours minimum:  Change oxygen saturation probe site  4. Every 4-6 hours:  If on nasal continuous positive airway pressure, respiratory therapy assess nares and determine need for appliance change or resting period.   Outcome: Progressing     Problem: Cardiovascular - Adult  Goal: Maintains optimal cardiac output and hemodynamic stability  Outcome: Progressing     Problem: Metabolic/Fluid and Electrolytes - Adult  Goal: Hemodynamic stability and optimal renal function maintained  Outcome: Progressing     Problem: Nutrition Deficit:  Goal: Optimize nutritional status  Outcome: Progressing

## 2023-02-10 NOTE — PROGRESS NOTES
[facility]  Nephrology Progress Note    Patient's Name: Tamar Estes    History of Present Illness from 2/7 Consult Note:    Nya Chavarria is a 80 y.o. female with a past medical history of arthritis, COPD, hypertension, hyperlipidemia, and diabetes mellitus. She presented to the ED on 2/4 with complaints of shortness of breath and was found to be hypoxemic and in respiratory distress per EMS. Vital signs on 2/4 included temperature 97.9, respirations 30, pulse 110, /58, and she was 97% on nonrebreather. Lab data on 2/4 include sodium 125, potassium 5.2, BUN 26, creatinine 1.1, proBNP 3109, hemoglobin 11.1, and she was found to be positive for RSV. She had a chest x-ray on 2/4 which showed no acute cardiopulmonary process. We were consulted to see the patient for anasarca and hyponatremia. Patient's sodium was noted to be 125 on 2/4 and is 124 today. At present, patient was seen and examined. She reports that she feels pretty good. She explained that she came in due to shortness of breath. She also reports decreased appetite prior to admission. She reports that she had urinary frequency. She reports nausea, but denies any abdominal pain, vomiting, or diarrhea. She denies any chest pain. Her  and daughter are at the bedside. \"    Subjective:    2/10: Patient was seen and examined. She reports that she feels better. She denies any chest pain or shortness of breath. She reports abdominal pain today, but denies any nausea. She has a visitor at the bedside.     PMH:    Past Medical History:   Diagnosis Date    Arthritis     COPD (chronic obstructive pulmonary disease) ([address]     Diabetes mellitus ([address]     Hyperlipidemia     Hypertension        Patient Active Problem List   Diagnosis    Acute appendicitis    Hypertension    Inadequately controlled diabetes mellitus ([address]    Metabolic encephalopathy    Sepsis associated hypotension ([address]    Sepsis ([address]    Relapsing appendicitis    Saddle embolus of pulmonary artery (HCC)    DVT of deep femoral vein, right (HCC)    History of pulmonary embolism    Generalized weakness    Acute kidney injury ([facility] [address]    Left knee pain    Baker's cyst    Atrial fibrillation with RVR ([facility] [address]    New onset a-fib (HCC)    Bradycardia    Acute respiratory failure with hypoxia ([facility] [address]       Diet:    ADULT DIET; Regular; 4 carb choices (60 gm/meal); Low Sodium (2 gm); Low Potassium (Less than 3000 mg/day)    Meds:     guaiFENesin  400 mg Oral TID    donepezil  5 mg Oral Nightly    amLODIPine  5 mg Oral Daily    carvedilol  12.5 mg Oral BID WC    levothyroxine  75 mcg Oral Daily    [Held by provider] lisinopril  10 mg Oral Daily    [Held by provider] furosemide  20 mg Oral Daily    sodium chloride flush  5-40 mL IntraVENous 2 times per day    ipratropium-albuterol  1 ampule Inhalation Q4H WA    predniSONE  20 mg Oral BID    insulin lispro  0-8 Units SubCUTAneous TID WC    insulin lispro  0-4 Units SubCUTAneous Nightly    apixaban  5 mg Oral BID        dextrose      sodium chloride         Meds prn:     guaiFENesin-dextromethorphan, ipratropium-albuterol, glucose, dextrose bolus **OR** dextrose bolus, glucagon (rDNA), dextrose, sodium chloride flush, sodium chloride, ondansetron **OR** ondansetron, magnesium hydroxide, acetaminophen **OR** acetaminophen    Meds prior to admission:     No current facility-administered medications on file prior to encounter.      Current Outpatient Medications on File Prior to Encounter   Medication Sig Dispense Refill    Multiple Vitamin (MULTIVITAMIN ADULT PO) Take by mouth daily      lisinopril (PRINIVIL;ZESTRIL) 10 MG tablet Take 1 tablet by mouth daily 30 tablet 3    furosemide (LASIX) 20 MG tablet Take 20 mg by mouth daily      donepezil (ARICEPT) 10 MG tablet Take 10 mg by mouth at bedtime      carvedilol (COREG) 12.5 MG tablet Take 1 tablet by mouth 2 times daily (with meals) (Patient taking differently: Take 12.5 mg by mouth 2 times daily (with meals) Takes one and a half tablet) 60 tablet 3    amLODIPine (NORVASC) 5 MG tablet Take 1 tablet by mouth daily 30 tablet 3    ipratropium-albuterol (DUONEB) 0.5-2.5 (3) MG/3ML SOLN nebulizer solution Inhale 1 vial into the lungs every 6 hours as needed for Shortness of Breath      levothyroxine (SYNTHROID) 75 MCG tablet Take 75 mcg by mouth Daily      predniSONE (DELTASONE) 5 MG tablet Take 5 mg by mouth 2 times daily      metFORMIN (GLUCOPHAGE-XR) 500 MG extended release tablet Take 500 mg by mouth daily (with breakfast)      apixaban (ELIQUIS) 5 MG TABS tablet Take 5 mg by mouth 2 times daily         Allergies:    Patient has no known allergies. Social History:     reports that she has never smoked. She has never used smokeless tobacco. She reports that she does not drink alcohol. Family History:     History reviewed. No pertinent family history. Physical Exam:      Patient Vitals for the past 24 hrs:   BP Temp Temp src Pulse Resp SpO2   02/10/23 0615 (!) 127/99 -- -- 98 20 --   02/09/23 2044 133/76 97.3 °F (36.3 °C) Axillary 86 21 98 %   02/09/23 1524 114/84 97.6 °F (36.4 °C) Oral 89 22 98 %   02/09/23 0924 108/73 -- -- (!) 115 -- --           Intake/Output Summary (Last 24 hours) at 2/10/2023 0816  Last data filed at 2/9/2023 2322  Gross per 24 hour   Intake 420 ml   Output 750 ml   Net -330 ml       General: Awake, alert, no acute distress  Neck: No JVD noted  Lungs: Clear bilaterally upper, diminished to the bases bilaterally. Unlabored  CV: Regular rate and rhythm. No rub  Abd: Soft, nontender, nondistended. Active bowel sounds  Skin: Warm and dry. No rash on exposed extremities  Ext: 2+ BLE, 1+ BUE edema   Neuro: Awake, answers questions appropriately    Data:    No results for input(s): WBC, HGB, HCT, MCV, PLT in the last 72 hours.       Recent Labs     02/08/23  0622 02/08/23  1633 02/09/23  0422 02/09/23  1424 02/09/23  2218   *   < > 132 128* 127*   K 5.3*   < > 4.8 4.7 4.8   CL 88* < > 99 95* 96*   CO2 22   < > 22 26 24   CREATININE 1.0   < > 0.9 0.8 0.8   BUN 41*   < > 33* 31* 29*   LABGLOM 53   < > >60 >60 >60   GLUCOSE 201*   < > 197* 245* 158*   CALCIUM 7.9*   < > 7.8* 7.6* 7.6*   PHOS 3.4  --   --   --   --    MG 2.7*  --   --   --   --     < > = values in this interval not displayed. No results found for: VITD25    No results found for: PTH    No results for input(s): ALT, AST, ALKPHOS, BILITOT, BILIDIR in the last 72 hours. No results for input(s): LABALBU in the last 72 hours. Ferritin   Date Value Ref Range Status   02/08/2023 297 ng/mL Final     Comment:     FERRITIN Reference Ranges:  Adult Males   20 - 60 years:    27 - 400 ng/mL  Adult females 16 - 61 years:    15 - 150 ng/mL  Adults greater than 60 years:   no established reference range  Pediatrics:                     no established reference range       Iron   Date Value Ref Range Status   02/08/2023 53 37 - 145 mcg/dL Final     TIBC   Date Value Ref Range Status   02/08/2023 249 (L) 250 - 450 mcg/dL Final       Vitamin B-12   Date Value Ref Range Status   02/08/2023 1736 (H) 211 - 946 pg/mL Final       Folate   Date Value Ref Range Status   02/08/2023 19.5 4.8 - 24.2 ng/mL Final       Lab Results   Component Value Date/Time    COLORU Yellow 12/11/2022 10:44 PM    NITRU Negative 12/11/2022 10:44 PM    GLUCOSEU Negative 12/11/2022 10:44 PM    KETUA TRACE 12/11/2022 10:44 PM    UROBILINOGEN 0.2 12/11/2022 10:44 PM    BILIRUBINUR Negative 12/11/2022 10:44 PM       Lab Results   Component Value Date/Time    OSMOU 452 02/07/2023 05:40 AM       No components found for: URIC    No results found for: LIPIDPAN    Assessment and Plans:    1.  Hyponatremia  Isotonic vs. Pseudohyponatremia   Likely hypovolemic/dehydration   Na 123 on 2/6--> 118 on 2/8--> 132--> 127--> 127 today   Serum osmolality 289  TSH WNL 0.395--> TSH 0.541, cortisol 3.15 on 3/9  Uric acid elevated 9.6 on 2/7--> 8 on 2/9  Urine sodium<20, urine chloride<20--> Isa<20 Ucr 37  S/p IVF   S/p 3% NaCl  S/p DDAVP 2/9  Await SPEP, UPEP  Strict I&O  Lasix on hold  Monitor labs     2. Hyperkalemia  Likely in the setting of MERYL /CKD  Potassium 5.8 on 2/6--> 5.1 today  Lokelma 10 g oral once today  Repeat BMP this evening  Low potassium diet  Monitor labs     3. MERYL stage I on CKD 3a  Likely in the setting of decreased effective renal perfusion given poor oral intake prior to admission; exacerbated by diuretics and lisinopril  Baseline creatinine 0.8-1  Creatinine peaked at 1.2 on 2/5--> 0.8 today  FeNa 0.2% and FeUrea 34.9% supports prerenal etiology  Hold Lasix and lisinopril for now  Avoid nephrotoxins/NSAIDs  Strict I& O's, daily weights  Monitor labs     4. RSV/shortness of breath  History of COPD  chest x-ray on 2/4 which showed no acute cardiopulmonary process  Positive for RSV  Management per primary service     5. Diabetes mellitus  Hemoglobin A1c 7% on 2/4/2023  On insulin lispro  Management per primary service     6. Hypertension  BP goal<120/80  BP at goal  Monitor on current regimen     7. Anemia  Not likely related to CKD with creatinine 1  Iron sat 21%, iron 53, TIBC 249  Await SPEP, UPEP  Monitor H&H    Laure Gonzales APRN - CNP    Patient seen and examined all key components of the physical performed independently , case discussed with NP, all pertinent labs and radiologic tests personally reviewed agree with above.       Arie Goode MD

## 2023-02-11 LAB
ANION GAP SERPL CALCULATED.3IONS-SCNC: 12 MMOL/L (ref 7–16)
BACTERIA: ABNORMAL /HPF
BILIRUBIN URINE: NEGATIVE
BLOOD, URINE: ABNORMAL
BUN BLDV-MCNC: 27 MG/DL (ref 6–23)
CALCIUM SERPL-MCNC: 8.1 MG/DL (ref 8.6–10.2)
CHLORIDE BLD-SCNC: 91 MMOL/L (ref 98–107)
CLARITY: ABNORMAL
CO2: 25 MMOL/L (ref 22–29)
COLOR: YELLOW
CREAT SERPL-MCNC: 0.8 MG/DL (ref 0.5–1)
GFR SERPL CREATININE-BSD FRML MDRD: >60 ML/MIN/1.73
GLUCOSE BLD-MCNC: 213 MG/DL (ref 74–99)
GLUCOSE URINE: 100 MG/DL
KETONES, URINE: NEGATIVE MG/DL
LEUKOCYTE ESTERASE, URINE: ABNORMAL
METER GLUCOSE: 203 MG/DL (ref 74–99)
METER GLUCOSE: 205 MG/DL (ref 74–99)
METER GLUCOSE: 212 MG/DL (ref 74–99)
METER GLUCOSE: 247 MG/DL (ref 74–99)
NITRITE, URINE: NEGATIVE
PH UA: 8.5 (ref 5–9)
POTASSIUM SERPL-SCNC: 5.2 MMOL/L (ref 3.5–5)
PROTEIN UA: 100 MG/DL
RBC UA: ABNORMAL /HPF (ref 0–2)
SODIUM BLD-SCNC: 128 MMOL/L (ref 132–146)
SPECIFIC GRAVITY UA: 1.01 (ref 1–1.03)
UROBILINOGEN, URINE: 0.2 E.U./DL
WBC UA: ABNORMAL /HPF (ref 0–5)

## 2023-02-11 PROCEDURE — 84166 PROTEIN E-PHORESIS/URINE/CSF: CPT

## 2023-02-11 PROCEDURE — 87088 URINE BACTERIA CULTURE: CPT

## 2023-02-11 PROCEDURE — 6370000000 HC RX 637 (ALT 250 FOR IP): Performed by: INTERNAL MEDICINE

## 2023-02-11 PROCEDURE — 6370000000 HC RX 637 (ALT 250 FOR IP): Performed by: NURSE PRACTITIONER

## 2023-02-11 PROCEDURE — 81001 URINALYSIS AUTO W/SCOPE: CPT

## 2023-02-11 PROCEDURE — 87186 SC STD MICRODIL/AGAR DIL: CPT

## 2023-02-11 PROCEDURE — 36415 COLL VENOUS BLD VENIPUNCTURE: CPT

## 2023-02-11 PROCEDURE — 87077 CULTURE AEROBIC IDENTIFY: CPT

## 2023-02-11 PROCEDURE — 2700000000 HC OXYGEN THERAPY PER DAY

## 2023-02-11 PROCEDURE — 80048 BASIC METABOLIC PNL TOTAL CA: CPT

## 2023-02-11 PROCEDURE — 2580000003 HC RX 258: Performed by: NURSE PRACTITIONER

## 2023-02-11 PROCEDURE — 84165 PROTEIN E-PHORESIS SERUM: CPT

## 2023-02-11 PROCEDURE — 82962 GLUCOSE BLOOD TEST: CPT

## 2023-02-11 PROCEDURE — 86334 IMMUNOFIX E-PHORESIS SERUM: CPT

## 2023-02-11 PROCEDURE — 94660 CPAP INITIATION&MGMT: CPT

## 2023-02-11 PROCEDURE — 1200000000 HC SEMI PRIVATE

## 2023-02-11 PROCEDURE — 94640 AIRWAY INHALATION TREATMENT: CPT

## 2023-02-11 RX ADMIN — Medication 10 ML: at 09:12

## 2023-02-11 RX ADMIN — GUAIFENESIN 400 MG: 400 TABLET ORAL at 09:12

## 2023-02-11 RX ADMIN — PREDNISONE 20 MG: 20 TABLET ORAL at 09:12

## 2023-02-11 RX ADMIN — CARVEDILOL 12.5 MG: 6.25 TABLET, FILM COATED ORAL at 09:12

## 2023-02-11 RX ADMIN — GUAIFENESIN 400 MG: 400 TABLET ORAL at 18:34

## 2023-02-11 RX ADMIN — LEVOTHYROXINE SODIUM 75 MCG: 0.07 TABLET ORAL at 06:13

## 2023-02-11 RX ADMIN — GUAIFENESIN 400 MG: 400 TABLET ORAL at 22:22

## 2023-02-11 RX ADMIN — PREDNISONE 20 MG: 20 TABLET ORAL at 22:21

## 2023-02-11 RX ADMIN — DONEPEZIL HYDROCHLORIDE 5 MG: 5 TABLET ORAL at 22:22

## 2023-02-11 RX ADMIN — IPRATROPIUM BROMIDE AND ALBUTEROL SULFATE 1 AMPULE: .5; 2.5 SOLUTION RESPIRATORY (INHALATION) at 15:51

## 2023-02-11 RX ADMIN — GUAIFENESIN SYRUP AND DEXTROMETHORPHAN 5 ML: 100; 10 SYRUP ORAL at 00:27

## 2023-02-11 RX ADMIN — CARVEDILOL 12.5 MG: 6.25 TABLET, FILM COATED ORAL at 18:34

## 2023-02-11 RX ADMIN — INSULIN LISPRO 2 UNITS: 100 INJECTION, SOLUTION INTRAVENOUS; SUBCUTANEOUS at 09:12

## 2023-02-11 RX ADMIN — Medication 10 ML: at 22:32

## 2023-02-11 RX ADMIN — APIXABAN 5 MG: 5 TABLET, FILM COATED ORAL at 09:12

## 2023-02-11 RX ADMIN — IPRATROPIUM BROMIDE AND ALBUTEROL SULFATE 1 AMPULE: .5; 2.5 SOLUTION RESPIRATORY (INHALATION) at 11:44

## 2023-02-11 RX ADMIN — AMLODIPINE BESYLATE 5 MG: 5 TABLET ORAL at 09:12

## 2023-02-11 RX ADMIN — IPRATROPIUM BROMIDE AND ALBUTEROL SULFATE 1 AMPULE: .5; 2.5 SOLUTION RESPIRATORY (INHALATION) at 08:20

## 2023-02-11 RX ADMIN — INSULIN LISPRO 2 UNITS: 100 INJECTION, SOLUTION INTRAVENOUS; SUBCUTANEOUS at 18:34

## 2023-02-11 RX ADMIN — APIXABAN 5 MG: 5 TABLET, FILM COATED ORAL at 22:22

## 2023-02-11 ASSESSMENT — PAIN SCALES - GENERAL: PAINLEVEL_OUTOF10: 0

## 2023-02-11 NOTE — PROGRESS NOTES
Dr Butcher made aware of blood (cherry color) in urine . Will continue to monitor for now and continue eliquis.

## 2023-02-11 NOTE — PROGRESS NOTES
[facility]  Nephrology Progress Note    Patient's Name: Isidro Rodriguez    History of Present Illness from 2/7 Consult Note:    Sarah Blanton is a 80 y.o. female with a past medical history of arthritis, COPD, hypertension, hyperlipidemia, and diabetes mellitus. She presented to the ED on 2/4 with complaints of shortness of breath and was found to be hypoxemic and in respiratory distress per EMS. Vital signs on 2/4 included temperature 97.9, respirations 30, pulse 110, /58, and she was 97% on nonrebreather. Lab data on 2/4 include sodium 125, potassium 5.2, BUN 26, creatinine 1.1, proBNP 3109, hemoglobin 11.1, and she was found to be positive for RSV. She had a chest x-ray on 2/4 which showed no acute cardiopulmonary process. We were consulted to see the patient for anasarca and hyponatremia. Patient's sodium was noted to be 125 on 2/4 and is 124 today. At present, patient was seen and examined. She reports that she feels pretty good. She explained that she came in due to shortness of breath. She also reports decreased appetite prior to admission. She reports that she had urinary frequency. She reports nausea, but denies any abdominal pain, vomiting, or diarrhea. She denies any chest pain. Her  and daughter are at the bedside. \"    Subjective:    2/11: Patient was incontinent of urine earlier this a.m. as pure wick malfunctioned - cherry colored urine noted after reapplying -currently 98% on 3 L nasal cannula -stable vital signs - all reviewed w primary RN as well as family member at bedside       Diet:    ADULT DIET; Regular; 4 carb choices (60 gm/meal); Low Sodium (2 gm); Low Potassium (Less than 3000 mg/day)  ADULT ORAL NUTRITION SUPPLEMENT; Lunch;  Other Oral Supplement; [id number]    Meds:     guaiFENesin  400 mg Oral TID    donepezil  5 mg Oral Nightly    amLODIPine  5 mg Oral Daily    carvedilol  12.5 mg Oral BID WC    levothyroxine  75 mcg Oral Daily    [Held by provider] lisinopril  10 mg Oral Daily    [Held by provider] furosemide  20 mg Oral Daily    sodium chloride flush  5-40 mL IntraVENous 2 times per day    ipratropium-albuterol  1 ampule Inhalation Q4H WA    predniSONE  20 mg Oral BID    insulin lispro  0-8 Units SubCUTAneous TID     insulin lispro  0-4 Units SubCUTAneous Nightly    apixaban  5 mg Oral BID        dextrose      sodium chloride         Meds prn:     guaiFENesin-dextromethorphan, ipratropium-albuterol, glucose, dextrose bolus **OR** dextrose bolus, glucagon (rDNA), dextrose, sodium chloride flush, sodium chloride, ondansetron **OR** ondansetron, magnesium hydroxide, acetaminophen **OR** acetaminophen    Meds prior to admission:     No current facility-administered medications on file prior to encounter.      Current Outpatient Medications on File Prior to Encounter   Medication Sig Dispense Refill    Multiple Vitamin (MULTIVITAMIN ADULT PO) Take by mouth daily      lisinopril (PRINIVIL;ZESTRIL) 10 MG tablet Take 1 tablet by mouth daily 30 tablet 3    furosemide (LASIX) 20 MG tablet Take 20 mg by mouth daily      donepezil (ARICEPT) 10 MG tablet Take 10 mg by mouth at bedtime      carvedilol (COREG) 12.5 MG tablet Take 1 tablet by mouth 2 times daily (with meals) (Patient taking differently: Take 12.5 mg by mouth 2 times daily (with meals) Takes one and a half tablet) 60 tablet 3    amLODIPine (NORVASC) 5 MG tablet Take 1 tablet by mouth daily 30 tablet 3    ipratropium-albuterol (DUONEB) 0.5-2.5 (3) MG/3ML SOLN nebulizer solution Inhale 1 vial into the lungs every 6 hours as needed for Shortness of Breath      levothyroxine (SYNTHROID) 75 MCG tablet Take 75 mcg by mouth Daily      predniSONE (DELTASONE) 5 MG tablet Take 5 mg by mouth 2 times daily      metFORMIN (GLUCOPHAGE-XR) 500 MG extended release tablet Take 500 mg by mouth daily (with breakfast)      apixaban (ELIQUIS) 5 MG TABS tablet Take 5 mg by mouth 2 times daily         Physical Exam:      Patient Vitals for the past 24 hrs:   BP Temp Temp src Pulse Resp SpO2 Height Weight   02/11/23 0818 126/77 97.5 °F (36.4 °C) Axillary (!) 117 20 99 % -- --   02/11/23 0350 -- -- -- -- -- -- -- 196 lb 11.2 oz (89.2 kg)   02/10/23 2345 120/80 98.1 °F (36.7 °C) Axillary 89 20 98 % -- --   02/10/23 2031 -- -- -- -- -- 95 % -- --   02/10/23 1946 104/65 97.6 °F (36.4 °C) Oral 86 23 99 % -- --   02/10/23 1639 -- -- -- -- -- -- 5' (1.524 m) --   02/10/23 1522 (!) 125/93 98.5 °F (36.9 °C) Oral 99 21 -- -- --   02/10/23 0853 102/69 97.5 °F (36.4 °C) Oral 98 22 -- -- --         Intake/Output Summary (Last 24 hours) at 2/11/2023 0828  Last data filed at 2/11/2023 9428  Gross per 24 hour   Intake 180 ml   Output 451 ml   Net -271 ml     General: Awake, alert, no acute distress  Neck: No JVD noted  Lungs: Clear bilaterally upper, diminished to the bases bilaterally. Unlabored  CV: Regular rate and rhythm. No rub  Abd: Soft, nontender, nondistended. Active bowel sounds  Skin: Warm and dry. No rash on exposed extremities  Ext: 2+ BLE, 1+ BUE edema   Neuro: Awake, answers questions appropriately    Data:    No results for input(s): WBC, HGB, HCT, MCV, PLT in the last 72 hours. Recent Labs     02/10/23  0838 02/10/23  1757 02/11/23  0501   *  126* 125* 128*   K 5.1*  5.1* 5.5* 5.2*   CL 93*  93* 92* 91*   CO2 25  24 24 25   CREATININE 0.8  0.8 0.8 0.8   BUN 29*  29* 28* 27*   LABGLOM >60  >60 >60 >60   GLUCOSE 170*  170* 229* 213*   CALCIUM 7.9*  7.8* 8.0* 8.1*       No results found for: VITD25    No results found for: PTH    No results for input(s): ALT, AST, ALKPHOS, BILITOT, BILIDIR in the last 72 hours. No results for input(s): LABALBU in the last 72 hours.     Ferritin   Date Value Ref Range Status   02/08/2023 297 ng/mL Final     Comment:     FERRITIN Reference Ranges:  Adult Males   20 - 60 years:    27 - 400 ng/mL  Adult females 16 - 61 years:    15 - 150 ng/mL  Adults greater than 60 years:   no established reference range  Pediatrics:                     no established reference range       Iron   Date Value Ref Range Status   02/08/2023 53 37 - 145 mcg/dL Final     TIBC   Date Value Ref Range Status   02/08/2023 249 (L) 250 - 450 mcg/dL Final       Vitamin B-12   Date Value Ref Range Status   02/08/2023 1736 (H) 211 - 946 pg/mL Final       Folate   Date Value Ref Range Status   02/08/2023 19.5 4.8 - 24.2 ng/mL Final       Lab Results   Component Value Date/Time    COLORU Yellow 12/11/2022 10:44 PM    NITRU Negative 12/11/2022 10:44 PM    GLUCOSEU Negative 12/11/2022 10:44 PM    KETUA TRACE 12/11/2022 10:44 PM    UROBILINOGEN 0.2 12/11/2022 10:44 PM    BILIRUBINUR Negative 12/11/2022 10:44 PM       Lab Results   Component Value Date/Time    OSMOU 452 02/07/2023 05:40 AM       No components found for: URIC    No results found for: LIPIDPAN    Assessment and Plans:    1. Hyponatremia  Isotonic vs. Pseudohyponatremia   Likely hypovolemic/dehydration   Na 123 on 2/6--> 118 on 2/8--> 132--> 127--> 127 >125>128 today   Serum osmolality 289  TSH WNL 0.395--> TSH 0.541, cortisol 3.15 on 3/9  Uric acid elevated 9.6 on 2/7--> 8 on 2/9  Urine sodium<20, urine chloride<20--> Isa<20 Ucr 37  S/p IVF   S/p 3% NaCl  S/p DDAVP 2/9  Await SPEP, UPEP  Strict I&O  Lasix on hold  Monitor labs     2. Hyperkalemia  Likely in the setting of MERYL /CKD  Potassium 5.8 on 2/6--> 5.2 today  Lokelma 10 g oral once today  Low potassium diet  Monitor labs     3.  MERYL stage I on CKD 3a  Likely in the setting of decreased effective renal perfusion given poor oral intake prior to admission; exacerbated by diuretics and lisinopril  Baseline creatinine 0.8-1  Creatinine peaked at 1.2 on 2/5--> 0.8 today  FeNa 0.2% and FeUrea 34.9% supporte prerenal etiology  Holding Lasix and lisinopril for now  Avoid nephrotoxins/NSAIDs  Strict I& O's, daily weights  Monitor labs     4. RSV/shortness of breath  History of COPD  chest x-ray on 2/4 which showed no acute cardiopulmonary process  Positive for RSV  Management per primary service     5. Diabetes mellitus  Hemoglobin A1c 7% on 2/4/2023  On insulin lispro  Management per primary service     6. Hypertension  BP goal<120/80  BP at goal  Monitor on current regimen     7. Anemia  Not likely related to CKD with creatinine 1  Iron sat 21%, iron 53, TIBC 249  Await SPEP, UPEP  Monitor H&H    8. Hematuria  Plan for keene now w/ irrigation    UA and Urine C/S      MICHA Briggs - CNP    Patient seen and examined all key components of the physical performed independently , case discussed with NP, all pertinent labs and radiologic tests personally reviewed agree with above. -Gross hematuria reported;  Keene catheter inserted manual irrigation ordered; RN reports clots return with irrigation by urine cleared  We will temporarily maintain Keene catheter for now if bleeding continues she will need a Urology consult    -Hyponatremia with persistently normal serum osmolality suggesting a pseudohyponatremia; however lipid panel and serum and urine protein electrophoresis all within normal limits; will empirically treat the hyponatremia with a few doses of urea    Updated patients daughter       Sherwin Nugent MD

## 2023-02-11 NOTE — PROGRESS NOTES
Hospitalist Progress Note      PCP: Say Snowden MD    Date of Admission: 2/4/2023        Hospital Course:  PRESENT WITH SOB, AND FOUND TO HAVE HYPONATREMIA.    DIURETICS HELD,  FEELING BETTER  SODIUM INCREASING  128 TODAY           Subjective:  SOB           Medications:  Reviewed    Infusion Medications    dextrose      sodium chloride       Scheduled Medications    guaiFENesin  400 mg Oral TID    donepezil  5 mg Oral Nightly    amLODIPine  5 mg Oral Daily    carvedilol  12.5 mg Oral BID WC    levothyroxine  75 mcg Oral Daily    [Held by provider] lisinopril  10 mg Oral Daily    [Held by provider] furosemide  20 mg Oral Daily    sodium chloride flush  5-40 mL IntraVENous 2 times per day    ipratropium-albuterol  1 ampule Inhalation Q4H WA    predniSONE  20 mg Oral BID    insulin lispro  0-8 Units SubCUTAneous TID WC    insulin lispro  0-4 Units SubCUTAneous Nightly    apixaban  5 mg Oral BID     PRN Meds: guaiFENesin-dextromethorphan, ipratropium-albuterol, glucose, dextrose bolus **OR** dextrose bolus, glucagon (rDNA), dextrose, sodium chloride flush, sodium chloride, ondansetron **OR** ondansetron, magnesium hydroxide, acetaminophen **OR** acetaminophen      Intake/Output Summary (Last 24 hours) at 2/11/2023 1608  Last data filed at 2/11/2023 1437  Gross per 24 hour   Intake 120 ml   Output 450 ml   Net -330 ml       Exam:    /89   Pulse 99   Temp 97.5 °F (36.4 °C) (Oral)   Resp 20   Ht 5' (1.524 m)   Wt 196 lb 11.2 oz (89.2 kg)   SpO2 97%   BMI 38.42 kg/m²       Gen:  WELL DEVELOPED  HEENT: NC/AT, moist mucous membranes, no oropharyngeal erythema or exudate  Neck: supple, trachea midline, no anterior cervical or SC LAD  Heart:  IRREG  Lungs:   DIMINISHED  bilaterally, FEW RHONCHI  Abd: bowel sounds present, soft, nontender, nondistended, no masses  Extrem:  No clubbing, cyanosis,  EDEMA OF ARMS AND LEGS edema  Skin: no rashes or lesions  Psych: Not oriented to date or Not oriented to place  Neuro: grossly intact, moves all four extremities. Labs:   No results for input(s): WBC, HGB, HCT, PLT in the last 72 hours. Recent Labs     02/10/23  0838 02/10/23  1757 02/11/23  0501   *  126* 125* 128*   K 5.1*  5.1* 5.5* 5.2*   CL 93*  93* 92* 91*   CO2 25  24 24 25   BUN 29*  29* 28* 27*   CREATININE 0.8  0.8 0.8 0.8   CALCIUM 7.9*  7.8* 8.0* 8.1*     No results for input(s): AST, ALT, BILIDIR, BILITOT, ALKPHOS in the last 72 hours. No results for input(s): INR in the last 72 hours. No results for input(s): James Shorts in the last 72 hours. No results for input(s): AST, ALT, ALB, BILIDIR, BILITOT, ALKPHOS in the last 72 hours. No results for input(s): LACTA in the last 72 hours. Lab Results   Component Value Date    LABURIC 8.0 (H) 02/09/2023     Lab Results   Component Value Date    AMMONIA <10.0 (L) 09/08/2020    AMMONIA 28.3 03/05/2020    AMMONIA 24.2 01/17/2020       Assessment:    Active Hospital Problems    Diagnosis Date Noted    Acute respiratory [facility] with hypoxia [facility] [J96.01] 02/04/2023     Priority: Medium   RSV  ANASARCA   IIDM   HYPOTHYROID   DEMENTIA   PACEMAKER   PAF  HYPONATREMIA  Plan:   HOLD LASIX   CONSULT NEPHROLOGY   SSI   SYNTHROID   DDAVP 2/9        DVT Prophylaxis: ELIQUIS  Diet: ADULT DIET; Regular; 4 carb choices (60 gm/meal);  Low Sodium (2 gm)  Code Status: Full Code     PT/OT Eval Status: ORDERED     Dispo - HOME  Electronically signed by Tonia Sinclair DO on 2/11/2023 at 4:08 PM [facility]

## 2023-02-11 NOTE — PROGRESS NOTES
Pt cleaned for incontinence of urine. Florestine Fast did not catch all her urine. Urine in canister 150cc bloody (cherry) color urine this am.  New pur wick placed at this time. Small wound red and non blanchable to coccyx area 0.2 cm x 0.2 cm  approx circular in shape. Cream to buttocks for comfort. Linen changed.

## 2023-02-12 LAB
ANION GAP SERPL CALCULATED.3IONS-SCNC: 9 MMOL/L (ref 7–16)
BUN BLDV-MCNC: 27 MG/DL (ref 6–23)
CALCIUM SERPL-MCNC: 8.3 MG/DL (ref 8.6–10.2)
CHLORIDE BLD-SCNC: 94 MMOL/L (ref 98–107)
CHLORIDE URINE RANDOM: 40 MMOL/L
CO2: 23 MMOL/L (ref 22–29)
CREAT SERPL-MCNC: 0.7 MG/DL (ref 0.5–1)
CREATININE URINE: 70 MG/DL (ref 29–226)
GFR SERPL CREATININE-BSD FRML MDRD: >60 ML/MIN/1.73
GLUCOSE BLD-MCNC: 204 MG/DL (ref 74–99)
METER GLUCOSE: 199 MG/DL (ref 74–99)
METER GLUCOSE: 209 MG/DL (ref 74–99)
METER GLUCOSE: 211 MG/DL (ref 74–99)
METER GLUCOSE: 246 MG/DL (ref 74–99)
OSMOLALITY URINE: 608 MOSM/KG (ref 300–900)
OSMOLALITY: 275 MOSM/KG (ref 285–310)
POTASSIUM SERPL-SCNC: 5.3 MMOL/L (ref 3.5–5)
POTASSIUM, UR: 21.5 MMOL/L
SODIUM BLD-SCNC: 126 MMOL/L (ref 132–146)
SODIUM URINE: <20 MMOL/L
UREA NITROGEN, UR: 971 MG/DL (ref 800–1666)
URIC ACID, SERUM: 6.9 MG/DL (ref 2.4–5.7)

## 2023-02-12 PROCEDURE — 82570 ASSAY OF URINE CREATININE: CPT

## 2023-02-12 PROCEDURE — 84540 ASSAY OF URINE/UREA-N: CPT

## 2023-02-12 PROCEDURE — 6370000000 HC RX 637 (ALT 250 FOR IP): Performed by: INTERNAL MEDICINE

## 2023-02-12 PROCEDURE — 84300 ASSAY OF URINE SODIUM: CPT

## 2023-02-12 PROCEDURE — 84550 ASSAY OF BLOOD/URIC ACID: CPT

## 2023-02-12 PROCEDURE — 80048 BASIC METABOLIC PNL TOTAL CA: CPT

## 2023-02-12 PROCEDURE — 2700000000 HC OXYGEN THERAPY PER DAY

## 2023-02-12 PROCEDURE — 2580000003 HC RX 258: Performed by: INTERNAL MEDICINE

## 2023-02-12 PROCEDURE — 6370000000 HC RX 637 (ALT 250 FOR IP): Performed by: NURSE PRACTITIONER

## 2023-02-12 PROCEDURE — 94660 CPAP INITIATION&MGMT: CPT

## 2023-02-12 PROCEDURE — 84133 ASSAY OF URINE POTASSIUM: CPT

## 2023-02-12 PROCEDURE — 83930 ASSAY OF BLOOD OSMOLALITY: CPT

## 2023-02-12 PROCEDURE — 82436 ASSAY OF URINE CHLORIDE: CPT

## 2023-02-12 PROCEDURE — 6360000002 HC RX W HCPCS: Performed by: INTERNAL MEDICINE

## 2023-02-12 PROCEDURE — 82962 GLUCOSE BLOOD TEST: CPT

## 2023-02-12 PROCEDURE — 1200000000 HC SEMI PRIVATE

## 2023-02-12 PROCEDURE — 83935 ASSAY OF URINE OSMOLALITY: CPT

## 2023-02-12 PROCEDURE — 94640 AIRWAY INHALATION TREATMENT: CPT

## 2023-02-12 PROCEDURE — 36415 COLL VENOUS BLD VENIPUNCTURE: CPT

## 2023-02-12 PROCEDURE — 2580000003 HC RX 258: Performed by: NURSE PRACTITIONER

## 2023-02-12 RX ORDER — SODIUM CHLORIDE 9 MG/ML
INJECTION, SOLUTION INTRAVENOUS CONTINUOUS
Status: DISCONTINUED | OUTPATIENT
Start: 2023-02-12 | End: 2023-02-13

## 2023-02-12 RX ADMIN — AMLODIPINE BESYLATE 5 MG: 5 TABLET ORAL at 09:19

## 2023-02-12 RX ADMIN — CARVEDILOL 12.5 MG: 6.25 TABLET, FILM COATED ORAL at 09:19

## 2023-02-12 RX ADMIN — GUAIFENESIN 400 MG: 400 TABLET ORAL at 17:37

## 2023-02-12 RX ADMIN — INSULIN LISPRO 2 UNITS: 100 INJECTION, SOLUTION INTRAVENOUS; SUBCUTANEOUS at 09:20

## 2023-02-12 RX ADMIN — ONDANSETRON 4 MG: 4 TABLET, ORALLY DISINTEGRATING ORAL at 13:02

## 2023-02-12 RX ADMIN — DONEPEZIL HYDROCHLORIDE 5 MG: 5 TABLET ORAL at 21:28

## 2023-02-12 RX ADMIN — INSULIN LISPRO 2 UNITS: 100 INJECTION, SOLUTION INTRAVENOUS; SUBCUTANEOUS at 17:37

## 2023-02-12 RX ADMIN — SODIUM ZIRCONIUM CYCLOSILICATE 10 G: 10 POWDER, FOR SUSPENSION ORAL at 13:02

## 2023-02-12 RX ADMIN — PREDNISONE 20 MG: 20 TABLET ORAL at 09:19

## 2023-02-12 RX ADMIN — APIXABAN 5 MG: 5 TABLET, FILM COATED ORAL at 09:19

## 2023-02-12 RX ADMIN — IPRATROPIUM BROMIDE AND ALBUTEROL SULFATE 1 AMPULE: .5; 2.5 SOLUTION RESPIRATORY (INHALATION) at 19:29

## 2023-02-12 RX ADMIN — SODIUM CHLORIDE: 9 INJECTION, SOLUTION INTRAVENOUS at 17:36

## 2023-02-12 RX ADMIN — GUAIFENESIN 400 MG: 400 TABLET ORAL at 09:20

## 2023-02-12 RX ADMIN — APIXABAN 5 MG: 5 TABLET, FILM COATED ORAL at 21:28

## 2023-02-12 RX ADMIN — Medication 10 ML: at 21:36

## 2023-02-12 RX ADMIN — CEFTRIAXONE 1000 MG: 1 INJECTION, POWDER, FOR SOLUTION INTRAMUSCULAR; INTRAVENOUS at 11:34

## 2023-02-12 RX ADMIN — SODIUM CHLORIDE, PRESERVATIVE FREE 10 ML: 5 INJECTION INTRAVENOUS at 11:35

## 2023-02-12 RX ADMIN — Medication 10 ML: at 09:20

## 2023-02-12 RX ADMIN — LEVOTHYROXINE SODIUM 75 MCG: 0.07 TABLET ORAL at 06:17

## 2023-02-12 RX ADMIN — CARVEDILOL 12.5 MG: 6.25 TABLET, FILM COATED ORAL at 17:37

## 2023-02-12 RX ADMIN — GUAIFENESIN 400 MG: 400 TABLET ORAL at 21:28

## 2023-02-12 RX ADMIN — PREDNISONE 20 MG: 20 TABLET ORAL at 21:28

## 2023-02-12 RX ADMIN — GUAIFENESIN SYRUP AND DEXTROMETHORPHAN 5 ML: 100; 10 SYRUP ORAL at 06:17

## 2023-02-12 RX ADMIN — GUAIFENESIN SYRUP AND DEXTROMETHORPHAN 5 ML: 100; 10 SYRUP ORAL at 13:02

## 2023-02-12 NOTE — PROGRESS NOTES
[facility]  Nephrology Progress Note    Patient's Name: Mariaa De    History of Present Illness from 2/7 Consult Note:    Luh Neri is a 80 y.o. female with a past medical history of arthritis, COPD, hypertension, hyperlipidemia, and diabetes mellitus. She presented to the ED on 2/4 with complaints of shortness of breath and was found to be hypoxemic and in respiratory distress per EMS. Vital signs on 2/4 included temperature 97.9, respirations 30, pulse 110, /58, and she was 97% on nonrebreather. Lab data on 2/4 include sodium 125, potassium 5.2, BUN 26, creatinine 1.1, proBNP 3109, hemoglobin 11.1, and she was found to be positive for RSV. She had a chest x-ray on 2/4 which showed no acute cardiopulmonary process. We were consulted to see the patient for anasarca and hyponatremia. Patient's sodium was noted to be 125 on 2/4 and is 124 today. At present, patient was seen and examined. She reports that she feels pretty good. She explained that she came in due to shortness of breath. She also reports decreased appetite prior to admission. She reports that she had urinary frequency. She reports nausea, but denies any abdominal pain, vomiting, or diarrhea. She denies any chest pain. Her  and daughter are at the bedside. \"    Subjective:    2/11: Patient was incontinent of urine earlier this a.m. as pure wick malfunctioned - cherry colored urine noted after reapplying -currently 98% on 3 L nasal cannula -stable vital signs - all reviewed w primary RN as well as family member at bedside     2/12: She complains of dry mouth and thirst;  at bedside visiting. She denies shortness of breath. She states that her coughing is much less      Diet:    ADULT DIET; Regular; 4 carb choices (60 gm/meal); Low Sodium (2 gm); Low Potassium (Less than 3000 mg/day)  ADULT ORAL NUTRITION SUPPLEMENT; Lunch;  Other Oral Supplement; [id number]    Meds:     guaiFENesin  400 mg Oral TID donepezil  5 mg Oral Nightly    amLODIPine  5 mg Oral Daily    carvedilol  12.5 mg Oral BID     levothyroxine  75 mcg Oral Daily    [Held by provider] lisinopril  10 mg Oral Daily    [Held by provider] furosemide  20 mg Oral Daily    sodium chloride flush  5-40 mL IntraVENous 2 times per day    ipratropium-albuterol  1 ampule Inhalation Q4H WA    predniSONE  20 mg Oral BID    insulin lispro  0-8 Units SubCUTAneous TID     insulin lispro  0-4 Units SubCUTAneous Nightly    apixaban  5 mg Oral BID        dextrose      sodium chloride         Meds prn:     guaiFENesin-dextromethorphan, ipratropium-albuterol, glucose, dextrose bolus **OR** dextrose bolus, glucagon (rDNA), dextrose, sodium chloride flush, sodium chloride, ondansetron **OR** ondansetron, magnesium hydroxide, acetaminophen **OR** acetaminophen    Meds prior to admission:     No current facility-administered medications on file prior to encounter.      Current Outpatient Medications on File Prior to Encounter   Medication Sig Dispense Refill    Multiple Vitamin (MULTIVITAMIN ADULT PO) Take by mouth daily      lisinopril (PRINIVIL;ZESTRIL) 10 MG tablet Take 1 tablet by mouth daily 30 tablet 3    furosemide (LASIX) 20 MG tablet Take 20 mg by mouth daily      donepezil (ARICEPT) 10 MG tablet Take 10 mg by mouth at bedtime      carvedilol (COREG) 12.5 MG tablet Take 1 tablet by mouth 2 times daily (with meals) (Patient taking differently: Take 12.5 mg by mouth 2 times daily (with meals) Takes one and a half tablet) 60 tablet 3    amLODIPine (NORVASC) 5 MG tablet Take 1 tablet by mouth daily 30 tablet 3    ipratropium-albuterol (DUONEB) 0.5-2.5 (3) MG/3ML SOLN nebulizer solution Inhale 1 vial into the lungs every 6 hours as needed for Shortness of Breath      levothyroxine (SYNTHROID) 75 MCG tablet Take 75 mcg by mouth Daily      predniSONE (DELTASONE) 5 MG tablet Take 5 mg by mouth 2 times daily      metFORMIN (GLUCOPHAGE-XR) 500 MG extended release tablet Take 500 mg by mouth daily (with breakfast)      apixaban (ELIQUIS) 5 MG TABS tablet Take 5 mg by mouth 2 times daily         Physical Exam:      Patient Vitals for the past 24 hrs:   BP Temp Temp src Pulse Resp SpO2   02/12/23 0800 120/89 97.5 °F (36.4 °C) Temporal 100 19 100 %   02/11/23 2030 -- 96.8 °F (36 °C) Temporal -- 20 97 %   02/11/23 1930 119/70 96.8 °F (36 °C) Oral 96 20 97 %   02/11/23 1603 125/89 97.5 °F (36.4 °C) Oral 99 20 97 %   02/11/23 1551 -- -- -- -- -- 94 %   02/11/23 1144 -- -- -- -- -- 98 %         Intake/Output Summary (Last 24 hours) at 2/12/2023 0920  Last data filed at 2/12/2023 0427  Gross per 24 hour   Intake 120 ml   Output 300 ml   Net -180 ml     General: Awake, alert, no acute distress  Neck: No JVD noted  Lungs: Clear bilaterally upper, diminished to the bases bilaterally. Unlabored  CV: Regular rate and rhythm. No rub  Abd: Soft, nontender, nondistended. Active bowel sounds  Skin: Warm and dry. No rash on exposed extremities  Ext: 2+ BLE, 1+ BUE edema   Neuro: Awake, answers questions appropriately    Data:    No results for input(s): WBC, HGB, HCT, MCV, PLT in the last 72 hours. Recent Labs     02/10/23  1757 02/11/23  0501 02/12/23  0504   * 128* 126*   K 5.5* 5.2* 5.3*   CL 92* 91* 94*   CO2 24 25 23   CREATININE 0.8 0.8 0.7   BUN 28* 27* 27*   LABGLOM >60 >60 >60   GLUCOSE 229* 213* 204*   CALCIUM 8.0* 8.1* 8.3*       No results found for: VITD25    No results found for: PTH    No results for input(s): ALT, AST, ALKPHOS, BILITOT, BILIDIR in the last 72 hours. No results for input(s): LABALBU in the last 72 hours.     Ferritin   Date Value Ref Range Status   02/08/2023 297 ng/mL Final     Comment:     FERRITIN Reference Ranges:  Adult Males   20 - 60 years:    27 - 400 ng/mL  Adult females 16 - 61 years:    15 - 150 ng/mL  Adults greater than 60 years:   no established reference range  Pediatrics:                     no established reference range       Iron Date Value Ref Range Status   02/08/2023 53 37 - 145 mcg/dL Final     TIBC   Date Value Ref Range Status   02/08/2023 249 (L) 250 - 450 mcg/dL Final       Vitamin B-12   Date Value Ref Range Status   02/08/2023 1736 (H) 211 - 946 pg/mL Final       Folate   Date Value Ref Range Status   02/08/2023 19.5 4.8 - 24.2 ng/mL Final       Lab Results   Component Value Date/Time    COLORU Yellow 02/11/2023 03:18 PM    NITRU Negative 02/11/2023 03:18 PM    GLUCOSEU 100 02/11/2023 03:18 PM    KETUA Negative 02/11/2023 03:18 PM    UROBILINOGEN 0.2 02/11/2023 03:18 PM    BILIRUBINUR Negative 02/11/2023 03:18 PM       Lab Results   Component Value Date/Time    OSMOU 452 02/07/2023 05:40 AM       No components found for: URIC    No results found for: LIPIDPAN    Assessment and Plans:    1. Hyponatremia  Isotonic vs. Pseudohyponatremia   Likely hypovolemic/dehydration   Serial monitoring of labs indicates hyponatremia likely mixed due to combination of low solute and hypovolemia  Will give course of 0.9 saline IVF  Monitor labs     2. Hyperkalemia  Likely in the setting of MERYL /CKD  Potassium 5.8 on 2/6--> 5.2 today  Lokelma 10 g oral once today  Low potassium diet  Monitor labs     3. MERYL stage I on CKD 3a  Likely in the setting of decreased effective renal perfusion given poor oral intake prior to admission; exacerbated by diuretics and lisinopril  Baseline creatinine 0.8-1  Creatinine peaked at 1.2 on 2/5--> 0.8 today  FeNa 0.2% and FeUrea 34.9% supporte prerenal etiology  Holding Lasix and lisinopril for now  Avoid nephrotoxins/NSAIDs  Strict I& O's, daily weights  Monitor labs     4. RSV/shortness of breath  History of COPD  chest x-ray on 2/4 which showed no acute cardiopulmonary process  Positive for RSV  Management per primary service     5. Diabetes mellitus  Hemoglobin A1c 7% on 2/4/2023  On insulin lispro  Management per primary service     6. Hypertension  BP goal<120/80  BP at goal  Monitor on current regimen     7. Anemia  Not likely related to CKD with creatinine 1  Iron sat 21%, iron 53, TIBC 249  Await SPEP, UPEP  Monitor H&H    8. Hematuria  Suspect Ramsey trauma in the presence of anticoagulation  Irrigation performed to clear  Maintain Ramsey to continue to assess bleeding  Continue to manually irrigate as needed    Sarika Avelar MD

## 2023-02-12 NOTE — PROGRESS NOTES
Hospitalist Progress Note      PCP: Say Snowden MD    Date of Admission: 2/4/2023        Hospital Course:  PRESENT WITH SOB, AND FOUND TO HAVE HYPONATREMIA.    DIURETICS HELD,  FEELING BETTER  SODIUM INCREASING  126 MICKEY           Subjective:  NO COMPLAINTS           Medications:  Reviewed    Infusion Medications    dextrose      sodium chloride       Scheduled Medications    cefTRIAXone (ROCEPHIN) IV  1,000 mg IntraVENous Q24H    guaiFENesin  400 mg Oral TID    donepezil  5 mg Oral Nightly    amLODIPine  5 mg Oral Daily    carvedilol  12.5 mg Oral BID WC    levothyroxine  75 mcg Oral Daily    [Held by provider] lisinopril  10 mg Oral Daily    [Held by provider] furosemide  20 mg Oral Daily    sodium chloride flush  5-40 mL IntraVENous 2 times per day    ipratropium-albuterol  1 ampule Inhalation Q4H WA    predniSONE  20 mg Oral BID    insulin lispro  0-8 Units SubCUTAneous TID WC    insulin lispro  0-4 Units SubCUTAneous Nightly    apixaban  5 mg Oral BID     PRN Meds: guaiFENesin-dextromethorphan, ipratropium-albuterol, glucose, dextrose bolus **OR** dextrose bolus, glucagon (rDNA), dextrose, sodium chloride flush, sodium chloride, ondansetron **OR** ondansetron, magnesium hydroxide, acetaminophen **OR** acetaminophen      Intake/Output Summary (Last 24 hours) at 2/12/2023 1515  Last data filed at 2/12/2023 0900  Gross per 24 hour   Intake 240 ml   Output 300 ml   Net -60 ml       Exam:    /89   Pulse 100   Temp 97.5 °F (36.4 °C) (Temporal)   Resp 19   Ht 5' (1.524 m)   Wt 196 lb 11.2 oz (89.2 kg)   SpO2 100%   BMI 38.42 kg/m²       Gen:  WELL DEVELOPED  HEENT: NC/AT, moist mucous membranes, no oropharyngeal erythema or exudate  Neck: supple, trachea midline, no anterior cervical or SC LAD  Heart:  IRREG  Lungs:   DIMINISHED  bilaterally, FEW RHONCHI  Abd: bowel sounds present, soft, nontender, nondistended, no masses  Extrem:  No clubbing, cyanosis,  EDEMA OF ARMS AND LEGS edema  Skin: no rashes or lesions  Psych: Not oriented to date or Not oriented to place  Neuro: grossly intact, moves all four extremities. Labs:   No results for input(s): WBC, HGB, HCT, PLT in the last 72 hours. Recent Labs     02/10/23  1757 02/11/23  0501 02/12/23  0504   * 128* 126*   K 5.5* 5.2* 5.3*   CL 92* 91* 94*   CO2 24 25 23   BUN 28* 27* 27*   CREATININE 0.8 0.8 0.7   CALCIUM 8.0* 8.1* 8.3*     No results for input(s): AST, ALT, BILIDIR, BILITOT, ALKPHOS in the last 72 hours. No results for input(s): INR in the last 72 hours. No results for input(s): [place] in the last 72 hours. No results for input(s): AST, ALT, ALB, BILIDIR, BILITOT, ALKPHOS in the last 72 hours. No results for input(s): LACTA in the last 72 hours. Lab Results   Component Value Date    LABURIC 6.9 (H) 02/12/2023     Lab Results   Component Value Date    AMMONIA <10.0 (L) 09/08/2020    AMMONIA 28.3 03/05/2020    AMMONIA 24.2 01/17/2020       Assessment:    Active Hospital Problems    Diagnosis Date Noted    Acute respiratory failure with hypoxia ([facility] [J96.01] 02/04/2023     Priority: Medium   RSV  ANASARCA   IIDM   HYPOTHYROID   DEMENTIA   PACEMAKER   PAF  HYPONATREMIA  Plan:   HOLD LASIX   CONSULT NEPHROLOGY   SSI   SYNTHROID   DDAVP 2/9        DVT Prophylaxis: ELIQUIS  Diet: ADULT DIET; Regular; 4 carb choices (60 gm/meal);  Low Sodium (2 gm)  Code Status: Full Code     PT/OT Eval Status: ORDERED     Dispo - HOME    Electronically signed by Silva Portillo DO on 2/12/2023 at 3:15 PM [facility]

## 2023-02-13 LAB
ANION GAP SERPL CALCULATED.3IONS-SCNC: 8 MMOL/L (ref 7–16)
BUN BLDV-MCNC: 24 MG/DL (ref 6–23)
CALCIUM SERPL-MCNC: 8.2 MG/DL (ref 8.6–10.2)
CHLORIDE BLD-SCNC: 95 MMOL/L (ref 98–107)
CO2: 26 MMOL/L (ref 22–29)
CREAT SERPL-MCNC: 0.8 MG/DL (ref 0.5–1)
GFR SERPL CREATININE-BSD FRML MDRD: >60 ML/MIN/1.73
GLUCOSE BLD-MCNC: 208 MG/DL (ref 74–99)
METER GLUCOSE: 202 MG/DL (ref 74–99)
METER GLUCOSE: 209 MG/DL (ref 74–99)
METER GLUCOSE: 212 MG/DL (ref 74–99)
METER GLUCOSE: 221 MG/DL (ref 74–99)
POTASSIUM SERPL-SCNC: 5 MMOL/L (ref 3.5–5)
PRO-BNP: 4989 PG/ML (ref 0–450)
SODIUM BLD-SCNC: 129 MMOL/L (ref 132–146)

## 2023-02-13 PROCEDURE — 1200000000 HC SEMI PRIVATE

## 2023-02-13 PROCEDURE — 6370000000 HC RX 637 (ALT 250 FOR IP): Performed by: NURSE PRACTITIONER

## 2023-02-13 PROCEDURE — 94640 AIRWAY INHALATION TREATMENT: CPT

## 2023-02-13 PROCEDURE — 2580000003 HC RX 258: Performed by: INTERNAL MEDICINE

## 2023-02-13 PROCEDURE — 6370000000 HC RX 637 (ALT 250 FOR IP): Performed by: INTERNAL MEDICINE

## 2023-02-13 PROCEDURE — 97530 THERAPEUTIC ACTIVITIES: CPT

## 2023-02-13 PROCEDURE — 97535 SELF CARE MNGMENT TRAINING: CPT

## 2023-02-13 PROCEDURE — 6360000002 HC RX W HCPCS: Performed by: INTERNAL MEDICINE

## 2023-02-13 PROCEDURE — 80048 BASIC METABOLIC PNL TOTAL CA: CPT

## 2023-02-13 PROCEDURE — 36415 COLL VENOUS BLD VENIPUNCTURE: CPT

## 2023-02-13 PROCEDURE — 2700000000 HC OXYGEN THERAPY PER DAY

## 2023-02-13 PROCEDURE — 82962 GLUCOSE BLOOD TEST: CPT

## 2023-02-13 PROCEDURE — 94660 CPAP INITIATION&MGMT: CPT

## 2023-02-13 PROCEDURE — 83880 ASSAY OF NATRIURETIC PEPTIDE: CPT

## 2023-02-13 RX ADMIN — IPRATROPIUM BROMIDE AND ALBUTEROL SULFATE 3 ML: .5; 2.5 SOLUTION RESPIRATORY (INHALATION) at 04:02

## 2023-02-13 RX ADMIN — GUAIFENESIN 400 MG: 400 TABLET ORAL at 14:29

## 2023-02-13 RX ADMIN — CARVEDILOL 12.5 MG: 6.25 TABLET, FILM COATED ORAL at 08:12

## 2023-02-13 RX ADMIN — APIXABAN 5 MG: 5 TABLET, FILM COATED ORAL at 21:40

## 2023-02-13 RX ADMIN — GUAIFENESIN 400 MG: 400 TABLET ORAL at 08:12

## 2023-02-13 RX ADMIN — INSULIN LISPRO 2 UNITS: 100 INJECTION, SOLUTION INTRAVENOUS; SUBCUTANEOUS at 11:45

## 2023-02-13 RX ADMIN — GUAIFENESIN SYRUP AND DEXTROMETHORPHAN 5 ML: 100; 10 SYRUP ORAL at 17:16

## 2023-02-13 RX ADMIN — DONEPEZIL HYDROCHLORIDE 5 MG: 5 TABLET ORAL at 22:33

## 2023-02-13 RX ADMIN — INSULIN LISPRO 2 UNITS: 100 INJECTION, SOLUTION INTRAVENOUS; SUBCUTANEOUS at 08:17

## 2023-02-13 RX ADMIN — GUAIFENESIN 400 MG: 400 TABLET ORAL at 22:33

## 2023-02-13 RX ADMIN — IPRATROPIUM BROMIDE AND ALBUTEROL SULFATE 1 AMPULE: .5; 2.5 SOLUTION RESPIRATORY (INHALATION) at 17:12

## 2023-02-13 RX ADMIN — LEVOTHYROXINE SODIUM 75 MCG: 0.07 TABLET ORAL at 06:55

## 2023-02-13 RX ADMIN — CARVEDILOL 12.5 MG: 6.25 TABLET, FILM COATED ORAL at 21:31

## 2023-02-13 RX ADMIN — CEFTRIAXONE 1000 MG: 1 INJECTION, POWDER, FOR SOLUTION INTRAMUSCULAR; INTRAVENOUS at 11:21

## 2023-02-13 RX ADMIN — IPRATROPIUM BROMIDE AND ALBUTEROL SULFATE 1 AMPULE: .5; 2.5 SOLUTION RESPIRATORY (INHALATION) at 21:26

## 2023-02-13 RX ADMIN — ACETAMINOPHEN 650 MG: 325 TABLET ORAL at 17:17

## 2023-02-13 RX ADMIN — AMLODIPINE BESYLATE 5 MG: 5 TABLET ORAL at 08:12

## 2023-02-13 RX ADMIN — APIXABAN 5 MG: 5 TABLET, FILM COATED ORAL at 08:12

## 2023-02-13 RX ADMIN — PREDNISONE 20 MG: 20 TABLET ORAL at 08:12

## 2023-02-13 RX ADMIN — SODIUM CHLORIDE: 9 INJECTION, SOLUTION INTRAVENOUS at 03:55

## 2023-02-13 RX ADMIN — IPRATROPIUM BROMIDE AND ALBUTEROL SULFATE 1 AMPULE: .5; 2.5 SOLUTION RESPIRATORY (INHALATION) at 13:40

## 2023-02-13 ASSESSMENT — PAIN DESCRIPTION - ORIENTATION: ORIENTATION: LOWER

## 2023-02-13 ASSESSMENT — PAIN DESCRIPTION - LOCATION: LOCATION: BACK

## 2023-02-13 ASSESSMENT — PAIN - FUNCTIONAL ASSESSMENT: PAIN_FUNCTIONAL_ASSESSMENT: ACTIVITIES ARE NOT PREVENTED

## 2023-02-13 ASSESSMENT — PAIN SCALES - GENERAL
PAINLEVEL_OUTOF10: 3
PAINLEVEL_OUTOF10: 6

## 2023-02-13 ASSESSMENT — PAIN DESCRIPTION - FREQUENCY: FREQUENCY: CONTINUOUS

## 2023-02-13 ASSESSMENT — PAIN DESCRIPTION - DESCRIPTORS: DESCRIPTORS: DULL;DISCOMFORT

## 2023-02-13 ASSESSMENT — PAIN DESCRIPTION - ONSET: ONSET: ON-GOING

## 2023-02-13 ASSESSMENT — PAIN DESCRIPTION - PAIN TYPE: TYPE: ACUTE PAIN

## 2023-02-13 NOTE — PROGRESS NOTES
Hospitalist Progress Note      PCP: Pepe Márquez MD    Date of Admission: 2/4/2023        Hospital Course:  PRESENT WITH SOB, AND FOUND TO HAVE HYPONATREMIA. DIURETICS HELD,  FEELING BETTER  SODIUM INCREASING  . SHE HAD SOME BLEEDING FROM JONAS, PROBABLY SECONDARY TO TRAUMA. PLAN IS TO DC HOME PER FAMILY REQUEST, WILL IRRIGATE JONAS TO MAKE SURE NOR OTHER CAUSE OF BLEEDING. Jesus Gutter URINE CULTURE WAS POS FOR PROTEUS, ON ROCEPHIN X  DAYS.       WILL RECHECK CBC IN AM           Subjective:  PT DOES NOT WANT TO GET UP            Medications:  Reviewed    Infusion Medications    dextrose      sodium chloride       Scheduled Medications    cefTRIAXone (ROCEPHIN) IV  1,000 mg IntraVENous Q24H    guaiFENesin  400 mg Oral TID    donepezil  5 mg Oral Nightly    amLODIPine  5 mg Oral Daily    carvedilol  12.5 mg Oral BID WC    levothyroxine  75 mcg Oral Daily    [Held by provider] lisinopril  10 mg Oral Daily    [Held by provider] furosemide  20 mg Oral Daily    sodium chloride flush  5-40 mL IntraVENous 2 times per day    ipratropium-albuterol  1 ampule Inhalation Q4H WA    predniSONE  20 mg Oral BID    insulin lispro  0-8 Units SubCUTAneous TID WC    insulin lispro  0-4 Units SubCUTAneous Nightly    apixaban  5 mg Oral BID     PRN Meds: guaiFENesin-dextromethorphan, ipratropium-albuterol, glucose, dextrose bolus **OR** dextrose bolus, glucagon (rDNA), dextrose, sodium chloride flush, sodium chloride, ondansetron **OR** ondansetron, magnesium hydroxide, acetaminophen **OR** acetaminophen      Intake/Output Summary (Last 24 hours) at 2/13/2023 1501  Last data filed at 2/13/2023 1433  Gross per 24 hour   Intake 0 ml   Output 1050 ml   Net -1050 ml       Exam:    /82   Pulse (!) 102   Temp 98.1 °F (36.7 °C) (Temporal)   Resp 20   Ht 5' (1.524 m)   Wt 196 lb 11.2 oz (89.2 kg)   SpO2 96%   BMI 38.42 kg/m²       Gen:  WELL DEVELOPED  HEENT: NC/AT, moist mucous membranes, no oropharyngeal erythema or exudate  Neck: supple, trachea midline, no anterior cervical or SC LAD  Heart:  IRREG  Lungs:   DIMINISHED  bilaterally, FEW RHONCHI  Abd: bowel sounds present, soft, nontender, nondistended, no masses  Extrem:  No clubbing, cyanosis,  EDEMA OF ARMS AND LEGS edema  Skin: ERYTHEMA OF ARMS  Psych: Not oriented to date   Neuro: grossly intact, moves all four extremities. Labs:   No results for input(s): WBC, HGB, HCT, PLT in the last 72 hours. Recent Labs     02/11/23  0501 02/12/23  0504 02/13/23  0648   * 126* 129*   K 5.2* 5.3* 5.0   CL 91* 94* 95*   CO2 25 23 26   BUN 27* 27* 24*   CREATININE 0.8 0.7 0.8   CALCIUM 8.1* 8.3* 8.2*     No results for input(s): AST, ALT, BILIDIR, BILITOT, ALKPHOS in the last 72 hours. No results for input(s): INR in the last 72 hours. No results for input(s): [place] in the last 72 hours. No results for input(s): AST, ALT, ALB, BILIDIR, BILITOT, ALKPHOS in the last 72 hours. No results for input(s): LACTA in the last 72 hours. Lab Results   Component Value Date    LABURIC 6.9 (H) 02/12/2023     Lab Results   Component Value Date    AMMONIA <10.0 (L) 09/08/2020    AMMONIA 28.3 03/05/2020    AMMONIA 24.2 01/17/2020       Assessment:    Active Hospital Problems    Diagnosis Date Noted    Acute respiratory failure with hypoxia ([facility] [J96.01] 02/04/2023     Priority: Medium   RSV  ANASARCA   IIDM   HYPOTHYROID   DEMENTIA   PACEMAKER   PAF  HYPONATREMIA  UTI(PROTEUS)  ON ROCEPIN  HEMATURIA  Plan:   HOLD LASIX   CONSULT NEPHROLOGY   SSI   SYNTHROID   DDAVP 2/9   REMOVE JONAS IN ANTICIPATION OF DISCHARGE     DVT Prophylaxis: ELIQUIS  Diet: ADULT DIET; Regular; 4 carb choices (60 gm/meal);  Low Sodium (2 gm)  Code Status: Full Code     PT/OT Eval Status: ORDERED     Dispo - HOME       Electronically signed by Sandra Doe DO on 2/13/2023 at 3:01 PM [place]

## 2023-02-13 NOTE — CARE COORDINATION
2/13. . Discharge plan remains to return home with [facility] care when medically stable.  Jerel Gann RN

## 2023-02-13 NOTE — PROGRESS NOTES
Occupational Therapy  OT BEDSIDE TREATMENT NOTE   [address] [facility] [address] [address]:3/34/7252  Patient Name: Rory Crouch  MRN: [id number]  : 1932  Room: [address] [place] [address]     Evaluating OT: Kevin Estrada OTR/L #[id number]      Referring Provider: MICHA Johnson CNP  Specific Provider Orders/Date: OT eval and treat 23     Diagnosis: Respiratory syncytial virus (RSV) [B33.8]  Acute respiratory failure with hypoxia ([facility] [J96.01]   Pt admitted to hospital with SOB (O2 sat 58%). Pertinent Medical History:  has a past medical history of Arthritis, COPD (chronic obstructive pulmonary disease) ([facility] Diabetes mellitus ([facility] Hyperlipidemia, and Hypertension.          Precautions:  Fall Risk, O2, continuous pulse ox, contact / droplet precautions     Assessment of current deficits    [x] Functional mobility          [x]ADLs           [x] Strength                  [x]Cognition    [x] Functional transfers        [x] IADLs         [x] Safety Awareness   [x]Endurance    [] Fine Coordination                        [x] Balance      [] Vision/perception   []Sensation      []Gross Motor Coordination            [] ROM           [] Delirium                   [] Motor Control      OT PLAN OF CARE   OT POC based on physician orders, patient diagnosis and results of clinical assessment     Frequency/Duration 1-3 days/wk for 2 weeks PRN   Specific OT Treatment Interventions to include:   * Instruction/training on adapted ADL techniques and AE recommendations to increase functional independence within precautions       * Training on energy conservation strategies, correct breathing pattern and techniques to improve independence/tolerance for self-care routine  * Functional transfer/mobility training/DME recommendations for increased independence, safety, and fall prevention  * Patient/Family education to increase follow through with safety techniques and functional independence  * Recommendation of environmental modifications for increased safety with functional transfers/mobility and ADLs  * Therapeutic exercise to improve motor endurance, ROM, and functional strength for ADLs/functional transfers  * Therapeutic activities to facilitate/challenge dynamic balance, stand tolerance for increased safety and independence with ADLs  * Therapeutic activities to facilitate gross/fine motor skills for increased independence with ADLs  * Neuro-muscular re-education: facilitation of righting/equilibrium reactions, midline orientation, scapular stability/mobility, normalization of muscle tone, and facilitation of volitional active controled movement     Recommended Adaptive Equipment:  TBD      Home Living: Pt lives with  in a 1 story home with 4 LACY   Bathroom setup: sponge bathes with assist from     Equipment owned: none     Prior Level of Function: assist with ADLs , assist with IADLs; ambulated with w/w (pt reports she has been bedbound and nonambulatory for the past 3 weeks following fall)   Driving: yes   Occupation: na     Pain Level: Pt denies pain this session     Cognition: A&O: x3 Follows 1 step directions             Memory:  fair-             Sequencing:  fair-             Problem solving:  fair-             Judgement/safety:  fair-               Functional Assessment:  AM-PAC Daily Activity Raw Score: 11/24    Initial Eval Status  Date: 2/6/23 Treatment Status  Date:2/13/23 STGs = LTGs  Time frame: 10-14 days   Feeding Set-up  min A  Mod I   Grooming Minimal Assist   Mod A  To wash face supported sitting Supervision    UB Dressing Moderate Assist  Mod A  To dof/don gown supported sitting Supervision    LB Dressing Dependent  Dependent   To don/dof socks Maximal Assist    Bathing Dependent Max A  simulated bed level Moderate Assist    Toileting Dependent   Dependent-catheter   Maximal Assist    Bed Mobility  Rolling Moderate Assist Supine<>sit: NT  Pt declined this session  Rolling side to side max A previous note Supine->sit: Max A  Sit->supine: Dependent Rolling: SBA  Supine to sit: Moderate Assist   Sit to supine: Moderate Assist    Functional Transfers NT  2/7/23 Max A  STS from EOB, pt presenting with poor flexed posture  Maximal Assist    Functional Mobility NT  2/7/23 Max A  Using ww taking 1-2 side steps to HOB, bilat knees buckling     Balance Sitting: NT Poor tolerance to bed mobility 2/13 previous noted Sitting: Min A  EOB for approx 5 min, min A to correct posterior lean  Standing: max A with ww  Approx 10 second stand tolerance      Activity Tolerance P+     Limited due to fatigue and weakness P     Limited due to fatigue and weakness. O2 99% on 2L, -130bpm with light activity  F+   Visual/  Perceptual wfl                             Hand Dominance right    Strength ROM Additional Info:    RUE   3+/5 wfl fair  and wfl FMC/dexterity noted during ADL tasks noted moderate hand edema       LUE 3+/5 wfl Fair  and wfl FMC/dexterity noted during ADL tasks moderate hand edema      Hearing: wfl  Sensation:wfl  Tone: wfl  Edema:none moderate B feet and hands      Comments: Upon arrival pt supine in bed. Patient agreeable to treatment. ADL retraining to position in bed upright sitting and use of UE in light grooming tasks, poor ability to tolerate rolling and dependent supine to sit with questionable safety . Pt educated on techniques to increase independence and safety during ADLs, bed mobility, and functional transfers. Pt would benefit from continued skilled OT to increase safety and independence with completion of ADL/IADL tasks for functional independence and quality of life. At end of session pt returned to supine with HOB elevated and taps system positioned on R and B UE's elevated , call light within reach, bed alarm on, family present. Pt has made limited progress towards set goals.      Continue with current plan of care    Treatment Time In:9:00           Treatment Time Out: 9:25             Treatment Charges: Mins Units   Ther Ex  11307     Manual Therapy 50903     Thera Activities 58744 15 1   ADL/Home Mgt 93604 10 1   Neuro Re-ed 57574     Group Therapy      Orthotic manage/training  51385     Non-Billable Time     Total Timed Treatment 25 2     [address] RAYMUNDO/L [id number]

## 2023-02-13 NOTE — PROGRESS NOTES
Spoke with Dr. Susan Tovar about daughter's concern for hematuria. Ok to remove keene per Dr. Susan Tovar. New orders noted.   Electronically signed by Shirin Rodriguez RN on 2/13/2023 at 12:43 PM

## 2023-02-13 NOTE — PROGRESS NOTES
Physical Therapy  Rx    Name: Lalit Guerra  : 1932  MRN: [id number]    Date of Service: 2023    Evaluating PT:  Nupur Bruce, PT, DPT  [id number]     Room #:  0123/8949-R  Diagnosis:  Respiratory syncytial virus (RSV) [B33.8]  Acute respiratory failure with hypoxia ([facility] [J96.01]  PMHx/PSHx:   has a past medical history of Arthritis, COPD (chronic obstructive pulmonary disease) ([facility] Diabetes mellitus ([facility] Hyperlipidemia, and Hypertension. Procedure/Surgery:  None  Precautions:  Falls, Contact/Droplet - RSV, O2, BUE and BLE edema. Equipment Needs:  TBD    SUBJECTIVE:    Pt lives with her  and dtr in a 1 story home with 2 steps and 1 rail. Pt reports that she is non-ambulatory and bed bound at baseline. Reports hospital bed at home. Caregivers come two days/week on  and .  and dtr provide  assistance. OBJECTIVE:   Initial Evaluation  Date: 23 Treatment  23 Short Term/ Long Term   Goals   AM-PAC 6 Clicks     Was pt agreeable to Eval/treatment? Yes  yes    Does pt have pain? No c/o pain  None reported    Bed Mobility  Rolling: Mod A  Supine to sit: Mod A  Sit to supine: Max A  Scooting: Dep Rolling: Mod A  Supine to sit: Max A  Sit to supine: Max A  Scooting: Dep Rolling: Min A  Supine to sit: Min A  Sit to supine: Min A  Scooting: Min A   Transfers Sit to stand: NT/refused  Stand to sit: NT  Stand pivot: NT/refused Declined Sit to stand: Mod A  Stand to sit: Mod A  Stand pivot: Mod A with AAD   Ambulation    NT NT >3 feet with AAD Mod A   Stair negotiation: ascended and descended  NT NT NA   ROM BUE:  mostly WFL - limited B shoulder flexion  BLE:  WFL     Strength BUE:  grossly 2/5  BLE:  grossly 2/5     Balance Sitting EOB:  SBA  Dynamic Standing:  NT/refused  Sitting EOB:  SBA  Dynamic Standing:   Mod A     Pt is A & O x self and location   Sensation:  Pt denies numbness and tingling to extremities  Edema:  Unremarkable  Therapeutic Exercises:    BUE and BLE AAROM in supine  BLE AROM at EOB: LAQs and ankle pumps x10 reps     Patient education  Pt educated on PT role, safety during functional mobility    Patient response to education:   Pt verbalized understanding Pt demonstrated skill Pt requires further education in this area   Yes  Yes  Reinforce      ASSESSMENT:    Conditions Requiring Skilled Therapeutic Intervention:    [x]Decreased strength     []Decreased ROM  [x]Decreased functional mobility  [x]Decreased balance   [x]Decreased endurance   []Decreased posture  []Decreased sensation  []Decreased coordination   []Decreased vision  []Decreased safety awareness   []Increased pain       Comments:  Pt received supine and agreeable to PT session. Vitals monitored during session. Pt reports hx of bed bound since last hospital visit. Reports arthritis. Requires assistance of trunk and LE during supine<>sit. Demonstrates generalized weakness and deconditioning. Patient repositioned with pillows under extremities. Off loaded to the Left side with TAPS Pt left with call button in reach, lines attached, and needs met. Pt would benefit from continued PT services at discharge. Treatment:  Patient practiced and was instructed in the following treatment:    Bed mobility training - pt given verbal and tactile cues to facilitate proper sequencing and safety during rolling and supine<>sit as well as provided with physical assistance to complete task    Sitting EOB for >10 minutes for upright tolerance, postural awareness and BLE ROM   Skilled positioning - Pt placed in the semi fowlers position with pillows utilized to facilitate upright posture, joint and skin integrity, and interaction with environment. Pt's/ family goals   1. Home with family support     Prognosis is fair for reaching above PT goals. Patient and or family understand(s) diagnosis, prognosis, and plan of care.   Yes     PHYSICAL THERAPY PLAN OF CARE:    PT POC is established based on physician order and patient diagnosis     Referring provider/PT Order:        Charan Chamberlain, MICHA - CNP    / PT eval and treat   Diagnosis:  Respiratory syncytial virus (RSV) [B33.8]  Acute respiratory failure with hypoxia ([facility] [J96.01]  Specific instructions for next treatment:  Increase EOB activity, Transfer training bed to chair as able    Current Treatment Recommendations:     [x] Strengthening to improve independence with functional mobility   [] ROM to improve independence with functional mobility   [x] Balance Training to improve static/dynamic balance and to reduce fall risk  [x] Endurance Training to improve activity tolerance during functional mobility   [x] Transfer Training to improve safety and independence with all functional transfers   [x] Gait Training to improve gait mechanics, endurance and asses need for appropriate assistive device  [] Stair Training in preparation for safe discharge home and/or into the community   [x] Positioning to prevent skin breakdown and contractures  [x] Safety and Education Training   [x] Patient/Caregiver Education   [x] HEP  [] Other     PT long term treatment goals are located in above grid    Frequency of treatments: 2-5x/week x 1-2 weeks. Time in  0855  Time out  0920    Total Treatment Time  25 minutes     Evaluation Time includes thorough review of current medical information, gathering information on past medical history/social history and prior level of function, completion of standardized testing/informal observation of tasks, assessment of data and education on plan of care and goals.     CPT codes:  [] Low Complexity PT evaluation 20126  [] Moderate Complexity PT evaluation 73090  [] High Complexity PT evaluation 92821  [] PT Re-evaluation 01239  [] Gait training 63163 -- minutes  [] Manual therapy [id number] -- minutes  [x] Therapeutic activities 71683 25 minutes  [] Therapeutic exercises 78840 - minutes  [] Neuromuscular reeducation 94365 -- minutes     Cheryl Casanova, [id number] [facility]

## 2023-02-13 NOTE — PROGRESS NOTES
[facility]  Nephrology Progress Note    Patient's Name: Maryann Carver    History of Present Illness from 2/7 Consult Note:    Bri Tang is a 80 y.o. female with a past medical history of arthritis, COPD, hypertension, hyperlipidemia, and diabetes mellitus. She presented to the ED on 2/4 with complaints of shortness of breath and was found to be hypoxemic and in respiratory distress per EMS. Vital signs on 2/4 included temperature 97.9, respirations 30, pulse 110, /58, and she was 97% on nonrebreather. Lab data on 2/4 include sodium 125, potassium 5.2, BUN 26, creatinine 1.1, proBNP 3109, hemoglobin 11.1, and she was found to be positive for RSV. She had a chest x-ray on 2/4 which showed no acute cardiopulmonary process. We were consulted to see the patient for anasarca and hyponatremia. Patient's sodium was noted to be 125 on 2/4 and is 124 today. At present, patient was seen and examined. She reports that she feels pretty good. She explained that she came in due to shortness of breath. She also reports decreased appetite prior to admission. She reports that she had urinary frequency. She reports nausea, but denies any abdominal pain, vomiting, or diarrhea. She denies any chest pain. Her  and daughter are at the bedside. \"    Subjective:    2/11: Patient was incontinent of urine earlier this a.m. as pure wick malfunctioned - cherry colored urine noted after reapplying -currently 98% on 3 L nasal cannula -stable vital signs - all reviewed w primary RN as well as family member at bedside     2/12: She complains of dry mouth and thirst;  at bedside visiting. She denies shortness of breath. She states that her coughing is much less    2/13:She states breathing and cough is much improvec      Diet:    ADULT DIET; Regular; 4 carb choices (60 gm/meal); Low Sodium (2 gm); Low Potassium (Less than 3000 mg/day)  ADULT ORAL NUTRITION SUPPLEMENT; Lunch;  Other Oral Supplement; [id number]    Meds:     cefTRIAXone (ROCEPHIN) IV  1,000 mg IntraVENous Q24H    guaiFENesin  400 mg Oral TID    donepezil  5 mg Oral Nightly    amLODIPine  5 mg Oral Daily    carvedilol  12.5 mg Oral BID     levothyroxine  75 mcg Oral Daily    [Held by provider] lisinopril  10 mg Oral Daily    [Held by provider] furosemide  20 mg Oral Daily    sodium chloride flush  5-40 mL IntraVENous 2 times per day    ipratropium-albuterol  1 ampule Inhalation Q4H WA    predniSONE  20 mg Oral BID    insulin lispro  0-8 Units SubCUTAneous TID     insulin lispro  0-4 Units SubCUTAneous Nightly    apixaban  5 mg Oral BID        sodium chloride 100 mL/hr at 02/13/23 0355    dextrose      sodium chloride         Meds prn:     guaiFENesin-dextromethorphan, ipratropium-albuterol, glucose, dextrose bolus **OR** dextrose bolus, glucagon (rDNA), dextrose, sodium chloride flush, sodium chloride, ondansetron **OR** ondansetron, magnesium hydroxide, acetaminophen **OR** acetaminophen    Meds prior to admission:     No current facility-administered medications on file prior to encounter.      Current Outpatient Medications on File Prior to Encounter   Medication Sig Dispense Refill    Multiple Vitamin (MULTIVITAMIN ADULT PO) Take by mouth daily      lisinopril (PRINIVIL;ZESTRIL) 10 MG tablet Take 1 tablet by mouth daily 30 tablet 3    furosemide (LASIX) 20 MG tablet Take 20 mg by mouth daily      donepezil (ARICEPT) 10 MG tablet Take 10 mg by mouth at bedtime      carvedilol (COREG) 12.5 MG tablet Take 1 tablet by mouth 2 times daily (with meals) (Patient taking differently: Take 12.5 mg by mouth 2 times daily (with meals) Takes one and a half tablet) 60 tablet 3    amLODIPine (NORVASC) 5 MG tablet Take 1 tablet by mouth daily 30 tablet 3    ipratropium-albuterol (DUONEB) 0.5-2.5 (3) MG/3ML SOLN nebulizer solution Inhale 1 vial into the lungs every 6 hours as needed for Shortness of Breath      levothyroxine (SYNTHROID) 75 MCG tablet Take 75 mcg by mouth Daily      predniSONE (DELTASONE) 5 MG tablet Take 5 mg by mouth 2 times daily      metFORMIN (GLUCOPHAGE-XR) 500 MG extended release tablet Take 500 mg by mouth daily (with breakfast)      apixaban (ELIQUIS) 5 MG TABS tablet Take 5 mg by mouth 2 times daily         Physical Exam:      Patient Vitals for the past 24 hrs:   BP Temp Temp src Pulse Resp SpO2   02/13/23 0803 117/82 98.1 °F (36.7 °C) Temporal (!) 102 20 100 %   02/13/23 0407 -- -- -- (!) 103 18 98 %   02/12/23 2055 119/76 97.8 °F (36.6 °C) Temporal 77 22 100 %         Intake/Output Summary (Last 24 hours) at 2/13/2023 1141  Last data filed at 2/13/2023 1106  Gross per 24 hour   Intake 0 ml   Output 900 ml   Net -900 ml     General: Awake, alert, no acute distress  Neck: No JVD noted  Lungs: Clear bilaterally upper, diminished to the bases bilaterally. Unlabored  CV: Regular rate and rhythm. No rub  Abd: Soft, nontender, nondistended. Active bowel sounds  Skin: Warm and dry. No rash on exposed extremities  Ext: 2+ BLE, 1+ BUE edema   Neuro: Awake, answers questions appropriately    Data:    No results for input(s): WBC, HGB, HCT, MCV, PLT in the last 72 hours. Recent Labs     02/11/23  0501 02/12/23  0504 02/13/23  0648   * 126* 129*   K 5.2* 5.3* 5.0   CL 91* 94* 95*   CO2 25 23 26   CREATININE 0.8 0.7 0.8   BUN 27* 27* 24*   LABGLOM >60 >60 >60   GLUCOSE 213* 204* 208*   CALCIUM 8.1* 8.3* 8.2*       No results found for: VITD25    No results found for: PTH    No results for input(s): ALT, AST, ALKPHOS, BILITOT, BILIDIR in the last 72 hours. No results for input(s): LABALBU in the last 72 hours.     Ferritin   Date Value Ref Range Status   02/08/2023 297 ng/mL Final     Comment:     FERRITIN Reference Ranges:  Adult Males   20 - 60 years:    27 - 400 ng/mL  Adult females 16 - 61 years:    15 - 150 ng/mL  Adults greater than 60 years:   no established reference range  Pediatrics:                     no established reference range       Iron   Date Value Ref Range Status   02/08/2023 53 37 - 145 mcg/dL Final     TIBC   Date Value Ref Range Status   02/08/2023 249 (L) 250 - 450 mcg/dL Final       Vitamin B-12   Date Value Ref Range Status   02/08/2023 1736 (H) 211 - 946 pg/mL Final       Folate   Date Value Ref Range Status   02/08/2023 19.5 4.8 - 24.2 ng/mL Final       Lab Results   Component Value Date/Time    COLORU Yellow 02/11/2023 03:18 PM    NITRU Negative 02/11/2023 03:18 PM    GLUCOSEU 100 02/11/2023 03:18 PM    KETUA Negative 02/11/2023 03:18 PM    UROBILINOGEN 0.2 02/11/2023 03:18 PM    BILIRUBINUR Negative 02/11/2023 03:18 PM       Lab Results   Component Value Date/Time    OSMOU 608 02/12/2023 10:41 AM       No components found for: URIC    No results found for: LIPIDPAN    Assessment and Plans:    1. Hyponatremia  Isotonic vs. Pseudohyponatremia   Likely hypovolemic/dehydration   Serial monitoring of labs indicates hyponatremia likely mixed due to combination of low solute and hypovolemia  Sodium level improved with course of normal saline  Monitor labs     2. Hyperkalemia  Likely in the setting of MERYL /CKD  Potassium 5.8 on 2/6--> 5.2 today  Lokelma 10 g oral once today  Low potassium diet  Monitor labs     3. MERYL stage I on CKD 3a  Likely in the setting of decreased effective renal perfusion given poor oral intake prior to admission; exacerbated by diuretics and lisinopril  Baseline creatinine 0.8-1  Creatinine peaked at 1.2 on 2/5--> 0.8 today  FeNa 0.2% and FeUrea 34.9% supporte prerenal etiology  Holding Lasix and lisinopril for now  Avoid nephrotoxins/NSAIDs       4. RSV/shortness of breath  History of COPD  chest x-ray on 2/4 which showed no acute cardiopulmonary process  Positive for RSV  Being treated with supportive measures  On ceftriaxone and prednisone     5. Diabetes mellitus  Hemoglobin A1c 7% on 2/4/2023  On insulin lispro  Management per primary service     6.  Hypertension  BP goal<120/80  BP at goal  Monitor on current regimen     7.   Anemia  Not likely related to CKD with creatinine 1  Iron sat 21%, iron 53, TIBC 249  Await SPEP, UPEP, ordered and not done; will rorder  Monitor H&H    8. Hematuria  Suspect Ramsey trauma in the presence of anticoagulation  Irrigation performed to clear  Maintain Ramsey to continue to assess bleeding  Continue to manually irrigate as needed    Discharge planning    Lue Form, MD

## 2023-02-14 LAB
ADDENDUM ELECTROPHORESIS URINE RANDOM: NORMAL
ALBUMIN SERPL-MCNC: 1.9 G/DL (ref 3.5–4.7)
ALPHA-1-GLOBULIN: 0.3 G/DL (ref 0.2–0.4)
ALPHA-2-GLOBULIN: 1.4 G/DL (ref 0.5–1)
ANION GAP SERPL CALCULATED.3IONS-SCNC: 8 MMOL/L (ref 7–16)
BETA GLOBULIN: 1.1 G/DL (ref 0.8–1.3)
BUN BLDV-MCNC: 21 MG/DL (ref 6–23)
CALCIUM SERPL-MCNC: 8.2 MG/DL (ref 8.6–10.2)
CHLORIDE BLD-SCNC: 97 MMOL/L (ref 98–107)
CO2: 24 MMOL/L (ref 22–29)
CREAT SERPL-MCNC: 0.6 MG/DL (ref 0.5–1)
ELECTROPHORESIS: ABNORMAL
GAMMA GLOBULIN: 0.8 G/DL (ref 0.7–1.6)
GFR SERPL CREATININE-BSD FRML MDRD: >60 ML/MIN/1.73
GLUCOSE BLD-MCNC: 183 MG/DL (ref 74–99)
HCT VFR BLD CALC: 35.1 % (ref 34–48)
HEMOGLOBIN: 11.1 G/DL (ref 11.5–15.5)
IMMUNOFIXATION URINE: NORMAL
MCH RBC QN AUTO: 28.2 PG (ref 26–35)
MCHC RBC AUTO-ENTMCNC: 31.6 % (ref 32–34.5)
MCV RBC AUTO: 89.1 FL (ref 80–99.9)
METER GLUCOSE: 156 MG/DL (ref 74–99)
METER GLUCOSE: 185 MG/DL (ref 74–99)
METER GLUCOSE: 211 MG/DL (ref 74–99)
METER GLUCOSE: 244 MG/DL (ref 74–99)
ORGANISM: ABNORMAL
ORGANISM: ABNORMAL
PDW BLD-RTO: 15.5 FL (ref 11.5–15)
PLATELET # BLD: 236 E9/L (ref 130–450)
PMV BLD AUTO: 9.9 FL (ref 7–12)
POTASSIUM SERPL-SCNC: 4.7 MMOL/L (ref 3.5–5)
RBC # BLD: 3.94 E12/L (ref 3.5–5.5)
SODIUM BLD-SCNC: 129 MMOL/L (ref 132–146)
TOTAL PROTEIN: 5.5 G/DL (ref 6.4–8.3)
URINE CULTURE, ROUTINE: ABNORMAL
URINE CULTURE, ROUTINE: ABNORMAL
WBC # BLD: 11.6 E9/L (ref 4.5–11.5)

## 2023-02-14 PROCEDURE — 6370000000 HC RX 637 (ALT 250 FOR IP): Performed by: INTERNAL MEDICINE

## 2023-02-14 PROCEDURE — 2580000003 HC RX 258: Performed by: NURSE PRACTITIONER

## 2023-02-14 PROCEDURE — 94640 AIRWAY INHALATION TREATMENT: CPT

## 2023-02-14 PROCEDURE — 82962 GLUCOSE BLOOD TEST: CPT

## 2023-02-14 PROCEDURE — 36415 COLL VENOUS BLD VENIPUNCTURE: CPT

## 2023-02-14 PROCEDURE — 6370000000 HC RX 637 (ALT 250 FOR IP): Performed by: NURSE PRACTITIONER

## 2023-02-14 PROCEDURE — 2580000003 HC RX 258: Performed by: INTERNAL MEDICINE

## 2023-02-14 PROCEDURE — 80048 BASIC METABOLIC PNL TOTAL CA: CPT

## 2023-02-14 PROCEDURE — 94660 CPAP INITIATION&MGMT: CPT

## 2023-02-14 PROCEDURE — 2700000000 HC OXYGEN THERAPY PER DAY

## 2023-02-14 PROCEDURE — 1200000000 HC SEMI PRIVATE

## 2023-02-14 PROCEDURE — 85027 COMPLETE CBC AUTOMATED: CPT

## 2023-02-14 PROCEDURE — 6360000002 HC RX W HCPCS: Performed by: INTERNAL MEDICINE

## 2023-02-14 RX ADMIN — GUAIFENESIN 400 MG: 400 TABLET ORAL at 15:31

## 2023-02-14 RX ADMIN — IPRATROPIUM BROMIDE AND ALBUTEROL SULFATE 1 AMPULE: .5; 2.5 SOLUTION RESPIRATORY (INHALATION) at 17:55

## 2023-02-14 RX ADMIN — INSULIN LISPRO 2 UNITS: 100 INJECTION, SOLUTION INTRAVENOUS; SUBCUTANEOUS at 09:21

## 2023-02-14 RX ADMIN — CEFTRIAXONE 1000 MG: 1 INJECTION, POWDER, FOR SOLUTION INTRAMUSCULAR; INTRAVENOUS at 11:55

## 2023-02-14 RX ADMIN — Medication 15 G: at 23:21

## 2023-02-14 RX ADMIN — IPRATROPIUM BROMIDE AND ALBUTEROL SULFATE 1 AMPULE: .5; 2.5 SOLUTION RESPIRATORY (INHALATION) at 09:56

## 2023-02-14 RX ADMIN — Medication 15 G: at 15:55

## 2023-02-14 RX ADMIN — GUAIFENESIN 400 MG: 400 TABLET ORAL at 20:36

## 2023-02-14 RX ADMIN — PREDNISONE 20 MG: 20 TABLET ORAL at 20:36

## 2023-02-14 RX ADMIN — GUAIFENESIN 400 MG: 400 TABLET ORAL at 09:20

## 2023-02-14 RX ADMIN — CARVEDILOL 12.5 MG: 6.25 TABLET, FILM COATED ORAL at 17:43

## 2023-02-14 RX ADMIN — Medication 10 ML: at 09:20

## 2023-02-14 RX ADMIN — LEVOTHYROXINE SODIUM 75 MCG: 0.07 TABLET ORAL at 06:02

## 2023-02-14 RX ADMIN — APIXABAN 5 MG: 5 TABLET, FILM COATED ORAL at 09:20

## 2023-02-14 RX ADMIN — INSULIN LISPRO 2 UNITS: 100 INJECTION, SOLUTION INTRAVENOUS; SUBCUTANEOUS at 17:42

## 2023-02-14 RX ADMIN — DONEPEZIL HYDROCHLORIDE 5 MG: 5 TABLET ORAL at 20:36

## 2023-02-14 RX ADMIN — APIXABAN 5 MG: 5 TABLET, FILM COATED ORAL at 20:36

## 2023-02-14 RX ADMIN — AMLODIPINE BESYLATE 5 MG: 5 TABLET ORAL at 09:20

## 2023-02-14 RX ADMIN — Medication 10 ML: at 20:37

## 2023-02-14 RX ADMIN — PREDNISONE 20 MG: 20 TABLET ORAL at 09:20

## 2023-02-14 RX ADMIN — IPRATROPIUM BROMIDE AND ALBUTEROL SULFATE 1 AMPULE: .5; 2.5 SOLUTION RESPIRATORY (INHALATION) at 13:43

## 2023-02-14 RX ADMIN — GUAIFENESIN SYRUP AND DEXTROMETHORPHAN 5 ML: 100; 10 SYRUP ORAL at 20:54

## 2023-02-14 RX ADMIN — CARVEDILOL 12.5 MG: 6.25 TABLET, FILM COATED ORAL at 09:20

## 2023-02-14 RX ADMIN — IPRATROPIUM BROMIDE AND ALBUTEROL SULFATE 1 AMPULE: .5; 2.5 SOLUTION RESPIRATORY (INHALATION) at 21:54

## 2023-02-14 NOTE — CARE COORDINATION
2/14. Discharge plan is to return home with family and [address] [place] [address]. Na 129 today.  Sean Hernandez RN

## 2023-02-14 NOTE — PROGRESS NOTES
[facility]  Nephrology Progress Note    Patient's Name: Rory Crouch    History of Present Illness from 2/7 Consult Note:    Candida Craft is a 80 y.o. female with a past medical history of arthritis, COPD, hypertension, hyperlipidemia, and diabetes mellitus. She presented to the ED on 2/4 with complaints of shortness of breath and was found to be hypoxemic and in respiratory distress per EMS. Vital signs on 2/4 included temperature 97.9, respirations 30, pulse 110, /58, and she was 97% on nonrebreather. Lab data on 2/4 include sodium 125, potassium 5.2, BUN 26, creatinine 1.1, proBNP 3109, hemoglobin 11.1, and she was found to be positive for RSV. She had a chest x-ray on 2/4 which showed no acute cardiopulmonary process. We were consulted to see the patient for anasarca and hyponatremia. Patient's sodium was noted to be 125 on 2/4 and is 124 today. At present, patient was seen and examined. She reports that she feels pretty good. She explained that she came in due to shortness of breath. She also reports decreased appetite prior to admission. She reports that she had urinary frequency. She reports nausea, but denies any abdominal pain, vomiting, or diarrhea. She denies any chest pain. Her  and daughter are at the bedside. \"    Subjective:    2/11: Patient was incontinent of urine earlier this a.m. as pure wick malfunctioned - cherry colored urine noted after reapplying -currently 98% on 3 L nasal cannula -stable vital signs - all reviewed w primary RN as well as family member at bedside     2/12: She complains of dry mouth and thirst;  at bedside visiting. She denies shortness of breath. She states that her coughing is much less    2/13:She states breathing and cough is much improved    2/15: No new c/o ; wants to go home      Diet:    ADULT DIET; Regular; 4 carb choices (60 gm/meal); Low Sodium (2 gm);  Low Potassium (Less than 3000 mg/day)  ADULT ORAL NUTRITION SUPPLEMENT; Lunch; Other Oral Supplement; [id number]    Meds:     urea  15 g Oral Q6H    cefTRIAXone (ROCEPHIN) IV  1,000 mg IntraVENous Q24H    guaiFENesin  400 mg Oral TID    donepezil  5 mg Oral Nightly    amLODIPine  5 mg Oral Daily    carvedilol  12.5 mg Oral BID WC    levothyroxine  75 mcg Oral Daily    [Held by provider] lisinopril  10 mg Oral Daily    furosemide  20 mg Oral Daily    sodium chloride flush  5-40 mL IntraVENous 2 times per day    ipratropium-albuterol  1 ampule Inhalation Q4H WA    predniSONE  20 mg Oral BID    insulin lispro  0-8 Units SubCUTAneous TID WC    insulin lispro  0-4 Units SubCUTAneous Nightly    apixaban  5 mg Oral BID        dextrose      sodium chloride         Meds prn:     guaiFENesin-dextromethorphan, ipratropium-albuterol, glucose, dextrose bolus **OR** dextrose bolus, glucagon (rDNA), dextrose, sodium chloride flush, sodium chloride, ondansetron **OR** ondansetron, magnesium hydroxide, acetaminophen **OR** acetaminophen    Meds prior to admission:     No current facility-administered medications on file prior to encounter.      Current Outpatient Medications on File Prior to Encounter   Medication Sig Dispense Refill    Multiple Vitamin (MULTIVITAMIN ADULT PO) Take by mouth daily      lisinopril (PRINIVIL;ZESTRIL) 10 MG tablet Take 1 tablet by mouth daily 30 tablet 3    furosemide (LASIX) 20 MG tablet Take 20 mg by mouth daily      donepezil (ARICEPT) 10 MG tablet Take 10 mg by mouth at bedtime      carvedilol (COREG) 12.5 MG tablet Take 1 tablet by mouth 2 times daily (with meals) (Patient taking differently: Take 12.5 mg by mouth 2 times daily (with meals) Takes one and a half tablet) 60 tablet 3    amLODIPine (NORVASC) 5 MG tablet Take 1 tablet by mouth daily 30 tablet 3    ipratropium-albuterol (DUONEB) 0.5-2.5 (3) MG/3ML SOLN nebulizer solution Inhale 1 vial into the lungs every 6 hours as needed for Shortness of Breath      levothyroxine (SYNTHROID) 75 MCG tablet Take 75 mcg by mouth Daily      predniSONE (DELTASONE) 5 MG tablet Take 5 mg by mouth 2 times daily      metFORMIN (GLUCOPHAGE-XR) 500 MG extended release tablet Take 500 mg by mouth daily (with breakfast)      apixaban (ELIQUIS) 5 MG TABS tablet Take 5 mg by mouth 2 times daily         Physical Exam:      Patient Vitals for the past 24 hrs:   BP Temp Temp src Pulse Resp SpO2 Weight   02/14/23 0918 132/88 (!) 96.7 °F (35.9 °C) Temporal 83 -- 100 % --   02/14/23 0600 -- -- -- -- -- -- 196 lb 11.2 oz (89.2 kg)   02/14/23 0504 131/70 (!) 96.5 °F (35.8 °C) Temporal (!) 109 18 100 % --   02/14/23 0045 116/78 97.9 °F (36.6 °C) Temporal 95 20 96 % --   02/13/23 2231 116/76 98.1 °F (36.7 °C) Temporal 98 20 94 % --   02/13/23 1342 -- -- -- -- -- 96 % --         Intake/Output Summary (Last 24 hours) at 2/14/2023 1257  Last data filed at 2/13/2023 1527  Gross per 24 hour   Intake 240 ml   Output 150 ml   Net 90 ml     General: Awake, alert, no acute distress  Neck: No JVD noted  Lungs: Clear bilaterally upper, diminished to the bases bilaterally. Unlabored  CV: Regular rate and rhythm. No rub  Abd: Soft, nontender, nondistended. Active bowel sounds  Skin: Warm and dry. No rash on exposed extremities  Ext: 2+ BLE, 1+ BUE edema   Neuro: Awake, answers questions appropriately    Data:    Recent Labs     02/14/23  0436   WBC 11.6*   HGB 11.1*   HCT 35.1   MCV 89.1            Recent Labs     02/12/23  0504 02/13/23  0648 02/14/23  0436   * 129* 129*   K 5.3* 5.0 4.7   CL 94* 95* 97*   CO2 23 26 24   CREATININE 0.7 0.8 0.6   BUN 27* 24* 21   LABGLOM >60 >60 >60   GLUCOSE 204* 208* 183*   CALCIUM 8.3* 8.2* 8.2*       No results found for: VITD25    No results found for: PTH    No results for input(s): ALT, AST, ALKPHOS, BILITOT, BILIDIR in the last 72 hours. No results for input(s): LABALBU in the last 72 hours.     Ferritin   Date Value Ref Range Status   02/08/2023 297 ng/mL Final     Comment:     FERRITIN Reference Ranges:  Adult Males   20 - 60 years:    30 - 400 ng/mL  Adult females 16 - 60 years:    15 - 150 ng/mL  Adults greater than 60 years:   no established reference range  Pediatrics:                     no established reference range       Iron   Date Value Ref Range Status   02/08/2023 53 37 - 145 mcg/dL Final     TIBC   Date Value Ref Range Status   02/08/2023 249 (L) 250 - 450 mcg/dL Final       Vitamin B-12   Date Value Ref Range Status   02/08/2023 1736 (H) 211 - 946 pg/mL Final       Folate   Date Value Ref Range Status   02/08/2023 19.5 4.8 - 24.2 ng/mL Final       Lab Results   Component Value Date/Time    COLORU Yellow 02/11/2023 03:18 PM    NITRU Negative 02/11/2023 03:18 PM    GLUCOSEU 100 02/11/2023 03:18 PM    KETUA Negative 02/11/2023 03:18 PM    UROBILINOGEN 0.2 02/11/2023 03:18 PM    BILIRUBINUR Negative 02/11/2023 03:18 PM       Lab Results   Component Value Date/Time    OSMOU 608 02/12/2023 10:41 AM       No components found for: URIC    No results found for: LIPIDPAN    Assessment and Plans:    1. Hyponatremia  Isotonic vs. Pseudohyponatremia   Likely hypovolemic/dehydration   Serial monitoring of labs indicates hyponatremia likely mixed due to combination of low solute and hypovolemia  Sodium level improved with course of normal saline  Will give few doses of urea; resuime lasix  Sodium 129 today  Monitor labs     2. Hyperkalemia  Likely in the setting of MERYL /CKD  Potassium 5.8 on 2/6--> 5.2 today  Lokelma 10 g oral once today  Low potassium diet  Monitor labs     3.  MERLY stage I on CKD 3a  Likely in the setting of decreased effective renal perfusion given poor oral intake prior to admission; exacerbated by diuretics and lisinopril  Baseline creatinine 0.8-1  Creatinine peaked at 1.2 on 2/5--> 0.8 today  FeNa 0.2% and FeUrea 34.9% supporte prerenal etiology  Will resume Lasix at 20mg daily       4. RSV/shortness of breath  History of COPD  chest x-ray on 2/4 which showed no acute cardiopulmonary process  Positive for RSV  Being treated with supportive measures  On ceftriaxone and prednisone     5. Diabetes mellitus  Hemoglobin A1c 7% on 2/4/2023  On insulin lispro  Management per primary service     6. Hypertension  BP goal<120/80  BP at goal  Monitor on current regimen     7. Anemia  Not likely related to CKD with creatinine 1  Iron sat 21%, iron 53, TIBC 249  Await SPEP, UPEP, ordered and not done; will rorder  Monitor H&H    8. Hematuria  Suspect Ramsey trauma in the presence of anticoagulation  Irrigation performed to clear  No further bleeding noted    9. UTI, polymicrobial Proteus and E  coli; on Ceftriaxone    Updated daughter at bedside;      Discharge planning  hopefully next 24 hrs    Carolina Pop MD

## 2023-02-14 NOTE — PROGRESS NOTES
Hospitalist Progress Note      PCP: Rashard Ayers MD    Date of Admission: 2/4/2023        Hospital Course:  PRESENT WITH SOB, AND FOUND TO HAVE HYPONATREMIA. DIURETICS HELD,  FEELING BETTER  SODIUM INCREASING  . SHE HAD SOME BLEEDING FROM JONAS, PROBABLY SECONDARY TO TRAUMA. PLAN IS TO DC HOME PER FAMILY REQUEST, WILL IRRIGATE JONAS TO MAKE SURE NOR OTHER CAUSE OF BLEEDING. Suellen Wesley URINE CULTURE WAS POS FOR PROTEUS, ON ROCEPHIN X  DAYS.       DC HOME IN AM              Subjective:    NO COMPLAINTS        Medications:  Reviewed    Infusion Medications    dextrose      sodium chloride       Scheduled Medications    urea  15 g Oral Q6H    cefTRIAXone (ROCEPHIN) IV  1,000 mg IntraVENous Q24H    guaiFENesin  400 mg Oral TID    donepezil  5 mg Oral Nightly    amLODIPine  5 mg Oral Daily    carvedilol  12.5 mg Oral BID WC    levothyroxine  75 mcg Oral Daily    [Held by provider] lisinopril  10 mg Oral Daily    furosemide  20 mg Oral Daily    sodium chloride flush  5-40 mL IntraVENous 2 times per day    ipratropium-albuterol  1 ampule Inhalation Q4H WA    predniSONE  20 mg Oral BID    insulin lispro  0-8 Units SubCUTAneous TID     insulin lispro  0-4 Units SubCUTAneous Nightly    apixaban  5 mg Oral BID     PRN Meds: guaiFENesin-dextromethorphan, ipratropium-albuterol, glucose, dextrose bolus **OR** dextrose bolus, glucagon (rDNA), dextrose, sodium chloride flush, sodium chloride, ondansetron **OR** ondansetron, magnesium hydroxide, acetaminophen **OR** acetaminophen      Intake/Output Summary (Last 24 hours) at 2/14/2023 1452  Last data filed at 2/13/2023 1527  Gross per 24 hour   Intake 240 ml   Output --   Net 240 ml       Exam:    /88   Pulse 83   Temp (!) 96.7 °F (35.9 °C) (Temporal)   Resp 18   Ht 5' (1.524 m)   Wt 196 lb 11.2 oz (89.2 kg)   SpO2 100%   BMI 38.42 kg/m²       Gen:  WELL DEVELOPED  HEENT: NC/AT, moist mucous membranes, n  Neck: supple, trachea midline,   Heart:  IRREG  Lungs: DIMINISHED  bilaterally, FEW RHONCHI  Abd: bowel sounds present, soft, nontender, nondistended, no masses  Extrem:  No clubbing, cyanosis,  EDEMA OF ARMS AND LEGS edema  Skin: ERYTHEMA OF ARMS  Psych: Not oriented to date   Neuro: grossly intact, moves all four extremities. Labs:   Recent Labs     02/14/23  0436   WBC 11.6*   HGB 11.1*   HCT 35.1        Recent Labs     02/12/23  0504 02/13/23  0648 02/14/23  0436   * 129* 129*   K 5.3* 5.0 4.7   CL 94* 95* 97*   CO2 23 26 24   BUN 27* 24* 21   CREATININE 0.7 0.8 0.6   CALCIUM 8.3* 8.2* 8.2*     No results for input(s): AST, ALT, BILIDIR, BILITOT, ALKPHOS in the last 72 hours. No results for input(s): INR in the last 72 hours. No results for input(s): Inga Gazella in the last 72 hours. No results for input(s): AST, ALT, ALB, BILIDIR, BILITOT, ALKPHOS in the last 72 hours. No results for input(s): LACTA in the last 72 hours. Lab Results   Component Value Date    LABURIC 6.9 (H) 02/12/2023     Lab Results   Component Value Date    AMMONIA <10.0 (L) 09/08/2020    AMMONIA 28.3 03/05/2020    AMMONIA 24.2 01/17/2020       Assessment:    Active Hospital Problems    Diagnosis Date Noted    Acute respiratory failure with hypoxia ([facility] [address] [J96.01] 02/04/2023     Priority: Medium   RSV  ANASARCA   IIDM   HYPOTHYROID   DEMENTIA   PACEMAKER   PAF  HYPONATREMIA  UTI(PROTEUS AND E COLI))  ON ROCEPIN  HEMATURIA  Plan:   HOLD LASIX   CONSULT NEPHROLOGY   SSI   SYNTHROID   DDAVP 2/9   REMOVE JONAS IN ANTICIPATION OF DISCHARGE     DVT Prophylaxis: ELIQUIS  Diet: ADULT DIET; Regular; 4 carb choices (60 gm/meal);  Low Sodium (2 gm)  Code Status: Full Code     PT/OT Eval Status: ORDERED     Dispo - HOME    Electronically signed by Mardy Angelucci, DO on 2/14/2023 at 2:52 PM [facility]

## 2023-02-15 VITALS
HEIGHT: 60 IN | DIASTOLIC BLOOD PRESSURE: 87 MMHG | SYSTOLIC BLOOD PRESSURE: 125 MMHG | WEIGHT: 196.7 LBS | BODY MASS INDEX: 38.62 KG/M2 | RESPIRATION RATE: 18 BRPM | OXYGEN SATURATION: 94 % | HEART RATE: 74 BPM | TEMPERATURE: 96.2 F

## 2023-02-15 LAB
ANION GAP SERPL CALCULATED.3IONS-SCNC: 8 MMOL/L (ref 7–16)
BUN BLDV-MCNC: 33 MG/DL (ref 6–23)
CALCIUM SERPL-MCNC: 8.7 MG/DL (ref 8.6–10.2)
CHLORIDE BLD-SCNC: 95 MMOL/L (ref 98–107)
CO2: 27 MMOL/L (ref 22–29)
CREAT SERPL-MCNC: 0.7 MG/DL (ref 0.5–1)
GFR SERPL CREATININE-BSD FRML MDRD: >60 ML/MIN/1.73
GLUCOSE BLD-MCNC: 211 MG/DL (ref 74–99)
METER GLUCOSE: 186 MG/DL (ref 74–99)
METER GLUCOSE: 208 MG/DL (ref 74–99)
POTASSIUM SERPL-SCNC: 5.2 MMOL/L (ref 3.5–5)
SODIUM BLD-SCNC: 130 MMOL/L (ref 132–146)

## 2023-02-15 PROCEDURE — 36415 COLL VENOUS BLD VENIPUNCTURE: CPT

## 2023-02-15 PROCEDURE — 94640 AIRWAY INHALATION TREATMENT: CPT

## 2023-02-15 PROCEDURE — 6370000000 HC RX 637 (ALT 250 FOR IP): Performed by: NURSE PRACTITIONER

## 2023-02-15 PROCEDURE — 82962 GLUCOSE BLOOD TEST: CPT

## 2023-02-15 PROCEDURE — 6370000000 HC RX 637 (ALT 250 FOR IP): Performed by: INTERNAL MEDICINE

## 2023-02-15 PROCEDURE — 2580000003 HC RX 258: Performed by: INTERNAL MEDICINE

## 2023-02-15 PROCEDURE — 2580000003 HC RX 258: Performed by: NURSE PRACTITIONER

## 2023-02-15 PROCEDURE — 80048 BASIC METABOLIC PNL TOTAL CA: CPT

## 2023-02-15 PROCEDURE — 6360000002 HC RX W HCPCS: Performed by: INTERNAL MEDICINE

## 2023-02-15 PROCEDURE — 97530 THERAPEUTIC ACTIVITIES: CPT

## 2023-02-15 PROCEDURE — 94660 CPAP INITIATION&MGMT: CPT

## 2023-02-15 PROCEDURE — 2700000000 HC OXYGEN THERAPY PER DAY

## 2023-02-15 RX ORDER — CEFUROXIME AXETIL 250 MG/1
250 TABLET ORAL 2 TIMES DAILY
Qty: 10 TABLET | Refills: 0 | Status: SHIPPED | OUTPATIENT
Start: 2023-02-15 | End: 2023-02-16

## 2023-02-15 RX ORDER — PREDNISONE 10 MG/1
TABLET ORAL
Qty: 30 TABLET | Refills: 0 | Status: SHIPPED | OUTPATIENT
Start: 2023-02-15 | End: 2023-02-16

## 2023-02-15 RX ORDER — GUAIFENESIN 400 MG/1
400 TABLET ORAL 3 TIMES DAILY
Qty: 56 TABLET | Refills: 0 | Status: SHIPPED | OUTPATIENT
Start: 2023-02-15

## 2023-02-15 RX ADMIN — GUAIFENESIN SYRUP AND DEXTROMETHORPHAN 5 ML: 100; 10 SYRUP ORAL at 03:49

## 2023-02-15 RX ADMIN — CEFTRIAXONE 1000 MG: 1 INJECTION, POWDER, FOR SOLUTION INTRAMUSCULAR; INTRAVENOUS at 12:36

## 2023-02-15 RX ADMIN — INSULIN LISPRO 2 UNITS: 100 INJECTION, SOLUTION INTRAVENOUS; SUBCUTANEOUS at 12:41

## 2023-02-15 RX ADMIN — SODIUM ZIRCONIUM CYCLOSILICATE 10 G: 10 POWDER, FOR SUSPENSION ORAL at 10:08

## 2023-02-15 RX ADMIN — CARVEDILOL 12.5 MG: 6.25 TABLET, FILM COATED ORAL at 09:30

## 2023-02-15 RX ADMIN — FUROSEMIDE 20 MG: 20 TABLET ORAL at 09:30

## 2023-02-15 RX ADMIN — GUAIFENESIN 400 MG: 400 TABLET ORAL at 09:30

## 2023-02-15 RX ADMIN — IPRATROPIUM BROMIDE AND ALBUTEROL SULFATE 1 AMPULE: .5; 2.5 SOLUTION RESPIRATORY (INHALATION) at 12:14

## 2023-02-15 RX ADMIN — Medication 10 ML: at 09:31

## 2023-02-15 RX ADMIN — AMLODIPINE BESYLATE 5 MG: 5 TABLET ORAL at 09:30

## 2023-02-15 RX ADMIN — PREDNISONE 20 MG: 20 TABLET ORAL at 09:30

## 2023-02-15 RX ADMIN — APIXABAN 5 MG: 5 TABLET, FILM COATED ORAL at 09:30

## 2023-02-15 RX ADMIN — LEVOTHYROXINE SODIUM 75 MCG: 0.07 TABLET ORAL at 05:39

## 2023-02-15 RX ADMIN — IPRATROPIUM BROMIDE AND ALBUTEROL SULFATE 1 AMPULE: .5; 2.5 SOLUTION RESPIRATORY (INHALATION) at 09:48

## 2023-02-15 NOTE — PROGRESS NOTES
Hospitalist Progress Note      PCP: Santino Fagan MD    Date of Admission: 2/4/2023        Hospital Course:  PRESENT WITH SOB, AND FOUND TO HAVE HYPONATREMIA. DIURETICS HELD,  FEELING BETTER  SODIUM INCREASING  . SHE HAD SOME BLEEDING FROM JONAS, PROBABLY SECONDARY TO TRAUMA. PLAN IS TO DC HOME PER FAMILY REQUEST, WILL IRRIGATE JONAS TO MAKE SURE NOR OTHER CAUSE OF BLEEDING. Sherryle Moder URINE CULTURE WAS POS FOR PROTEUS, ON ROCEPHIN X  DAYS.                  Subjective:  DAUGHTER ENCOURAGED TO GET HER MOM UP IN THE CHAIR DAILY            Medications:  Reviewed    Infusion Medications    dextrose      sodium chloride       Scheduled Medications    cefTRIAXone (ROCEPHIN) IV  1,000 mg IntraVENous Q24H    guaiFENesin  400 mg Oral TID    donepezil  5 mg Oral Nightly    amLODIPine  5 mg Oral Daily    carvedilol  12.5 mg Oral BID WC    levothyroxine  75 mcg Oral Daily    [Held by provider] lisinopril  10 mg Oral Daily    furosemide  20 mg Oral Daily    sodium chloride flush  5-40 mL IntraVENous 2 times per day    ipratropium-albuterol  1 ampule Inhalation Q4H WA    predniSONE  20 mg Oral BID    insulin lispro  0-8 Units SubCUTAneous TID WC    insulin lispro  0-4 Units SubCUTAneous Nightly    apixaban  5 mg Oral BID     PRN Meds: guaiFENesin-dextromethorphan, ipratropium-albuterol, glucose, dextrose bolus **OR** dextrose bolus, glucagon (rDNA), dextrose, sodium chloride flush, sodium chloride, ondansetron **OR** ondansetron, magnesium hydroxide, acetaminophen **OR** acetaminophen      Intake/Output Summary (Last 24 hours) at 2/15/2023 1519  Last data filed at 2/15/2023 0856  Gross per 24 hour   Intake 130 ml   Output 500 ml   Net -370 ml       Exam:    /87   Pulse 74   Temp (!) 96.2 °F (35.7 °C) (Temporal)   Resp 18   Ht 5' (1.524 m)   Wt 196 lb 11.2 oz (89.2 kg)   SpO2 94%   BMI 38.42 kg/m²       Gen:  WELL DEVELOPED  HEENT: NC/AT, moist mucous membranes, n  Neck: supple, trachea midline,   Heart:  IRREG  Lungs: DIMINISHED  bilaterally, FEW RHONCHI  Abd: bowel sounds present, soft, nontender, nondistended, no masses  Extrem:  No clubbing, cyanosis,  EDEMA OF ARMS AND LEGS edema  Skin: ERYTHEMA OF ARMS  Psych: Not oriented to date   Neuro: grossly intact, moves all four extremities. Labs:   Recent Labs     02/14/23  0436   WBC 11.6*   HGB 11.1*   HCT 35.1        Recent Labs     02/13/23  0648 02/14/23  0436 02/15/23  0457   * 129* 130*   K 5.0 4.7 5.2*   CL 95* 97* 95*   CO2 26 24 27   BUN 24* 21 33*   CREATININE 0.8 0.6 0.7   CALCIUM 8.2* 8.2* 8.7     No results for input(s): AST, ALT, BILIDIR, BILITOT, ALKPHOS in the last 72 hours. No results for input(s): INR in the last 72 hours. No results for input(s): Jian Wang in the last 72 hours. No results for input(s): AST, ALT, ALB, BILIDIR, BILITOT, ALKPHOS in the last 72 hours. No results for input(s): LACTA in the last 72 hours.   Lab Results   Component Value Date    LABURIC 6.9 (H) 02/12/2023     Lab Results   Component Value Date    AMMONIA <10.0 (L) 09/08/2020    AMMONIA 28.3 03/05/2020    AMMONIA 24.2 01/17/2020       Assessment:    Active Hospital Problems    Diagnosis Date Noted    Acute respiratory failure with hypoxia ([facility] [address] [J96.01] 02/04/2023     Priority: Medium   RSV  ANASARCA   IIDM   HYPOTHYROID   DEMENTIA   PACEMAKER   PAF  HYPONATREMIA  UTI(PROTEUS AND E COLI))  ON ROCEPIN  HEMATURIA    Plan: [id number] [facility]  Electronically signed by Rosalba Rhoades DO on 2/15/2023 at 3:19 PM [facility]

## 2023-02-15 NOTE — CARE COORDINATION
2/15. Discharge order noted. Transportation arranged with physician's ambulance for 3:00 to return home. Nursing and pt's  notified of the time. Nursing checking for O2 requirement.  Catha Homans, RN

## 2023-02-15 NOTE — PROGRESS NOTES
[facility]  Nephrology Progress Note    Patient's Name: Jacqueline Gomez    History of Present Illness from 2/7 Consult Note:    Jorge Epps is a 80 y.o. female with a past medical history of arthritis, COPD, hypertension, hyperlipidemia, and diabetes mellitus. She presented to the ED on 2/4 with complaints of shortness of breath and was found to be hypoxemic and in respiratory distress per EMS. Vital signs on 2/4 included temperature 97.9, respirations 30, pulse 110, /58, and she was 97% on nonrebreather. Lab data on 2/4 include sodium 125, potassium 5.2, BUN 26, creatinine 1.1, proBNP 3109, hemoglobin 11.1, and she was found to be positive for RSV. She had a chest x-ray on 2/4 which showed no acute cardiopulmonary process. We were consulted to see the patient for anasarca and hyponatremia. Patient's sodium was noted to be 125 on 2/4 and is 124 today. At present, patient was seen and examined. She reports that she feels pretty good. She explained that she came in due to shortness of breath. She also reports decreased appetite prior to admission. She reports that she had urinary frequency. She reports nausea, but denies any abdominal pain, vomiting, or diarrhea. She denies any chest pain. Her  and daughter are at the bedside. \"    Subjective:    2/11: Patient was incontinent of urine earlier this a.m. as pure wick malfunctioned - cherry colored urine noted after reapplying -currently 98% on 3 L nasal cannula -stable vital signs - all reviewed w primary RN as well as family member at bedside     2/12: She complains of dry mouth and thirst;  at bedside visiting. She denies shortness of breath. She states that her coughing is much less    2/13:She states breathing and cough is much improved    2/14: No new c/o ; wants to go home    2/15: No new c/o; denies SOB. Cough less frequent        Diet:    ADULT DIET; Regular; 4 carb choices (60 gm/meal); Low Sodium (2 gm);  Low Potassium (Less than 3000 mg/day)  ADULT ORAL NUTRITION SUPPLEMENT; Lunch; Other Oral Supplement; [id number]    Meds:     cefTRIAXone (ROCEPHIN) IV  1,000 mg IntraVENous Q24H    guaiFENesin  400 mg Oral TID    donepezil  5 mg Oral Nightly    amLODIPine  5 mg Oral Daily    carvedilol  12.5 mg Oral BID WC    levothyroxine  75 mcg Oral Daily    [Held by provider] lisinopril  10 mg Oral Daily    furosemide  20 mg Oral Daily    sodium chloride flush  5-40 mL IntraVENous 2 times per day    ipratropium-albuterol  1 ampule Inhalation Q4H WA    predniSONE  20 mg Oral BID    insulin lispro  0-8 Units SubCUTAneous TID WC    insulin lispro  0-4 Units SubCUTAneous Nightly    apixaban  5 mg Oral BID        dextrose      sodium chloride         Meds prn:     guaiFENesin-dextromethorphan, ipratropium-albuterol, glucose, dextrose bolus **OR** dextrose bolus, glucagon (rDNA), dextrose, sodium chloride flush, sodium chloride, ondansetron **OR** ondansetron, magnesium hydroxide, acetaminophen **OR** acetaminophen    Meds prior to admission:     No current facility-administered medications on file prior to encounter.      Current Outpatient Medications on File Prior to Encounter   Medication Sig Dispense Refill    Multiple Vitamin (MULTIVITAMIN ADULT PO) Take by mouth daily      lisinopril (PRINIVIL;ZESTRIL) 10 MG tablet Take 1 tablet by mouth daily 30 tablet 3    furosemide (LASIX) 20 MG tablet Take 20 mg by mouth daily      donepezil (ARICEPT) 10 MG tablet Take 10 mg by mouth at bedtime      carvedilol (COREG) 12.5 MG tablet Take 1 tablet by mouth 2 times daily (with meals) (Patient taking differently: Take 12.5 mg by mouth 2 times daily (with meals) Takes one and a half tablet) 60 tablet 3    amLODIPine (NORVASC) 5 MG tablet Take 1 tablet by mouth daily 30 tablet 3    ipratropium-albuterol (DUONEB) 0.5-2.5 (3) MG/3ML SOLN nebulizer solution Inhale 1 vial into the lungs every 6 hours as needed for Shortness of Breath      levothyroxine (SYNTHROID) 75 MCG tablet Take 75 mcg by mouth Daily      predniSONE (DELTASONE) 5 MG tablet Take 5 mg by mouth 2 times daily      metFORMIN (GLUCOPHAGE-XR) 500 MG extended release tablet Take 500 mg by mouth daily (with breakfast)      apixaban (ELIQUIS) 5 MG TABS tablet Take 5 mg by mouth 2 times daily         Physical Exam:      Patient Vitals for the past 24 hrs:   BP Temp Temp src Pulse Resp SpO2   02/15/23 0856 125/87 (!) 96.2 °F (35.7 °C) Temporal (!) 108 18 93 %   02/15/23 0345 130/76 97.4 °F (36.3 °C) Temporal 57 18 --   02/14/23 2345 119/62 97.2 °F (36.2 °C) Temporal 83 18 --   02/14/23 1945 118/65 -- Temporal 83 18 96 %   02/14/23 1743 119/68 -- -- 81 -- --         Intake/Output Summary (Last 24 hours) at 2/15/2023 1028  Last data filed at 2/14/2023 2345  Gross per 24 hour   Intake 130 ml   Output 500 ml   Net -370 ml     General: Awake, alert, no acute distress  Neck: No JVD noted  Lungs: Clear bilaterally upper, diminished to the bases bilaterally. Unlabored  CV: Regular rate and rhythm. No rub  Abd: Soft, nontender, nondistended. Active bowel sounds  Skin: Warm and dry. No rash on exposed extremities  Ext: 2+ BLE, 1+ BUE edema   Neuro: Awake, answers questions appropriately    Data:    Recent Labs     02/14/23  0436   WBC 11.6*   HGB 11.1*   HCT 35.1   MCV 89.1            Recent Labs     02/13/23  0648 02/14/23  0436 02/15/23  0457   * 129* 130*   K 5.0 4.7 5.2*   CL 95* 97* 95*   CO2 26 24 27   CREATININE 0.8 0.6 0.7   BUN 24* 21 33*   LABGLOM >60 >60 >60   GLUCOSE 208* 183* 211*   CALCIUM 8.2* 8.2* 8.7       No results found for: VITD25    No results found for: PTH    No results for input(s): ALT, AST, ALKPHOS, BILITOT, BILIDIR in the last 72 hours. No results for input(s): LABALBU in the last 72 hours.     Ferritin   Date Value Ref Range Status   02/08/2023 297 ng/mL Final     Comment:     FERRITIN Reference Ranges:  Adult Males   20 - 60 years:    27 - 400 ng/mL  Adult females 16 - 60 years:    13 - 150 ng/mL  Adults greater than 60 years:   no established reference range  Pediatrics:                     no established reference range       Iron   Date Value Ref Range Status   02/08/2023 53 37 - 145 mcg/dL Final     TIBC   Date Value Ref Range Status   02/08/2023 249 (L) 250 - 450 mcg/dL Final       Vitamin B-12   Date Value Ref Range Status   02/08/2023 1736 (H) 211 - 946 pg/mL Final       Folate   Date Value Ref Range Status   02/08/2023 19.5 4.8 - 24.2 ng/mL Final       Lab Results   Component Value Date/Time    COLORU Yellow 02/11/2023 03:18 PM    NITRU Negative 02/11/2023 03:18 PM    GLUCOSEU 100 02/11/2023 03:18 PM    KETUA Negative 02/11/2023 03:18 PM    UROBILINOGEN 0.2 02/11/2023 03:18 PM    BILIRUBINUR Negative 02/11/2023 03:18 PM       Lab Results   Component Value Date/Time    OSMOU 608 02/12/2023 10:41 AM       No components found for: URIC    No results found for: LIPIDPAN    Assessment and Plans:    1. Hyponatremia  Isotonic vs. Pseudohyponatremia   Likely hypovolemic/dehydration   Serial monitoring of labs indicates hyponatremia likely mixed due to combination of low solute and hypovolemia  Sodium level improved with course of normal saline  Will give few doses of urea; resuime lasix  Sodium 130 today  Monitor labs     2. Hyperkalemia  Likely in the setting of MERYL /CKD  Potassium 5.8 on 2/6--> 5.2 today  Lokelma 10 g oral once today  Low potassium diet  Monitor labs     3. MERYL stage I on CKD 3a  Likely in the setting of decreased effective renal perfusion given poor oral intake prior to admission; exacerbated by diuretics and lisinopril  Baseline creatinine 0.8-1  Creatinine peaked at 1.2 on 2/5--> 0.8 today  FeNa 0.2% and FeUrea 34.9% supporte prerenal etiology  Lasix 20mg daily, resumed at discharge       4.  RSV/shortness of breath  History of COPD  chest x-ray on 2/4 which showed no acute cardiopulmonary process  Positive for RSV  Being treated with supportive measures  On ceftriaxone and prednisone     5. Diabetes mellitus  Hemoglobin A1c 7% on 2/4/2023  On insulin lispro  Management per primary service     6. Hypertension  BP goal<120/80  BP at goal  Monitor on current regimen     7.   Anemia  Not likely related to CKD with creatinine 1  Iron sat 21%, iron 53, TIBC 249  Await SPEP, UPEP, ordered and not done; will rorder  Monitor H&H    8. Hematuria  Suspect Ramsey trauma in the presence of anticoagulation  Irrigation performed to clear  No further bleeding noted    9. UTI, polymicrobial Proteus and E  coli; on Ceftriaxone; day #4 today       OK to discharge from a Renal standpoint    Cathy Arora MD

## 2023-02-15 NOTE — PROGRESS NOTES
Patient does not use oxygen at home. Oxygen removed and patient has been sating between 93-94% on room air. No shortness of breath noted or voiced by patient. 36.7

## 2023-02-15 NOTE — PROGRESS NOTES
CLINICAL PHARMACY NOTE: MEDS TO BEDS    Total # of Prescriptions Filled: 3   The following medications were delivered to the patient:  Chest congestion relief 400mg  Prednisone 10mg  Cefuroxime 250mg    Additional Documentation:  Delivered to Kyler Meek RN

## 2023-02-15 NOTE — PROGRESS NOTES
Occupational Therapy  OT BEDSIDE TREATMENT NOTE   [facility] [address] [facility]  [address] [place] [address]      VSJ  Patient Name: Karishma Lara  MRN: [id number]  : 1932  Room: [address] [place], [place] [address]     Evaluating OT: Freddy Keane OTR/L #[id number]      Referring Provider: MICHA Baird CNP  Specific Provider Orders/Date: OT eval and treat 23     Diagnosis: Respiratory syncytial virus (RSV) [B33.8]  Acute respiratory failure with hypoxia ([facility] [address] [J96.01]   Pt admitted to hospital with SOB (O2 sat 58%). Pertinent Medical History:  has a past medical history of Arthritis, COPD (chronic obstructive pulmonary disease) ([facility] [address] Diabetes mellitus ([facility] [address] Hyperlipidemia, and Hypertension.          Precautions:  Fall Risk, O2, contact / droplet precautions     Assessment of current deficits    [x] Functional mobility          [x]ADLs           [x] Strength                  [x]Cognition    [x] Functional transfers        [x] IADLs         [x] Safety Awareness   [x]Endurance    [] Fine Coordination                        [x] Balance      [] Vision/perception   []Sensation      []Gross Motor Coordination            [] ROM           [] Delirium                   [] Motor Control      OT PLAN OF CARE   OT POC based on physician orders, patient diagnosis and results of clinical assessment     Frequency/Duration 1-3 days/wk for 2 weeks PRN   Specific OT Treatment Interventions to include:   * Instruction/training on adapted ADL techniques and AE recommendations to increase functional independence within precautions       * Training on energy conservation strategies, correct breathing pattern and techniques to improve independence/tolerance for self-care routine  * Functional transfer/mobility training/DME recommendations for increased independence, safety, and fall prevention  * Patient/Family education to increase follow through with safety techniques and functional independence  * Recommendation of environmental modifications for increased safety with functional transfers/mobility and ADLs  * Therapeutic exercise to improve motor endurance, ROM, and functional strength for ADLs/functional transfers  * Therapeutic activities to facilitate/challenge dynamic balance, stand tolerance for increased safety and independence with ADLs  * Therapeutic activities to facilitate gross/fine motor skills for increased independence with ADLs  * Neuro-muscular re-education: facilitation of righting/equilibrium reactions, midline orientation, scapular stability/mobility, normalization of muscle tone, and facilitation of volitional active controled movement     Recommended Adaptive Equipment:  TBD      Home Living: Pt lives with  in a 1 story home with 4 LACY   Bathroom setup: sponge bathes with assist from     Equipment owned: none     Prior Level of Function: assist with ADLs , assist with IADLs; ambulated with w/w (pt reports she has been bedbound and nonambulatory for the past 3 weeks following fall)   Driving: yes   Occupation: na     Pain Level: Pt denies pain this session     Cognition: A&O: x3 Follows 1 step directions             Memory:  fair-             Sequencing:  fair-             Problem solving:  Poor+             Judgement/safety:  fair-               Functional Assessment:  AM-PAC Daily Activity Raw Score: 11/24    Initial Eval Status  Date: 2/6/23 Treatment Status  Date:2/15/23 STGs = LTGs  Time frame: 10-14 days   Feeding Set-up  min A  Mod I   Grooming Minimal Assist   Mod A  To wash face supported sitting Supervision    UB Dressing Moderate Assist  Max A  To dof/don gown supported sitting Supervision    LB Dressing Dependent  Dependent   To don/dof socks Maximal Assist    Bathing Dependent Max A  simulated bed level Moderate Assist    Toileting Dependent   Dependent-catheter   Maximal Assist    Bed Mobility  Rolling Moderate Assist   Supine<>sit: NT  Pt declined this session  Rolling side to side max A previous note Supine->sit: Max A  Sit->supine: Dependent Rolling: SBA  Supine to sit: Moderate Assist   Sit to supine: Moderate Assist    Functional Transfers NT  N/T Maximal Assist    Functional Mobility NT  N/T     Balance Sitting: NT Per last tx     Activity Tolerance P+     Limited due to fatigue and weakness P     Limited due to fatigue and weakness. F+   Visual/  Perceptual wfl                             Hand Dominance right    Strength ROM Additional Info:    RUE   3+/5 wfl fair  and wfl FMC/dexterity noted during ADL tasks noted moderate hand edema       LUE 3-/5 wfl Fair  and wfl FMC/dexterity noted during ADL tasks moderate hand edema      Hearing: wfl  Sensation:wfl  Tone: wfl  Edema:none moderate B feet and hands      Comments: Upon arrival pt supine in bed. Pt educated on techniques to increase independence and safety during ADL's and bed mobility. Pt educated on BUE AROM and hand squeezes to reduce edema and maintain strength. At end of session pt returned to supine with HOB elevated and taps system positioned on R and B UE's elevated , call light within reach, bed alarm on. Pt has made limited progress towards set goals.      Continue with current plan of care    Treatment Time In: 10:30           Treatment Time Out: 10:45             Treatment Charges: Mins Units   Ther Ex  58309     Manual Therapy 34281     Thera Activities 40257 15 1   ADL/Home Mgt 41889     Neuro Re-ed 91449     Group Therapy      Orthotic manage/training  91822     Non-Billable Time     Total Timed Treatment 15 69 Romeo Mcconnell

## 2023-02-15 NOTE — PLAN OF CARE
Problem: Discharge Planning  Goal: Discharge to home or other facility with appropriate resources  Outcome: Progressing     Problem: Safety - Adult  Goal: Free from fall injury  Outcome: Progressing     Problem: Cardiovascular - Adult  Goal: Maintains optimal cardiac output and hemodynamic stability  Outcome: Progressing     Problem: Metabolic/Fluid and Electrolytes - Adult  Goal: Hemodynamic stability and optimal renal function maintained  Outcome: Progressing     Problem: Pain  Goal: Verbalizes/displays adequate comfort level or baseline comfort level  Outcome: Progressing

## 2023-02-15 NOTE — PLAN OF CARE
Problem: Discharge Planning  Goal: Discharge to home or other facility with appropriate resources  2/15/2023 1544 by Gorge Currie RN  Outcome: Completed  2/15/2023 1307 by Gorge Currie RN  Outcome: Progressing     Problem: Safety - Adult  Goal: Free from fall injury  2/15/2023 1544 by Gorge Currie RN  Outcome: Completed  2/15/2023 1307 by Gorge Currie RN  Outcome: Progressing     Problem: Cardiovascular - Adult  Goal: Maintains optimal cardiac output and hemodynamic stability  2/15/2023 1544 by Gorge Currie RN  Outcome: Completed  2/15/2023 1307 by Gorge Currie RN  Outcome: Progressing     Problem: Metabolic/Fluid and Electrolytes - Adult  Goal: Hemodynamic stability and optimal renal function maintained  2/15/2023 1544 by Gorge Currie RN  Outcome: Completed  2/15/2023 1307 by Gorge Currie RN  Outcome: Progressing     Problem: Pain  Goal: Verbalizes/displays adequate comfort level or baseline comfort level  2/15/2023 1544 by Gorge Currie RN  Outcome: Completed  2/15/2023 1307 by Gorge Currie RN  Outcome: Progressing

## 2023-02-16 ENCOUNTER — HOSPITAL ENCOUNTER (INPATIENT)
Age: 88
LOS: 8 days | Discharge: HOME HEALTH | End: 2023-02-24
Attending: EMERGENCY MEDICINE | Admitting: INTERNAL MEDICINE
Payer: MEDICARE

## 2023-02-16 ENCOUNTER — APPOINTMENT (OUTPATIENT)
Dept: GENERAL RADIOLOGY | Age: 88
End: 2023-02-16
Payer: MEDICARE

## 2023-02-16 DIAGNOSIS — R09.02 HYPOXIA: Primary | ICD-10-CM

## 2023-02-16 DIAGNOSIS — I50.9 CONGESTIVE HEART FAILURE, UNSPECIFIED HF CHRONICITY, UNSPECIFIED HEART FAILURE TYPE (HCC): ICD-10-CM

## 2023-02-16 DIAGNOSIS — J90 PLEURAL EFFUSION: ICD-10-CM

## 2023-02-16 DIAGNOSIS — J96.01 ACUTE RESPIRATORY FAILURE WITH HYPOXIA (HCC): ICD-10-CM

## 2023-02-16 LAB
ALBUMIN SERPL-MCNC: 2.9 G/DL (ref 3.5–5.2)
ALP BLD-CCNC: 97 U/L (ref 35–104)
ALT SERPL-CCNC: 32 U/L (ref 0–32)
ANION GAP SERPL CALCULATED.3IONS-SCNC: 10 MMOL/L (ref 7–16)
ANISOCYTOSIS: ABNORMAL
AST SERPL-CCNC: 18 U/L (ref 0–31)
BASOPHILS ABSOLUTE: 0 E9/L (ref 0–0.2)
BASOPHILS RELATIVE PERCENT: 0.9 % (ref 0–2)
BILIRUB SERPL-MCNC: <0.2 MG/DL (ref 0–1.2)
BUN BLDV-MCNC: 29 MG/DL (ref 6–23)
CALCIUM SERPL-MCNC: 8.9 MG/DL (ref 8.6–10.2)
CHLORIDE BLD-SCNC: 93 MMOL/L (ref 98–107)
CO2: 28 MMOL/L (ref 22–29)
CREAT SERPL-MCNC: 0.7 MG/DL (ref 0.5–1)
EOSINOPHILS ABSOLUTE: 0 E9/L (ref 0.05–0.5)
EOSINOPHILS RELATIVE PERCENT: 0 % (ref 0–6)
GFR SERPL CREATININE-BSD FRML MDRD: >60 ML/MIN/1.73
GLUCOSE BLD-MCNC: 302 MG/DL (ref 74–99)
HCT VFR BLD CALC: 35.1 % (ref 34–48)
HEMOGLOBIN: 11.4 G/DL (ref 11.5–15.5)
INFLUENZA A BY PCR: NOT DETECTED
INFLUENZA B BY PCR: NOT DETECTED
INR BLD: 1.7
LYMPHOCYTES ABSOLUTE: 0.63 E9/L (ref 1.5–4)
LYMPHOCYTES RELATIVE PERCENT: 3.5 % (ref 20–42)
MCH RBC QN AUTO: 28.1 PG (ref 26–35)
MCHC RBC AUTO-ENTMCNC: 32.5 % (ref 32–34.5)
MCV RBC AUTO: 86.7 FL (ref 80–99.9)
METER GLUCOSE: 336 MG/DL (ref 74–99)
METER GLUCOSE: 348 MG/DL (ref 74–99)
MONOCYTES ABSOLUTE: 0.63 E9/L (ref 0.1–0.95)
MONOCYTES RELATIVE PERCENT: 3.5 % (ref 2–12)
MYELOCYTE PERCENT: 4.4 % (ref 0–0)
NEUTROPHILS ABSOLUTE: 14.6 E9/L (ref 1.8–7.3)
NEUTROPHILS RELATIVE PERCENT: 88.6 % (ref 43–80)
PDW BLD-RTO: 15.7 FL (ref 11.5–15)
PLATELET # BLD: 303 E9/L (ref 130–450)
PMV BLD AUTO: 9.2 FL (ref 7–12)
POLYCHROMASIA: ABNORMAL
POTASSIUM SERPL-SCNC: 4.6 MMOL/L (ref 3.5–5)
PRO-BNP: 6396 PG/ML (ref 0–450)
PROTHROMBIN TIME: 18.4 SEC (ref 9.3–12.4)
RBC # BLD: 4.05 E12/L (ref 3.5–5.5)
SARS-COV-2, NAAT: NOT DETECTED
SODIUM BLD-SCNC: 131 MMOL/L (ref 132–146)
TOTAL PROTEIN: 5.5 G/DL (ref 6.4–8.3)
TROPONIN, HIGH SENSITIVITY: 44 NG/L (ref 0–9)
WBC # BLD: 15.7 E9/L (ref 4.5–11.5)

## 2023-02-16 PROCEDURE — 6370000000 HC RX 637 (ALT 250 FOR IP): Performed by: INTERNAL MEDICINE

## 2023-02-16 PROCEDURE — 6360000002 HC RX W HCPCS: Performed by: EMERGENCY MEDICINE

## 2023-02-16 PROCEDURE — 87635 SARS-COV-2 COVID-19 AMP PRB: CPT

## 2023-02-16 PROCEDURE — 80053 COMPREHEN METABOLIC PANEL: CPT

## 2023-02-16 PROCEDURE — 84484 ASSAY OF TROPONIN QUANT: CPT

## 2023-02-16 PROCEDURE — 99285 EMERGENCY DEPT VISIT HI MDM: CPT

## 2023-02-16 PROCEDURE — 82962 GLUCOSE BLOOD TEST: CPT

## 2023-02-16 PROCEDURE — 36415 COLL VENOUS BLD VENIPUNCTURE: CPT

## 2023-02-16 PROCEDURE — 85610 PROTHROMBIN TIME: CPT

## 2023-02-16 PROCEDURE — 93005 ELECTROCARDIOGRAM TRACING: CPT | Performed by: EMERGENCY MEDICINE

## 2023-02-16 PROCEDURE — 87502 INFLUENZA DNA AMP PROBE: CPT

## 2023-02-16 PROCEDURE — 94640 AIRWAY INHALATION TREATMENT: CPT

## 2023-02-16 PROCEDURE — 2140000000 HC CCU INTERMEDIATE R&B

## 2023-02-16 PROCEDURE — 6360000002 HC RX W HCPCS: Performed by: INTERNAL MEDICINE

## 2023-02-16 PROCEDURE — 71045 X-RAY EXAM CHEST 1 VIEW: CPT

## 2023-02-16 PROCEDURE — 85025 COMPLETE CBC W/AUTO DIFF WBC: CPT

## 2023-02-16 PROCEDURE — 83880 ASSAY OF NATRIURETIC PEPTIDE: CPT

## 2023-02-16 RX ORDER — PREDNISONE 20 MG/1
40 TABLET ORAL DAILY
Status: DISCONTINUED | OUTPATIENT
Start: 2023-02-16 | End: 2023-02-16

## 2023-02-16 RX ORDER — DONEPEZIL HYDROCHLORIDE 5 MG/1
10 TABLET, FILM COATED ORAL NIGHTLY
Status: DISCONTINUED | OUTPATIENT
Start: 2023-02-16 | End: 2023-02-20

## 2023-02-16 RX ORDER — CARVEDILOL 6.25 MG/1
18.75 TABLET ORAL 2 TIMES DAILY WITH MEALS
Status: DISCONTINUED | OUTPATIENT
Start: 2023-02-16 | End: 2023-02-24 | Stop reason: HOSPADM

## 2023-02-16 RX ORDER — DEXTROSE MONOHYDRATE 100 MG/ML
INJECTION, SOLUTION INTRAVENOUS CONTINUOUS PRN
Status: DISCONTINUED | OUTPATIENT
Start: 2023-02-16 | End: 2023-02-24 | Stop reason: HOSPADM

## 2023-02-16 RX ORDER — CARVEDILOL 12.5 MG/1
18.75 TABLET ORAL 2 TIMES DAILY WITH MEALS
COMMUNITY

## 2023-02-16 RX ORDER — ALBUTEROL SULFATE 2.5 MG/3ML
2.5 SOLUTION RESPIRATORY (INHALATION) 4 TIMES DAILY
Status: DISCONTINUED | OUTPATIENT
Start: 2023-02-16 | End: 2023-02-17

## 2023-02-16 RX ORDER — INSULIN LISPRO 100 [IU]/ML
0-10 INJECTION, SOLUTION INTRAVENOUS; SUBCUTANEOUS
Status: DISCONTINUED | OUTPATIENT
Start: 2023-02-16 | End: 2023-02-24 | Stop reason: HOSPADM

## 2023-02-16 RX ORDER — CEFUROXIME AXETIL 250 MG/1
250 TABLET ORAL 2 TIMES DAILY
Status: ON HOLD | COMMUNITY
Start: 2023-02-15 | End: 2023-02-24 | Stop reason: HOSPADM

## 2023-02-16 RX ORDER — AMLODIPINE BESYLATE 5 MG/1
5 TABLET ORAL DAILY
Status: DISCONTINUED | OUTPATIENT
Start: 2023-02-16 | End: 2023-02-24 | Stop reason: HOSPADM

## 2023-02-16 RX ORDER — LEVOTHYROXINE SODIUM 0.07 MG/1
75 TABLET ORAL DAILY
Status: DISCONTINUED | OUTPATIENT
Start: 2023-02-16 | End: 2023-02-24 | Stop reason: HOSPADM

## 2023-02-16 RX ORDER — BISACODYL 10 MG
10 SUPPOSITORY, RECTAL RECTAL DAILY PRN
Status: DISCONTINUED | OUTPATIENT
Start: 2023-02-16 | End: 2023-02-24 | Stop reason: HOSPADM

## 2023-02-16 RX ORDER — CEFUROXIME AXETIL 250 MG/1
250 TABLET ORAL 2 TIMES DAILY
Status: DISPENSED | OUTPATIENT
Start: 2023-02-16 | End: 2023-02-21

## 2023-02-16 RX ORDER — FUROSEMIDE 10 MG/ML
40 INJECTION INTRAMUSCULAR; INTRAVENOUS DAILY
Status: DISCONTINUED | OUTPATIENT
Start: 2023-02-16 | End: 2023-02-24 | Stop reason: HOSPADM

## 2023-02-16 RX ORDER — IPRATROPIUM BROMIDE AND ALBUTEROL SULFATE 2.5; .5 MG/3ML; MG/3ML
1 SOLUTION RESPIRATORY (INHALATION) EVERY 6 HOURS PRN
Status: DISCONTINUED | OUTPATIENT
Start: 2023-02-16 | End: 2023-02-24 | Stop reason: HOSPADM

## 2023-02-16 RX ORDER — FUROSEMIDE 10 MG/ML
40 INJECTION INTRAMUSCULAR; INTRAVENOUS ONCE
Status: COMPLETED | OUTPATIENT
Start: 2023-02-16 | End: 2023-02-16

## 2023-02-16 RX ORDER — PREDNISONE 10 MG/1
40 TABLET ORAL DAILY
Status: ON HOLD | COMMUNITY
Start: 2023-02-15 | End: 2023-02-24 | Stop reason: HOSPADM

## 2023-02-16 RX ORDER — METHYLPREDNISOLONE SODIUM SUCCINATE 40 MG/ML
40 INJECTION, POWDER, LYOPHILIZED, FOR SOLUTION INTRAMUSCULAR; INTRAVENOUS EVERY 12 HOURS
Status: DISCONTINUED | OUTPATIENT
Start: 2023-02-16 | End: 2023-02-17

## 2023-02-16 RX ORDER — ACETAMINOPHEN 325 MG/1
650 TABLET ORAL EVERY 4 HOURS PRN
Status: DISCONTINUED | OUTPATIENT
Start: 2023-02-16 | End: 2023-02-24 | Stop reason: HOSPADM

## 2023-02-16 RX ORDER — GUAIFENESIN 400 MG/1
400 TABLET ORAL 3 TIMES DAILY
Status: DISCONTINUED | OUTPATIENT
Start: 2023-02-16 | End: 2023-02-24 | Stop reason: HOSPADM

## 2023-02-16 RX ADMIN — LEVOTHYROXINE SODIUM 75 MCG: 0.07 TABLET ORAL at 18:31

## 2023-02-16 RX ADMIN — ALBUTEROL SULFATE 2.5 MG: 2.5 SOLUTION RESPIRATORY (INHALATION) at 20:25

## 2023-02-16 RX ADMIN — FUROSEMIDE 40 MG: 10 INJECTION, SOLUTION INTRAMUSCULAR; INTRAVENOUS at 13:13

## 2023-02-16 RX ADMIN — CARVEDILOL 18.75 MG: 6.25 TABLET, FILM COATED ORAL at 18:30

## 2023-02-16 RX ADMIN — GUAIFENESIN 400 MG: 400 TABLET ORAL at 20:29

## 2023-02-16 RX ADMIN — AMLODIPINE BESYLATE 5 MG: 5 TABLET ORAL at 18:31

## 2023-02-16 RX ADMIN — METHYLPREDNISOLONE SODIUM SUCCINATE 40 MG: 40 INJECTION, POWDER, FOR SOLUTION INTRAMUSCULAR; INTRAVENOUS at 18:31

## 2023-02-16 RX ADMIN — CEFUROXIME AXETIL 250 MG: 250 TABLET ORAL at 20:29

## 2023-02-16 RX ADMIN — GUAIFENESIN 400 MG: 400 TABLET ORAL at 18:31

## 2023-02-16 RX ADMIN — INSULIN LISPRO 8 UNITS: 100 INJECTION, SOLUTION INTRAVENOUS; SUBCUTANEOUS at 20:29

## 2023-02-16 RX ADMIN — DONEPEZIL HYDROCHLORIDE 10 MG: 5 TABLET ORAL at 20:29

## 2023-02-16 ASSESSMENT — PAIN - FUNCTIONAL ASSESSMENT: PAIN_FUNCTIONAL_ASSESSMENT: NONE - DENIES PAIN

## 2023-02-16 ASSESSMENT — PAIN SCALES - GENERAL
PAINLEVEL_OUTOF10: 0
PAINLEVEL_OUTOF10: 0

## 2023-02-16 NOTE — PROGRESS NOTES
Notified IR that patient's INR is 1.7. The tech stated she will let me know if patient will have procedure and to keep patient NPO.

## 2023-02-16 NOTE — LETTER
[place] Department Medicaid  CERTIFICATION OF NECESSITY  FOR NON-EMERGENCY TRANSPORTATION   BY GROUND AMBULANCE      Individual Information   1. Name: Gery Cowden 2. [place] Medicaid Billing Number:    3. Address: [address] [place] [address] Road      Transportation Provider Information   4. Provider Name:    5. [place] Medicaid Provider Number:  National Provider Identifier (NPI):      Certification  7. Criteria:  During transport, this individual requires:  [] Medical treatment or continuous     supervision by an EMT. [] The administration or regulation of oxygen by another person. [] Supervised protective restraint. 8. Period Beginning Date:    5. Length  [x] Not more than 1 day(s)  [] One Year     Additional Information Relevant to Certification   10. Comments or Explanations, If Necessary or Appropriate   Acute respiratory failure with hypoxia, on 2L NC, dementia, wheelchair bound, unable to transfer     Certifying Practitioner Information    11. Name of Practitioner: Jose Carlson DO   12. [place] Medicaid Provider Number, If Applicable:  [address] Provider Identifier (NPI):      Signature Information   14. Date of Signature:  13. Name of Person Signin. Signature and Professional Designation:      [facility] [id number]  Rev. 2015    [address] [place] [address] Encounter Date/Time: 2023 [address]  Account: [de-identified]    MRN: [id number]    Patient: Gery Cowden    Contact Serial #: [id number]      ENCOUNTER          Patient Class: I Private Enc?   No Unit [facility] 9056/[id number]   Hospital Service: MED   Encounter DX: Acute respiratory failur*   ADM Provider: Ebrosa Leung DO   Procedure:     ATT Provider: Ebrosa Leung, DO   REF Provider:        Admission DX: Acute respiratory failure with hypoxia ([facility] [address] and DX codes: J96.01      PATIENT                 Name: Gery Cowden : 1932 (89 yrs)   Address: [address] [place] [address] Sex: Female   [facility] [address]         Marital Status:    Employer: RETIRED         Religious: Carlota Baltazar*   Primary Care Provider: Evaristo Easton MD         Primary Phone: [phone]   EMERGENCY CONTACT   Contact Name Legal Guardian? Relationship to Patient Home Phone Work Phone   1. John Manley  2. Damaris Small \"Sissy\"      Spouse  Child [phone]              GUARANTOR            Guarantor: Chelsie Young     : 1932   Address: [address] [place] [address] Sex: Female   Saurav [place] [id number]     Relation to Patient: Self       Home Phone: [phone]   Guarantor ID: [id number]       Work Phone:     Guarantor Employer: RETIRED         Status: RETIRED      COVERAGE        PRIMARY INSURANCE   Payor: [facility] MEDICARE Plan: ANTHEM MEDIBLUE ESSENTIA*   Payor Address: [facility] [id number] [address] [address]       Group Number: [id number] Type: INDEMNITY   Subscriber Name: Trinity Rell : 1932   Subscriber ID: [id number] Lola Smith. Rel. to Sub: Self   SECONDARY INSURANCE   Payor:   Plan:     Payor Address:  ,           Group Number:   Insurance Type:     Subscriber Name:   Subscriber :     Subscriber ID:   Pat.  Rel. to Sub:        CSN: [id number]

## 2023-02-16 NOTE — ED PROVIDER NOTES
Department of Emergency Medicine   ED  Provider Note  Admit Date/RoomTime: 2/16/2023 10:57 AM  ED Room: [facility]          History of Present Illness:  2/16/23, Time: 1:06 PM EST  Chief Complaint   Patient presents with    Shortness of Breath     Recently discharged with RSV. Home health nurse reports pts oxygen 80% on room air. Sunitha Hoang is a 80 y.o. female presenting to the ED for shortness of breath. Patient has been short of breath since this morning. Came on suddenly. Was just discharged in the hospital yesterday. She is on oxygen chronically. No fevers or chills. No nausea or vomiting. No change in bowel or bladder. No other symptoms or complaints. Review of Systems:   Pertinent positives and negatives are stated within HPI, all other systems reviewed and are negative.        --------------------------------------------- PAST HISTORY ---------------------------------------------  Past Medical History:  has a past medical history of Arthritis, COPD (chronic obstructive pulmonary disease) ([facility] [address] Diabetes mellitus ([facility] [address] Hyperlipidemia, and Hypertension. Past Surgical History:  has a past surgical history that includes Cholecystectomy; Hysterectomy; Carpal tunnel release; Insert Picc Cath, 5/> Yrs (01/21/2020); and Pacemaker insertion (Left, 12/21/2022). Social History:  reports that she has never smoked. She has never used smokeless tobacco. She reports that she does not drink alcohol. Family History: family history is not on file. . Unless otherwise noted, family history is non contributory    The patients home medications have been reviewed. Allergies: Patient has no known allergies.         ---------------------------------------------------PHYSICAL EXAM--------------------------------------    Constitutional/General: Alert and oriented x3, obese  Head: Normocephalic and atraumatic  Eyes: PERRL, EOMI, sclera non icteric  Mouth: Oropharynx clear, handling secretions, no trismus, no asymmetry of the posterior oropharynx or uvular edema  Neck: Supple, full ROM, no stridor, no meningeal signs  Respiratory: Diminished on the right  Cardiovascular:  Regular rate. Regular rhythm. 2+ distal pulses. Equal extremity pulses. Chest: No chest wall tenderness  GI:  Abdomen Soft, Non tender, Non distended. No rebound, guarding, or rigidity. No pulsatile masses. Musculoskeletal: Moves all extremities x 4. Warm and well perfused, no clubbing, cyanosis, or edema. Capillary refill <3 seconds  Integument: skin warm and dry. No rashes. Neurologic: GCS 15, no focal deficits, symmetric strength 5/5 in the upper and lower extremities bilaterally  Psychiatric: Normal Affect          -------------------------------------------------- RESULTS -------------------------------------------------  I have personally reviewed all laboratory and imaging results for this patient. Results are listed below.      LABS: (Lab results interpreted by me)  Results for orders placed or performed during the hospital encounter of 02/16/23   COVID-19, Rapid    Specimen: Nasopharyngeal Swab   Result Value Ref Range    SARS-CoV-2, NAAT Not Detected Not Detected   Rapid influenza A/B antigens    Specimen: Nares   Result Value Ref Range    Influenza A by PCR Not Detected Not Detected    Influenza B by PCR Not Detected Not Detected   Troponin   Result Value Ref Range    Troponin, High Sensitivity 44 (H) 0 - 9 ng/L   Brain Natriuretic Peptide   Result Value Ref Range    Pro-BNP 6,396 (H) 0 - 450 pg/mL   Comprehensive Metabolic Panel   Result Value Ref Range    Sodium 131 (L) 132 - 146 mmol/L    Potassium 4.6 3.5 - 5.0 mmol/L    Chloride 93 (L) 98 - 107 mmol/L    CO2 28 22 - 29 mmol/L    Anion Gap 10 7 - 16 mmol/L    Glucose 302 (H) 74 - 99 mg/dL    BUN 29 (H) 6 - 23 mg/dL    Creatinine 0.7 0.5 - 1.0 mg/dL    Est, Glom Filt Rate >60 >=60 mL/min/1.73    Calcium 8.9 8.6 - 10.2 mg/dL    Total Protein 5.5 (L) 6.4 - 8.3 g/dL Albumin 2.9 (L) 3.5 - 5.2 g/dL    Total Bilirubin <0.2 0.0 - 1.2 mg/dL    Alkaline Phosphatase 97 35 - 104 U/L    ALT 32 0 - 32 U/L    AST 18 0 - 31 U/L   CBC with Auto Differential   Result Value Ref Range    WBC 15.7 (H) 4.5 - 11.5 E9/L    RBC 4.05 3.50 - 5.50 E12/L    Hemoglobin 11.4 (L) 11.5 - 15.5 g/dL    Hematocrit 35.1 34.0 - 48.0 %    MCV 86.7 80.0 - 99.9 fL    MCH 28.1 26.0 - 35.0 pg    MCHC 32.5 32.0 - 34.5 %    RDW 15.7 (H) 11.5 - 15.0 fL    Platelets 531 809 - 880 E9/L    MPV 9.2 7.0 - 12.0 fL   EKG 12 Lead   Result Value Ref Range    Ventricular Rate 111 BPM    Atrial Rate 113 BPM    QRS Duration 82 ms    Q-T Interval 348 ms    QTc Calculation (Bazett) 473 ms    R Axis -22 degrees    T Axis -147 degrees   ,       RADIOLOGY:  Interpreted by Radiologist unless otherwise specified  XR CHEST PORTABLE   Final Result   1. Right perihilar and right lower lobe airspace disease   2. Moderate right pleural effusion   3. Small left pleural effusion   4. Cardiomegaly             ------------------------- NURSING NOTES AND VITALS REVIEWED ---------------------------   The nursing notes within the ED encounter and vital signs as below have been reviewed by myself  /68   Pulse (!) (P) 107   Temp (!) 96.7 °F (35.9 °C) (Axillary)   Resp 18   SpO2 98%     Oxygen Saturation Interpretation: Improved after treatment    The patients available past medical records and past encounters were reviewed. ------------------------------ ED COURSE/MEDICAL DECISION MAKING----------------------  Medications   furosemide (LASIX) injection 40 mg (has no administration in time range)           The cardiac monitor revealed a fib with a heart rate in the 100s as interpreted by me. The cardiac monitor was ordered secondary to the patient's fatigue and to monitor the patient for dysrhythmia. CPT [id number]         Medical Decision Making:     Patient presents with shortness of breath.   Concern for PE, pneumonia, CHF, among other pathologies. She was 80% on room air, was placed on 4 L nasal cannula, responded appropriately. Exam showed some swelling to lower extremities and hands, diminished on the right. Otherwise exam unremarkable. She was hemodynamically stable. EKG interpreted by me shows A-fib, rate 111, no STEMI, no ischemic change    Labs interpreted by me shows a negative COVID and flu, BNP is 6300, troponin 44, CMP and CBC reviewed, leukocytosis of 15 appreciated. Patient is on prednisone Per charting review. X-ray reviewed by me shows enlarged pleural effusion on the right, radiology agrees. Chart reviewed. Patient has a progress note from 12/15/2023. It was noted that she was having RSV at that time, she started on prednisone. She was discharged not on oxygen. Reevaluation, patient's resting, did receive IV Lasix. Low suspicion for PE as the patient is on Eliquis. Given hypoxemia, and overall findings, patient will be admitted. I did discuss with the hospitalist, they agreed for admission. Please note that the withdrawal or failure to initiate urgent interventions for this patient would likely result in a life threatening deterioration or permanent disability. Accordingly this patient received 30 minutes of critical care time, excluding separately billable procedures. Counseling: The emergency provider has spoken with the patient and discussed todays results, in addition to providing specific details for the plan of care and counseling regarding the diagnosis and prognosis. Questions are answered at this time and they are agreeable with the plan.       --------------------------------- IMPRESSION AND DISPOSITION ---------------------------------    IMPRESSION  1. Hypoxia    2. Congestive heart failure, unspecified HF chronicity, unspecified heart failure type ([facility] [address]    3. Pleural effusion    4.  Acute respiratory failure with hypoxia (HCC)        DISPOSITION  Disposition: Admit to telemetry  Patient condition is stable        NOTE: This report was transcribed using voice recognition software.  Every effort was made to ensure accuracy; however, inadvertent computerized transcription errors may be present        Eliane Hernandes MD  02/17/23 5897

## 2023-02-16 NOTE — ED NOTES
Small sore left buttock with redness noted, this RN placed pillows under left side and placed her on right side.      Kilo Elizabeth RN  02/16/23 1027

## 2023-02-16 NOTE — H&P
Hospital Medicine History & Physical      PCP: Coby Workman MD    Date of Admission: 2/16/2023    Date of Service: . FEB 16, 2023    Chief Complaint:   HYPOXIA      History Of Present Illness:     80 y.o. female presented with HYPOXIA. JUST DISCHARGED YESTERDAY, HOME HEALTH NURSE CAME TODAY, AND SHE WAS HYPOXIC. SHE HAS A HISTORY OF HYPOXIA, BUT REFUSES BIPAP AS OF RECENT ADMISSION. WAS WAS SEEN BY DR DELAROSA FOR HYPONATREMIA, AND AND SHE WAS TREATED FOR A UTI WITH PROTEUS AND E COLI . Past Medical History:          Diagnosis Date    Arthritis     COPD (chronic obstructive pulmonary disease) ([facility] [address]     Diabetes mellitus ([facility] [address]     Hyperlipidemia     Hypertension        Past Surgical History:          Procedure Laterality Date    CARPAL TUNNEL RELEASE      CHOLECYSTECTOMY      HC INSERT PICC CATH, 5/> YRS  01/21/2020         HYSTERECTOMY (CERVIX STATUS UNKNOWN)      PACEMAKER INSERTION Left 12/21/2022    [employer] Single PPM (Dr. Audrey Calle)       Medications Prior to Admission:      Prior to Admission medications    Medication Sig Start Date End Date Taking?  Authorizing Provider   carvedilol (COREG) 12.5 MG tablet Take 18.75 mg by mouth 2 times daily (with meals)   Yes Historical Provider, MD   cefUROXime (CEFTIN) 250 MG tablet Take 250 mg by mouth 2 times daily 2/15/23 2/20/23 Yes Historical Provider, MD   predniSONE (DELTASONE) 10 MG tablet Take 40 mg by mouth daily 2/15/23 2/19/23 Yes Historical Provider, MD   guaiFENesin 400 MG tablet Take 1 tablet by mouth in the morning, at noon, and at bedtime 2/15/23   Stef Aden DO   furosemide (LASIX) 20 MG tablet Take 20 mg by mouth daily 9/13/22   Historical Provider, MD   donepezil (ARICEPT) 10 MG tablet Take 10 mg by mouth at bedtime 11/22/22   Historical Provider, MD   amLODIPine (NORVASC) 5 MG tablet Take 1 tablet by mouth daily 9/12/20 Jackie Valle MD   ipratropium-albuterol (DUONEB) 0.5-2.5 (3) MG/3ML SOLN nebulizer solution Take 1 vial by nebulization every 6 hours as needed for Shortness of Breath    Historical Provider, MD   levothyroxine (SYNTHROID) 75 MCG tablet Take 75 mcg by mouth Daily    Historical Provider, MD   apixaban (ELIQUIS) 5 MG TABS tablet Take 5 mg by mouth 2 times daily    Historical Provider, MD       Allergies:  Patient has no known allergies. Social History:      The patient currently lives      TOBACCO:   reports that she has never smoked. She has never used smokeless tobacco.  ETOH:   reports no history of alcohol use. Family History:      No family history on file. REVIEW OF SYSTEMS:   Pertinent positives as noted in the HPI. All other systems reviewed and negative. PHYSICAL EXAM:    /87   Pulse 93   Temp 98.4 °F (36.9 °C) (Oral)   Resp 20   SpO2 98%   Gen:  WELL DEVELOPED  HEENT: NC/AT, moist mucous membranes, n  Neck: supple, trachea midline,   Heart:  IRREG  Lungs:   DIMINISHED  bilaterally, FEW RHONCHI  Abd: bowel sounds present, soft, nontender, nondistended, no masses  Extrem:  No clubbing, cyanosis,  EDEMA OF ARMS AND LEGS edema  Skin: ERYTHEMA OF ARMS  Psych: Not oriented to date   Neuro: grossly intact, moves all four extremities. Labs:     Recent Labs     02/14/23  0436 02/16/23  1111   WBC 11.6* 15.7*   HGB 11.1* 11.4*   HCT 35.1 35.1    303     Recent Labs     02/14/23  0436 02/15/23  0457 02/16/23  1111   * 130* 131*   K 4.7 5.2* 4.6   CL 97* 95* 93*   CO2 24 27 28   BUN 21 33* 29*   CREATININE 0.6 0.7 0.7   CALCIUM 8.2* 8.7 8.9     Recent Labs     02/16/23  1111   AST 18   ALT 32   BILITOT <0.2   ALKPHOS 97     Recent Labs     02/16/23  1430   INR 1.7     No results for input(s): Normajean Elgin in the last 72 hours.     Urinalysis:      Lab Results   Component Value Date/Time    NITRU Negative 02/11/2023 03:18 PM    WBCUA 10-20 02/11/2023 03:18 PM    BACTERIA MANY 02/11/2023 03:18 PM    RBCUA 10-20 02/11/2023 03:18 PM    BLOODU LARGE 02/11/2023 03:18 PM    SPECGRAV 1.010 02/11/2023 03:18 PM    GLUCOSEU 100 02/11/2023 03:18 PM       Radiology:           XR CHEST PORTABLE   Final Result   1. Right perihilar and right lower lobe airspace disease   2. Moderate right pleural effusion   3. Small left pleural effusion   4. Cardiomegaly         IR GUIDED THORACENTESIS PLEURAL    (Results Pending)       ASSESSMENT:    Active Hospital Problems    Diagnosis Date Noted    Acute respiratory failure with hypoxia ([facility] [address] [J96.01] 02/04/2023     Priority: Medium   ANASARCA   IIDM   HYPOTHYROID   DEMENTIA   PACEMAKER   PAF  HYPONATREMIA  UTI(PROTEUS AND E COLI))  ON CEFTIN  Plan:   IR THORACENTESIS   PULM CONSULT         DVT Prophylaxis: ELIQUIS( HOLD FOR THORACENTESIS) SCD  Diet: ADULT DIET; Regular; 4 carb choices (60 gm/meal); Low Sodium (2 gm)  Code Status: Full Code     PT/OT Eval Status: ORDERED     Dispo - HOME      Electronically signed by Claudio Gao DO on 2/16/2023 at 4:03 PM Dexter Swain       Thank you Kan Vazquez MD for the opportunity to be involved in this patient's care.  If you have any questions or concerns please feel free to contact me at [phone]

## 2023-02-17 ENCOUNTER — APPOINTMENT (OUTPATIENT)
Dept: GENERAL RADIOLOGY | Age: 88
End: 2023-02-17
Payer: MEDICARE

## 2023-02-17 ENCOUNTER — APPOINTMENT (OUTPATIENT)
Dept: INTERVENTIONAL RADIOLOGY/VASCULAR | Age: 88
End: 2023-02-17
Payer: MEDICARE

## 2023-02-17 LAB
ANION GAP SERPL CALCULATED.3IONS-SCNC: 10 MMOL/L (ref 7–16)
BUN BLDV-MCNC: 29 MG/DL (ref 6–23)
CALCIUM SERPL-MCNC: 8.8 MG/DL (ref 8.6–10.2)
CHLORIDE BLD-SCNC: 91 MMOL/L (ref 98–107)
CO2: 30 MMOL/L (ref 22–29)
CREAT SERPL-MCNC: 0.7 MG/DL (ref 0.5–1)
EKG ATRIAL RATE: 113 BPM
EKG Q-T INTERVAL: 348 MS
EKG QRS DURATION: 82 MS
EKG QTC CALCULATION (BAZETT): 473 MS
EKG R AXIS: -22 DEGREES
EKG T AXIS: -147 DEGREES
EKG VENTRICULAR RATE: 111 BPM
GFR SERPL CREATININE-BSD FRML MDRD: >60 ML/MIN/1.73
GLUCOSE BLD-MCNC: 271 MG/DL (ref 74–99)
INR BLD: 1.4
METER GLUCOSE: 283 MG/DL (ref 74–99)
METER GLUCOSE: 299 MG/DL (ref 74–99)
METER GLUCOSE: 300 MG/DL (ref 74–99)
METER GLUCOSE: 319 MG/DL (ref 74–99)
POTASSIUM SERPL-SCNC: 4.6 MMOL/L (ref 3.5–5)
PRO-BNP: 6028 PG/ML (ref 0–450)
PROTHROMBIN TIME: 15 SEC (ref 9.3–12.4)
SODIUM BLD-SCNC: 131 MMOL/L (ref 132–146)

## 2023-02-17 PROCEDURE — 2700000000 HC OXYGEN THERAPY PER DAY

## 2023-02-17 PROCEDURE — 97165 OT EVAL LOW COMPLEX 30 MIN: CPT

## 2023-02-17 PROCEDURE — 36415 COLL VENOUS BLD VENIPUNCTURE: CPT

## 2023-02-17 PROCEDURE — 84157 ASSAY OF PROTEIN OTHER: CPT

## 2023-02-17 PROCEDURE — 6360000002 HC RX W HCPCS: Performed by: INTERNAL MEDICINE

## 2023-02-17 PROCEDURE — 2500000003 HC RX 250 WO HCPCS: Performed by: RADIOLOGY

## 2023-02-17 PROCEDURE — S5553 INSULIN LONG ACTING 5 U: HCPCS | Performed by: INTERNAL MEDICINE

## 2023-02-17 PROCEDURE — 85610 PROTHROMBIN TIME: CPT

## 2023-02-17 PROCEDURE — 93010 ELECTROCARDIOGRAM REPORT: CPT | Performed by: INTERNAL MEDICINE

## 2023-02-17 PROCEDURE — C1729 CATH, DRAINAGE: HCPCS

## 2023-02-17 PROCEDURE — 97530 THERAPEUTIC ACTIVITIES: CPT

## 2023-02-17 PROCEDURE — 71045 X-RAY EXAM CHEST 1 VIEW: CPT

## 2023-02-17 PROCEDURE — 80048 BASIC METABOLIC PNL TOTAL CA: CPT

## 2023-02-17 PROCEDURE — 0W993ZZ DRAINAGE OF RIGHT PLEURAL CAVITY, PERCUTANEOUS APPROACH: ICD-10-PCS | Performed by: RADIOLOGY

## 2023-02-17 PROCEDURE — 32555 ASPIRATE PLEURA W/ IMAGING: CPT

## 2023-02-17 PROCEDURE — 97161 PT EVAL LOW COMPLEX 20 MIN: CPT

## 2023-02-17 PROCEDURE — 87070 CULTURE OTHR SPECIMN AEROBIC: CPT

## 2023-02-17 PROCEDURE — 87205 SMEAR GRAM STAIN: CPT

## 2023-02-17 PROCEDURE — 2140000000 HC CCU INTERMEDIATE R&B

## 2023-02-17 PROCEDURE — 6370000000 HC RX 637 (ALT 250 FOR IP): Performed by: INTERNAL MEDICINE

## 2023-02-17 PROCEDURE — 82962 GLUCOSE BLOOD TEST: CPT

## 2023-02-17 PROCEDURE — 99223 1ST HOSP IP/OBS HIGH 75: CPT | Performed by: INTERNAL MEDICINE

## 2023-02-17 PROCEDURE — 89051 BODY FLUID CELL COUNT: CPT

## 2023-02-17 PROCEDURE — 94640 AIRWAY INHALATION TREATMENT: CPT

## 2023-02-17 RX ORDER — INSULIN GLARGINE-YFGN 100 [IU]/ML
12 INJECTION, SOLUTION SUBCUTANEOUS 2 TIMES DAILY
Status: DISCONTINUED | OUTPATIENT
Start: 2023-02-17 | End: 2023-02-22

## 2023-02-17 RX ORDER — IPRATROPIUM BROMIDE AND ALBUTEROL SULFATE 2.5; .5 MG/3ML; MG/3ML
1 SOLUTION RESPIRATORY (INHALATION) 4 TIMES DAILY
Status: DISCONTINUED | OUTPATIENT
Start: 2023-02-17 | End: 2023-02-24 | Stop reason: HOSPADM

## 2023-02-17 RX ORDER — ALBUTEROL SULFATE 2.5 MG/3ML
2.5 SOLUTION RESPIRATORY (INHALATION) EVERY 4 HOURS PRN
Status: DISCONTINUED | OUTPATIENT
Start: 2023-02-17 | End: 2023-02-24 | Stop reason: HOSPADM

## 2023-02-17 RX ADMIN — INSULIN LISPRO 6 UNITS: 100 INJECTION, SOLUTION INTRAVENOUS; SUBCUTANEOUS at 18:13

## 2023-02-17 RX ADMIN — CEFUROXIME AXETIL 250 MG: 250 TABLET ORAL at 21:30

## 2023-02-17 RX ADMIN — Medication 10 ML: at 09:47

## 2023-02-17 RX ADMIN — ALBUTEROL SULFATE 2.5 MG: 2.5 SOLUTION RESPIRATORY (INHALATION) at 19:32

## 2023-02-17 RX ADMIN — INSULIN LISPRO 6 UNITS: 100 INJECTION, SOLUTION INTRAVENOUS; SUBCUTANEOUS at 11:57

## 2023-02-17 RX ADMIN — GUAIFENESIN 400 MG: 400 TABLET ORAL at 14:59

## 2023-02-17 RX ADMIN — ALBUTEROL SULFATE 2.5 MG: 2.5 SOLUTION RESPIRATORY (INHALATION) at 15:57

## 2023-02-17 RX ADMIN — INSULIN GLARGINE-YFGN 12 UNITS: 100 INJECTION, SOLUTION SUBCUTANEOUS at 21:31

## 2023-02-17 RX ADMIN — CARVEDILOL 18.75 MG: 6.25 TABLET, FILM COATED ORAL at 18:13

## 2023-02-17 RX ADMIN — ALBUTEROL SULFATE 2.5 MG: 2.5 SOLUTION RESPIRATORY (INHALATION) at 11:49

## 2023-02-17 RX ADMIN — METHYLPREDNISOLONE SODIUM SUCCINATE 40 MG: 40 INJECTION, POWDER, FOR SOLUTION INTRAMUSCULAR; INTRAVENOUS at 05:19

## 2023-02-17 RX ADMIN — DONEPEZIL HYDROCHLORIDE 10 MG: 5 TABLET ORAL at 21:31

## 2023-02-17 RX ADMIN — GUAIFENESIN 400 MG: 400 TABLET ORAL at 21:30

## 2023-02-17 RX ADMIN — ALBUTEROL SULFATE 2.5 MG: 2.5 SOLUTION RESPIRATORY (INHALATION) at 08:00

## 2023-02-17 RX ADMIN — INSULIN LISPRO 8 UNITS: 100 INJECTION, SOLUTION INTRAVENOUS; SUBCUTANEOUS at 21:31

## 2023-02-17 RX ADMIN — METHYLPREDNISOLONE SODIUM SUCCINATE 40 MG: 40 INJECTION, POWDER, FOR SOLUTION INTRAMUSCULAR; INTRAVENOUS at 18:24

## 2023-02-17 ASSESSMENT — PAIN SCALES - GENERAL
PAINLEVEL_OUTOF10: 0

## 2023-02-17 ASSESSMENT — PAIN SCALES - WONG BAKER: WONGBAKER_NUMERICALRESPONSE: 0

## 2023-02-17 NOTE — OR NURSING
Called report to miles. Patient stable, comfortable, and awaiting transport. Got the clear on chest xray for patient to go back to the floor.

## 2023-02-17 NOTE — ACP (ADVANCE CARE PLANNING)
Advance Care Planning   Healthcare Decision Maker:    Primary Decision Maker: Emile Murphy" - Child - [phone]    Secondary Decision Maker: Arline Mary - [phone]    Click here to complete Healthcare Decision Makers including selection of the Healthcare Decision Maker Relationship (ie \"Primary\").           POA already on record

## 2023-02-17 NOTE — Clinical Note
920 Patient arrived via bed  to Radiology department for thoracentesis. Dr Gray Bansal spoke to poa daughter Phone consent via Daughter obtained  , Allergies, home medications, H&P and fasting instructions reviewed with patient. Vital signs taken. 20ga  IV place  blood obtained, IV flushed and prn adapter attached. Blood sample sent to lab for ordered tests. Procedural instructions given, questions answered, understanding expressed and consent signed. Patient given fluoroscopy education, no questions at this time.

## 2023-02-17 NOTE — PROGRESS NOTES
Hospitalist Progress Note      PCP: Van Gonzalez MD    Date of Admission: 2/16/2023        Hospital Course:  80 y.o. female presented with HYPOXIA. JUST DISCHARGED YESTERDAY, HOME HEALTH NURSE CAME TODAY, AND SHE WAS HYPOXIC. SHE HAS A HISTORY OF HYPOXIA, BUT REFUSES BIPAP AS OF RECENT ADMISSION. WAS WAS SEEN BY DR DELAROSA FOR HYPONATREMIA, AND AND SHE WAS TREATED FOR A UTI WITH PROTEUS AND E COLI ** TO FINISH THE COURSE OF ABX FOR BOTH.   ** IN ROOM WHILE  IS FEEDING HER, SHE CONTINUES TO COUGH  AFTER EACH BITE,  WILL GET A VIDEO SWALLOW.      FOR IR THORACENTESIS       Subjective:  COUGH           Medications:  Reviewed    Infusion Medications    dextrose       Scheduled Medications    insulin glargine  12 Units SubCUTAneous BID    [Held by provider] apixaban  5 mg Oral BID    amLODIPine  5 mg Oral Daily    carvedilol  18.75 mg Oral BID WC    cefUROXime  250 mg Oral BID    donepezil  10 mg Oral Nightly    guaiFENesin  400 mg Oral TID    levothyroxine  75 mcg Oral Daily    furosemide  40 mg IntraVENous Daily    albuterol  2.5 mg Nebulization 4x daily    insulin lispro  0-10 Units SubCUTAneous 4x Daily AC & HS    methylPREDNISolone  40 mg IntraVENous Q12H     PRN Meds: ipratropium-albuterol, glucose, dextrose bolus **OR** dextrose bolus, glucagon (rDNA), dextrose, bisacodyl, acetaminophen, trimethobenzamide      Intake/Output Summary (Last 24 hours) at 2/17/2023 1437  Last data filed at 2/17/2023 0920  Gross per 24 hour   Intake 60 ml   Output 1400 ml   Net -1340 ml       Exam:    /75   Pulse 88   Temp 97.5 °F (36.4 °C) (Oral)   Resp 16   Wt 188 lb 9.6 oz (85.5 kg)   SpO2 99%   BMI 36.83 kg/m²       Gen:  WELL DEVELOPED  HEENT: NC/AT, moist mucous membranes, n  Neck: supple, trachea midline,   Heart:  IRREG  Lungs:   DIMINISHED  bilaterally, FEW RHONCHI  Abd: bowel sounds present, soft, nontender, nondistended, no masses  Extrem:  No clubbing, cyanosis,  EDEMA OF ARMS AND LEGS edema  Skin: ERYTHEMA OF ARMS  Psych: Not oriented to date   Neuro: grossly intact, moves all four extremities. Labs:   Recent Labs     02/16/23  1111   WBC 15.7*   HGB 11.4*   HCT 35.1        Recent Labs     02/15/23  0457 02/16/23  1111 02/17/23  0448   * 131* 131*   K 5.2* 4.6 4.6   CL 95* 93* 91*   CO2 27 28 30*   BUN 33* 29* 29*   CREATININE 0.7 0.7 0.7   CALCIUM 8.7 8.9 8.8     Recent Labs     02/16/23  1111   AST 18   ALT 32   BILITOT <0.2   ALKPHOS 97     Recent Labs     02/16/23  1430 02/17/23  0448   INR 1.7 1.4     No results for input(s): Lattie Macrina in the last 72 hours. Recent Labs     02/16/23  1111   AST 18   ALT 32   BILITOT <0.2   ALKPHOS 97     No results for input(s): LACTA in the last 72 hours. Lab Results   Component Value Date    LABURIC 6.9 (H) 02/12/2023     Lab Results   Component Value Date    AMMONIA <10.0 (L) 09/08/2020    AMMONIA 28.3 03/05/2020    AMMONIA 24.2 01/17/2020       Assessment:    Active Hospital Problems    Diagnosis Date Noted    Acute respiratory failure with hypoxia ([facility] [address] [J96.01] 02/04/2023     Priority: Medium   COUGH WHILE EATING  ANASARCA   IIDM   HYPOTHYROID   DEMENTIA   PACEMAKER   PAF  HYPONATREMIA  UTI(PROTEUS AND E COLI))   CEFTIN       Plan:   IR THORACENTESIS   PULM CONSULT    VIDEO SWALLOW         DVT Prophylaxis: ELIQUIS( HOLD FOR THORACENTESIS) SCD  Diet: ADULT DIET; Regular; 4 carb choices (60 gm/meal);  Low Sodium (2 gm)  Code Status: Full Code     PT/OT Eval Status: ORDERED     Dispo - HOME     Electronically signed by Claudio Gao DO on 2/17/2023 at 2:37 PM [facility]

## 2023-02-17 NOTE — OR NURSING
Patient tolerated procedure, was 97% 4L during the case. 550cc cloudy yellow fluid. Petroleum, gauze and Tegaderm applied to the site, No signs of bleeding. Due to patient coughing Dr. Rolly Garcia ordered a bedside portable chest xray. Patient stable. Called report to floor nurse.

## 2023-02-17 NOTE — PROGRESS NOTES
Physical Therapy    Initial Assessment     Name: Kaylee Brooke  : 1932  MRN: [id number]      Date of Service: 2023    Evaluating PT: Cassie Mays, SAMMY, DPT [id number]      Room #:  9450/6583-Q  Diagnosis:  Acute respiratory failure with hypoxia ([facility] [J96.01]  PMHx/PSHx:   has a past medical history of Arthritis, COPD (chronic obstructive pulmonary disease) ([facility] Diabetes mellitus ([facility] Hyperlipidemia, and Hypertension. Precautions:  Fall Risk, O2, TAPS, Alarm    SUBJECTIVE:    Pt lives with  in a single story house with 2 stair(s) and 1 rail(s) to enter. Pt is nonambulatory and is bed bound at baseline. Pt owns a hospital bed.  and daughter provide  assistance. OBJECTIVE:   Initial Evaluation  Date: 23 Treatment Date: Short Term/ Long Term   Goals   AM-PAC 6 Clicks 3/39     Was pt agreeable to Eval/treatment? Yes     Does pt have pain? No current complaints of pain     Bed Mobility  Rolling: Dependent   Supine to sit: Declined  Sit to supine: NT  Scooting: Dependent to HOB  Rolling: Mod A  Supine to sit: Mod A  Sit to supine: Mod A  Scooting: Mod A   Transfers Sit to stand: NT  Stand to sit: NT  Stand pivot: NT  Sit to stand: NA  Stand to sit: NA  Stand pivot: NA   Ambulation   NT  NA   Stair negotiation: ascended and descended NT  NA   ROM BUE: Refer to OT note  BLE: WFL     Strength BUE: Refer to OT note  BLE: NT     Balance Sitting EOB: NT  Dynamic Standing: NT  Sitting EOB: Min A  Dynamic Standing: NA     Pt is A & O x: 3 grossly to person, place, and month/year. Sensation: Pt denies numbness and tingling of extremities. Edema: Unremarkable. Patient education  Pt educated on PT role in acute care setting.     Patient response to education:   Pt verbalized understanding Pt demonstrated skill Pt requires further education in this area   Yes NA No     ASSESSMENT:    Conditions Requiring Skilled Therapeutic Intervention:    [x]Decreased strength     []Decreased ROM  [x]Decreased functional mobility  [x]Decreased balance   [x]Decreased endurance   []Decreased posture  []Decreased sensation  []Decreased coordination   []Decreased vision  [x]Decreased safety awareness   []Increased pain       Comments:    Pt was in bed upon room entry; agreeable to PT evaluation.  present. Pt declined to sit at EOB today. Pt was agreeable to rolling and repositioning. Pt was scooted to [facility] and positioned comfortably with pillows. Transport arrived to take pt to test. All questions and concerns were addressed. Pt was left in bed with all needs met at conclusion of session. Treatment:  Patient practiced and was instructed in the following treatment:    Therapeutic activities:  Bed mobility: Pt was cued for technique during rolling and repositioning. Vitals and symptoms were closely monitored throughout session. Skillful positioning in bed to protect skin/joint integrity. Pt's/family goals:  1. To return home. Prognosis is Fair- for reaching above PT goals. Patient and or family understand(s) diagnosis, prognosis, and plan of care. Yes. PHYSICAL THERAPY PLAN OF CARE:    PT POC is established based on physician order and patient diagnosis     Referring provider/PT Order:    Start   Ordering Provider    02/16/23 1630  PT eval and treat  Start:  02/16/23 1630,   End:  02/16/23 1630,   ONE TIME,   Standing Count:  1 Occurrences,   R,   Status:  Binford Hand, DO      Diagnosis:  Acute respiratory failure with hypoxia ([facility] [address] [J96.01]  Specific instructions for next treatment:  Progress activity.     Current Treatment Recommendations:     [x] Strengthening to improve independence with functional mobility   [] ROM to improve independence with functional mobility   [x] Balance Training to improve static/dynamic balance and to reduce fall risk  [x] Endurance Training to improve activity tolerance during functional mobility   [x] Transfer Training to improve safety and independence with all functional transfers   [x] Gait Training to improve gait mechanics, endurance and assess need for appropriate assistive device  [] Stair Training in preparation for safe discharge home and/or into the community   [x] Positioning to prevent skin breakdown and contractures  [x] Safety and Education Training   [x] Patient/Caregiver Education   [] HEP  [] Other     PT long term treatment goals are located in above grid    Frequency of treatments: 2-5x/week x 1-2 weeks. Time in  0936  Time out  0900    Total Treatment Time  10 minutes     Evaluation Time includes thorough review of current medical information, gathering information on past medical history/social history and prior level of function, completion of standardized testing/informal observation of tasks, assessment of data and education on plan of care and goals.     CPT codes:  [x] Low Complexity PT evaluation 69518  [] Moderate Complexity PT evaluation 46088  [] High Complexity PT evaluation 74994  [] PT Re-evaluation 62691  [] Gait training 28380 0 minutes  [] Manual therapy 43307 0 minutes  [x] Therapeutic activities 38733 10 minutes  [] Therapeutic exercises 69580 0 minutes  [] Neuromuscular reeducation 20339 0 minutes     Rekha Patch, PT, DPT  [id number]

## 2023-02-17 NOTE — PROGRESS NOTES
Spoke with RN regarding patient's ordered thoracentesis. INR reviewed, keep patient NPO, and hold all thinners for procedure. Patient is not able to sign consent. Will obtain consent from Pt's daughter.

## 2023-02-17 NOTE — CARE COORDINATION
2/17/23  Transition of Care Update. Patient admitted for acute respiratory failure with hypoxia. Patient is being followed by pulmonary with thoracentesis today. Patient is on IV Solu-medrol and IV lasix. Patient is from home where she lives with her . Patient has a ramp to enter her home and patient is wheelchair bound. Patient owns a wheelchair as a well as a walker. Patient is active with [address] and would like to resume services at discharge. Call placed to [facility] to confirm they are following. [facility] will need [facility] order at discharge for PT/OT and skilled nursing. Patient is on 5 liters of O2 currently and does not have O2 at home. Patient will require pulse ox testing prior to discharge. If patient requires O2 at discharge she would like [facility] to supply the O2. PCP is Dr. Vero Johns and pharmacy preference is [facility] [address]. SW/ to follow. Electronically signed by ENZO Montes De Oca on 2/17/2023 at 1:24 PM     Case Management Assessment  Initial Evaluation    Date/Time of Evaluation: 2/17/2023 1:24 PM  Assessment Completed by: ENZO Montes De Oca    If patient is discharged prior to next notation, then this note serves as note for discharge by case management. Patient Name: Providence Lefort                   YOB: 1932  Diagnosis: Acute respiratory failure with hypoxia ([facility] [address] [J96.01]                   Date / Time: 2/16/2023 10:57 AM    Patient Admission Status: Inpatient   Readmission Risk (Low < 19, Mod (19-27), High > 27): Readmission Risk Score: 21.8    Current PCP: Lars Nageotte, MD  PCP verified by CM? Yes    Chart Reviewed: Yes      History Provided by: Patient  Patient Orientation: Person, Situation, Alert and Oriented, Place    Patient Cognition: Alert    Hospitalization in the last 30 days (Readmission):  Yes    If yes, Readmission Assessment in  Navigator will be completed.     Advance Directives:      Code Status: Full Code   Patient's Primary Decision Maker is: Legal Next of Kin    Primary Decision Maker: Damaris Small \"Sissy\" - Child - [phone]    Secondary Decision Maker: Radhika Rios - [phone]    Discharge Planning:    Patient lives with: Spouse/Significant Other Type of Home: House  Primary Care Giver: Self  Patient Support Systems include: Children, Spouse/Significant Other   Current Financial resources:    Current community resources:    Current services prior to admission: Durable Medical Equipment            Current DME: Wheelchair, Walker            Type of Home Care services:  OT, PT, Nursing Services    ADLS  Prior functional level: Assistance with the following:, Bathing, Dressing, Toileting, Cooking, Housework, Shopping, Mobility  Current functional level: Assistance with the following:, Bathing, Dressing, Toileting, Cooking, Housework, Shopping, Mobility    PT AM-PAC: 6 /24  OT AM-PAC: 11 /24    Family can provide assistance at DC: Yes  Would you like Case Management to discuss the discharge plan with any other family members/significant others, and if so, who?  Yes ( and daughter)  Plans to Return to Present Housing: Yes  Other Identified Issues/Barriers to RETURNING to current housing: yes  Potential Assistance needed at discharge: Home Care            Potential DME:    Patient expects to discharge to: [address] [place] [address] for transportation at discharge:      Financial    Payor: [facility] MEDICARE / Plan: Mauricio Lara ESSENTIAL/PLUS / Product Type: *No Product type* /     Does insurance require precert for SNF: Yes    Potential assistance Purchasing Medications: No  Meds-to-Beds request: Yes      Ivanna Riley [address], [address] [facility] [phone] [facility] [phone] [address] [facility] [phone] [facility] [address] [address]  Phone: [phone] Fax: [phone]      Notes:    Factors facilitating achievement of predicted outcomes: Family support, Caregiver support, Cooperative, Pleasant, Has needed Durable Medical Equipment at home, and Home is wheelchair accessible    Barriers to discharge: Decreased endurance and Lower extremity weakness    Additional Case Management Notes: See above    The Plan for Transition of Care is related to the following treatment goals of Acute respiratory failure with hypoxia ([facility] [address] [R60.30]    IF APPLICABLE: The Patient and/or patient representative Kerrie Morgan and her family were provided with a choice of provider and agrees with the discharge plan. Freedom of choice list with basic dialogue that supports the patient's individualized plan of care/goals and shares the quality data associated with the providers was provided to: Patient   Patient Representative Name:       The Patient and/or Patient Representative Agree with the Discharge Plan?  Yes    [facility]  Case Management Department  Ph: [phone] Fax:

## 2023-02-17 NOTE — PROGRESS NOTES
Occupational Therapy  OCCUPATIONAL THERAPY INITIAL EVALUATION    [address] [address] [address] [facility]                                                Patient Name: Griselda Saltness  MRN: [id number]  : 1932  Room: [address] [place] [address]    Evaluating OT: Elsy Alejandro OTR/L #[id number]     Referring Provider: Erin Escobedo DO  Specific Provider Orders/Date: OT eval and treat 23    Diagnosis: Acute respiratory failure with hypoxia ([facility] [address] [J96.01]   Pt admitted to hospital     Pertinent Medical History:  has a past medical history of Arthritis, COPD (chronic obstructive pulmonary disease) ([facility] [address] Diabetes mellitus ([facility] [address] Hyperlipidemia, and Hypertension.        Precautions:  Fall Risk, O2, TAPS, bed alarm     Assessment of current deficits    [x] Functional mobility  [x]ADLs  [x] Strength               []Cognition    [x] Functional transfers   [x] IADLs         [x] Safety Awareness   [x]Endurance    [] Fine Coordination              [x] Balance      [] Vision/perception   []Sensation     []Gross Motor Coordination  [] ROM  [] Delirium                   [] Motor Control     OT PLAN OF CARE   OT POC based on physician orders, patient diagnosis and results of clinical assessment    Frequency/Duration 1-3 days/wk for 2 weeks PRN   Specific OT Treatment Interventions to include:   * Instruction/training on adapted ADL techniques and AE recommendations to increase functional independence within precautions       * Training on energy conservation strategies, correct breathing pattern and techniques to improve independence/tolerance for self-care routine  * Functional transfer/mobility training/DME recommendations for increased independence, safety, and fall prevention  * Patient/Family education to increase follow through with safety techniques and functional independence  * Recommendation of environmental modifications for increased safety with functional transfers/mobility and ADLs  * Therapeutic exercise to improve motor endurance, ROM, and functional strength for ADLs/functional transfers  * Therapeutic activities to facilitate/challenge dynamic balance, stand tolerance for increased safety and independence with ADLs  * Therapeutic activities to facilitate gross/fine motor skills for increased independence with ADLs  * Neuro-muscular re-education: facilitation of righting/equilibrium reactions, midline orientation, scapular stability/mobility, normalization of muscle tone, and facilitation of volitional active controled movement      Home Living: Pt lives with  in a 1 story home with 4 LACY   Bathroom setup: sponge bathes with assist from     Equipment owned: none     Prior Level of Function: assist with ADLs , assist with IADLs; ambulated with w/w (pt reports she has been bedbound and nonambulatory for the past 3 weeks following fall)   Driving: yes   Occupation: na     Pain Level: Pt denies pain this session     Cognition: A&O: x3 Follows 1 step directions             Memory:  fair             Sequencing:  fair-             Problem solving:  fair-             Judgement/safety:  fair-               Functional Assessment:  AM-PAC Daily Activity Raw Score: 11/24    Initial Eval Status  Date: 2/17/23 Treatment Status  Date: STGs = LTGs  Time frame: 10-14 days   Feeding Set-up   Mod I   Grooming Minimal Assist    Supervision    UB Dressing Moderate Assist    Supervision    LB Dressing Dependent    Maximal Assist    Bathing Dependent   Moderate Assist    Toileting Dependent    Maximal Assist    Bed Mobility  Rolling Moderate Assist   Supine<>sit: NT  Pt declined this session    Rolling: SBA  Supine to sit: Moderate Assist   Sit to supine:  Moderate Assist    Functional Transfers NT    Maximal Assist    Functional Mobility NT        Balance Sitting: NT       Activity Tolerance P+     Limited due to fatigue and weakness   F+   Visual/  Perceptual wfl                             Hand Dominance right    Strength ROM Additional Info:    RUE   3+/5 wfl good  and wfl FMC/dexterity noted during ADL tasks      LUE 3+/5 wfl good  and wfl FMC/dexterity noted during ADL tasks      Hearing: wfl  Sensation:wfl  Tone: wfl  Edema: global edema       Hand Dominance right   Strength ROM Additional Info:    RUE   4/5 wfl good  and wfl FMC/dexterity noted during ADL tasks     LUE 4/5 wfl good  and wfl FMC/dexterity noted during ADL tasks     Hearing: wfl  Sensation:wfl  Tone: wfl  Edema:none noted     Comments: Upon arrival patient supine in bed and agreeable to OT session. Therapist educated pt on role of OT. At end of session, patient se with call light and phone within reach, all lines and tubes intact. Overall patient demonstrated decreased independence and safety during completion of ADL/functional transfer/mobility tasks. Pt would benefit from continued skilled OT to increase safety and independence with completion of ADL/IADL tasks for functional independence and quality of life. Treatment: OT treatment provided this date includes: Facilitation of bed mobility (rolling), B UE ROM and skilled repositioning in bed addressing joint and skin integrity  - skilled cuing on hand placement, posture, body mechanics, energy conservation techniques and safety. Therapist facilitated self-care retraining: simulated UB/LB self-care tasks and grooming tasks while educating pt on modified techniques, posture, safety and energy conservation techniques. Skilled monitoring of HR, O2 sats and pts response to treatment. Rehab Potential: Good for established goals     Patient / Family Goal: return home      Patient and/or family were instructed on functional diagnosis, prognosis/goals and OT plan of care. Demonstrated fair understanding.      Eval Complexity: Low    Time In: 836  Time Out: 901  Total Treatment Time: 10 minutes    Min Units   OT Eval Low 27038  x 1   OT Eval Medium 67392      OT Eval High 79249      OT Re-Eval [id number]       Therapeutic Ex [id number]       Therapeutic Activities [id number] 9  1   ADL/Self Care 43038  1     Orthotic Management 41177       Manual 01110     Neuro Re-Ed 52445       Non-Billable Time          Evaluation Time additionally includes thorough review of current medical information, gathering information on past medical history/social history and prior level of function, interpretation of standardized testing/informal observation of tasks, assessment of data and development of plan of care and goals.           Rodrigue Nielson OTR/L #[id number]

## 2023-02-18 ENCOUNTER — APPOINTMENT (OUTPATIENT)
Dept: GENERAL RADIOLOGY | Age: 88
End: 2023-02-18
Payer: MEDICARE

## 2023-02-18 LAB
ANION GAP SERPL CALCULATED.3IONS-SCNC: 12 MMOL/L (ref 7–16)
APPEARANCE FLUID: CLEAR
BUN BLDV-MCNC: 29 MG/DL (ref 6–23)
CALCIUM SERPL-MCNC: 8.5 MG/DL (ref 8.6–10.2)
CELL COUNT FLUID TYPE: NORMAL
CHLORIDE BLD-SCNC: 95 MMOL/L (ref 98–107)
CO2: 28 MMOL/L (ref 22–29)
COLOR FLUID: NORMAL
CREAT SERPL-MCNC: 0.7 MG/DL (ref 0.5–1)
FLUID TYPE: NORMAL
GFR SERPL CREATININE-BSD FRML MDRD: >60 ML/MIN/1.73
GLUCOSE BLD-MCNC: 212 MG/DL (ref 74–99)
INR BLD: 1.2
METER GLUCOSE: 274 MG/DL (ref 74–99)
METER GLUCOSE: 280 MG/DL (ref 74–99)
METER GLUCOSE: 313 MG/DL (ref 74–99)
MONOCYTE, FLUID: 92 %
NEUTROPHIL, FLUID: 8 %
NUCLEATED CELLS FLUID: 232 /UL
POTASSIUM SERPL-SCNC: 4.6 MMOL/L (ref 3.5–5)
PROTEIN FLUID: 1.1 G/DL
PROTHROMBIN TIME: 13 SEC (ref 9.3–12.4)
RBC FLUID: <2000 /UL
SODIUM BLD-SCNC: 135 MMOL/L (ref 132–146)

## 2023-02-18 PROCEDURE — 92611 MOTION FLUOROSCOPY/SWALLOW: CPT | Performed by: SPEECH-LANGUAGE PATHOLOGIST

## 2023-02-18 PROCEDURE — 88112 CYTOPATH CELL ENHANCE TECH: CPT

## 2023-02-18 PROCEDURE — 92526 ORAL FUNCTION THERAPY: CPT | Performed by: SPEECH-LANGUAGE PATHOLOGIST

## 2023-02-18 PROCEDURE — 88305 TISSUE EXAM BY PATHOLOGIST: CPT

## 2023-02-18 PROCEDURE — 85610 PROTHROMBIN TIME: CPT

## 2023-02-18 PROCEDURE — 2140000000 HC CCU INTERMEDIATE R&B

## 2023-02-18 PROCEDURE — 99232 SBSQ HOSP IP/OBS MODERATE 35: CPT | Performed by: INTERNAL MEDICINE

## 2023-02-18 PROCEDURE — 94640 AIRWAY INHALATION TREATMENT: CPT

## 2023-02-18 PROCEDURE — 74230 X-RAY XM SWLNG FUNCJ C+: CPT

## 2023-02-18 PROCEDURE — 80048 BASIC METABOLIC PNL TOTAL CA: CPT

## 2023-02-18 PROCEDURE — 82962 GLUCOSE BLOOD TEST: CPT

## 2023-02-18 PROCEDURE — S5553 INSULIN LONG ACTING 5 U: HCPCS | Performed by: INTERNAL MEDICINE

## 2023-02-18 PROCEDURE — 6360000002 HC RX W HCPCS: Performed by: INTERNAL MEDICINE

## 2023-02-18 PROCEDURE — 2500000003 HC RX 250 WO HCPCS: Performed by: RADIOLOGY

## 2023-02-18 PROCEDURE — 6370000000 HC RX 637 (ALT 250 FOR IP): Performed by: INTERNAL MEDICINE

## 2023-02-18 PROCEDURE — 36415 COLL VENOUS BLD VENIPUNCTURE: CPT

## 2023-02-18 PROCEDURE — 2700000000 HC OXYGEN THERAPY PER DAY

## 2023-02-18 RX ADMIN — INSULIN LISPRO 6 UNITS: 100 INJECTION, SOLUTION INTRAVENOUS; SUBCUTANEOUS at 14:11

## 2023-02-18 RX ADMIN — GUAIFENESIN 400 MG: 400 TABLET ORAL at 09:19

## 2023-02-18 RX ADMIN — CEFUROXIME AXETIL 250 MG: 250 TABLET ORAL at 09:18

## 2023-02-18 RX ADMIN — AMLODIPINE BESYLATE 5 MG: 5 TABLET ORAL at 09:18

## 2023-02-18 RX ADMIN — BARIUM SULFATE 15 ML: 400 PASTE ORAL at 10:45

## 2023-02-18 RX ADMIN — LEVOTHYROXINE SODIUM 75 MCG: 0.07 TABLET ORAL at 05:59

## 2023-02-18 RX ADMIN — IPRATROPIUM BROMIDE AND ALBUTEROL SULFATE 1 AMPULE: .5; 2.5 SOLUTION RESPIRATORY (INHALATION) at 15:49

## 2023-02-18 RX ADMIN — FUROSEMIDE 40 MG: 10 INJECTION, SOLUTION INTRAMUSCULAR; INTRAVENOUS at 09:27

## 2023-02-18 RX ADMIN — DONEPEZIL HYDROCHLORIDE 10 MG: 5 TABLET ORAL at 21:41

## 2023-02-18 RX ADMIN — BARIUM SULFATE 15 ML: 400 SUSPENSION ORAL at 10:45

## 2023-02-18 RX ADMIN — IPRATROPIUM BROMIDE AND ALBUTEROL SULFATE 1 AMPULE: .5; 2.5 SOLUTION RESPIRATORY (INHALATION) at 19:35

## 2023-02-18 RX ADMIN — BARIUM SULFATE 15 ML: 0.81 POWDER, FOR SUSPENSION ORAL at 10:45

## 2023-02-18 RX ADMIN — CARVEDILOL 18.75 MG: 6.25 TABLET, FILM COATED ORAL at 15:24

## 2023-02-18 RX ADMIN — INSULIN LISPRO 6 UNITS: 100 INJECTION, SOLUTION INTRAVENOUS; SUBCUTANEOUS at 18:15

## 2023-02-18 RX ADMIN — INSULIN GLARGINE-YFGN 12 UNITS: 100 INJECTION, SOLUTION SUBCUTANEOUS at 09:19

## 2023-02-18 RX ADMIN — GUAIFENESIN 400 MG: 400 TABLET ORAL at 21:41

## 2023-02-18 RX ADMIN — INSULIN GLARGINE-YFGN 12 UNITS: 100 INJECTION, SOLUTION SUBCUTANEOUS at 21:41

## 2023-02-18 RX ADMIN — CEFUROXIME AXETIL 250 MG: 250 TABLET ORAL at 21:41

## 2023-02-18 RX ADMIN — IPRATROPIUM BROMIDE AND ALBUTEROL SULFATE 1 AMPULE: .5; 2.5 SOLUTION RESPIRATORY (INHALATION) at 08:16

## 2023-02-18 RX ADMIN — INSULIN LISPRO 4 UNITS: 100 INJECTION, SOLUTION INTRAVENOUS; SUBCUTANEOUS at 09:19

## 2023-02-18 RX ADMIN — GUAIFENESIN 400 MG: 400 TABLET ORAL at 15:17

## 2023-02-18 RX ADMIN — INSULIN LISPRO 8 UNITS: 100 INJECTION, SOLUTION INTRAVENOUS; SUBCUTANEOUS at 21:43

## 2023-02-18 RX ADMIN — CARVEDILOL 18.75 MG: 6.25 TABLET, FILM COATED ORAL at 09:20

## 2023-02-18 NOTE — CONSULTS
[facility]  Department of Internal Medicine  Division of Pulmonary, Critical Care and Sleep Medicine  Consult Note    Jose Alfredo Zhang DO, MPH, Rosemarie Callie, [facility] , Salud Taylor MD      Patient: Joann Crenshaw  MRN: [id number]  : 1932    Encounter Time: 7:35 PM     Date of Admission: 2023 10:57 AM    Primary Care Physician: Kae Rodriguez MD    Reason for Consultation: Plerual Effusion     HISTORY OF PRESENT ILLNESS : Joann Crenshaw 80 y.o. female was seen in consultation regarding the above chief compliant. Patient is a 80-year-old female with past medical history of COPD?, DM, HLD, HTN, atrial fibrillation on Eliquis, hypothyroidism who presents to the emergency room for shortness of breath. Patient was short of breath for last few days with a nonproductive cough EMS was called and patient was found to be 50% on room air. Patient was placed on nonrebreather and brought to the emergency room where she was placed on BiPAP. Chest x-ray was obtained which reveals a new pleural effusion compared to the one on 2023. Her proBNP 6k, remains elevated and a respiratory panel that radiology was positive for RSV. Is now negative  Patient is admitted to intermediate telemetry unit for further work-up and treatment. Thoracentesis was done. Nephrology was consulted    PAST MEDICAL HISTORY:  has a past medical history of Arthritis, COPD (chronic obstructive pulmonary disease) ([address] Diabetes mellitus ([address] Hyperlipidemia, and Hypertension. SURGICAL HISTORY:  has a past surgical history that includes Cholecystectomy; Hysterectomy; Carpal tunnel release; Insert Picc Cath, 5/> Yrs (2020); and Pacemaker insertion (Left, 2022). SOCIAL HISTORY:  reports that she has never smoked. She has never used smokeless tobacco. She reports that she does not drink alcohol. FAMILY  HISTORY: family history is not on file.      MEDICATIONS: Prior to Admission medications    Medication Sig Start Date End Date Taking? Authorizing Provider   carvedilol (COREG) 12.5 MG tablet Take 18.75 mg by mouth 2 times daily (with meals)   Yes Historical Provider, MD   cefUROXime (CEFTIN) 250 MG tablet Take 250 mg by mouth 2 times daily 2/15/23 2/20/23 Yes Historical Provider, MD   predniSONE (DELTASONE) 10 MG tablet Take 40 mg by mouth daily 2/15/23 2/19/23 Yes Historical Provider, MD   guaiFENesin 400 MG tablet Take 1 tablet by mouth in the morning, at noon, and at bedtime 2/15/23   22 Josiah Quintanilla DO   furosemide (LASIX) 20 MG tablet Take 20 mg by mouth daily 9/13/22   Historical Provider, MD   donepezil (ARICEPT) 10 MG tablet Take 10 mg by mouth at bedtime 11/22/22   Historical Provider, MD   amLODIPine (NORVASC) 5 MG tablet Take 1 tablet by mouth daily 9/12/20   Drew Santos MD   ipratropium-albuterol (DUONEB) 0.5-2.5 (3) MG/3ML SOLN nebulizer solution Take 1 vial by nebulization every 6 hours as needed for Shortness of Breath    Historical Provider, MD   levothyroxine (SYNTHROID) 75 MCG tablet Take 75 mcg by mouth Daily    Historical Provider, MD   apixaban (ELIQUIS) 5 MG TABS tablet Take 5 mg by mouth 2 times daily    Historical Provider, MD       ALLERGIES: Patient has no known allergies. REVIEW OF SYSTEMS:  Otherwise negative if not reported or listed below  Constitutional:  No unanticipated weight loss. No change in sleep pattern or activity. No fevers, chills or rigors. Eyes:    No visual changes or diplopia. No scleral icterus. ENT:    No Headaches, hearing loss or vertigo. No mouth sores or sore throat. Cardiovascular:  + chest discomfort, palpitations. Respiratory:  + cough, No wheezing      No sputum production. No hemoptysis, pleuritic pain. Gastrointestinal:  No abdominal pain, appetite loss, blood in stools. No hematemesis  Genitourinary:  No dysuria, trouble voiding or hematuria.       No nocturia. Musculoskeletal:   No weakness or joint complaints. Integumentary: No rashes or pruritis. Neurological:  No headache, numbness or tingling. Psychiatric:   No effect on mood, memory, mentation, or behavior. No anxiety or depression. Endocrine:   No excessive thirst, fluid intake, or urination. No tremor. Hematologic: No abnormal bruising or bleeding. Lymphatic:  No swollen lymph nodes. Immunologic:  No hives or hx of anaphylaxis      OBJECTIVE:     PHYSICAL EXAM:   VITALS:   Vitals:    02/17/23 1445 02/17/23 1449 02/17/23 1547 02/17/23 1813   BP:  130/73  136/76   Pulse:  (!) 126 83 85   Resp:  18     Temp:  97.3 °F (36.3 °C)     TempSrc:  Oral     SpO2:  100%     Weight:       Height: 5' (1.524 m)           Intake/Output Summary (Last 24 hours) at 2/17/2023 1935  Last data filed at 2/17/2023 1735  Gross per 24 hour   Intake 300 ml   Output 1400 ml   Net -1100 ml        CONSTITUTIONAL:   A&O x 3, NAD  SKIN:     No rash, No suspicious lesions, No skin discoloration  HEENT:     EOMI, MMM, No thrush  NECK:    No bruits, No JVP appreciated  CV:      Sinus,  No murmur, No rubs, No gallops  PULMONARY:   Couse BS,  No Wheezing, No Rales, No Rhonchi      No noted egophony  ABDOMEN:     Soft, non-tender. BS normal. No R/R/G  EXT:    No deformities . No clubbing.       + lower extremity edema, No venous stasis  PULSE:   Appears equal and palpable.   PSYCHIATRIC:  Seems appropriate, No acute psychosis  MS:    No fractures, No gross weakness  NEUROLOGIC:   The clinical assessment is non-focal     DATA: IMAGING & TESTING:     LABORATORY TESTS:    CBC:   Lab Results   Component Value Date/Time    WBC 15.7 02/16/2023 11:11 AM    RBC 4.05 02/16/2023 11:11 AM    HGB 11.4 02/16/2023 11:11 AM    HCT 35.1 02/16/2023 11:11 AM    MCV 86.7 02/16/2023 11:11 AM    MCH 28.1 02/16/2023 11:11 AM    MCHC 32.5 02/16/2023 11:11 AM    RDW 15.7 02/16/2023 11:11 AM     02/16/2023 11:11 AM    MPV 9.2 02/16/2023 11:11 AM     CMP:    Lab Results   Component Value Date/Time     02/17/2023 04:48 AM    K 4.6 02/17/2023 04:48 AM    K 5.3 02/07/2023 04:35 AM    CL 91 02/17/2023 04:48 AM    CO2 30 02/17/2023 04:48 AM    BUN 29 02/17/2023 04:48 AM    CREATININE 0.7 02/17/2023 04:48 AM    GFRAA >60 09/12/2020 04:39 AM    LABGLOM >60 02/17/2023 04:48 AM    GLUCOSE 271 02/17/2023 04:48 AM    PROT 5.5 02/16/2023 11:11 AM    LABALBU 2.9 02/16/2023 11:11 AM    CALCIUM 8.8 02/17/2023 04:48 AM    BILITOT <0.2 02/16/2023 11:11 AM    ALKPHOS 97 02/16/2023 11:11 AM    AST 18 02/16/2023 11:11 AM    ALT 32 02/16/2023 11:11 AM     Magnesium:    Lab Results   Component Value Date/Time    MG 2.7 02/08/2023 06:22 AM     Phosphorus:    Lab Results   Component Value Date/Time    PHOS 3.4 02/08/2023 06:22 AM     PT/INR:    Lab Results   Component Value Date/Time    PROTIME 15.0 02/17/2023 04:48 AM    INR 1.4 02/17/2023 04:48 AM     Troponin:    Lab Results   Component Value Date/Time    TROPONINI 0.01 09/08/2020 10:59 AM     ABG:    Lab Results   Component Value Date/Time    PH 7.373 02/04/2023 09:56 AM    PCO2 44.0 02/04/2023 09:56 AM    PO2 407.8 02/04/2023 09:56 AM    HCO3 25.0 02/04/2023 09:56 AM    BE -0.4 02/04/2023 09:56 AM    O2SAT 99.6 02/04/2023 09:56 AM        PRO-BNP:   Lab Results   Component Value Date    PROBNP 6,028 (H) 02/16/2023    PROBNP 6,396 (H) 02/16/2023      ABGs:   Lab Results   Component Value Date/Time    PH 7.373 02/04/2023 09:56 AM    PO2 407.8 02/04/2023 09:56 AM    PCO2 44.0 02/04/2023 09:56 AM     Hemoglobin A1C: No components found for: HGBA1C    IMAGING:  Imaging tests were completed and reviewed and discussed radiology and care team involved and reveals   XR CHEST PORTABLE    Result Date: 2/17/2023 FINDINGS: The lungs are hypoinflated. There are airspace densities at the lung bases and blunting of the costophrenic sulci. Cardiac silhouette is enlarged.  There is atherosclerotic calcification of the thoracic aorta.  A left subclavian pacemaker is unchanged. There is moderate thoracic spondylosis. No pneumothorax. There are surgical clips right upper abdomen. Bilateral lower lobe atelectasis/pneumonia with improved pneumatization at the right lung base. Small bilateral pleural effusions, decreased on the right consistent with interval thoracentesis. No pneumothorax. Stable enlargement of the cardiac silhouette. ECHOCARDIOGRAM:   Left ventricular internal dimensions were normal in diastole and systole. Mild left ventricular concentric hypertrophy noted. Overall ejection fraction mildly decreased. Ejection fraction is visually estimated at 45-50%. Indeterminate diastolic function.   ,Moderate MR    Assessment:   Plerual Effusion  likely from HF with low EF and MReg  Anasarcha  DM type II  Dementia  Pacemaker   Hyponatremia   UTI Mixed  Chronic Anemia        Plan:   Thoracentesis and analysis   OXygen support and bronchodilators  PT OT   I don't know if she has COPD as reported, will continue bronchodilators   Interogate the pacer for arrhthymias  Diuresis need  Refused BIPAP so doing a sleep study in a 80year old will be difficult- family to discuss        Dm Marie DO DO, MPH, Brett Andres  Professor of Internal Medicine  Pulmonary, Critical Care and Sleep Medicine

## 2023-02-18 NOTE — PROGRESS NOTES
SPEECH/LANGUAGE PATHOLOGY  VIDEOFLUOROSCOPIC STUDY OF SWALLOWING (MBS)   and PLAN OF CARE    PATIENT NAME:  Km Kirkland  (female)     MRN:  [id number]    :  1932  (80 y.o.)  STATUS:  Inpatient: Room 6408/6408-B    TODAY'S DATE:  2023  REFERRING PROVIDER:   Dr. Robbie Sosa: [place] modified barium swallow with video  Date of order:  23   REASON FOR REFERRAL: coughing with eating   EVALUATING THERAPIST: Patrick Thornton, SLP      RESULTS:      DYSPHAGIA DIAGNOSIS:  mild  oral phase dysphagia -- moist cough present pre/during/post PO intake that was NOT related to airway compromise     Oral deficits appear related to generalized deconditioning/weakness and respiratory status at this time. DIET RECOMMENDATIONS:  Soft and bite size consistency solids (IDDSI level 6) with  thin liquids (IDDSI level 0)      FEEDING RECOMMENDATIONS:    Assistance level:  Full assistance is needed during all oral intake     Compensatory strategies recommended: Double swallow (initiated indep by Pt), Small bites/sips, and Alternate solids and liquids     Discussed recommendations with nursing and/or faxed report to referring provider: Nursing not available, diet change recommendation placed in Physician sticky note section     Laryngeal Penetration and Aspiration:  Penetration WITHOUT aspiration was observed in today's study with  thin liquid    SPEECH THERAPY  PLAN OF CARE   The dysphagia POC is established based on physician order and dysphagia diagnosis    Dysphagia therapy is not recommended following today's session due to independent with implementation of strategies/modifications.        Conditions Requiring Skilled Therapeutic Intervention for dysphagia:    Swallow within functional limits per assessment     SPECIFIC DYSPHAGIA INTERVENTIONS TO INCLUDE:     Not applicable no therapy warranted     Specific instructions for next treatment:  not applicable   Treatment Goals:    Short Term Goals:  Not applicable no therapy warranted     Long Term Goals:   Not applicable no therapy warranted      Patient/family Goal:    not applicable    Plan of care discussed with Patient   The Patient understand(s) the diagnosis, prognosis and plan of care     Rehabilitation Potential/Prognosis: good                      ADMITTING DIAGNOSIS: Acute respiratory failure with hypoxia ([facility] [address] [J96.01]     VISIT DIAGNOSIS:   Visit Diagnoses         Codes    Hypoxia    -  Primary R09.02    Congestive heart failure, unspecified HF chronicity, unspecified heart failure type (HCC)     I50.9    Pleural effusion     J90                PATIENT REPORT/COMPLAINT: denies difficulty swallowing    PRIOR LEVEL OF SWALLOW FUNCTION:    Past History of Dysphagia?:  none reported    Home diet: Regular consistency solids (IDDSI level 7) with  thin liquids (IDDSI level 0)  Current Diet Order:  ADULT DIET; Regular; 4 carb choices (60 gm/meal);  Low Sodium (2 gm)    PROCEDURE:  Consistencies Administered During the Evaluation   Liquids: thin liquid and nectar thick liquid   Solids:  pureed foods and solid foods      Method of Intake:   cup, straw, spoon  Fed by clinician      Position:   Seated, upright    INSTRUMENTAL ASSESSMENT:    ORAL PREP/ ORAL PHASE:    Decreased mastication due to:  disorganized chewing pattern  Decreased bolus formation resulting in observed premature pharyngeal spillage  Piecemeal deglutition     PHARYNGEAL PHASE:     ONSET TIME       Delayed initiation of the pharyngeal swallow was noted with swallow reflex triggered at the level of the vallecula (puree/ cookie only)       PHARYNGEAL RESIDUALS        Vallecula/Pharyngeal Wall           Reduced tongue base retraction resulting in residuals in vallecula and/or posterior pharyngeal wall for pureed foods and solid foods which cleared with spontaneous double swallow       Pyriform Sinuses      No significant residuals were noted in the pyriform sinuses     LARYNGEAL PENETRATION   Laryngeal penetration occurred in the absence of aspiration DURING the swallow for thin liquid due to  delayed laryngeal closure which cleared from the laryngeal vestibule spontaneously (transient). Laryngeal penetration was minimal and occurred consistently   an absent cough/throat clear was noted    ASPIRATION  Aspiration was not present during this evaluation    PENETRATION-ASPIRATION SCALE (PAS):  THIN 2 = Material enters the airway, remains above vocal folds, and is ejected from the airway   MILDLY THICK 1 = Material does not enter the airway  MODERATELY THICK 1 = Material does not enter the airway  PUREE 1 = Material does not enter the airway  HARD SOLID 1 = Material does not enter the airway       COMPENSATORY STRATEGIES    Compensatory strategies that were beneficial included Double swallow (initiated indep by Pt), Small bites/sips, and Alternate solids and liquids      STRUCTURAL/FUNCTIONAL ANOMALIES   No structural/functional anomalies were noted    CERVICAL ESOPHAGEAL STAGE :     The cervical esophagus appeared adequate-- questionable small esophageal webbing present, defer to radiologist report          ___________    Cognition:   Within functional limits for this exam    Oral Peripheral Examination   Generalized oral weakness    Current Respiratory Status   6 liters O2 via nasal cannula     RR appears slightly increased at time of exam     Parameters of Speech Production  Respiration:  Adequate for speech production  Quality:   Within functional limits  Intensity: Within functional limits    Pain: No pain reported. EDUCATION:   The Speech Language Pathologist (SLP) completed education regarding results of evaluation and that intervention is not warranted at this time.   Learner: Patient  Education: Reviewed results and recommendations of this evaluation, Reviewed diet and strategies, Reviewed signs, symptoms and risks of aspiration, Reviewed recommendations for follow-up, and Education Related to Potential Risks and Complications Due to Impairment/Illness/Injury  Evaluation of Education:  Verbalizes understanding    This plan may be re-evaluated and revised as warranted. Evaluation Time includes thorough review of current medical information, gathering information on past medical history/social history and prior level of function, completion of standardized testing/informal observation of tasks, assessment of data and education on plan of care and goals. [x]The admitting diagnosis and active problem list, have been reviewed prior to initiation of this evaluation. CPT Code: 46902  dysphagia study    ACTIVE PROBLEM LIST:   Patient Active Problem List   Diagnosis    Acute appendicitis    Hypertension    Inadequately controlled diabetes mellitus ([facility] [address]    Metabolic encephalopathy    Sepsis associated hypotension (HCC)    Sepsis ([facility] [address]    Relapsing appendicitis    Saddle embolus of pulmonary artery (HCC)    DVT of deep femoral vein, right (HCC)    History of pulmonary embolism    Generalized weakness    Acute kidney injury ([facility] [address]    Left knee pain    Baker's cyst    Atrial fibrillation with RVR (HCC)    New onset a-fib (HCC)    Bradycardia    Acute respiratory failure with hypoxia (HCC)       INTERVENTION  CPT Code: 69742  dysphagia tx    Speech Pathologist (SLP) completed education with the patient/family regarding procedure of Modified Barium Swallow Study prior to exam and then type of swallowing impairment following completion of MBSS. Reviewed current solid/liquid consistency diet recommendations --   Dysphagia 3, Soft/advanced (Soft & Bite-sized) solids with  thin liquids (IDDSI level 0) and discussed compensatory strategies (Double swallow (initiated indep by Pt), Small bites/sips, and Alternate solids and liquids) to ensure safe PO intake. Images from MBSS reviewed with patient/ family and education provided. Reviewed aspiration precautions.  Encouraged patient and/or family to engage SLP in unstructured Q&A session relative to identified deficit areas; indicated understanding of all information provided via satisfactory verbal response.

## 2023-02-18 NOTE — PROGRESS NOTES
Hospitalist Progress Note      PCP: Peg Banuelos MD    Date of Admission: 2/16/2023        Hospital Course:  80 y.o. female presented with HYPOXIA. JUST DISCHARGED YESTERDAY, HOME HEALTH NURSE CAME TODAY, AND SHE WAS HYPOXIC. SHE HAS A HISTORY OF HYPOXIA, BUT REFUSES BIPAP AS OF RECENT ADMISSION. WAS WAS SEEN BY DR DELAROSA FOR HYPONATREMIA, AND AND SHE WAS TREATED FOR A UTI WITH PROTEUS AND E COLI ** TO FINISH THE COURSE OF ABX FOR BOTH.   ** IN ROOM WHILE  IS FEEDING HER, SHE CONTINUES TO COUGH  AFTER EACH BITE,  WILL GET A VIDEO SWALLOW.      FOR IR THORACENTESIS       Subjective:      Patient seen at [facility]  Passed swallow study  No acute events overnight          Medications:  Reviewed    Infusion Medications    dextrose       Scheduled Medications    insulin glargine  12 Units SubCUTAneous BID    ipratropium-albuterol  1 ampule Inhalation 4x daily    [Held by provider] apixaban  5 mg Oral BID    amLODIPine  5 mg Oral Daily    carvedilol  18.75 mg Oral BID WC    cefUROXime  250 mg Oral BID    donepezil  10 mg Oral Nightly    guaiFENesin  400 mg Oral TID    levothyroxine  75 mcg Oral Daily    furosemide  40 mg IntraVENous Daily    insulin lispro  0-10 Units SubCUTAneous 4x Daily AC & HS     PRN Meds: albuterol, ipratropium-albuterol, glucose, dextrose bolus **OR** dextrose bolus, glucagon (rDNA), dextrose, bisacodyl, acetaminophen, trimethobenzamide      Intake/Output Summary (Last 24 hours) at 2/18/2023 1447  Last data filed at 2/18/2023 1010  Gross per 24 hour   Intake 240 ml   Output 950 ml   Net -710 ml       Exam:    BP (!) 122/49   Pulse 83   Temp 97.5 °F (36.4 °C) (Oral)   Resp 20   Ht 5' (1.524 m)   Wt 188 lb 9.6 oz (85.5 kg)   SpO2 100%   BMI 36.83 kg/m²       Gen:  WELL DEVELOPED  HEENT: NC/AT, moist mucous membranes, n  Neck: supple, trachea midline,   Heart:  IRREG, pacer  Lungs:   DIMINISHED  bilaterally,   Abd: bowel sounds present, soft, nontender, nondistended, no masses  Extrem:  No clubbing, cyanosis,  EDEMA OF ARMS AND LEGS edema  Skin: ERYTHEMA OF ARMS  Psych: Not oriented to date   Neuro: grossly intact, moves all four extremities. Labs:   Recent Labs     02/16/23  1111   WBC 15.7*   HGB 11.4*   HCT 35.1        Recent Labs     02/16/23  1111 02/17/23  0448 02/18/23  0555   * 131* 135   K 4.6 4.6 4.6   CL 93* 91* 95*   CO2 28 30* 28   BUN 29* 29* 29*   CREATININE 0.7 0.7 0.7   CALCIUM 8.9 8.8 8.5*     Recent Labs     02/16/23  1111   AST 18   ALT 32   BILITOT <0.2   ALKPHOS 97     Recent Labs     02/16/23  1430 02/17/23  0448 02/18/23  0555   INR 1.7 1.4 1.2     No results for input(s): Ofelia Nguyen in the last 72 hours. Recent Labs     02/16/23  1111   AST 18   ALT 32   BILITOT <0.2   ALKPHOS 97     No results for input(s): LACTA in the last 72 hours. Lab Results   Component Value Date    LABURIC 6.9 (H) 02/12/2023     Lab Results   Component Value Date    AMMONIA <10.0 (L) 09/08/2020    AMMONIA 28.3 03/05/2020    AMMONIA 24.2 01/17/2020       Assessment:    Active Hospital Problems    Diagnosis Date Noted    Acute respiratory failure with hypoxia ([facility] [address] [J96.01] 02/04/2023     Priority: Medium   COUGH WHILE EATING  ANASARCA   IIDM   HYPOTHYROID   DEMENTIA   PACEMAKER   PAF  HYPONATREMIA  UTI(PROTEUS AND E COLI))   CEFTIN       Plan:   IR THORACENTESIS   PULM CONSULT    VIDEO SWALLOW     2/18/2023  Patient passed swallow study   Resume diet with recommendations  Vital signs stable  Repeat labs in am  Continue Ceftin     DVT Prophylaxis: ELIQUIS( HOLD FOR THORACENTESIS) SCD  Diet: ADULT DIET; Regular; 4 carb choices (60 gm/meal);  Low Sodium (2 gm)  Code Status: Full Code     PT/OT Eval Status: ORDERED     Dispo - HOME     Electronically signed by MICHA Renteria CNP on 2/18/2023 at 2:47 PM

## 2023-02-18 NOTE — PROGRESS NOTES
[place]  Department of Internal Medicine  Division of Pulmonary, Critical Care and Sleep Medicine  Progress Note    Lucy Holland DO, MPH, Vikram Russ, [address] [facility] Olivia JACKMAN MD      Patient: Jenny Mccray  MRN: [id number]  : 1932    Encounter Time: 3:21 PM     Date of Admission: 2023 10:57 AM    Primary Care Physician: Orquidea Rooney MD    Reason for Consultation: Plerual Effusion  SUBJECTIVE:    Family at bedside  No complaints  Fluid was clear - awaiting analysis    OBJECTIVE:     PHYSICAL EXAM:   VITALS:   Vitals:    23 0918 23 0920 23 0923 23 1328   BP: 139/82   (!) 122/49   Pulse:  90 90 83   Resp:    20   Temp:    97.5 °F (36.4 °C)   TempSrc:    Oral   SpO2:    100%   Weight:       Height:            Intake/Output Summary (Last 24 hours) at 2023 1521  Last data filed at 2023 1010  Gross per 24 hour   Intake 240 ml   Output 950 ml   Net -710 ml          CONSTITUTIONAL:   Alert, NAD  SKIN:     No rash,   HEENT:     No thrush  NECK:    No bruits, No JVP appreciated  CV:      Afib murmur, No rubs, No gallops  PULMONARY:   Couse BS,  No Wheezing, No Rales, No Rhonchi      No noted egophony  ABDOMEN:     Soft, non-tender. BS normal. No R/R/G  EXT:    No deformities . No clubbing.       + lower extremity edema, No venous stasis  PULSE:   Palpable.   PSYCHIATRIC:  Seems appropriate, No acute psychosis  MS:    No fractures, + weakness  NEUROLOGIC:   The clinical assessment is non-focal     DATA: IMAGING & TESTING:     LABORATORY TESTS:    CBC:   Lab Results   Component Value Date/Time    WBC 15.7 2023 11:11 AM    RBC 4.05 2023 11:11 AM    HGB 11.4 2023 11:11 AM    HCT 35.1 2023 11:11 AM    MCV 86.7 2023 11:11 AM    MCH 28.1 2023 11:11 AM    MCHC 32.5 2023 11:11 AM    RDW 15.7 2023 11:11 AM     2023 11:11 AM    MPV 9.2 2023 11:11 AM CMP:    Lab Results   Component Value Date/Time     02/18/2023 05:55 AM    K 4.6 02/18/2023 05:55 AM    K 5.3 02/07/2023 04:35 AM    CL 95 02/18/2023 05:55 AM    CO2 28 02/18/2023 05:55 AM    BUN 29 02/18/2023 05:55 AM    CREATININE 0.7 02/18/2023 05:55 AM    GFRAA >60 09/12/2020 04:39 AM    LABGLOM >60 02/18/2023 05:55 AM    GLUCOSE 212 02/18/2023 05:55 AM    PROT 5.5 02/16/2023 11:11 AM    LABALBU 2.9 02/16/2023 11:11 AM    CALCIUM 8.5 02/18/2023 05:55 AM    BILITOT <0.2 02/16/2023 11:11 AM    ALKPHOS 97 02/16/2023 11:11 AM    AST 18 02/16/2023 11:11 AM    ALT 32 02/16/2023 11:11 AM     Magnesium:    Lab Results   Component Value Date/Time    MG 2.7 02/08/2023 06:22 AM     Phosphorus:    Lab Results   Component Value Date/Time    PHOS 3.4 02/08/2023 06:22 AM     PT/INR:    Lab Results   Component Value Date/Time    PROTIME 13.0 02/18/2023 05:55 AM    INR 1.2 02/18/2023 05:55 AM     Troponin:    Lab Results   Component Value Date/Time    TROPONINI 0.01 09/08/2020 10:59 AM     ABG:    Lab Results   Component Value Date/Time    PH 7.373 02/04/2023 09:56 AM    PCO2 44.0 02/04/2023 09:56 AM    PO2 407.8 02/04/2023 09:56 AM    HCO3 25.0 02/04/2023 09:56 AM    BE -0.4 02/04/2023 09:56 AM    O2SAT 99.6 02/04/2023 09:56 AM        PRO-BNP:   Lab Results   Component Value Date    PROBNP 6,028 (H) 02/16/2023    PROBNP 6,396 (H) 02/16/2023      ABGs:   Lab Results   Component Value Date/Time    PH 7.373 02/04/2023 09:56 AM    PO2 407.8 02/04/2023 09:56 AM    PCO2 44.0 02/04/2023 09:56 AM     Hemoglobin A1C: No components found for: HGBA1C    IMAGING:  Imaging tests were completed and reviewed and discussed radiology and care team involved and reveals   XR CHEST PORTABLE    Result Date: 2/17/2023 FINDINGS: The lungs are hypoinflated. There are airspace densities at the lung bases and blunting of the costophrenic sulci. Cardiac silhouette is enlarged. There is atherosclerotic calcification of the thoracic aorta.   A left subclavian pacemaker is unchanged. There is moderate thoracic spondylosis. No pneumothorax. There are surgical clips right upper abdomen. Bilateral lower lobe atelectasis/pneumonia with improved pneumatization at the right lung base. Small bilateral pleural effusions, decreased on the right consistent with interval thoracentesis. No pneumothorax. Stable enlargement of the cardiac silhouette. ECHOCARDIOGRAM:   Left ventricular internal dimensions were normal in diastole and systole. Mild left ventricular concentric hypertrophy noted. Overall ejection fraction mildly decreased. Ejection fraction is visually estimated at 45-50%. Indeterminate diastolic function.   ,Moderate MR    Assessment:   Plerual Effusion  likely from HF with low EF and MReg  Anasarcha  DM type II  Dementia  Pacemaker  left side  Hyponatremia   UTI Mixed  Chronic Anemia        Plan:   Thoracentesis and analysis pending  OXygen support and bronchodilators  PT OT   I don't know if she has COPD as reported, will continue bronchodilators   Interogate the pacer for arrhthymias  Diuresis need  Refused BIPAP so doing a sleep study in a 80year old will be difficult- family to discuss    Dalila Hubbard DO DO, MPH, Beth Ivory  Professor of Internal Medicine  Pulmonary, Critical Care and Sleep Medicine

## 2023-02-19 LAB
ANION GAP SERPL CALCULATED.3IONS-SCNC: 4 MMOL/L (ref 7–16)
BUN BLDV-MCNC: 26 MG/DL (ref 6–23)
CALCIUM SERPL-MCNC: 8.3 MG/DL (ref 8.6–10.2)
CHLORIDE BLD-SCNC: 94 MMOL/L (ref 98–107)
CO2: 33 MMOL/L (ref 22–29)
CREAT SERPL-MCNC: 0.6 MG/DL (ref 0.5–1)
GFR SERPL CREATININE-BSD FRML MDRD: >60 ML/MIN/1.73
GLUCOSE BLD-MCNC: 93 MG/DL (ref 74–99)
INR BLD: 1.2
METER GLUCOSE: 129 MG/DL (ref 74–99)
METER GLUCOSE: 167 MG/DL (ref 74–99)
METER GLUCOSE: 199 MG/DL (ref 74–99)
METER GLUCOSE: 97 MG/DL (ref 74–99)
POTASSIUM SERPL-SCNC: 4.2 MMOL/L (ref 3.5–5)
PROTHROMBIN TIME: 12.5 SEC (ref 9.3–12.4)
SODIUM BLD-SCNC: 131 MMOL/L (ref 132–146)

## 2023-02-19 PROCEDURE — S5553 INSULIN LONG ACTING 5 U: HCPCS | Performed by: INTERNAL MEDICINE

## 2023-02-19 PROCEDURE — 94640 AIRWAY INHALATION TREATMENT: CPT

## 2023-02-19 PROCEDURE — 99232 SBSQ HOSP IP/OBS MODERATE 35: CPT | Performed by: INTERNAL MEDICINE

## 2023-02-19 PROCEDURE — 82962 GLUCOSE BLOOD TEST: CPT

## 2023-02-19 PROCEDURE — 2700000000 HC OXYGEN THERAPY PER DAY

## 2023-02-19 PROCEDURE — 2140000000 HC CCU INTERMEDIATE R&B

## 2023-02-19 PROCEDURE — 80048 BASIC METABOLIC PNL TOTAL CA: CPT

## 2023-02-19 PROCEDURE — 6370000000 HC RX 637 (ALT 250 FOR IP): Performed by: INTERNAL MEDICINE

## 2023-02-19 PROCEDURE — 36415 COLL VENOUS BLD VENIPUNCTURE: CPT

## 2023-02-19 PROCEDURE — 85610 PROTHROMBIN TIME: CPT

## 2023-02-19 PROCEDURE — 6360000002 HC RX W HCPCS: Performed by: INTERNAL MEDICINE

## 2023-02-19 RX ADMIN — IPRATROPIUM BROMIDE AND ALBUTEROL SULFATE 1 AMPULE: .5; 2.5 SOLUTION RESPIRATORY (INHALATION) at 15:28

## 2023-02-19 RX ADMIN — FUROSEMIDE 40 MG: 10 INJECTION, SOLUTION INTRAMUSCULAR; INTRAVENOUS at 09:29

## 2023-02-19 RX ADMIN — DONEPEZIL HYDROCHLORIDE 5 MG: 5 TABLET ORAL at 20:42

## 2023-02-19 RX ADMIN — IPRATROPIUM BROMIDE AND ALBUTEROL SULFATE 1 AMPULE: .5; 2.5 SOLUTION RESPIRATORY (INHALATION) at 09:23

## 2023-02-19 RX ADMIN — IPRATROPIUM BROMIDE AND ALBUTEROL SULFATE 1 AMPULE: .5; 2.5 SOLUTION RESPIRATORY (INHALATION) at 12:33

## 2023-02-19 RX ADMIN — AMLODIPINE BESYLATE 5 MG: 5 TABLET ORAL at 09:29

## 2023-02-19 RX ADMIN — GUAIFENESIN 400 MG: 400 TABLET ORAL at 09:29

## 2023-02-19 RX ADMIN — CEFUROXIME AXETIL 250 MG: 250 TABLET ORAL at 20:43

## 2023-02-19 RX ADMIN — CARVEDILOL 18.75 MG: 6.25 TABLET, FILM COATED ORAL at 17:47

## 2023-02-19 RX ADMIN — GUAIFENESIN 400 MG: 400 TABLET ORAL at 20:43

## 2023-02-19 RX ADMIN — CARVEDILOL 18.75 MG: 6.25 TABLET, FILM COATED ORAL at 09:27

## 2023-02-19 RX ADMIN — INSULIN LISPRO 2 UNITS: 100 INJECTION, SOLUTION INTRAVENOUS; SUBCUTANEOUS at 17:47

## 2023-02-19 RX ADMIN — APIXABAN 5 MG: 5 TABLET, FILM COATED ORAL at 20:43

## 2023-02-19 RX ADMIN — CEFUROXIME AXETIL 250 MG: 250 TABLET ORAL at 09:29

## 2023-02-19 RX ADMIN — LEVOTHYROXINE SODIUM 75 MCG: 0.07 TABLET ORAL at 06:28

## 2023-02-19 RX ADMIN — IPRATROPIUM BROMIDE AND ALBUTEROL SULFATE 1 AMPULE: .5; 2.5 SOLUTION RESPIRATORY (INHALATION) at 18:20

## 2023-02-19 RX ADMIN — GUAIFENESIN 400 MG: 400 TABLET ORAL at 15:21

## 2023-02-19 RX ADMIN — INSULIN GLARGINE-YFGN 12 UNITS: 100 INJECTION, SOLUTION SUBCUTANEOUS at 09:33

## 2023-02-19 RX ADMIN — INSULIN LISPRO 2 UNITS: 100 INJECTION, SOLUTION INTRAVENOUS; SUBCUTANEOUS at 20:38

## 2023-02-19 RX ADMIN — INSULIN GLARGINE-YFGN 12 UNITS: 100 INJECTION, SOLUTION SUBCUTANEOUS at 20:38

## 2023-02-19 NOTE — PROGRESS NOTES
[place]  Department of Internal Medicine  Division of Pulmonary, Critical Care and Sleep Medicine  Progress Note    Inell DO Josette, MPH, [facility], FACOI, [address] Leonel JACKMAN MD      Patient: Janay Parr  MRN: [id number]  : 1932    Encounter Time: 2:47 PM     Date of Admission: 2023 10:57 AM    Primary Care Physician: Frank Mc MD    Reason for Consultation: Plerual Effusion  SUBJECTIVE:  Cell count on Fluid was NORMAL     Family at bedside  No complaints  Fluid was clear - awaiting analysis    OBJECTIVE:     PHYSICAL EXAM:   VITALS:   Vitals:    23 0840 23 0927 23 0946 23 1130   BP: 108/65 108/65  (!) 118/54   Pulse: 97 97 99 99   Resp: 20   18   Temp: 98.3 °F (36.8 °C)   98.5 °F (36.9 °C)   TempSrc: Oral   Axillary   SpO2: 98%   95%   Weight:       Height:            Intake/Output Summary (Last 24 hours) at 2023 1447  Last data filed at 2023 0634  Gross per 24 hour   Intake 100 ml   Output 1000 ml   Net -900 ml          CONSTITUTIONAL:   Alert, NAD  SKIN:     No rash,   HEENT:     No thrush  NECK:    No bruits, No JVP appreciated  CV:      Afib murmur, No rubs, No gallops  PULMONARY:   Couse BS,  No Wheezing, No Rales, No Rhonchi      No noted egophony  ABDOMEN:     Soft, non-tender. BS normal. No R/R/G  EXT:    No deformities . No clubbing.       + lower extremity edema, No venous stasis  PULSE:   Palpable.   PSYCHIATRIC:  Seems appropriate, No acute psychosis  MS:    No fractures, + weakness  NEUROLOGIC:   The clinical assessment is non-focal     DATA: IMAGING & TESTING:     LABORATORY TESTS:    CBC:   Lab Results   Component Value Date/Time    WBC 15.7 2023 11:11 AM    RBC 4.05 2023 11:11 AM    HGB 11.4 2023 11:11 AM    HCT 35.1 2023 11:11 AM    MCV 86.7 2023 11:11 AM    MCH 28.1 2023 11:11 AM    MCHC 32.5 2023 11:11 AM    RDW 15.7 2023 11:11 AM     02/16/2023 11:11 AM    MPV 9.2 02/16/2023 11:11 AM     CMP:    Lab Results   Component Value Date/Time     02/19/2023 06:38 AM    K 4.2 02/19/2023 06:38 AM    K 5.3 02/07/2023 04:35 AM    CL 94 02/19/2023 06:38 AM    CO2 33 02/19/2023 06:38 AM    BUN 26 02/19/2023 06:38 AM    CREATININE 0.6 02/19/2023 06:38 AM    GFRAA >60 09/12/2020 04:39 AM    LABGLOM >60 02/19/2023 06:38 AM    GLUCOSE 93 02/19/2023 06:38 AM    PROT 5.5 02/16/2023 11:11 AM    LABALBU 2.9 02/16/2023 11:11 AM    CALCIUM 8.3 02/19/2023 06:38 AM    BILITOT <0.2 02/16/2023 11:11 AM    ALKPHOS 97 02/16/2023 11:11 AM    AST 18 02/16/2023 11:11 AM    ALT 32 02/16/2023 11:11 AM     Magnesium:    Lab Results   Component Value Date/Time    MG 2.7 02/08/2023 06:22 AM     Phosphorus:    Lab Results   Component Value Date/Time    PHOS 3.4 02/08/2023 06:22 AM     PT/INR:    Lab Results   Component Value Date/Time    PROTIME 12.5 02/19/2023 06:38 AM    INR 1.2 02/19/2023 06:38 AM     Troponin:    Lab Results   Component Value Date/Time    TROPONINI 0.01 09/08/2020 10:59 AM     ABG:    Lab Results   Component Value Date/Time    PH 7.373 02/04/2023 09:56 AM    PCO2 44.0 02/04/2023 09:56 AM    PO2 407.8 02/04/2023 09:56 AM    HCO3 25.0 02/04/2023 09:56 AM    BE -0.4 02/04/2023 09:56 AM    O2SAT 99.6 02/04/2023 09:56 AM        PRO-BNP:   Lab Results   Component Value Date    PROBNP 6,028 (H) 02/16/2023    PROBNP 6,396 (H) 02/16/2023      ABGs:   Lab Results   Component Value Date/Time    PH 7.373 02/04/2023 09:56 AM    PO2 407.8 02/04/2023 09:56 AM    PCO2 44.0 02/04/2023 09:56 AM     Hemoglobin A1C: No components found for: HGBA1C    IMAGING:  Imaging tests were completed and reviewed and discussed radiology and care team involved and reveals   XR CHEST PORTABLE    Result Date: 2/17/2023 FINDINGS: The lungs are hypoinflated. There are airspace densities at the lung bases and blunting of the costophrenic sulci.   Cardiac silhouette is enlarged. There is atherosclerotic calcification of the thoracic aorta. A left subclavian pacemaker is unchanged. There is moderate thoracic spondylosis. No pneumothorax. There are surgical clips right upper abdomen. Bilateral lower lobe atelectasis/pneumonia with improved pneumatization at the right lung base. Small bilateral pleural effusions, decreased on the right consistent with interval thoracentesis. No pneumothorax. Stable enlargement of the cardiac silhouette. ECHOCARDIOGRAM:   Left ventricular internal dimensions were normal in diastole and systole. Mild left ventricular concentric hypertrophy noted. Overall ejection fraction mildly decreased. Ejection fraction is visually estimated at 45-50%. Indeterminate diastolic function.   ,Moderate MR    Assessment:   Plerual Effusion  likely from HF with low EF and MReg  Anasarcha  DM type II  Dementia  Pacemaker  left side  Hyponatremia   UTI Mixed  Chronic Anemia        Plan:   Thoracentesis and analysis pending, suspect heart related   OXygen support and bronchodilators  PT OT   I don't know if she has COPD as reported, will continue bronchodilators   Interogate the pacer for arrhthymias  Diuresis need  Refused BIPAP so doing a sleep study in a 80year old will be difficult- family to discuss    Jose Adame DO DO, MPH, Agustina Ruffin  Professor of Internal Medicine  Pulmonary, Critical Care and Sleep Medicine

## 2023-02-19 NOTE — PROGRESS NOTES
Hospitalist Progress Note      PCP: Peg Banuelos MD    Date of Admission: 2/16/2023        Hospital Course:  80 y.o. female presented with HYPOXIA. JUST DISCHARGED YESTERDAY, HOME HEALTH NURSE CAME TODAY, AND SHE WAS HYPOXIC. SHE HAS A HISTORY OF HYPOXIA, BUT REFUSES BIPAP AS OF RECENT ADMISSION. WAS WAS SEEN BY DR DELAROSA FOR HYPONATREMIA, AND AND SHE WAS TREATED FOR A UTI WITH PROTEUS AND E COLI ** TO FINISH THE COURSE OF ABX FOR BOTH.   ** IN ROOM WHILE  IS FEEDING HER, SHE CONTINUES TO COUGH  AFTER EACH BITE,  WILL GET A VIDEO SWALLOW.      FOR IR THORACENTESIS       Subjective:      Patient is resting on exam, easily arouse able  Denies chest pain  No acute events overnight          Medications:  Reviewed    Infusion Medications    dextrose       Scheduled Medications    insulin glargine  12 Units SubCUTAneous BID    ipratropium-albuterol  1 ampule Inhalation 4x daily    [Held by provider] apixaban  5 mg Oral BID    amLODIPine  5 mg Oral Daily    carvedilol  18.75 mg Oral BID WC    cefUROXime  250 mg Oral BID    donepezil  10 mg Oral Nightly    guaiFENesin  400 mg Oral TID    levothyroxine  75 mcg Oral Daily    furosemide  40 mg IntraVENous Daily    insulin lispro  0-10 Units SubCUTAneous 4x Daily AC & HS     PRN Meds: albuterol, ipratropium-albuterol, glucose, dextrose bolus **OR** dextrose bolus, glucagon (rDNA), dextrose, bisacodyl, acetaminophen, trimethobenzamide      Intake/Output Summary (Last 24 hours) at 2/19/2023 1342  Last data filed at 2/19/2023 4899  Gross per 24 hour   Intake 100 ml   Output 1000 ml   Net -900 ml       Exam:    BP (!) 118/54   Pulse 99   Temp 98.5 °F (36.9 °C) (Axillary)   Resp 18   Ht 5' (1.524 m)   Wt 197 lb 9 oz (89.6 kg)   SpO2 95%   BMI 38.58 kg/m²       Gen:  WELL DEVELOPED  HEENT: NC/AT, moist mucous membranes, n  Neck: supple, trachea midline,   Heart:  IRREG, pacer  Lungs:   DIMINISHED  bilaterally,   Abd: bowel sounds present, soft, nontender, nondistended, no masses  Extrem:  No clubbing, cyanosis,  EDEMA OF ARMS AND LEGS edema  Skin: ERYTHEMA OF ARMS  Psych: Not oriented to date   Neuro: grossly intact, moves all four extremities. Labs:   No results for input(s): WBC, HGB, HCT, PLT in the last 72 hours. Recent Labs     02/17/23 0448 02/18/23  0555 02/19/23  0638   * 135 131*   K 4.6 4.6 4.2   CL 91* 95* 94*   CO2 30* 28 33*   BUN 29* 29* 26*   CREATININE 0.7 0.7 0.6   CALCIUM 8.8 8.5* 8.3*     No results for input(s): AST, ALT, BILIDIR, BILITOT, ALKPHOS in the last 72 hours. Recent Labs     02/17/23 0448 02/18/23  0555 02/19/23  0638   INR 1.4 1.2 1.2     No results for input(s): Aleene Sauger in the last 72 hours. No results for input(s): AST, ALT, ALB, BILIDIR, BILITOT, ALKPHOS in the last 72 hours. No results for input(s): LACTA in the last 72 hours. Lab Results   Component Value Date    LABURIC 6.9 (H) 02/12/2023     Lab Results   Component Value Date    AMMONIA <10.0 (L) 09/08/2020    AMMONIA 28.3 03/05/2020    AMMONIA 24.2 01/17/2020       Assessment:    Active Hospital Problems    Diagnosis Date Noted    Acute respiratory failure with hypoxia ([facility] [address] [J96.01] 02/04/2023     Priority: Medium   COUGH WHILE EATING  ANASARCA   IIDM   HYPOTHYROID   DEMENTIA   PACEMAKER   PAF  HYPONATREMIA  UTI(PROTEUS AND E COLI))   CEFTIN       Plan:   IR THORACENTESIS   PULM CONSULT    VIDEO SWALLOW     2/18/2023  Patient passed swallow study   Resume diet with recommendations  Vital signs stable  Repeat labs in am  Continue Ceftin    2/19/2023  Sodium level today 131  FR 1500  Lungs sound improved today  Check with pulmonary on clearance to resume Eliquis  Check a.m. labs  Increase sliding scale secondary to blood sugars being elevated in the 300 range last evening     DVT Prophylaxis: ELIQUIS  Diet: ADULT DIET; Regular; 4 carb choices (60 gm/meal);  Low Sodium (2 gm)  Code Status: Full Code     PT/OT Eval Status: ORDERED     Dispo - HOME     Electronically signed by MICHA Islas CNP on 2/19/2023 at 1:42 PM     Above note edited to reflect my thoughts     I personally saw, examined and provided care for the patient. Radiographs, labs and medication list were reviewed by me independently. The case was discussed in detail and plans for care were established. Review of Damaris CARTER CNP, documentation was conducted and revisions were made as appropriate directly by me. I agree with the above documented exam, problem list, and plan of care.      Darci Marrero MD  8:06 PM  2/19/2023

## 2023-02-19 NOTE — PROGRESS NOTES
Perfectserve Dr Laddie Lanes for clearance to resume Eliquis per MICHA Blankenship's order. Dr Laddie Lanes stated yes to resume Eliquis.

## 2023-02-20 LAB
ANION GAP SERPL CALCULATED.3IONS-SCNC: 5 MMOL/L (ref 7–16)
BUN BLDV-MCNC: 22 MG/DL (ref 6–23)
CALCIUM SERPL-MCNC: 8 MG/DL (ref 8.6–10.2)
CHLORIDE BLD-SCNC: 96 MMOL/L (ref 98–107)
CO2: 36 MMOL/L (ref 22–29)
CREAT SERPL-MCNC: 0.6 MG/DL (ref 0.5–1)
GFR SERPL CREATININE-BSD FRML MDRD: >60 ML/MIN/1.73
GLUCOSE BLD-MCNC: 91 MG/DL (ref 74–99)
INR BLD: 1.3
METER GLUCOSE: 106 MG/DL (ref 74–99)
METER GLUCOSE: 109 MG/DL (ref 74–99)
METER GLUCOSE: 170 MG/DL (ref 74–99)
METER GLUCOSE: 87 MG/DL (ref 74–99)
POTASSIUM SERPL-SCNC: 3.9 MMOL/L (ref 3.5–5)
PROTHROMBIN TIME: 14.3 SEC (ref 9.3–12.4)
SODIUM BLD-SCNC: 137 MMOL/L (ref 132–146)

## 2023-02-20 PROCEDURE — 6370000000 HC RX 637 (ALT 250 FOR IP): Performed by: INTERNAL MEDICINE

## 2023-02-20 PROCEDURE — S5553 INSULIN LONG ACTING 5 U: HCPCS | Performed by: INTERNAL MEDICINE

## 2023-02-20 PROCEDURE — 2700000000 HC OXYGEN THERAPY PER DAY

## 2023-02-20 PROCEDURE — 36415 COLL VENOUS BLD VENIPUNCTURE: CPT

## 2023-02-20 PROCEDURE — 94640 AIRWAY INHALATION TREATMENT: CPT

## 2023-02-20 PROCEDURE — 6360000002 HC RX W HCPCS: Performed by: INTERNAL MEDICINE

## 2023-02-20 PROCEDURE — 2140000000 HC CCU INTERMEDIATE R&B

## 2023-02-20 PROCEDURE — 99232 SBSQ HOSP IP/OBS MODERATE 35: CPT | Performed by: INTERNAL MEDICINE

## 2023-02-20 PROCEDURE — 80048 BASIC METABOLIC PNL TOTAL CA: CPT

## 2023-02-20 PROCEDURE — 82962 GLUCOSE BLOOD TEST: CPT

## 2023-02-20 PROCEDURE — 85610 PROTHROMBIN TIME: CPT

## 2023-02-20 RX ORDER — DONEPEZIL HYDROCHLORIDE 5 MG/1
5 TABLET, FILM COATED ORAL NIGHTLY
Status: DISCONTINUED | OUTPATIENT
Start: 2023-02-20 | End: 2023-02-24 | Stop reason: HOSPADM

## 2023-02-20 RX ADMIN — APIXABAN 5 MG: 5 TABLET, FILM COATED ORAL at 08:20

## 2023-02-20 RX ADMIN — APIXABAN 5 MG: 5 TABLET, FILM COATED ORAL at 21:04

## 2023-02-20 RX ADMIN — IPRATROPIUM BROMIDE AND ALBUTEROL SULFATE 1 AMPULE: .5; 2.5 SOLUTION RESPIRATORY (INHALATION) at 07:48

## 2023-02-20 RX ADMIN — IPRATROPIUM BROMIDE AND ALBUTEROL SULFATE 1 AMPULE: .5; 2.5 SOLUTION RESPIRATORY (INHALATION) at 11:56

## 2023-02-20 RX ADMIN — IPRATROPIUM BROMIDE AND ALBUTEROL SULFATE 1 AMPULE: .5; 2.5 SOLUTION RESPIRATORY (INHALATION) at 19:19

## 2023-02-20 RX ADMIN — INSULIN GLARGINE-YFGN 12 UNITS: 100 INJECTION, SOLUTION SUBCUTANEOUS at 21:05

## 2023-02-20 RX ADMIN — GUAIFENESIN 400 MG: 400 TABLET ORAL at 21:04

## 2023-02-20 RX ADMIN — INSULIN GLARGINE-YFGN 12 UNITS: 100 INJECTION, SOLUTION SUBCUTANEOUS at 09:24

## 2023-02-20 RX ADMIN — CARVEDILOL 18.75 MG: 6.25 TABLET, FILM COATED ORAL at 08:18

## 2023-02-20 RX ADMIN — GUAIFENESIN 400 MG: 400 TABLET ORAL at 15:57

## 2023-02-20 RX ADMIN — CARVEDILOL 18.75 MG: 6.25 TABLET, FILM COATED ORAL at 17:07

## 2023-02-20 RX ADMIN — FUROSEMIDE 40 MG: 10 INJECTION, SOLUTION INTRAMUSCULAR; INTRAVENOUS at 08:20

## 2023-02-20 RX ADMIN — GUAIFENESIN 400 MG: 400 TABLET ORAL at 08:20

## 2023-02-20 RX ADMIN — CEFUROXIME AXETIL 250 MG: 250 TABLET ORAL at 21:14

## 2023-02-20 RX ADMIN — IPRATROPIUM BROMIDE AND ALBUTEROL SULFATE 1 AMPULE: .5; 2.5 SOLUTION RESPIRATORY (INHALATION) at 15:42

## 2023-02-20 RX ADMIN — LEVOTHYROXINE SODIUM 75 MCG: 0.07 TABLET ORAL at 05:10

## 2023-02-20 RX ADMIN — DONEPEZIL HYDROCHLORIDE 5 MG: 5 TABLET ORAL at 21:04

## 2023-02-20 RX ADMIN — INSULIN LISPRO 2 UNITS: 100 INJECTION, SOLUTION INTRAVENOUS; SUBCUTANEOUS at 21:04

## 2023-02-20 RX ADMIN — AMLODIPINE BESYLATE 5 MG: 5 TABLET ORAL at 08:19

## 2023-02-20 RX ADMIN — CEFUROXIME AXETIL 250 MG: 250 TABLET ORAL at 08:18

## 2023-02-20 ASSESSMENT — PAIN SCALES - GENERAL: PAINLEVEL_OUTOF10: 0

## 2023-02-20 ASSESSMENT — PAIN SCALES - WONG BAKER: WONGBAKER_NUMERICALRESPONSE: 0

## 2023-02-20 NOTE — PROGRESS NOTES
[facility]  Department of Internal Medicine  Division of Pulmonary, Critical Care and Sleep Medicine  Progress Note    Cyndie Collier DO, MPH, [facility], [facility], [address] [facility] Parvez JACKMAN MD      Patient: Marcia Olivo  MRN: [id number]  : 1932    Encounter Time: 2:50 PM     Date of Admission: 2023 10:57 AM    Primary Care Physician: Donya Bray MD    Reason for Consultation: Plerual Effusion  SUBJECTIVE:  Cell count on Fluid was NORMAL     No complaints  Fluid was clear - no infection  FLuid is transudative     OBJECTIVE:     PHYSICAL EXAM:   VITALS:   Vitals:    23 0515 23 0748 23 0803 23 1444   BP:   95/69 134/86   Pulse:   98 68   Resp:   20 18   Temp:   98.3 °F (36.8 °C) 98.3 °F (36.8 °C)   TempSrc:   Oral Oral   SpO2:  96% 96% 95%   Weight: 188 lb 9.6 oz (85.5 kg)      Height:            Intake/Output Summary (Last 24 hours) at 2023 1450  Last data filed at 2023 1446  Gross per 24 hour   Intake 200 ml   Output 2400 ml   Net -2200 ml          CONSTITUTIONAL:   Alert, NAD  SKIN:     No rash,   HEENT:     No thrush  NECK:    No bruits, No JVP appreciated  CV:      Afib murmur, No rubs, No gallops  PULMONARY:   Couse BS,  No Wheezing, No Rales, No Rhonchi      No noted egophony  ABDOMEN:     Soft, non-tender. BS normal. No R/R/G  EXT:    No deformities . No clubbing.       + lower extremity edema, No venous stasis  PULSE:   Palpable.   PSYCHIATRIC:  Seems appropriate, No acute psychosis  MS:    No fractures, + weakness  NEUROLOGIC:   The clinical assessment is non-focal     DATA: IMAGING & TESTING:     LABORATORY TESTS:    CBC:   Lab Results   Component Value Date/Time    WBC 15.7 2023 11:11 AM    RBC 4.05 2023 11:11 AM    HGB 11.4 2023 11:11 AM    HCT 35.1 2023 11:11 AM    MCV 86.7 2023 11:11 AM    MCH 28.1 2023 11:11 AM    MCHC 32.5 2023 11:11 AM    RDW 15.7 02/16/2023 11:11 AM     02/16/2023 11:11 AM    MPV 9.2 02/16/2023 11:11 AM     CMP:    Lab Results   Component Value Date/Time     02/20/2023 08:11 AM    K 3.9 02/20/2023 08:11 AM    K 5.3 02/07/2023 04:35 AM    CL 96 02/20/2023 08:11 AM    CO2 36 02/20/2023 08:11 AM    BUN 22 02/20/2023 08:11 AM    CREATININE 0.6 02/20/2023 08:11 AM    GFRAA >60 09/12/2020 04:39 AM    LABGLOM >60 02/20/2023 08:11 AM    GLUCOSE 91 02/20/2023 08:11 AM    PROT 5.5 02/16/2023 11:11 AM    LABALBU 2.9 02/16/2023 11:11 AM    CALCIUM 8.0 02/20/2023 08:11 AM    BILITOT <0.2 02/16/2023 11:11 AM    ALKPHOS 97 02/16/2023 11:11 AM    AST 18 02/16/2023 11:11 AM    ALT 32 02/16/2023 11:11 AM     Magnesium:    Lab Results   Component Value Date/Time    MG 2.7 02/08/2023 06:22 AM     Phosphorus:    Lab Results   Component Value Date/Time    PHOS 3.4 02/08/2023 06:22 AM     PT/INR:    Lab Results   Component Value Date/Time    PROTIME 14.3 02/20/2023 08:11 AM    INR 1.3 02/20/2023 08:11 AM     Troponin:    Lab Results   Component Value Date/Time    TROPONINI 0.01 09/08/2020 10:59 AM     ABG:    Lab Results   Component Value Date/Time    PH 7.373 02/04/2023 09:56 AM    PCO2 44.0 02/04/2023 09:56 AM    PO2 407.8 02/04/2023 09:56 AM    HCO3 25.0 02/04/2023 09:56 AM    BE -0.4 02/04/2023 09:56 AM    O2SAT 99.6 02/04/2023 09:56 AM        PRO-BNP:   Lab Results   Component Value Date    PROBNP 6,028 (H) 02/16/2023    PROBNP 6,396 (H) 02/16/2023      ABGs:   Lab Results   Component Value Date/Time    PH 7.373 02/04/2023 09:56 AM    PO2 407.8 02/04/2023 09:56 AM    PCO2 44.0 02/04/2023 09:56 AM     Hemoglobin A1C: No components found for: HGBA1C    IMAGING:  Imaging tests were completed and reviewed and discussed radiology and care team involved and reveals   XR CHEST PORTABLE    Result Date: 2/17/2023 FINDINGS: The lungs are hypoinflated. There are airspace densities at the lung bases and blunting of the costophrenic sulci.   Cardiac silhouette is enlarged. There is atherosclerotic calcification of the thoracic aorta. A left subclavian pacemaker is unchanged. There is moderate thoracic spondylosis. No pneumothorax. There are surgical clips right upper abdomen. Bilateral lower lobe atelectasis/pneumonia with improved pneumatization at the right lung base. Small bilateral pleural effusions, decreased on the right consistent with interval thoracentesis. No pneumothorax. Stable enlargement of the cardiac silhouette. ECHOCARDIOGRAM:   Left ventricular internal dimensions were normal in diastole and systole. Mild left ventricular concentric hypertrophy noted. Overall ejection fraction mildly decreased. Ejection fraction is visually estimated at 45-50%. Indeterminate diastolic function.   ,Moderate MR    Assessment:   Plerual Effusion  likely from HF with low EF and MReg  Anasarcha  DM type II  Dementia  Pacemaker  left side  Hyponatremia   UTI Mixed  Chronic Anemia        Plan:   Thoracentesis and analysis pending, suspect heart related   OXygen support   Continue bronchodilators  PT OT   I don't know if she has COPD as reported, will continue bronchodilators   Interogate the pacer for arrhthymias  Diuresis need  Refused BIPAP so doing a sleep study in a 80year old will be difficult- family to discuss    Amanuel Esparza DO DO, MPH, [facility]  Professor of Internal Medicine  Pulmonary, Critical Care and Sleep Medicine

## 2023-02-20 NOTE — PROGRESS NOTES
[employer] called about pacer interrogation. [employer] rep called back and stated no one was at hospital, that we could do it with device here. Unknown

## 2023-02-20 NOTE — PROGRESS NOTES
Hospitalist Progress Note      PCP: Donya Bray MD    Date of Admission: 2/16/2023        Hospital Course:  80 y.o. female presented with HYPOXIA. JUST DISCHARGED YESTERDAY, HOME HEALTH NURSE CAME TODAY, AND SHE WAS HYPOXIC. SHE HAS A HISTORY OF HYPOXIA, BUT REFUSES BIPAP AS OF RECENT ADMISSION. WAS WAS SEEN BY DR DELAROSA FOR HYPONATREMIA, AND AND SHE WAS TREATED FOR A UTI WITH PROTEUS AND E COLI ** TO FINISH THE COURSE OF ABX FOR BOTH.   ** PASSED SWALLOW EVAL  HAD A IR THORACENTESIS 550 REMOVED.    STILL REFUSING BIPAP** WAS APPARENTLY CONFUSED THIS MORNING, DAUGHTER STATES HER ARICEPT DOSE IS 5 MG NOT 10           Subjective:    NO COMPLAINTS          Medications:  Reviewed    Infusion Medications    dextrose       Scheduled Medications    donepezil  5 mg Oral Nightly    insulin glargine  12 Units SubCUTAneous BID    ipratropium-albuterol  1 ampule Inhalation 4x daily    apixaban  5 mg Oral BID    amLODIPine  5 mg Oral Daily    carvedilol  18.75 mg Oral BID WC    cefUROXime  250 mg Oral BID    guaiFENesin  400 mg Oral TID    levothyroxine  75 mcg Oral Daily    furosemide  40 mg IntraVENous Daily    insulin lispro  0-10 Units SubCUTAneous 4x Daily AC & HS     PRN Meds: albuterol, ipratropium-albuterol, glucose, dextrose bolus **OR** dextrose bolus, glucagon (rDNA), dextrose, bisacodyl, acetaminophen, trimethobenzamide      Intake/Output Summary (Last 24 hours) at 2/20/2023 1548  Last data filed at 2/20/2023 1446  Gross per 24 hour   Intake 200 ml   Output 2400 ml   Net -2200 ml       Exam:    /86   Pulse 68   Temp 98.3 °F (36.8 °C) (Oral)   Resp 18   Ht 5' (1.524 m)   Wt 188 lb 9.6 oz (85.5 kg)   SpO2 95%   BMI 36.83 kg/m²         Gen:  WELL DEVELOPED  HEENT: NC/AT, moist mucous membranes, n  Neck: supple, trachea midline,   Heart:  IRREG  Lungs:   DIMINISHED  bilaterally, FEW RHONCHI  Abd: bowel sounds present, soft, nontender, nondistended, no masses  Extrem:  No clubbing, cyanosis,  EDEMA OF ARMS AND LEGS edema  Skin: ERYTHEMA OF ARMS  Psych: Not oriented to date   Neuro: grossly intact, moves all four extremities. Labs:   No results for input(s): WBC, HGB, HCT, PLT in the last 72 hours. Recent Labs     02/18/23  0555 02/19/23  0638 02/20/23  0811    131* 137   K 4.6 4.2 3.9   CL 95* 94* 96*   CO2 28 33* 36*   BUN 29* 26* 22   CREATININE 0.7 0.6 0.6   CALCIUM 8.5* 8.3* 8.0*     No results for input(s): AST, ALT, BILIDIR, BILITOT, ALKPHOS in the last 72 hours. Recent Labs     02/18/23  0555 02/19/23  0638 02/20/23  0811   INR 1.2 1.2 1.3     No results for input(s): CKTOTAL, TROPONINI in the last 72 hours. No results for input(s): AST, ALT, ALB, BILIDIR, BILITOT, ALKPHOS in the last 72 hours. No results for input(s): LACTA in the last 72 hours. Lab Results   Component Value Date    LABURIC 6.9 (H) 02/12/2023     Lab Results   Component Value Date    AMMONIA <10.0 (L) 09/08/2020    AMMONIA 28.3 03/05/2020    AMMONIA 24.2 01/17/2020       Assessment:    Active Hospital Problems    Diagnosis Date Noted    Acute respiratory failure with hypoxia ([facility] [address] [J96.01] 02/04/2023     Priority: Medium   COUGH WHILE EATING  ANASARCA   IIDM   HYPOTHYROID   DEMENTIA   PACEMAKER   PAF  HYPONATREMIA  UTI(PROTEUS AND E COLI))   CEFTIN         Plan:   IR THORACENTESIS(550CC REMOVED )   PULM CONSULT    VIDEO SWALLOW (PASSED)   ORDERED INTERROGATION OF PACER SPOKE WITH CHARGE NURSE     DVT Prophylaxis: ELIQUIS( HOLD FOR THORACENTESIS) SCD  Diet: ADULT DIET; Regular; 4 carb choices (60 gm/meal);  Low Sodium (2 gm)  Code Status: Full Code     PT/OT Eval Status: ORDERED     Dispo - HOME      Electronically signed by Zoya Amato DO on 2/20/2023 at 3:48 PM [facility]

## 2023-02-20 NOTE — PLAN OF CARE
Problem: Chronic Conditions and Co-morbidities  Goal: Patient's chronic conditions and co-morbidity symptoms are monitored and maintained or improved  2/19/2023 2211 by Matthias Jesus RN  Outcome: Progressing  2/19/2023 1208 by Jonathon Roper RN  Outcome: Progressing     Problem: Discharge Planning  Goal: Discharge to home or other facility with appropriate resources  2/19/2023 2211 by Matthias Jesus RN  Outcome: Progressing  2/19/2023 1208 by Jonathon Roper RN  Outcome: Progressing     Problem: Safety - Adult  Goal: Free from fall injury  2/19/2023 2211 by Matthias Jesus RN  Outcome: Progressing  2/19/2023 1208 by Jonathon Roper RN  Outcome: Progressing     Problem: ABCDS Injury Assessment  Goal: Absence of physical injury  2/19/2023 2211 by Matthias Jesus RN  Outcome: Progressing  2/19/2023 1208 by Jonathon Roper RN  Outcome: Progressing     Problem: Skin/Tissue Integrity  Goal: Absence of new skin breakdown  2/19/2023 2211 by Matthias Jesus RN  Outcome: Progressing  2/19/2023 1208 by Jonathon Roper RN  Outcome: Progressing

## 2023-02-21 PROBLEM — R09.02 HYPOXIA: Status: ACTIVE | Noted: 2023-02-21

## 2023-02-21 LAB
ANION GAP SERPL CALCULATED.3IONS-SCNC: 7 MMOL/L (ref 7–16)
BODY FLUID CULTURE, STERILE: NORMAL
BUN BLDV-MCNC: 19 MG/DL (ref 6–23)
CALCIUM SERPL-MCNC: 8 MG/DL (ref 8.6–10.2)
CHLORIDE BLD-SCNC: 91 MMOL/L (ref 98–107)
CO2: 35 MMOL/L (ref 22–29)
CREAT SERPL-MCNC: 0.7 MG/DL (ref 0.5–1)
GFR SERPL CREATININE-BSD FRML MDRD: >60 ML/MIN/1.73
GLUCOSE BLD-MCNC: 119 MG/DL (ref 74–99)
GRAM STAIN RESULT: NORMAL
INR BLD: 1.6
METER GLUCOSE: 101 MG/DL (ref 74–99)
METER GLUCOSE: 113 MG/DL (ref 74–99)
METER GLUCOSE: 116 MG/DL (ref 74–99)
METER GLUCOSE: 76 MG/DL (ref 74–99)
POTASSIUM SERPL-SCNC: 3.5 MMOL/L (ref 3.5–5)
PROTHROMBIN TIME: 17 SEC (ref 9.3–12.4)
SODIUM BLD-SCNC: 133 MMOL/L (ref 132–146)

## 2023-02-21 PROCEDURE — S5553 INSULIN LONG ACTING 5 U: HCPCS | Performed by: INTERNAL MEDICINE

## 2023-02-21 PROCEDURE — 6360000002 HC RX W HCPCS: Performed by: INTERNAL MEDICINE

## 2023-02-21 PROCEDURE — 6370000000 HC RX 637 (ALT 250 FOR IP): Performed by: INTERNAL MEDICINE

## 2023-02-21 PROCEDURE — 99222 1ST HOSP IP/OBS MODERATE 55: CPT | Performed by: NURSE PRACTITIONER

## 2023-02-21 PROCEDURE — 99232 SBSQ HOSP IP/OBS MODERATE 35: CPT | Performed by: INTERNAL MEDICINE

## 2023-02-21 PROCEDURE — 80048 BASIC METABOLIC PNL TOTAL CA: CPT

## 2023-02-21 PROCEDURE — 82962 GLUCOSE BLOOD TEST: CPT

## 2023-02-21 PROCEDURE — 2700000000 HC OXYGEN THERAPY PER DAY

## 2023-02-21 PROCEDURE — 2140000000 HC CCU INTERMEDIATE R&B

## 2023-02-21 PROCEDURE — 94640 AIRWAY INHALATION TREATMENT: CPT

## 2023-02-21 PROCEDURE — 85610 PROTHROMBIN TIME: CPT

## 2023-02-21 PROCEDURE — 36415 COLL VENOUS BLD VENIPUNCTURE: CPT

## 2023-02-21 RX ORDER — LACTULOSE 10 G/15ML
20 SOLUTION ORAL 2 TIMES DAILY
Status: DISCONTINUED | OUTPATIENT
Start: 2023-02-21 | End: 2023-02-24 | Stop reason: HOSPADM

## 2023-02-21 RX ORDER — ACETAZOLAMIDE 250 MG/1
250 TABLET ORAL DAILY
Status: COMPLETED | OUTPATIENT
Start: 2023-02-21 | End: 2023-02-23

## 2023-02-21 RX ADMIN — ACETAZOLAMIDE 250 MG: 250 TABLET ORAL at 15:18

## 2023-02-21 RX ADMIN — ACETAMINOPHEN 650 MG: 325 TABLET ORAL at 21:50

## 2023-02-21 RX ADMIN — GUAIFENESIN 400 MG: 400 TABLET ORAL at 14:44

## 2023-02-21 RX ADMIN — LEVOTHYROXINE SODIUM 75 MCG: 0.07 TABLET ORAL at 06:51

## 2023-02-21 RX ADMIN — IPRATROPIUM BROMIDE AND ALBUTEROL SULFATE 1 AMPULE: .5; 2.5 SOLUTION RESPIRATORY (INHALATION) at 16:26

## 2023-02-21 RX ADMIN — INSULIN GLARGINE-YFGN 12 UNITS: 100 INJECTION, SOLUTION SUBCUTANEOUS at 21:50

## 2023-02-21 RX ADMIN — LACTULOSE 20 G: 20 SOLUTION ORAL at 14:44

## 2023-02-21 RX ADMIN — DONEPEZIL HYDROCHLORIDE 5 MG: 5 TABLET ORAL at 21:50

## 2023-02-21 RX ADMIN — APIXABAN 5 MG: 5 TABLET, FILM COATED ORAL at 21:50

## 2023-02-21 RX ADMIN — IPRATROPIUM BROMIDE AND ALBUTEROL SULFATE 1 AMPULE: .5; 2.5 SOLUTION RESPIRATORY (INHALATION) at 21:36

## 2023-02-21 RX ADMIN — APIXABAN 5 MG: 5 TABLET, FILM COATED ORAL at 09:59

## 2023-02-21 RX ADMIN — AMLODIPINE BESYLATE 5 MG: 5 TABLET ORAL at 09:59

## 2023-02-21 RX ADMIN — IPRATROPIUM BROMIDE AND ALBUTEROL SULFATE 1 AMPULE: .5; 2.5 SOLUTION RESPIRATORY (INHALATION) at 09:30

## 2023-02-21 RX ADMIN — CEFUROXIME AXETIL 250 MG: 250 TABLET ORAL at 10:00

## 2023-02-21 RX ADMIN — GUAIFENESIN 400 MG: 400 TABLET ORAL at 09:59

## 2023-02-21 RX ADMIN — INSULIN GLARGINE-YFGN 12 UNITS: 100 INJECTION, SOLUTION SUBCUTANEOUS at 09:58

## 2023-02-21 RX ADMIN — IPRATROPIUM BROMIDE AND ALBUTEROL SULFATE 1 AMPULE: .5; 2.5 SOLUTION RESPIRATORY (INHALATION) at 12:11

## 2023-02-21 RX ADMIN — FUROSEMIDE 40 MG: 10 INJECTION, SOLUTION INTRAMUSCULAR; INTRAVENOUS at 09:59

## 2023-02-21 RX ADMIN — GUAIFENESIN 400 MG: 400 TABLET ORAL at 21:50

## 2023-02-21 RX ADMIN — LACTULOSE 20 G: 20 SOLUTION ORAL at 21:50

## 2023-02-21 ASSESSMENT — PAIN SCALES - GENERAL: PAINLEVEL_OUTOF10: 5

## 2023-02-21 ASSESSMENT — PAIN SCALES - WONG BAKER: WONGBAKER_NUMERICALRESPONSE: 0

## 2023-02-21 ASSESSMENT — PAIN DESCRIPTION - LOCATION: LOCATION: BUTTOCKS

## 2023-02-21 NOTE — PROGRESS NOTES
Hospitalist Progress Note      PCP: Kan Vazquez MD    Date of Admission: 2/16/2023        Hospital Course:    80 y.o. female presented with HYPOXIA. JUST DISCHARGED YESTERDAY, HOME HEALTH NURSE CAME TODAY, AND SHE WAS HYPOXIC. SHE HAS A HISTORY OF HYPOXIA, BUT REFUSES BIPAP AS OF RECENT ADMISSION. WAS WAS SEEN BY DR DELAROSA FOR HYPONATREMIA, AND AND SHE WAS TREATED FOR A UTI WITH PROTEUS AND E COLI ** TO FINISH THE COURSE OF ABX FOR BOTH.   ** PASSED SWALLOW EVAL  HAD A IR THORACENTESIS 550 REMOVED. STILL REFUSING BIPAP** WAS APPARENTLY CONFUSED THIS MORNING, DAUGHTER STATES HER ARICEPT DOSE IS 5 MG NOT 10   ** HAS NOT HAD A BM. DAUGHTER STATES SHE AMBULATES TO THE BATHROOM AT HOME, AND HER OXYGEN DROPS, SHE IS 91%  ON RA WHILE LYING IN THE BED .   WILL HAVE PT AMBULATE TOMORROW AND DO A PUSLE OX** PASSED SWALLOW EVAL      Subjective:  FAMILY WANTS BIPAP            Medications:  Reviewed    Infusion Medications    dextrose       Scheduled Medications    lactulose  20 g Oral BID    acetaZOLAMIDE  250 mg Oral Daily    donepezil  5 mg Oral Nightly    insulin glargine  12 Units SubCUTAneous BID    ipratropium-albuterol  1 ampule Inhalation 4x daily    apixaban  5 mg Oral BID    amLODIPine  5 mg Oral Daily    carvedilol  18.75 mg Oral BID WC    cefUROXime  250 mg Oral BID    guaiFENesin  400 mg Oral TID    levothyroxine  75 mcg Oral Daily    furosemide  40 mg IntraVENous Daily    insulin lispro  0-10 Units SubCUTAneous 4x Daily AC & HS     PRN Meds: albuterol, ipratropium-albuterol, glucose, dextrose bolus **OR** dextrose bolus, glucagon (rDNA), dextrose, bisacodyl, acetaminophen, trimethobenzamide      Intake/Output Summary (Last 24 hours) at 2/21/2023 1504  Last data filed at 2/21/2023 1115  Gross per 24 hour   Intake 420 ml   Output 300 ml   Net 120 ml       Exam:    BP (!) 101/58   Pulse 100   Temp 98.6 °F (37 °C) (Oral)   Resp 18   Ht 5' (1.524 m)   Wt 188 lb 9.6 oz (85.5 kg)   SpO2 96%   BMI 36.83 kg/m²       Gen:  WELL DEVELOPED  HEENT: NC/AT, moist mucous membranes, n  Neck: supple, trachea midline,   Heart:  IRREG  Lungs:   DIMINISHED  bilaterally, FEW RHONCHI  Abd: bowel sounds present, soft, nontender, nondistended, no masses  Extrem:  No clubbing, cyanosis,  EDEMA OF ARMS AND LEGS edema  Skin: SMOOTH AND DRY   Psych: Not oriented to date   Neuro: grossly intact, moves all four extremities. Labs:   No results for input(s): WBC, HGB, HCT, PLT in the last 72 hours. Recent Labs     02/19/23  0638 02/20/23  0811 02/21/23  1058   * 137 133   K 4.2 3.9 3.5   CL 94* 96* 91*   CO2 33* 36* 35*   BUN 26* 22 19   CREATININE 0.6 0.6 0.7   CALCIUM 8.3* 8.0* 8.0*     No results for input(s): AST, ALT, BILIDIR, BILITOT, ALKPHOS in the last 72 hours. Recent Labs     02/19/23  0638 02/20/23  0811 02/21/23  1058   INR 1.2 1.3 1.6     No results for input(s): CKTOTAL, TROPONINI in the last 72 hours. No results for input(s): AST, ALT, ALB, BILIDIR, BILITOT, ALKPHOS in the last 72 hours. No results for input(s): LACTA in the last 72 hours. Lab Results   Component Value Date    LABURIC 6.9 (H) 02/12/2023     Lab Results   Component Value Date    AMMONIA <10.0 (L) 09/08/2020    AMMONIA 28.3 03/05/2020    AMMONIA 24.2 01/17/2020       Assessment:    Active Hospital Problems    Diagnosis Date Noted    Acute respiratory failure with hypoxia ([facility] [address] [J96.01] 02/04/2023     Priority: Medium   CONSTIPATION  COUGH WHILE EATING  ANASARCA   IIDM   HYPOTHYROID   DEMENTIA   PACEMAKER   PAF  HYPONATREMIA  UTI(PROTEUS AND E COLI))   CEFTIN (COMPLETED)   LACTULOSE     Plan:   IR THORACENTESIS(550CC REMOVED )   PULM CONSULT    VIDEO SWALLOW (PASSED)   ORDERED INTERROGATION OF PACER SPOKE WITH CHARGE NURSE     DVT Prophylaxis: ELIQUIS( HOLD FOR THORACENTESIS) SCD  Diet: ADULT DIET; Regular; 4 carb choices (60 gm/meal);  Low Sodium (2 gm)  Code Status: Full Code     PT/OT Eval Status: ORDERED     Dispo - HOME Electronically signed by Leonardo Paige DO on 2/21/2023 at 3:04 PM [facility]

## 2023-02-21 NOTE — CONSULTS
Palliative Care Department  [phone]  Palliative Care Initial Consult  Provider MICHA Jones CNP      PATIENT: Km Kirkland  : 1932  MRN: [id number]  ADMISSION DATE: 2023 10:57 AM  Referring Provider: Eleonora Lehman DO    Palliative Medicine was consulted on hospital day 5 for assistance with Goals of care, Code Status Discussion, Family support    HPI:     Jt Fleming is a 80 y.o. y/o female with a history of of COPD, DM, HLD, HTN, atrial fibrillation on Eliquis, hypothyroidism, dementia, pacemaker, HFrEF 45% who presented to [facility] on 2023 with hypoxia. Chest x-ray showed right lung airspace disease, moderate right pleural effusion, small left pleural effusion, and cardiomegaly. She had a thoracentesis performed on . She passed her video swallow on . She remains on telemetry for further medical management. ASSESSMENT/PLAN:     Pertinent Hospital Diagnoses     Acute hypoxic respiratory failure  Moderate right pleural effusion  HFrEF    Palliative Care Encounter / Counseling Regarding Goals of Care  Please see detailed goals of care discussion as below  At this time, Km Kirkland, Does Not have capacity for medical decision-making. Capacity is time limited and situation/question specific  During encounter Hal Avelar and Megan Quinones were surrogate medical decision-maker  Outcome of goals of care meeting: Plan is likely home with Cayla Carson would like to discuss CODE STATUS and goals of care with her brother. Code status Full Code  Advanced Directives: no POA or living will in epic  Surrogate/Legal NOK:  Hal Avelar \"Sissy\" Geovanny (daughter/ HPOA) [phone]    Spiritual assessment: no spiritual distress identified  Bereavement and grief: to be determined  Referrals to: none today    Thank you for the opportunity to participate in the care of Km Kirkland.      MICHA Jones CNP   Palliative Medicine     SUBJECTIVE:     Details of Conversation: Chart reviewed and met with the patient and her  John at the bedside. Rochelle Reese is alert to person and place, but confused to time and situation. Living will and HPOA listed in epic. Nuha Chairez confirms that his daughter is healthcare power of . Nuha Chairez expresses that one of the things him and his wife have discussed is that they would not want to be kept alive by machines. He asked that I call and speak to his daughter about other matters. I called and spoke with Gabbi Villalta on the phone and introduced palliative medicine. She explains that since December the patient has had multiple hospitalizations with different complications. She acknowledges that at 80years old she does not know how much her mother is going to be able to recover from all of this. We also discussed the BiPAP, which the patient has been noncompliant with. Gabbi Villalta is hopeful that the patient will qualify for oxygen and she explains that the patient is going to have to wear the BiPAP. Gabbi Villalta expresses that she would like to try to do physical therapy at home and try to increase her mother's strength. If not tolerating physical therapy and recurring hospitalizations we discussed the option of considering comfort care and hospice. I also explained the different CODE STATUS options. Gabbi Villalta would like some time to think over everything we discussed and also speak with her brother. She denied any other questions or concerns at this time. We will continue to follow.     Prognosis: Guarded    OBJECTIVE:     /64   Pulse 95   Temp 98.6 °F (37 °C) (Oral)   Resp 20   Ht 5' (1.524 m)   Wt 188 lb 9.6 oz (85.5 kg)   SpO2 91%   BMI 36.83 kg/m²     Physical Examination:  Gen: elderly,  NAD, awake, alert   HEENT: normocephalic, atraumatic  Neck: trachea midline, no JVD  Lungs: respirations easy and not labored,   Heart: regular rate and rhythm, distant heart tones,   Abdomen: normoactive bowel sounds, soft, non-tender  Extremities: no clubbing, cyanosis or edema, moving all extremities    Skin: warm, dry without rashes, lesions, bruising  Neuro: Sleepy, oriented x 2, follows commands    Objective data reviewed: labs, images, records, medication use, vitals, and chart    Time/Communication  Greater than 50% of time spent, total 55 minutes in counseling and coordination of care at the bedside regarding goals of care and symptom management. Thank you for allowing Palliative Medicine to participate in the care of Ana Maria Yun. Note: This report was completed using computerTransphorm voiced recognition software. Every effort has been made to ensure accuracy; however, inadvertent computerized transcription errors may be present.

## 2023-02-21 NOTE — PROGRESS NOTES
[place]  Department of Internal Medicine  Division of Pulmonary, Critical Care and Sleep Medicine  Progress Note    Torie Mendez DO, MPH, [facility], FACOI, [address] [facility] , Karin Ford MD      Patient: Elvin Chi  MRN: [id number]  : 1932    Encounter Time: 1:27 PM     Date of Admission: 2023 10:57 AM    Primary Care Physician: Jamison Jordan MD    Reason for Consultation: Plerual Effusion  SUBJECTIVE:    Cell count on Fluid was NORMAL   Fluid was clear - no infection  FLuid is transudative   Adjusting meds  Pacer interrogation pending    OBJECTIVE:     PHYSICAL EXAM:   VITALS:   Vitals:    23 0229 23 0843 23 0931 23 1115   BP: 111/75 113/71  101/64   Pulse: 97 88  95   Resp: 22 20     Temp: 98.1 °F (36.7 °C) 97.3 °F (36.3 °C)  98.6 °F (37 °C)   TempSrc: Axillary Oral  Oral   SpO2: 99% 100% 99% 91%   Weight:       Height:            Intake/Output Summary (Last 24 hours) at 2023 1327  Last data filed at 2023 1115  Gross per 24 hour   Intake 420 ml   Output 600 ml   Net -180 ml          CONSTITUTIONAL:   Alert, NAD  SKIN:     No rash,   HEENT:     No thrush  NECK:    No bruits, No JVP appreciated  CV:      Afib murmur, No rubs, No gallops  PULMONARY:   Couse BS,  No Wheezing, No Rales, No Rhonchi      No noted egophony  ABDOMEN:     Soft, non-tender. BS normal. No R/R/G  EXT:    No deformities . No clubbing.       + lower extremity edema, No venous stasis  PULSE:   Palpable.   PSYCHIATRIC:  Seems appropriate, No acute psychosis  MS:    No fractures, + weakness  NEUROLOGIC:   The clinical assessment is non-focal     DATA: IMAGING & TESTING:     LABORATORY TESTS:    CBC:   Lab Results   Component Value Date/Time    WBC 15.7 2023 11:11 AM    RBC 4.05 2023 11:11 AM    HGB 11.4 2023 11:11 AM    HCT 35.1 2023 11:11 AM    MCV 86.7 2023 11:11 AM    [facility] 28.1 2023 11:11 AM    [facility] 32.5 02/16/2023 11:11 AM    RDW 15.7 02/16/2023 11:11 AM     02/16/2023 11:11 AM    MPV 9.2 02/16/2023 11:11 AM     CMP:    Lab Results   Component Value Date/Time     02/21/2023 10:58 AM    K 3.5 02/21/2023 10:58 AM    K 5.3 02/07/2023 04:35 AM    CL 91 02/21/2023 10:58 AM    CO2 35 02/21/2023 10:58 AM    BUN 19 02/21/2023 10:58 AM    CREATININE 0.7 02/21/2023 10:58 AM    GFRAA >60 09/12/2020 04:39 AM    LABGLOM >60 02/21/2023 10:58 AM    GLUCOSE 119 02/21/2023 10:58 AM    PROT 5.5 02/16/2023 11:11 AM    LABALBU 2.9 02/16/2023 11:11 AM    CALCIUM 8.0 02/21/2023 10:58 AM    BILITOT <0.2 02/16/2023 11:11 AM    ALKPHOS 97 02/16/2023 11:11 AM    AST 18 02/16/2023 11:11 AM    ALT 32 02/16/2023 11:11 AM     Magnesium:    Lab Results   Component Value Date/Time    MG 2.7 02/08/2023 06:22 AM     Phosphorus:    Lab Results   Component Value Date/Time    PHOS 3.4 02/08/2023 06:22 AM     PT/INR:    Lab Results   Component Value Date/Time    PROTIME 17.0 02/21/2023 10:58 AM    INR 1.6 02/21/2023 10:58 AM     Troponin:    Lab Results   Component Value Date/Time    TROPONINI 0.01 09/08/2020 10:59 AM     ABG:    Lab Results   Component Value Date/Time    PH 7.373 02/04/2023 09:56 AM    PCO2 44.0 02/04/2023 09:56 AM    PO2 407.8 02/04/2023 09:56 AM    HCO3 25.0 02/04/2023 09:56 AM    BE -0.4 02/04/2023 09:56 AM    O2SAT 99.6 02/04/2023 09:56 AM        PRO-BNP:   Lab Results   Component Value Date    PROBNP 6,028 (H) 02/16/2023    PROBNP 6,396 (H) 02/16/2023      ABGs:   Lab Results   Component Value Date/Time    PH 7.373 02/04/2023 09:56 AM    PO2 407.8 02/04/2023 09:56 AM    PCO2 44.0 02/04/2023 09:56 AM     Hemoglobin A1C: No components found for: HGBA1C    IMAGING:  Imaging tests were completed and reviewed and discussed radiology and care team involved and reveals   XR CHEST PORTABLE    Result Date: 2/17/2023 FINDINGS: The lungs are hypoinflated.   There are airspace densities at the lung bases and blunting of the costophrenic sulci. Cardiac silhouette is enlarged. There is atherosclerotic calcification of the thoracic aorta. A left subclavian pacemaker is unchanged. There is moderate thoracic spondylosis. No pneumothorax. There are surgical clips right upper abdomen. Bilateral lower lobe atelectasis/pneumonia with improved pneumatization at the right lung base. Small bilateral pleural effusions, decreased on the right consistent with interval thoracentesis. No pneumothorax. Stable enlargement of the cardiac silhouette. ECHOCARDIOGRAM:   Left ventricular internal dimensions were normal in diastole and systole. Mild left ventricular concentric hypertrophy noted. Overall ejection fraction mildly decreased. Ejection fraction is visually estimated at 45-50%. Indeterminate diastolic function. ,Moderate MR    Assessment:   Plerual Effusion  likely from HF with low EF and Mitral Reg  Anasarcha  DM type II  Dementia  Pacemaker  left side  Hyponatremia   UTI Mixed  Chronic Anemia        Plan:   Mazimize GDT for cardiomyopathy  Oxygen support   Continue bronchodilators for symptoms  Nebulizer for home  PT OT assesment  I don't know if she has COPD as reported, will continue bronchodilators   Interogate the pacer for arrhthymias - pending's  Diuresis now - 5.7 liters !   Refused BIPAP so doing a sleep study in a 80year old will be difficult- family to discuss  Monitor Hb  Dementia meds adjusted    Mike Councilman, DO DO, MPH, [facility]  Professor of Internal Medicine  Pulmonary, Critical Care and Sleep Medicine

## 2023-02-22 LAB
ANION GAP SERPL CALCULATED.3IONS-SCNC: 7 MMOL/L (ref 7–16)
BUN BLDV-MCNC: 17 MG/DL (ref 6–23)
CALCIUM SERPL-MCNC: 8.2 MG/DL (ref 8.6–10.2)
CHLORIDE BLD-SCNC: 91 MMOL/L (ref 98–107)
CO2: 36 MMOL/L (ref 22–29)
CREAT SERPL-MCNC: 0.7 MG/DL (ref 0.5–1)
GFR SERPL CREATININE-BSD FRML MDRD: >60 ML/MIN/1.73
GLUCOSE BLD-MCNC: 63 MG/DL (ref 74–99)
INR BLD: 1.6
METER GLUCOSE: 105 MG/DL (ref 74–99)
METER GLUCOSE: 134 MG/DL (ref 74–99)
METER GLUCOSE: 139 MG/DL (ref 74–99)
METER GLUCOSE: 169 MG/DL (ref 74–99)
METER GLUCOSE: 59 MG/DL (ref 74–99)
METER GLUCOSE: 64 MG/DL (ref 74–99)
POTASSIUM SERPL-SCNC: 3.2 MMOL/L (ref 3.5–5)
PROTHROMBIN TIME: 17.8 SEC (ref 9.3–12.4)
SODIUM BLD-SCNC: 134 MMOL/L (ref 132–146)

## 2023-02-22 PROCEDURE — 6360000002 HC RX W HCPCS: Performed by: INTERNAL MEDICINE

## 2023-02-22 PROCEDURE — 99233 SBSQ HOSP IP/OBS HIGH 50: CPT | Performed by: INTERNAL MEDICINE

## 2023-02-22 PROCEDURE — 6370000000 HC RX 637 (ALT 250 FOR IP): Performed by: INTERNAL MEDICINE

## 2023-02-22 PROCEDURE — 94640 AIRWAY INHALATION TREATMENT: CPT

## 2023-02-22 PROCEDURE — 2700000000 HC OXYGEN THERAPY PER DAY

## 2023-02-22 PROCEDURE — 80048 BASIC METABOLIC PNL TOTAL CA: CPT

## 2023-02-22 PROCEDURE — 6360000002 HC RX W HCPCS: Performed by: NURSE PRACTITIONER

## 2023-02-22 PROCEDURE — 82962 GLUCOSE BLOOD TEST: CPT

## 2023-02-22 PROCEDURE — 36415 COLL VENOUS BLD VENIPUNCTURE: CPT

## 2023-02-22 PROCEDURE — 99231 SBSQ HOSP IP/OBS SF/LOW 25: CPT | Performed by: NURSE PRACTITIONER

## 2023-02-22 PROCEDURE — 2140000000 HC CCU INTERMEDIATE R&B

## 2023-02-22 PROCEDURE — 85610 PROTHROMBIN TIME: CPT

## 2023-02-22 RX ORDER — POTASSIUM CHLORIDE 20 MEQ/1
40 TABLET, EXTENDED RELEASE ORAL 2 TIMES DAILY WITH MEALS
Status: COMPLETED | OUTPATIENT
Start: 2023-02-22 | End: 2023-02-22

## 2023-02-22 RX ORDER — ONDANSETRON 2 MG/ML
4 INJECTION INTRAMUSCULAR; INTRAVENOUS EVERY 6 HOURS PRN
Status: DISCONTINUED | OUTPATIENT
Start: 2023-02-22 | End: 2023-02-24 | Stop reason: HOSPADM

## 2023-02-22 RX ADMIN — IPRATROPIUM BROMIDE AND ALBUTEROL SULFATE 1 AMPULE: .5; 2.5 SOLUTION RESPIRATORY (INHALATION) at 12:23

## 2023-02-22 RX ADMIN — ACETAZOLAMIDE 250 MG: 250 TABLET ORAL at 08:51

## 2023-02-22 RX ADMIN — DONEPEZIL HYDROCHLORIDE 5 MG: 5 TABLET ORAL at 21:13

## 2023-02-22 RX ADMIN — GUAIFENESIN 400 MG: 400 TABLET ORAL at 08:51

## 2023-02-22 RX ADMIN — GUAIFENESIN 400 MG: 400 TABLET ORAL at 14:19

## 2023-02-22 RX ADMIN — ONDANSETRON 4 MG: 2 INJECTION INTRAMUSCULAR; INTRAVENOUS at 11:07

## 2023-02-22 RX ADMIN — APIXABAN 5 MG: 5 TABLET, FILM COATED ORAL at 08:50

## 2023-02-22 RX ADMIN — ALBUTEROL SULFATE 2.5 MG: 2.5 SOLUTION RESPIRATORY (INHALATION) at 19:44

## 2023-02-22 RX ADMIN — POTASSIUM CHLORIDE 40 MEQ: 1500 TABLET, EXTENDED RELEASE ORAL at 17:09

## 2023-02-22 RX ADMIN — CARVEDILOL 18.75 MG: 6.25 TABLET, FILM COATED ORAL at 08:50

## 2023-02-22 RX ADMIN — LACTULOSE 20 G: 20 SOLUTION ORAL at 08:51

## 2023-02-22 RX ADMIN — Medication 16 G: at 05:26

## 2023-02-22 RX ADMIN — IPRATROPIUM BROMIDE AND ALBUTEROL SULFATE 1 AMPULE: .5; 2.5 SOLUTION RESPIRATORY (INHALATION) at 08:29

## 2023-02-22 RX ADMIN — APIXABAN 5 MG: 5 TABLET, FILM COATED ORAL at 21:13

## 2023-02-22 RX ADMIN — IPRATROPIUM BROMIDE AND ALBUTEROL SULFATE 1 AMPULE: .5; 2.5 SOLUTION RESPIRATORY (INHALATION) at 16:57

## 2023-02-22 RX ADMIN — ACETAMINOPHEN 650 MG: 325 TABLET ORAL at 21:13

## 2023-02-22 RX ADMIN — FUROSEMIDE 40 MG: 10 INJECTION, SOLUTION INTRAMUSCULAR; INTRAVENOUS at 08:51

## 2023-02-22 RX ADMIN — POTASSIUM CHLORIDE 40 MEQ: 1500 TABLET, EXTENDED RELEASE ORAL at 08:50

## 2023-02-22 RX ADMIN — GUAIFENESIN 400 MG: 400 TABLET ORAL at 21:13

## 2023-02-22 RX ADMIN — CARVEDILOL 18.75 MG: 6.25 TABLET, FILM COATED ORAL at 17:09

## 2023-02-22 RX ADMIN — LEVOTHYROXINE SODIUM 75 MCG: 0.07 TABLET ORAL at 05:19

## 2023-02-22 RX ADMIN — AMLODIPINE BESYLATE 5 MG: 5 TABLET ORAL at 08:51

## 2023-02-22 RX ADMIN — LACTULOSE 20 G: 20 SOLUTION ORAL at 21:13

## 2023-02-22 ASSESSMENT — PAIN SCALES - GENERAL: PAINLEVEL_OUTOF10: 5

## 2023-02-22 ASSESSMENT — PAIN DESCRIPTION - DESCRIPTORS: DESCRIPTORS: ACHING

## 2023-02-22 ASSESSMENT — PAIN SCALES - WONG BAKER: WONGBAKER_NUMERICALRESPONSE: 0

## 2023-02-22 ASSESSMENT — PAIN DESCRIPTION - LOCATION: LOCATION: BUTTOCKS

## 2023-02-22 NOTE — PROGRESS NOTES
Oxygen removed. After 15 min. O2 status declined to 84% via RA.    Nasal Canula on 1L  M [facility] BSN

## 2023-02-22 NOTE — PROGRESS NOTES
Patient resting. Spo2 % via [address]. Taken down to [facility]. For high 90's.    M Rosie JAMILN

## 2023-02-22 NOTE — CARE COORDINATION
Per nursing rounds, patient continues on 2L NC, no oxygen at baseline, she continues on IV Lasix, K 3.2 today. If oxygen is needed, patient's family requesting [address] DME. Plan is for the patient to return home, active with [facility] care; will need resumption of care orders. Patient will also need ambulance transport home. Ambulance form completed and envelope placed on the soft chart.     Jason Obrien MSW, LSW [phone]

## 2023-02-22 NOTE — PROGRESS NOTES
Hospitalist Progress Note      PCP: Van Gonzalez MD    Date of Admission: 2/16/2023        Hospital Course:  YESTERDAY, HOME HEALTH NURSE CAME TODAY, AND SHE WAS HYPOXIC. SHE HAS A HISTORY OF HYPOXIA, BUT REFUSES BIPAP AS OF RECENT ADMISSION. WAS WAS SEEN BY DR DELAROSA FOR HYPONATREMIA, AND AND SHE WAS TREATED FOR A UTI WITH PROTEUS AND E COLI ** TO FINISH THE COURSE OF ABX FOR BOTH.   **   HAD A IR THORACENTESIS 550 REMOVED. STILL REFUSING BIPAP** WAS APPARENTLY CONFUSED THIS MORNING, DAUGHTER STATES HER ARICEPT DOSE IS 5 MG NOT 10   ** HAS NOT HAD A BM. DAUGHTER STATES SHE AMBULATES TO THE BATHROOM AT HOME, AND HER OXYGEN DROPS, SHE IS 91%  ON RA WHILE LYING IN THE BED .   WILL HAVE PT AMBULATE TOMORROW AND DO A PUSLE OX** PASSED SWALLOW EVAL        Subjective:  HAVING ABD PAIN,            Medications:  Reviewed    Infusion Medications    dextrose       Scheduled Medications    potassium chloride  40 mEq Oral BID WC    lactulose  20 g Oral BID    acetaZOLAMIDE  250 mg Oral Daily    donepezil  5 mg Oral Nightly    ipratropium-albuterol  1 ampule Inhalation 4x daily    apixaban  5 mg Oral BID    amLODIPine  5 mg Oral Daily    carvedilol  18.75 mg Oral BID WC    guaiFENesin  400 mg Oral TID    levothyroxine  75 mcg Oral Daily    furosemide  40 mg IntraVENous Daily    insulin lispro  0-10 Units SubCUTAneous 4x Daily AC & HS     PRN Meds: ondansetron, albuterol, ipratropium-albuterol, glucose, dextrose bolus **OR** dextrose bolus, glucagon (rDNA), dextrose, bisacodyl, acetaminophen, trimethobenzamide      Intake/Output Summary (Last 24 hours) at 2/22/2023 1556  Last data filed at 2/22/2023 0618  Gross per 24 hour   Intake 150 ml   Output 950 ml   Net -800 ml       Exam:    BP (!) 117/53   Pulse 82   Temp 98.3 °F (36.8 °C) (Oral)   Resp 18   Ht 5' (1.524 m)   Wt 188 lb 9.6 oz (85.5 kg)   SpO2 99%   BMI 36.83 kg/m²       Gen:  WELL DEVELOPED  HEENT: NC/AT, moist mucous membranes, n  Neck: supple, trachea midline,   Heart:  IRREG  Lungs:   DIMINISHED  bilaterally, FEW RHONCHI  Abd:  HYPERACTIVE bowel sounds present, soft, nontender, nondistended, no masses  Extrem:  No clubbing, cyanosis,  EDEMA OF ARMS AND LEGS edema  Skin: SMOOTH AND DRY   Psych: Not oriented to date   Neuro: grossly intact, moves all four extremities. Labs:   No results for input(s): WBC, HGB, HCT, PLT in the last 72 hours. Recent Labs     02/20/23  0811 02/21/23  1058 02/22/23  0534    133 134   K 3.9 3.5 3.2*   CL 96* 91* 91*   CO2 36* 35* 36*   BUN 22 19 17   CREATININE 0.6 0.7 0.7   CALCIUM 8.0* 8.0* 8.2*     No results for input(s): AST, ALT, BILIDIR, BILITOT, ALKPHOS in the last 72 hours. Recent Labs     02/20/23  0811 02/21/23  1058 02/22/23  0534   INR 1.3 1.6 1.6     No results for input(s): Othelia Nutley in the last 72 hours. No results for input(s): AST, ALT, ALB, BILIDIR, BILITOT, ALKPHOS in the last 72 hours. No results for input(s): LACTA in the last 72 hours.   Lab Results   Component Value Date    LABURIC 6.9 (H) 02/12/2023     Lab Results   Component Value Date    AMMONIA <10.0 (L) 09/08/2020    AMMONIA 28.3 03/05/2020    AMMONIA 24.2 01/17/2020       Assessment:    Active Hospital Problems    Diagnosis Date Noted    Hypoxia [R09.02] 02/21/2023     Priority: Medium    Acute respiratory failure with hypoxia ([facility] [address] [J96.01] 02/04/2023     Priority: Medium   CONSTIPATION  COUGH WHILE EATING  ANASARCA   IIDM   HYPOTHYROID   DEMENTIA   PACEMAKER   PAF  HYPONATREMIA  UTI(PROTEUS AND E COLI))   CEFTIN (COMPLETED)       Plan:   IR THORACENTESIS(550CC REMOVED )   PULM CONSULT    VIDEO SWALLOW (PASSED)   ORDERED INTERROGATION OF PACER SPOKE WITH CHARGE NCONSTIPATION  COUGH WHILE EATING  ANASARCA   IIDM   HYPOTHYROID   DEMENTIA   PACEMAKER   PAF  HYPONATREMIA  UTI(PROTEUS AND E COLI))   CEFTIN (COMPLETED)   LACTULOSE     Plan:   IR THORACENTESIS(550CC REMOVED )   PULM CONSULT    VIDEO SWALLOW (PASSED) ORDERED INTERROGATION OF PACER SPOKE WITH CHARGE NURSE   LACTULOSE       DVT Prophylaxis: ELIQUIS(  Diet: ADULT DIET; Regular; 4 carb choices (60 gm/meal);  Low Sodium (2 gm)  Code Status: Full Code     PT/OT Eval Status: ORDERED     Dispo - HOME      Electronically signed by Abelardo Almanza DO on 2/22/2023 at 3:56 PM Tiny walker

## 2023-02-22 NOTE — PLAN OF CARE
Problem: Chronic Conditions and Co-morbidities  Goal: Patient's chronic conditions and co-morbidity symptoms are monitored and maintained or improved  Outcome: Progressing  Flowsheets (Taken 2/21/2023 2000 by Leif Henderson RN)  Care Plan - Patient's Chronic Conditions and Co-Morbidity Symptoms are Monitored and Maintained or Improved: Monitor and assess patient's chronic conditions and comorbid symptoms for stability, deterioration, or improvement     Problem: Discharge Planning  Goal: Discharge to home or other facility with appropriate resources  Outcome: Progressing     Problem: Safety - Adult  Goal: Free from fall injury  Outcome: Progressing     Problem: ABCDS Injury Assessment  Goal: Absence of physical injury  Outcome: Progressing     Problem: Skin/Tissue Integrity  Goal: Absence of new skin breakdown  Description: 1. Monitor for areas of redness and/or skin breakdown  2. Assess vascular access sites hourly  3. Every 4-6 hours minimum:  Change oxygen saturation probe site  4. Every 4-6 hours:  If on nasal continuous positive airway pressure, respiratory therapy assess nares and determine need for appliance change or resting period.   Outcome: Progressing

## 2023-02-22 NOTE — PROGRESS NOTES
Patient's blood sugar 59, skin warm and dry. No signs of distress. OJ given. Rechecked for 64, glucose tablets given, then again checked in 15 minutes for 105.    Remains on 1LNC, SPO2 98-99%  ZENAIDA JAMILN

## 2023-02-22 NOTE — PROGRESS NOTES
Palliative Care Department  [phone]  Palliative Care Progress Note  Provider MICHA Levi CNP      PATIENT: Kaylee Brooke  : 1932  MRN: [id number]  ADMISSION DATE: 2023 10:57 AM  Referring Provider: Yamini Bailey DO    Palliative Medicine was consulted on hospital day 5 for assistance with Goals of care, Code Status Discussion, Family support    HPI:     Fran Davis is a 80 y.o. y/o female with a history of of COPD, DM, HLD, HTN, atrial fibrillation on Eliquis, hypothyroidism, dementia, pacemaker, HFrEF 45% who presented to [facility] on 2023 with hypoxia. Chest x-ray showed right lung airspace disease, moderate right pleural effusion, small left pleural effusion, and cardiomegaly. She had a thoracentesis performed on . She passed her video swallow on . She remains on telemetry for further medical management. ASSESSMENT/PLAN:     Pertinent Hospital Diagnoses     Acute hypoxic respiratory failure  Moderate right pleural effusion  HFrEF    Palliative Care Encounter / Counseling Regarding Goals of Care  Please see detailed goals of care discussion as below  At this time, Kaylee Brooke, Does Not have capacity for medical decision-making. Capacity is time limited and situation/question specific  During encounter Jill Godfrey and Symone Van were surrogate medical decision-maker  Outcome of goals of care meeting: Plan is likely home with Tamika Briceno would like to discuss CODE STATUS and goals of care with her father  Code status Full Code  Advanced Directives: POA or living will in epic  Surrogate/Legal NOK:  Jill Godfrey \"Sissy\" Geovanny (daughter/ HPOA) [phone]    Spiritual assessment: no spiritual distress identified  Bereavement and grief: to be determined  Referrals to: none today    Thank you for the opportunity to participate in the care of Kaylee Brooke.      MICHA Levi CNP   Palliative Medicine     SUBJECTIVE:     Details of Conversation:   Chart reviewed. Patient seen at the bedside, alert and oriented x2. Patient unable to partake in meaningful conversation. Patient's spouse, Elizabeth Chan, and daughter, Huong Victor, present at the bedside. Introduced self. Reiterated discussion on goals of care and CODE STATUS. Huong Victor states that her mother is not feeling well because she received her morning medications prior to eating. Zofran ordered for patient. Huong Victor states that she was able to speak with her brother in regards to goals of care and CODE STATUS but would like to review the CODE STATUS options with John. Huong Victor states that the plan is for discharge home with home health care and therapies if the patient is able. Huong Victor would like palliative medicine to follow-up with her tomorrow with CODE STATUS decision. All questions and concerns addressed. Palliative medicine to follow. Prognosis: Guarded    OBJECTIVE:     /75   Pulse 99   Temp 97.8 °F (36.6 °C) (Oral)   Resp 24   Ht 5' (1.524 m)   Wt 188 lb 9.6 oz (85.5 kg)   SpO2 99%   BMI 36.83 kg/m²     Physical Examination:  Gen: elderly,  NAD, awake, alert   HEENT: normocephalic, atraumatic  Neck: trachea midline, no JVD  Lungs: respirations easy and not labored,   Heart: regular rate and rhythm, distant heart tones,   Abdomen: normoactive bowel sounds, soft, non-tender  Extremities: no clubbing, cyanosis or edema, moving all extremities    Skin: warm, dry without rashes, lesions, bruising  Neuro: Sleepy, oriented x 2, follows commands    Objective data reviewed: labs, images, records, medication use, vitals, and chart    Time/Communication  Greater than 50% of time spent, total 25 minutes in counseling and coordination of care at the bedside regarding goals of care and symptom management. Thank you for allowing Palliative Medicine to participate in the care of Zoya Villanueva. Note: This report was completed using [employer] voiced recognition software.   Every effort has been made to ensure accuracy; however, inadvertent computerized transcription errors may be present.

## 2023-02-22 NOTE — PROGRESS NOTES
[place]  Department of Internal Medicine  Division of Pulmonary, Critical Care and Sleep Medicine  Progress Note    Vini Deng DO, MPH, Brennan Flores, [address] [facility] Rashid JACKMAN MD      Patient: Lynn Silveira  MRN: [id number]  : 1932    Encounter Time: 1:08 PM     Date of Admission: 2023 10:57 AM    Primary Care Physician: Tracy Cortes MD    Reason for Consultation: Plerual Effusion    SUBJECTIVE:    Raven Arrington seen and examined at the bedside. Remains on supplemental O2, not her baseline. She is sleepy but easily aroused and answers questions appropriately. Denies pain. OBJECTIVE:     PHYSICAL EXAM:   VITALS:   Vitals:    23 2136 23 0010 23 0800 23 1145   BP:  101/70 121/75 107/62   Pulse: (!) 104 (!) 107 99 (!) 112   Resp:    Temp:  98.5 °F (36.9 °C) 97.8 °F (36.6 °C) 98.2 °F (36.8 °C)   TempSrc:  Oral Oral Oral   SpO2: 97% 99%     Weight:       Height:            Intake/Output Summary (Last 24 hours) at 2023 1308  Last data filed at 2023 0618  Gross per 24 hour   Intake 390 ml   Output 950 ml   Net -560 ml          CONSTITUTIONAL:   Alert, NAD  SKIN:     No rash,   HEENT:     No thrush  NECK:    No bruits, No JVP appreciated  CV:      Afib murmur, No rubs, No gallops  PULMONARY:   Couse BS,  No Wheezing, No Rales, No Rhonchi      No noted egophony  ABDOMEN:     Soft, non-tender. BS normal. No R/R/G  EXT:    No deformities . No clubbing.       + upper and lower extremity edema, No venous stasis  PULSE:   Palpable.   PSYCHIATRIC:  Seems appropriate, No acute psychosis  MS:    No fractures, + weakness  NEUROLOGIC:   The clinical assessment is non-focal     DATA: IMAGING & TESTING:     LABORATORY TESTS:    CBC:   Lab Results   Component Value Date/Time    WBC 15.7 2023 11:11 AM    RBC 4.05 2023 11:11 AM    HGB 11.4 2023 11:11 AM    HCT 35.1 2023 11:11 AM    MCV 86.7 02/16/2023 11:11 AM    MCH 28.1 02/16/2023 11:11 AM    MCHC 32.5 02/16/2023 11:11 AM    RDW 15.7 02/16/2023 11:11 AM     02/16/2023 11:11 AM    MPV 9.2 02/16/2023 11:11 AM     CMP:    Lab Results   Component Value Date/Time     02/22/2023 05:34 AM    K 3.2 02/22/2023 05:34 AM    K 5.3 02/07/2023 04:35 AM    CL 91 02/22/2023 05:34 AM    CO2 36 02/22/2023 05:34 AM    BUN 17 02/22/2023 05:34 AM    CREATININE 0.7 02/22/2023 05:34 AM    GFRAA >60 09/12/2020 04:39 AM    LABGLOM >60 02/22/2023 05:34 AM    GLUCOSE 63 02/22/2023 05:34 AM    PROT 5.5 02/16/2023 11:11 AM    LABALBU 2.9 02/16/2023 11:11 AM    CALCIUM 8.2 02/22/2023 05:34 AM    BILITOT <0.2 02/16/2023 11:11 AM    ALKPHOS 97 02/16/2023 11:11 AM    AST 18 02/16/2023 11:11 AM    ALT 32 02/16/2023 11:11 AM     Magnesium:    Lab Results   Component Value Date/Time    MG 2.7 02/08/2023 06:22 AM     Phosphorus:    Lab Results   Component Value Date/Time    PHOS 3.4 02/08/2023 06:22 AM     PT/INR:    Lab Results   Component Value Date/Time    PROTIME 17.8 02/22/2023 05:34 AM    INR 1.6 02/22/2023 05:34 AM     Troponin:    Lab Results   Component Value Date/Time    TROPONINI 0.01 09/08/2020 10:59 AM     ABG:    Lab Results   Component Value Date/Time    PH 7.373 02/04/2023 09:56 AM    PCO2 44.0 02/04/2023 09:56 AM    PO2 407.8 02/04/2023 09:56 AM    HCO3 25.0 02/04/2023 09:56 AM    BE -0.4 02/04/2023 09:56 AM    O2SAT 99.6 02/04/2023 09:56 AM        PRO-BNP:   Lab Results   Component Value Date    PROBNP 6,028 (H) 02/16/2023    PROBNP 6,396 (H) 02/16/2023      ABGs:   Lab Results   Component Value Date/Time    PH 7.373 02/04/2023 09:56 AM    PO2 407.8 02/04/2023 09:56 AM    PCO2 44.0 02/04/2023 09:56 AM     Hemoglobin A1C: No components found for: HGBA1C    IMAGING:  Imaging tests were completed and reviewed and discussed radiology and care team involved and reveals   XR CHEST PORTABLE    Result Date: 2/17/2023 FINDINGS: The lungs are hypoinflated.   There are airspace densities at the lung bases and blunting of the costophrenic sulci. Cardiac silhouette is enlarged. There is atherosclerotic calcification of the thoracic aorta. A left subclavian pacemaker is unchanged. There is moderate thoracic spondylosis. No pneumothorax. There are surgical clips right upper abdomen. Bilateral lower lobe atelectasis/pneumonia with improved pneumatization at the right lung base. Small bilateral pleural effusions, decreased on the right consistent with interval thoracentesis. No pneumothorax. Stable enlargement of the cardiac silhouette. ECHOCARDIOGRAM:   Left ventricular internal dimensions were normal in diastole and systole. Mild left ventricular concentric hypertrophy noted. Overall ejection fraction mildly decreased. Ejection fraction is visually estimated at 45-50%. Indeterminate diastolic function. ,Moderate MR    Assessment:     Plerual Effusion  likely from HF with low EF and Mitral Reg, s/p thoracentesis 2/18--TRANSUDATE  Anasarca  DM type II  Dementia  Pacemaker left side  Hyponatremia--resolving  UTI Mixed  Chronic Anemia  Poor appetite with poor oral intake  Hypokalemia, 2/2 renal loss from diuresis, receiving supplementation  Elevated bicarbonate level, likely contraction alkalosis from diuretic use.  Receiving acetazolamide        Plan:     Obtain CMP in am to monitor albumin level, cbc in am  Maximize GDT for cardiomyopathy  Oxygen support, wean fio2 as tolerated to maintain spo2>90%  Continue bronchodilators for symptoms  Nebulizer for home  PT OT assesment  I don't know if she has COPD as reported, will continue bronchodilators   Interogate the pacer for arrhthymias - pending's  Diuresis now -6.2 liters   Refused BIPAP so doing a sleep study in a 80year old will be difficult- family to discuss  Monitor Hb  Dementia meds adjusted      Case and plan discussed with Dr. Andreas Blue, APRN - CNP

## 2023-02-23 ENCOUNTER — APPOINTMENT (OUTPATIENT)
Dept: GENERAL RADIOLOGY | Age: 88
End: 2023-02-23
Payer: MEDICARE

## 2023-02-23 LAB
ALBUMIN SERPL-MCNC: 2.2 G/DL (ref 3.5–5.2)
ALP BLD-CCNC: 102 U/L (ref 35–104)
ALT SERPL-CCNC: 26 U/L (ref 0–32)
ANION GAP SERPL CALCULATED.3IONS-SCNC: 4 MMOL/L (ref 7–16)
AST SERPL-CCNC: 25 U/L (ref 0–31)
BASOPHILS ABSOLUTE: 0.02 E9/L (ref 0–0.2)
BASOPHILS RELATIVE PERCENT: 0.3 % (ref 0–2)
BILIRUB SERPL-MCNC: 0.5 MG/DL (ref 0–1.2)
BUN BLDV-MCNC: 16 MG/DL (ref 6–23)
CALCIUM SERPL-MCNC: 7.9 MG/DL (ref 8.6–10.2)
CHLORIDE BLD-SCNC: 91 MMOL/L (ref 98–107)
CO2: 34 MMOL/L (ref 22–29)
CREAT SERPL-MCNC: 0.8 MG/DL (ref 0.5–1)
EOSINOPHILS ABSOLUTE: 0.08 E9/L (ref 0.05–0.5)
EOSINOPHILS RELATIVE PERCENT: 1.2 % (ref 0–6)
GFR SERPL CREATININE-BSD FRML MDRD: >60 ML/MIN/1.73
GLUCOSE BLD-MCNC: 79 MG/DL (ref 74–99)
HCT VFR BLD CALC: 29.1 % (ref 34–48)
HEMOGLOBIN: 9 G/DL (ref 11.5–15.5)
IMMATURE GRANULOCYTES #: 0.32 E9/L
IMMATURE GRANULOCYTES %: 5 % (ref 0–5)
INR BLD: 1.7
LYMPHOCYTES ABSOLUTE: 1.23 E9/L (ref 1.5–4)
LYMPHOCYTES RELATIVE PERCENT: 19.1 % (ref 20–42)
MCH RBC QN AUTO: 28 PG (ref 26–35)
MCHC RBC AUTO-ENTMCNC: 30.9 % (ref 32–34.5)
MCV RBC AUTO: 90.7 FL (ref 80–99.9)
METER GLUCOSE: 115 MG/DL (ref 74–99)
METER GLUCOSE: 128 MG/DL (ref 74–99)
METER GLUCOSE: 164 MG/DL (ref 74–99)
METER GLUCOSE: 85 MG/DL (ref 74–99)
MONOCYTES ABSOLUTE: 0.5 E9/L (ref 0.1–0.95)
MONOCYTES RELATIVE PERCENT: 7.8 % (ref 2–12)
NEUTROPHILS ABSOLUTE: 4.29 E9/L (ref 1.8–7.3)
NEUTROPHILS RELATIVE PERCENT: 66.6 % (ref 43–80)
PDW BLD-RTO: 16.1 FL (ref 11.5–15)
PLATELET # BLD: 176 E9/L (ref 130–450)
PMV BLD AUTO: 9.9 FL (ref 7–12)
POTASSIUM SERPL-SCNC: 4.2 MMOL/L (ref 3.5–5)
PROTHROMBIN TIME: 18.8 SEC (ref 9.3–12.4)
RBC # BLD: 3.21 E12/L (ref 3.5–5.5)
SODIUM BLD-SCNC: 129 MMOL/L (ref 132–146)
TOTAL PROTEIN: 4.7 G/DL (ref 6.4–8.3)
WBC # BLD: 6.4 E9/L (ref 4.5–11.5)

## 2023-02-23 PROCEDURE — 6370000000 HC RX 637 (ALT 250 FOR IP): Performed by: INTERNAL MEDICINE

## 2023-02-23 PROCEDURE — 2140000000 HC CCU INTERMEDIATE R&B

## 2023-02-23 PROCEDURE — 85025 COMPLETE CBC W/AUTO DIFF WBC: CPT

## 2023-02-23 PROCEDURE — 80053 COMPREHEN METABOLIC PANEL: CPT

## 2023-02-23 PROCEDURE — 94640 AIRWAY INHALATION TREATMENT: CPT

## 2023-02-23 PROCEDURE — 2700000000 HC OXYGEN THERAPY PER DAY

## 2023-02-23 PROCEDURE — P9047 ALBUMIN (HUMAN), 25%, 50ML: HCPCS | Performed by: NURSE PRACTITIONER

## 2023-02-23 PROCEDURE — 85610 PROTHROMBIN TIME: CPT

## 2023-02-23 PROCEDURE — 6360000002 HC RX W HCPCS: Performed by: NURSE PRACTITIONER

## 2023-02-23 PROCEDURE — 99232 SBSQ HOSP IP/OBS MODERATE 35: CPT | Performed by: INTERNAL MEDICINE

## 2023-02-23 PROCEDURE — 71045 X-RAY EXAM CHEST 1 VIEW: CPT

## 2023-02-23 PROCEDURE — 6360000002 HC RX W HCPCS: Performed by: INTERNAL MEDICINE

## 2023-02-23 PROCEDURE — 36415 COLL VENOUS BLD VENIPUNCTURE: CPT

## 2023-02-23 PROCEDURE — 82962 GLUCOSE BLOOD TEST: CPT

## 2023-02-23 RX ORDER — ALBUMIN (HUMAN) 12.5 G/50ML
25 SOLUTION INTRAVENOUS ONCE
Status: COMPLETED | OUTPATIENT
Start: 2023-02-23 | End: 2023-02-23

## 2023-02-23 RX ADMIN — APIXABAN 5 MG: 5 TABLET, FILM COATED ORAL at 11:59

## 2023-02-23 RX ADMIN — GUAIFENESIN 400 MG: 400 TABLET ORAL at 12:00

## 2023-02-23 RX ADMIN — AMLODIPINE BESYLATE 5 MG: 5 TABLET ORAL at 12:00

## 2023-02-23 RX ADMIN — ACETAMINOPHEN 650 MG: 325 TABLET ORAL at 21:14

## 2023-02-23 RX ADMIN — GUAIFENESIN 400 MG: 400 TABLET ORAL at 21:09

## 2023-02-23 RX ADMIN — LACTULOSE 20 G: 20 SOLUTION ORAL at 12:00

## 2023-02-23 RX ADMIN — FUROSEMIDE 40 MG: 10 INJECTION, SOLUTION INTRAMUSCULAR; INTRAVENOUS at 12:00

## 2023-02-23 RX ADMIN — CARVEDILOL 18.75 MG: 6.25 TABLET, FILM COATED ORAL at 18:59

## 2023-02-23 RX ADMIN — IPRATROPIUM BROMIDE AND ALBUTEROL SULFATE 1 AMPULE: .5; 2.5 SOLUTION RESPIRATORY (INHALATION) at 16:09

## 2023-02-23 RX ADMIN — DONEPEZIL HYDROCHLORIDE 5 MG: 5 TABLET ORAL at 21:09

## 2023-02-23 RX ADMIN — IPRATROPIUM BROMIDE AND ALBUTEROL SULFATE 1 AMPULE: .5; 2.5 SOLUTION RESPIRATORY (INHALATION) at 08:18

## 2023-02-23 RX ADMIN — ALBUMIN (HUMAN) 25 G: 0.25 INJECTION, SOLUTION INTRAVENOUS at 12:24

## 2023-02-23 RX ADMIN — APIXABAN 5 MG: 5 TABLET, FILM COATED ORAL at 21:09

## 2023-02-23 RX ADMIN — IPRATROPIUM BROMIDE AND ALBUTEROL SULFATE 1 AMPULE: .5; 2.5 SOLUTION RESPIRATORY (INHALATION) at 12:55

## 2023-02-23 RX ADMIN — LACTULOSE 20 G: 20 SOLUTION ORAL at 21:09

## 2023-02-23 RX ADMIN — ACETAZOLAMIDE 250 MG: 250 TABLET ORAL at 11:59

## 2023-02-23 RX ADMIN — IPRATROPIUM BROMIDE AND ALBUTEROL SULFATE 1 AMPULE: .5; 2.5 SOLUTION RESPIRATORY (INHALATION) at 21:09

## 2023-02-23 RX ADMIN — LEVOTHYROXINE SODIUM 75 MCG: 0.07 TABLET ORAL at 05:58

## 2023-02-23 RX ADMIN — CARVEDILOL 18.75 MG: 6.25 TABLET, FILM COATED ORAL at 12:00

## 2023-02-23 ASSESSMENT — PAIN DESCRIPTION - LOCATION
LOCATION: BACK
LOCATION: BACK

## 2023-02-23 ASSESSMENT — PAIN DESCRIPTION - DESCRIPTORS: DESCRIPTORS: ACHING

## 2023-02-23 ASSESSMENT — PAIN SCALES - WONG BAKER: WONGBAKER_NUMERICALRESPONSE: 0

## 2023-02-23 ASSESSMENT — PAIN SCALES - GENERAL
PAINLEVEL_OUTOF10: 8
PAINLEVEL_OUTOF10: 8

## 2023-02-23 NOTE — CARE COORDINATION
Patient continues on 2L NC (none at baseline), started on albumin, is for a repeat chest XR and for repeat proBNP in the am; pulmonology following. If oxygen is needed at discharge, patient's family requested a referral be made to [facility]. Plan is for the patient to return home, active with [facility] care; will need resumption of care orders. Patient will also need ambulance transport home. Ambulance form completed and envelope placed on the soft chart.      Beverly Godfrey MSW, LSW [phone]

## 2023-02-23 NOTE — PROGRESS NOTES
Occupational Therapy  OT BEDSIDE TREATMENT NOTE   [address] [facility] [address] [facility]  [address] [place] [address]      Date:2023  Patient Name: Gina Byrne  MRN: [id number]  : 1932  Room: [address] [place] [address]         Attempted OT session this date:    [] unavailable due to other medical staff currently with pt   [] on hold per nursing staff   [] on hold per nursing staff secondary to lab / radiology results    [x] declined treatment  this date despite encouragement/education. Benefits of participation in therapy reviewed with pt.    [] off unit   [] Other:      Continue with current OT [address] RAYMUNDO/L [address]

## 2023-02-23 NOTE — PROGRESS NOTES
Physical Therapy    Medical chart reviewed for PT treatment 2/23. Attempted STAT PT treatment, however pt declined. Therapists reviewed health benefits of OOB activity as well as PT role in discharge process, however pt continued to decline adamantly. CM was notified. Will re-attempt at a later time/date. Thank you.     Jennifer Gloria, PT, DPT  [id number]

## 2023-02-23 NOTE — PROGRESS NOTES
Comprehensive Nutrition Assessment    Type and Reason for Visit:  Initial, RD Nutrition Re-Screen/LOS    Nutrition Recommendations/Plan:   Continue current diet  Start gelatein once/day and glucerna once/day to promote oral intake ; noted 1500ml fluid restriction  Will monitor     Malnutrition Assessment:  Malnutrition Status: At risk for malnutrition (Comment) (02/23/23 1150)    Context:  Chronic Illness (comorbids)     Findings of the 6 clinical characteristics of malnutrition:  Energy Intake:  Mild decrease in energy intake (Comment)  Weight Loss:  Unable to assess (wt flux this adm 198-188#)     Body Fat Loss:  No significant body fat loss     Muscle Mass Loss:  No significant muscle mass loss    Fluid Accumulation:  No significant fluid accumulation     Strength:  Not Performed    Nutrition Assessment:    pt adm d/t SOB/hypoxia; pleural effusion s/p IR thoracentesis; PMhx of COPD, DM, Dementia, HLD,HTN,AFIB; pt passed swallow study; attempted to speak w/ pt at bedside but pt lethargic- spoke to member at bedside about ONS- will start per discussion and monitor. Nutrition Related Findings:    A&O x 3 (oriented/disoriented at times)- forgetfullness noted, abd soft and rounded, +BS, Nausea, -I/O's (-7,575),  +3 edema ; hyponatremia; lethargic per RD observation Wound Type:  (dark purple area on buttocks noted)       Current Nutrition Intake & Therapies:    Average Meal Intake: 26-50% (avg)  Average Supplements Intake: None Ordered  ADULT DIET; Regular; 4 carb choices (60 gm/meal); Low Sodium (2 gm); 1500 ml  ADULT ORAL NUTRITION SUPPLEMENT; Breakfast; Diabetic Oral Supplement  ADULT ORAL NUTRITION SUPPLEMENT; Lunch; Other Oral Supplement; Gelatein    Anthropometric Measures:  Height: 5' (152.4 cm)  Ideal Body Weight (IBW): 100 lbs (45 kg)       Current Body Weight: 188 lb 9.6 oz (85.5 kg) (2/20-BS), 188.6 % IBW.     Current BMI (kg/m2): 36.8  Usual Body Weight: 179 lb 7 oz (81.4 kg) (12/14/22-BS)  % Weight Change (Calculated): 5.1  Weight Adjustment For: No Adjustment                 BMI Categories: Obese Class 2 (BMI 35.0 -39.9)    Estimated Daily Nutrient Needs:  Energy Requirements Based On: Formula  Weight Used for Energy Requirements: Current  Energy (kcal/day): 2131-0897  Weight Used for Protein Requirements: Ideal  Protein (g/day): 1.3-1.5g/kgxIBW=60-70g  Method Used for Fluid Requirements: 1 ml/kcal  Fluid (ml/day): 3729-7230    Nutrition Diagnosis:   Inadequate oral intake related to cognitive or neurological impairment (hx of dementia) as evidenced by intake 0-25%, intake 26-50%    Nutrition Interventions:   Food and/or Nutrient Delivery: Continue Current Diet, Start Oral Nutrition Supplement (gelatein once/day and glucerna once/day - noted 1500ml fluid restriction)  Nutrition Education/Counseling: Education not appropriate (hx of dementia/lethargic)  Coordination of Nutrition Care: Continue to monitor while inpatient       Goals:     Goals: PO intake 50% or greater, by next RD assessment       Nutrition Monitoring and Evaluation:   Behavioral-Environmental Outcomes: None Identified  Food/Nutrient Intake Outcomes: Food and Nutrient Intake, Supplement Intake  Physical Signs/Symptoms Outcomes: Biochemical Data, Nausea or Vomiting, Fluid Status or Edema, GI Status, Nutrition Focused Physical Findings, Skin, Weight, Chewing or Swallowing    Discharge Planning:     Too soon to determine     Brijesh Signs, RD  Contact: [phone]

## 2023-02-23 NOTE — CARE COORDINATION
Patient a potential discharge home tomorrow. Ambulance transport tentatively set up for 12P via [facility]. Spoke with patient's daughter, Nanci Duckworth at bedside and provided update, no needs reported. Patient currently on 2L NC; no preference on DME company. Oxygen to be ordered once oxygen testing is documented and oxygen order is in. Message sent to Telma with [facility] care and provided update regarding patient's discharge; will need resumption of care orders. Ambulance form completed and envelope placed on the soft chart.     Ra Currie MSW, LSW [phone]

## 2023-02-23 NOTE — PROGRESS NOTES
[place]  Department of Internal Medicine  Division of Pulmonary, Critical Care and Sleep Medicine  Progress Note    Lucy Holland DO, MPH, Vikram Russ, [address] [facility] Olivia JACKMAN MD      Patient: Jenny Mccray  MRN: [id number]  : 1932    Encounter Time: 9:04 AM     Date of Admission: 2023 10:57 AM    Primary Care Physician: Orquidea Rooney MD    Reason for Consultation: Plerual Effusion    SUBJECTIVE:    Nina Thornton seen and examined at the bedside. Remains on supplemental O2, not her baseline. She is sleepy but easily aroused and answers questions appropriately. Denies pain.  and daughter at the bedside. She did eat some breakfast this morning with her husbands assistance. OBJECTIVE:     PHYSICAL EXAM:   VITALS:   Vitals:    23 2030 23 2345 23 0750 23 0819   BP: 100/78 (!) 90/48 109/64    Pulse: (!) 109 90 (!) 106    Resp: 20 20 18    Temp: 97.3 °F (36.3 °C) 97.3 °F (36.3 °C) 97.6 °F (36.4 °C)    TempSrc: Temporal Temporal Axillary    SpO2: 100% 100% 100% 99%   Weight:       Height:            Intake/Output Summary (Last 24 hours) at 2023 0904  Last data filed at 2023 0604  Gross per 24 hour   Intake 75 ml   Output 1400 ml   Net -1325 ml          CONSTITUTIONAL:   Alert, NAD  SKIN:     No rash,   HEENT:     No thrush  NECK:    No bruits, No JVP appreciated  CV:      Afib murmur, No rubs, No gallops  PULMONARY:   Couse BS,  No Wheezing, No Rales, No Rhonchi      No noted egophony  ABDOMEN:     Soft, non-tender. BS normal. No R/R/G  EXT:    No deformities . No clubbing.       + upper and lower extremity edema, No venous stasis  PULSE:   Palpable.   PSYCHIATRIC:  Seems appropriate, No acute psychosis  MS:    No fractures, + weakness  NEUROLOGIC:   The clinical assessment is non-focal     DATA: IMAGING & TESTING:     LABORATORY TESTS:    CBC:   Lab Results   Component Value Date/Time    WBC 6.4 02/23/2023 08:02 AM    RBC 3.21 02/23/2023 08:02 AM    HGB 9.0 02/23/2023 08:02 AM    HCT 29.1 02/23/2023 08:02 AM    MCV 90.7 02/23/2023 08:02 AM    MCH 28.0 02/23/2023 08:02 AM    MCHC 30.9 02/23/2023 08:02 AM    RDW 16.1 02/23/2023 08:02 AM     02/23/2023 08:02 AM    MPV 9.9 02/23/2023 08:02 AM     CMP:    Lab Results   Component Value Date/Time     02/23/2023 08:02 AM    K 4.2 02/23/2023 08:02 AM    K 5.3 02/07/2023 04:35 AM    CL 91 02/23/2023 08:02 AM    CO2 34 02/23/2023 08:02 AM    BUN 16 02/23/2023 08:02 AM    CREATININE 0.8 02/23/2023 08:02 AM    GFRAA >60 09/12/2020 04:39 AM    LABGLOM >60 02/23/2023 08:02 AM    GLUCOSE 79 02/23/2023 08:02 AM    PROT 4.7 02/23/2023 08:02 AM    LABALBU 2.2 02/23/2023 08:02 AM    CALCIUM 7.9 02/23/2023 08:02 AM    BILITOT 0.5 02/23/2023 08:02 AM    ALKPHOS 102 02/23/2023 08:02 AM    AST 25 02/23/2023 08:02 AM    ALT 26 02/23/2023 08:02 AM     Magnesium:    Lab Results   Component Value Date/Time    MG 2.7 02/08/2023 06:22 AM     Phosphorus:    Lab Results   Component Value Date/Time    PHOS 3.4 02/08/2023 06:22 AM     PT/INR:    Lab Results   Component Value Date/Time    PROTIME 18.8 02/23/2023 08:02 AM    INR 1.7 02/23/2023 08:02 AM     Troponin:    Lab Results   Component Value Date/Time    TROPONINI 0.01 09/08/2020 10:59 AM     ABG:    Lab Results   Component Value Date/Time    PH 7.373 02/04/2023 09:56 AM    PCO2 44.0 02/04/2023 09:56 AM    PO2 407.8 02/04/2023 09:56 AM    HCO3 25.0 02/04/2023 09:56 AM    BE -0.4 02/04/2023 09:56 AM    O2SAT 99.6 02/04/2023 09:56 AM        PRO-BNP:   Lab Results   Component Value Date    PROBNP 6,028 (H) 02/16/2023    PROBNP 6,396 (H) 02/16/2023      ABGs:   Lab Results   Component Value Date/Time    PH 7.373 02/04/2023 09:56 AM    PO2 407.8 02/04/2023 09:56 AM    PCO2 44.0 02/04/2023 09:56 AM     Hemoglobin A1C: No components found for: HGBA1C    IMAGING:  Imaging tests were completed and reviewed and discussed radiology and care team involved and reveals   XR CHEST PORTABLE    Result Date: 2/17/2023 FINDINGS: The lungs are hypoinflated. There are airspace densities at the lung bases and blunting of the costophrenic sulci. Cardiac silhouette is enlarged. There is atherosclerotic calcification of the thoracic aorta. A left subclavian pacemaker is unchanged. There is moderate thoracic spondylosis. No pneumothorax. There are surgical clips right upper abdomen. Bilateral lower lobe atelectasis/pneumonia with improved pneumatization at the right lung base. Small bilateral pleural effusions, decreased on the right consistent with interval thoracentesis. No pneumothorax. Stable enlargement of the cardiac silhouette. ECHOCARDIOGRAM:   Left ventricular internal dimensions were normal in diastole and systole. Mild left ventricular concentric hypertrophy noted. Overall ejection fraction mildly decreased. Ejection fraction is visually estimated at 45-50%. Indeterminate diastolic function. ,Moderate MR    Assessment:     Plerual Effusion  likely from HF with low EF and Mitral Reg, s/p thoracentesis 2/18--TRANSUDATE  Anasarca  HFrEF 45-50%, proBNP 7164>>6241  DM type II  Dementia  Pacemaker left side  Hyponatremia  UTI Mixed  Chronic Anemia  Poor appetite with poor oral intake  Hypokalemia, 2/2 renal loss from diuresis, receiving supplementation  Elevated bicarbonate level, likely contraction alkalosis from diuretic use. Receiving acetazolamide  Hypoalbuminemia, 2.2. Give supplementation.  Total protein 4.7. likely 2/2 poor oral intake        Plan:     Repeat CXR  Repeat proBNP in am  Obtain phosphorus level in am  Give albumin 25 gm X1 dose  Will need O2 at discharge  Maximize GDT for cardiomyopathy  Oxygen support, wean fio2 as tolerated to maintain spo2>90%  Continue bronchodilators for symptoms  Nebulizer for home  PT OT assesment  I don't know if she has COPD as reported, will continue bronchodilators   Interogate the pacer for arrhthymias - pending's  Diuresis now -6.2 liters   Refused BIPAP so doing a sleep study in a 80year old will be difficult- family to discuss  Monitor Hb  Dementia meds adjusted      Case and plan discussed with Dr. Yeni Strickland, APRN - CNP

## 2023-02-23 NOTE — PROGRESS NOTES
Hospitalist Progress Note      PCP: Radha Carter MD    Date of Admission: 2/16/2023        Hospital Course:  YESTERDAY, HOME HEALTH NURSE CAME TODAY, AND SHE WAS HYPOXIC. SHE HAS A HISTORY OF HYPOXIA, BUT REFUSES BIPAP AS OF RECENT ADMISSION. WAS WAS SEEN BY DR DELAROSA FOR HYPONATREMIA, AND AND SHE WAS TREATED FOR A UTI WITH PROTEUS AND E COLI ** TO FINISH THE COURSE OF ABX FOR BOTH.   **   HAD A IR THORACENTESIS 550 REMOVED. STILL REFUSING BIPAP** WAS APPARENTLY CONFUSED THIS MORNING, DAUGHTER STATES HER ARICEPT DOSE IS 5 MG NOT 10   ** HAS NOT HAD A BM. DAUGHTER STATES SHE AMBULATES TO THE BATHROOM AT HOME, AND HER OXYGEN DROPS, SHE IS < 90 ON RA WHILE LYING IN THE BED .   WILL HAVE PT AMBULATE TOMORROW AND DO A PUSLE OX** PASSED SWALLOW EVAL        Subjective:  REFUSED TO GET UP WITH THERAPY           Medications:  Reviewed    Infusion Medications    dextrose       Scheduled Medications    lactulose  20 g Oral BID    donepezil  5 mg Oral Nightly    ipratropium-albuterol  1 ampule Inhalation 4x daily    apixaban  5 mg Oral BID    amLODIPine  5 mg Oral Daily    carvedilol  18.75 mg Oral BID WC    guaiFENesin  400 mg Oral TID    levothyroxine  75 mcg Oral Daily    furosemide  40 mg IntraVENous Daily    insulin lispro  0-10 Units SubCUTAneous 4x Daily AC & HS     PRN Meds: ondansetron, albuterol, ipratropium-albuterol, glucose, dextrose bolus **OR** dextrose bolus, glucagon (rDNA), dextrose, bisacodyl, acetaminophen, trimethobenzamide      Intake/Output Summary (Last 24 hours) at 2/23/2023 1529  Last data filed at 2/23/2023 0604  Gross per 24 hour   Intake 75 ml   Output 1400 ml   Net -1325 ml       Exam:    /64   Pulse (!) 106   Temp 97.6 °F (36.4 °C) (Axillary)   Resp 18   Ht 5' (1.524 m)   Wt 189 lb (85.7 kg)   SpO2 99%   BMI 36.91 kg/m²       Gen:  WELL DEVELOPED  HEENT: NC/AT, moist mucous membranes, n  Neck: supple, trachea midline,   Heart:  IRREG  Lungs:   DIMINISHED  bilaterally, FEW RHONCHI  Abd:  HYPERACTIVE bowel sounds present, soft, nontender, nondistended, no masses  Extrem:  No clubbing, cyanosis,  EDEMA OF ARMS AND LEGS edema  Skin: SMOOTH AND DRY   Psych: Not oriented to date   Neuro: grossly intact, moves all four extremities. Labs:   Recent Labs     02/23/23  0802   WBC 6.4   HGB 9.0*   HCT 29.1*        Recent Labs     02/21/23  1058 02/22/23  0534 02/23/23  0802    134 129*   K 3.5 3.2* 4.2   CL 91* 91* 91*   CO2 35* 36* 34*   BUN 19 17 16   CREATININE 0.7 0.7 0.8   CALCIUM 8.0* 8.2* 7.9*     Recent Labs     02/23/23  0802   AST 25   ALT 26   BILITOT 0.5   ALKPHOS 102     Recent Labs     02/21/23  1058 02/22/23  0534 02/23/23  0802   INR 1.6 1.6 1.7     No results for input(s): [employer] in the last 72 hours. Recent Labs     02/23/23  0802   AST 25   ALT 26   BILITOT 0.5   ALKPHOS 102     No results for input(s): LACTA in the last 72 hours.   Lab Results   Component Value Date    LABURIC 6.9 (H) 02/12/2023     Lab Results   Component Value Date    AMMONIA <10.0 (L) 09/08/2020    AMMONIA 28.3 03/05/2020    AMMONIA 24.2 01/17/2020       Assessment:    Active Hospital Problems    Diagnosis Date Noted    Hypoxia [R09.02] 02/21/2023     Priority: Medium    Acute respiratory failure with hypoxia (HCC) [J96.01] 02/04/2023     Priority: Medium   CONSTIPATION  COUGH WHILE EATING  ANASARCA   IIDM   HYPOTHYROID   DEMENTIA   PACEMAKER   PAF  HYPONATREMIA  UTI(PROTEUS AND E COLI))   CEFTIN (COMPLETED)       Plan:  IR THORACENTESIS(550CC REMOVED )   PULM CONSULT    VIDEO SWALLOW (PASSED)   ORDERED INTERROGATION OF PACER SPOKE WITH CHARGE NCONSTIPATION  COUGH WHILE EATING  ANASARCA   IIDM   HYPOTHYROID   DEMENTIA   PACEMAKER   PAF  HYPONATREMIA  UTI(PROTEUS AND E COLI))   CEFTIN (COMPLETED)   LACTULOSE     Plan:   IR THORACENTESIS(550CC REMOVED )   PULM CONSULT    VIDEO SWALLOW (PASSED)   ORDERED INTERROGATION OF PACER SPOKE WITH CHARGE NURSE   LACTULOSE DVT Prophylaxis: ELIQUIS(  Diet: ADULT DIET; Regular; 4 carb choices (60 gm/meal);  Low Sodium (2 gm)  Code Status: Full Code     PT/OT Eval Status: ORDERED      Dispo - HOME    Electronically signed by Anitha Adorno DO on 2/23/2023 at 3:29 PM [facility]

## 2023-02-24 VITALS
HEART RATE: 80 BPM | WEIGHT: 189 LBS | OXYGEN SATURATION: 100 % | RESPIRATION RATE: 18 BRPM | TEMPERATURE: 97.4 F | SYSTOLIC BLOOD PRESSURE: 96 MMHG | HEIGHT: 60 IN | BODY MASS INDEX: 37.11 KG/M2 | DIASTOLIC BLOOD PRESSURE: 58 MMHG

## 2023-02-24 LAB
ANION GAP SERPL CALCULATED.3IONS-SCNC: 12 MMOL/L (ref 7–16)
BUN BLDV-MCNC: 13 MG/DL (ref 6–23)
CALCIUM SERPL-MCNC: 8.5 MG/DL (ref 8.6–10.2)
CHLORIDE BLD-SCNC: 90 MMOL/L (ref 98–107)
CO2: 29 MMOL/L (ref 22–29)
CREAT SERPL-MCNC: 0.8 MG/DL (ref 0.5–1)
GFR SERPL CREATININE-BSD FRML MDRD: >60 ML/MIN/1.73
GLUCOSE BLD-MCNC: 111 MG/DL (ref 74–99)
INR BLD: 1.8
METER GLUCOSE: 117 MG/DL (ref 74–99)
METER GLUCOSE: 133 MG/DL (ref 74–99)
PHOSPHORUS: 2.5 MG/DL (ref 2.5–4.5)
POTASSIUM SERPL-SCNC: 3.6 MMOL/L (ref 3.5–5)
PRO-BNP: 2832 PG/ML (ref 0–450)
PROTHROMBIN TIME: 19.4 SEC (ref 9.3–12.4)
SODIUM BLD-SCNC: 131 MMOL/L (ref 132–146)

## 2023-02-24 PROCEDURE — 84100 ASSAY OF PHOSPHORUS: CPT

## 2023-02-24 PROCEDURE — 94640 AIRWAY INHALATION TREATMENT: CPT

## 2023-02-24 PROCEDURE — 99231 SBSQ HOSP IP/OBS SF/LOW 25: CPT | Performed by: NURSE PRACTITIONER

## 2023-02-24 PROCEDURE — 85610 PROTHROMBIN TIME: CPT

## 2023-02-24 PROCEDURE — 80048 BASIC METABOLIC PNL TOTAL CA: CPT

## 2023-02-24 PROCEDURE — 6370000000 HC RX 637 (ALT 250 FOR IP): Performed by: INTERNAL MEDICINE

## 2023-02-24 PROCEDURE — 83880 ASSAY OF NATRIURETIC PEPTIDE: CPT

## 2023-02-24 PROCEDURE — 82962 GLUCOSE BLOOD TEST: CPT

## 2023-02-24 PROCEDURE — 99232 SBSQ HOSP IP/OBS MODERATE 35: CPT | Performed by: NURSE PRACTITIONER

## 2023-02-24 PROCEDURE — 2700000000 HC OXYGEN THERAPY PER DAY

## 2023-02-24 PROCEDURE — 36415 COLL VENOUS BLD VENIPUNCTURE: CPT

## 2023-02-24 RX ORDER — FUROSEMIDE 40 MG/1
40 TABLET ORAL DAILY
Qty: 30 TABLET | Refills: 1 | Status: SHIPPED | OUTPATIENT
Start: 2023-02-24

## 2023-02-24 RX ORDER — POTASSIUM CHLORIDE 20 MEQ/1
40 TABLET, EXTENDED RELEASE ORAL ONCE
Status: COMPLETED | OUTPATIENT
Start: 2023-02-24 | End: 2023-02-24

## 2023-02-24 RX ORDER — LACTULOSE 10 G/15ML
20 SOLUTION ORAL NIGHTLY
Qty: 900 ML | Refills: 1 | Status: SHIPPED | OUTPATIENT
Start: 2023-02-24

## 2023-02-24 RX ORDER — POTASSIUM CHLORIDE 20 MEQ/1
40 TABLET, EXTENDED RELEASE ORAL ONCE
Status: DISCONTINUED | OUTPATIENT
Start: 2023-02-24 | End: 2023-02-24

## 2023-02-24 RX ORDER — NATEGLINIDE 60 MG/1
60 TABLET ORAL
Qty: 90 TABLET | Refills: 1 | Status: SHIPPED | OUTPATIENT
Start: 2023-02-24 | End: 2024-02-24

## 2023-02-24 RX ADMIN — APIXABAN 5 MG: 5 TABLET, FILM COATED ORAL at 12:04

## 2023-02-24 RX ADMIN — LEVOTHYROXINE SODIUM 75 MCG: 0.07 TABLET ORAL at 06:25

## 2023-02-24 RX ADMIN — IPRATROPIUM BROMIDE AND ALBUTEROL SULFATE 1 AMPULE: .5; 2.5 SOLUTION RESPIRATORY (INHALATION) at 08:07

## 2023-02-24 RX ADMIN — LACTULOSE 20 G: 20 SOLUTION ORAL at 12:06

## 2023-02-24 RX ADMIN — POTASSIUM CHLORIDE 40 MEQ: 1500 TABLET, EXTENDED RELEASE ORAL at 12:05

## 2023-02-24 RX ADMIN — GUAIFENESIN 400 MG: 400 TABLET ORAL at 12:04

## 2023-02-24 NOTE — PROGRESS NOTES
CLINICAL PHARMACY NOTE: MEDS TO BEDS    Total # of Prescriptions Filled: 2   The following medications were delivered to the patient:  Furosemide 40 mg  Enulose 10 gm / 15 ml    Additional Documentation:  Picked up in pharmacy

## 2023-02-24 NOTE — PROGRESS NOTES
Consult received and chart reviewed. Visit made to the bedside. Nephew at the bedside stated the pt's daughter just stepped off the floor. Will be revisit in a little awhile. This is an information only consult. 1115 follow up visit made to bedside. Daughter present at the bedside. Hospice philosophy and services discussed. All questions answered at this time. Brochure given so that when family is ready they have our phone number to contact [facility] for services.      Electronically signed by Tray Astorga RN on 2/24/2023 at 11:31 AM  [phone]

## 2023-02-24 NOTE — PROGRESS NOTES
[place]  Department of Internal Medicine  Division of Pulmonary, Critical Care and Sleep Medicine  Progress Note    Ryan Causey DO, MPH, FCCP, FACOI, FACP  Shell Jackson DO, FCCP  MD Julissa Michelle, APRN      SUBJECTIVE:    Monica Erp seen and examined at the bedside. Remains on supplemental O2, not her baseline. She is sleepy but easily aroused and answers questions appropriately. Denies pain. Daughter at the bedside at the time of my examination. Plan is to go home today. Daughter [facility]) is considering code status change. OBJECTIVE:     PHYSICAL EXAM:   VITALS:   Vitals:    02/23/23 1609 02/23/23 2100 02/24/23 0615 02/24/23 0936   BP: 123/75 (!) 95/56 102/69 (!) 96/58   Pulse: 75 70 90 80   Resp: 20 20 18 18   Temp: 97.3 °F (36.3 °C) 97.5 °F (36.4 °C) 97.2 °F (36.2 °C) 97.4 °F (36.3 °C)   TempSrc: Axillary Temporal Temporal Axillary   SpO2: 98% 100% 100% 100%   Weight:       Height:            Intake/Output Summary (Last 24 hours) at 2/24/2023 1039  Last data filed at 2/24/2023 0618  Gross per 24 hour   Intake 460 ml   Output 250 ml   Net 210 ml          CONSTITUTIONAL:   Alert, NAD  SKIN:     No rash,   HEENT:     No thrush  NECK:    No bruits, No JVP appreciated  CV:      Afib murmur, No rubs, No gallops  PULMONARY:   Couse BS,  No Wheezing, No Rales, No Rhonchi      No noted egophony  ABDOMEN:     Soft, non-tender. BS normal. No R/R/G  EXT:    No deformities . No clubbing.       + upper and lower extremity edema, No venous stasis  PULSE:   Palpable.   PSYCHIATRIC:  Seems appropriate, No acute psychosis  MS:    No fractures, + weakness  NEUROLOGIC:   The clinical assessment is non-focal     DATA: IMAGING & TESTING:     LABORATORY TESTS:    CBC:   Lab Results   Component Value Date/Time    WBC 6.4 02/23/2023 08:02 AM    RBC 3.21 02/23/2023 08:02 AM    HGB 9.0 02/23/2023 08:02 AM    HCT 29.1 02/23/2023 08:02 AM    MCV 90.7 02/23/2023 08:02 AM MCH 28.0 02/23/2023 08:02 AM    MCHC 30.9 02/23/2023 08:02 AM    RDW 16.1 02/23/2023 08:02 AM     02/23/2023 08:02 AM    MPV 9.9 02/23/2023 08:02 AM     CMP:    Lab Results   Component Value Date/Time     02/24/2023 06:26 AM    K 3.6 02/24/2023 06:26 AM    K 5.3 02/07/2023 04:35 AM    CL 90 02/24/2023 06:26 AM    CO2 29 02/24/2023 06:26 AM    BUN 13 02/24/2023 06:26 AM    CREATININE 0.8 02/24/2023 06:26 AM    GFRAA >60 09/12/2020 04:39 AM    LABGLOM >60 02/24/2023 06:26 AM    GLUCOSE 111 02/24/2023 06:26 AM    PROT 4.7 02/23/2023 08:02 AM    LABALBU 2.2 02/23/2023 08:02 AM    CALCIUM 8.5 02/24/2023 06:26 AM    BILITOT 0.5 02/23/2023 08:02 AM    ALKPHOS 102 02/23/2023 08:02 AM    AST 25 02/23/2023 08:02 AM    ALT 26 02/23/2023 08:02 AM     Magnesium:    Lab Results   Component Value Date/Time    MG 2.7 02/08/2023 06:22 AM     Phosphorus:    Lab Results   Component Value Date/Time    PHOS 2.5 02/24/2023 06:26 AM     PT/INR:    Lab Results   Component Value Date/Time    PROTIME 19.4 02/24/2023 06:26 AM    INR 1.8 02/24/2023 06:26 AM     Troponin:    Lab Results   Component Value Date/Time    TROPONINI 0.01 09/08/2020 10:59 AM     ABG:    Lab Results   Component Value Date/Time    PH 7.373 02/04/2023 09:56 AM    PCO2 44.0 02/04/2023 09:56 AM    PO2 407.8 02/04/2023 09:56 AM    HCO3 25.0 02/04/2023 09:56 AM    BE -0.4 02/04/2023 09:56 AM    O2SAT 99.6 02/04/2023 09:56 AM        PRO-BNP:   Lab Results   Component Value Date    PROBNP 2,832 (H) 02/24/2023    PROBNP 6,028 (H) 02/16/2023      ABGs:   Lab Results   Component Value Date/Time    PH 7.373 02/04/2023 09:56 AM    PO2 407.8 02/04/2023 09:56 AM    PCO2 44.0 02/04/2023 09:56 AM     Hemoglobin A1C: No components found for: HGBA1C    IMAGING:  Imaging tests were completed and reviewed and discussed radiology and care team involved and reveals   XR CHEST PORTABLE    Result Date: 2/17/2023 FINDINGS: The lungs are hypoinflated.   There are airspace densities at the lung bases and blunting of the costophrenic sulci. Cardiac silhouette is enlarged. There is atherosclerotic calcification of the thoracic aorta. A left subclavian pacemaker is unchanged. There is moderate thoracic spondylosis. No pneumothorax. There are surgical clips right upper abdomen. Bilateral lower lobe atelectasis/pneumonia with improved pneumatization at the right lung base. Small bilateral pleural effusions, decreased on the right consistent with interval thoracentesis. No pneumothorax. Stable enlargement of the cardiac silhouette. ECHOCARDIOGRAM:   Left ventricular internal dimensions were normal in diastole and systole. Mild left ventricular concentric hypertrophy noted. Overall ejection fraction mildly decreased. Ejection fraction is visually estimated at 45-50%. Indeterminate diastolic function. ,Moderate MR    Assessment:     Acute hypoxic respiratory failure, likely 2/2 pleural effusions and atelectasis  Pleural Effusion,  likely from HF with low EF and Mitral Reg, s/p thoracentesis 2/18--TRANSUDATE  Anasarca  HFrEF 45-50%, proBNP 5561>>7621  DM type II  Dementia  Pacemaker left side  Hyponatremia  UTI Mixed  Chronic Anemia  Poor appetite with poor oral intake  Hyponatremia  Hypokalemia, 2/2 renal loss from diuresis, receiving supplementation  Elevated bicarbonate level, likely contraction alkalosis from diuretic use. Receiving acetazolamide--RESOLVED  Hypoalbuminemia, 2.2. Give supplementation. Total protein 4.7. likely 2/2 poor oral intake        Plan:      Will need O2 at discharge  Repeat CXR- stable bilateral pleural effuisons  Repeat proBNP--trending down 2832  Phosphorus level- 2.5  Maximize GDT for cardiomyopathy  Continue bronchodilators for symptoms  Nebulizer for home  PT OT assesment  I don't know if she has COPD as reported, will continue bronchodilators   Diuresis now -7 liters   Monitor Hb  Dementia meds adjusted    Discussed long term plans with daughter at the bedside this morning, she is considering code status change to keep Ashly comfortable. Palliative to discuss further. Case and plan discussed with Dr. Radha Shelton, MICHA - CNP       Patient seen and examined by APRN. Discharge prior to being seen by pulmonary attending today.   [address], DO

## 2023-02-24 NOTE — PROGRESS NOTES
Completed oxygen testing on patient. Patient is non-ambulatory at baseline. Removed oxygen completely and patient dropped to 87% on room air. Placed patient back on 2L and she recovered at 97%.

## 2023-02-24 NOTE — PROGRESS NOTES
Palliative Care Department  [phone]  Palliative Care Progress Note  Provider MICHA Cordova CNP      PATIENT: Karen Baugh  : 1932  MRN: [id number]  ADMISSION DATE: 2023 10:57 AM  Referring Provider: Kahlil Quintanilla DO    Palliative Medicine was consulted on hospital day 5 for assistance with Goals of care, Code Status Discussion, Family support    HPI:     Bridgette Rosas is a 80 y.o. y/o female with a history of of COPD, DM, HLD, HTN, atrial fibrillation on Eliquis, hypothyroidism, dementia, pacemaker, HFrEF 45% who presented to [facility] on 2023 with hypoxia. Chest x-ray showed right lung airspace disease, moderate right pleural effusion, small left pleural effusion, and cardiomegaly. She had a thoracentesis performed on . She passed her video swallow on . She remains on telemetry for further medical management. ASSESSMENT/PLAN:     Pertinent Hospital Diagnoses     Acute hypoxic respiratory failure  Moderate right pleural effusion  HFrEF    Palliative Care Encounter / Counseling Regarding Goals of Care  Please see detailed goals of care discussion as below  At this time, Karen Baugh, Does Not have capacity for medical decision-making. Capacity is time limited and situation/question specific  During encounter Silverio Dalton and Jovany Mir were surrogate medical decision-maker  Outcome of goals of care meeting: Change CODE STATUS to a DNR CC. Hospice consulted for information. Code status DNR-CC  Advanced Directives: no POA or living will in [facility]  Surrogate/Legal NOK:  Silverio Dalton \"Sissy\" Geovanny (daughter/ HPOA) [phone]    Spiritual assessment: no spiritual distress identified  Bereavement and grief: to be determined  Referrals to: [facility]    Thank you for the opportunity to participate in the care of Karen Baugh. MICHA Cordova CNP   Palliative Medicine     SUBJECTIVE:     Details of Conversation: Chart reviewed and met with Silverio Dalton at the bedside.   She wished to talk in the hallway. Plan is for discharge home today. Daughter expresses that she does not think her mother is going to work with PT at discharge. She expresses that the main goal is making sure she is comfortable. She expresses that she knows her mom is ready and would not want anything to prolong her life. Suma Eason states that she wants her mother to be a DO NOT RESUSCITATE. She also expresses that she would not want to put her through intubation, feeding tubes, dialysis, or aggressive medical management. She is interested in speaking with hospice to receive some information with the possibility of transitioning to hospice instead of returning to the hospital in the future. At this time she wishes for CODE STATUS to be changed to a DNR CC. Prognosis: Guarded    OBJECTIVE:     BP (!) 96/58   Pulse 80   Temp 97.4 °F (36.3 °C) (Axillary)   Resp 18   Ht 5' (1.524 m)   Wt 189 lb (85.7 kg)   SpO2 100%   BMI 36.91 kg/m²     Physical Examination:  Gen: elderly,  NAD, awake, alert   HEENT: normocephalic, atraumatic  Neck: trachea midline, no JVD  Lungs: respirations easy and not labored,   Heart: regular rate and rhythm, distant heart tones,   Abdomen: normoactive bowel sounds, soft, non-tender  Extremities: no clubbing, cyanosis or edema, moving all extremities    Skin: warm, dry without rashes, lesions, bruising  Neuro: Sleepy, oriented x 2, follows commands    Objective data reviewed: labs, images, records, medication use, vitals, and chart    Time/Communication  Greater than 50% of time spent, total 35 minutes in counseling and coordination of care at the bedside regarding goals of care and symptom management. Thank you for allowing Palliative Medicine to participate in the care of Marcia Olivo. Note: This report was completed using computerFiREapps voiced recognition software. Every effort has been made to ensure accuracy; however, inadvertent computerized transcription errors may be present.

## 2023-02-24 NOTE — PLAN OF CARE

## 2023-02-24 NOTE — DISCHARGE SUMMARY
Hospitalist Discharge Summary    Patient ID: Oliverio Espinosa   Patient : 1932  Patient's PCP: Jann Araiza MD    Admit Date: 2023   Admitting Physician: Misa Sue MD    Discharge Date: 02/15/2023   Discharge Physician: Cecille Barreto DO   Discharge Condition: Stable  Discharge Disposition: Home with  [facility] [address]      Discharge Diagnoses:   ACUTE RESP [address]  RSV  ANASARCA   IIDM   HYPOTHYROID   DEMENTIA   PACEMAKER   PAF  HYPONATREMIA  UTI(PROTEUS AND E COLI))  ON [facility] course in brief:    PRESENT WITH SOB, AND FOUND TO HAVE HYPONATREMIA. DIURETICS HELD,  FEELING BETTER  SODIUM INCREASING  . SHE HAD SOME BLEEDING FROM JONAS, PROBABLY SECONDARY TO TRAUMA. PLAN IS TO DC HOME PER FAMILY REQUEST, WILL IRRIGATE JONAS TO MAKE SURE NOR OTHER CAUSE OF BLEEDING. Rita Freyoung URINE CULTURE WAS POS FOR PROTEUS, ON ROCEPHIN              PHYSICAL EXAM:    /87   Pulse 74   Temp (!) 96.2 °F (35.7 °C) (Temporal)   Resp 18   Ht 5' (1.524 m)   Wt 196 lb 11.2 oz (89.2 kg)   SpO2 94%   BMI 38.42 kg/m²   Gen:  WELL DEVELOPED  HEENT: NC/AT, moist mucous membranes, n  Neck: supple, trachea midline,   Heart:  IRREG  Lungs:   DIMINISHED  bilaterally, FEW RHONCHI  Abd: bowel sounds present, soft, nontender, nondistended, no masses  Extrem:  No clubbing, cyanosis,  EDEMA OF ARMS AND LEGS edema  Skin: ERYTHEMA OF ARMS  Psych: Not oriented to date   Neuro: grossly intact, moves all four extremities. Prior to Admission medications    Medication Sig Start Date End Date Taking?  Authorizing Provider   guaiFENesin 400 MG tablet Take 1 tablet by mouth in the morning, at noon, and at bedtime 2/15/23  Yes Cecille Barreto DO   lactulose [facility]) 10 GM/15ML solution Take 30 mLs by mouth nightly 23   rBennan Ledezma DO   nateglinide (STARLIX) 60 MG tablet Take 1 tablet by mouth 3 times daily (before meals) Take with meals ONLY,   if she does not eat, do not give this medicine. No more than 3 a day , this is for her II DM 2/24/23 2/24/24  Brennan Murillo DO   furosemide (LASIX) 40 MG tablet Take 1 tablet by mouth daily 2/24/23   Brennan Murillo DO   carvedilol (COREG) 12.5 MG tablet Take 18.75 mg by mouth 2 times daily (with meals)    Historical Provider, MD   donepezil (ARICEPT) 10 MG tablet Take 10 mg by mouth at bedtime 11/22/22   Historical Provider, MD   amLODIPine (NORVASC) 5 MG tablet Take 1 tablet by mouth daily 9/12/20   Jason Hernandez MD   ipratropium-albuterol (DUONEB) 0.5-2.5 (3) MG/3ML SOLN nebulizer solution Take 1 vial by nebulization every 6 hours as needed for Shortness of Breath    Historical Provider, MD   levothyroxine (SYNTHROID) 75 MCG tablet Take 75 mcg by mouth Daily    Historical Provider, MD   apixaban (ELIQUIS) 5 MG TABS tablet Take 5 mg by mouth 2 times daily    Historical Provider, MD       Consults:   IP CONSULT TO INTERNAL MEDICINE  IP CONSULT TO SOCIAL WORK  IP CONSULT TO NEPHROLOGY            Discharge Instructions / Follow up:    No future appointments. Continued appropriate risk factor modification of blood pressure, diabetes and serum lipids will remain essential to reducing risk of future atherosclerotic development    Activity: activity as tolerated    Significant labs:  CBC:   Recent Labs     02/23/23  0802   WBC 6.4   RBC 3.21*   HGB 9.0*   HCT 29.1*   MCV 90.7   RDW 16.1*        BMP:   Recent Labs     02/22/23  0534 02/23/23  0802 02/24/23  0626    129* 131*   K 3.2* 4.2 3.6   CL 91* 91* 90*   CO2 36* 34* 29   BUN 17 16 13   CREATININE 0.7 0.8 0.8   PHOS  --   --  2.5     LFT:  Recent Labs     02/23/23  0802   PROT 4.7*   ALKPHOS 102   ALT 26   AST 25   BILITOT 0.5     PT/INR:   Recent Labs     02/22/23  0534 02/23/23  0802 02/24/23  0626   INR 1.6 1.7 1.8     BNP: No results for input(s): BNP in the last 72 hours.   Hgb A1C:   Lab Results   Component Value Date    LABA1C 7.0 (H) 02/04/2023 Folate and B12:   Lab Results   Component Value Date    PBTMWBYQ30 1736 (H) 02/08/2023   ,   Lab Results   Component Value Date    FOLATE 19.5 02/08/2023     Thyroid Studies:   Lab Results   Component Value Date    TSH 0.541 02/09/2023    B0MKRQW 5.8 02/09/2023       Urinalysis:    Lab Results   Component Value Date/Time    NITRU Negative 02/11/2023 03:18 PM    WBCUA 10-20 02/11/2023 03:18 PM    BACTERIA MANY 02/11/2023 03:18 PM    RBCUA 10-20 02/11/2023 03:18 PM    BLOODU LARGE 02/11/2023 03:18 PM    SPECGRAV 1.010 02/11/2023 03:18 PM    GLUCOSEU 100 02/11/2023 03:18 PM       Imaging:  XR CHEST PORTABLE    Result Date: 2/23/2023  EXAMINATION: ONE XRAY VIEW OF THE CHEST 2/23/2023 3:01 pm COMPARISON: 02/17/2023 HISTORY: ORDERING SYSTEM PROVIDED HISTORY: pleural effusion TECHNOLOGIST PROVIDED HISTORY: Reason for exam:->pleural effusion What reading provider will be dictating this exam?->CRC FINDINGS: The lungs are without acute focal process. Bilateral pleural effusions. No pneumothorax. Cardiomegaly. The osseous structures are without acute process. Left-sided transvenous pacer device. Stable bilateral pleural effusions. Cardiomegaly. XR CHEST PORTABLE    Result Date: 2/17/2023  EXAMINATION: ONE XRAY VIEW OF THE CHEST 2/17/2023 10:32 am COMPARISON: 02/16/2023 chest radiograph. HISTORY: ORDERING SYSTEM PROVIDED HISTORY: R Thoracentesis post XRAY TECHNOLOGIST PROVIDED HISTORY: Reason for exam:->R Thoracentesis post XRAY Reason for exam:->pt is in angio bay 2 What reading provider will be dictating this exam?->CRC FINDINGS: The lungs are hypoinflated. There are airspace densities at the lung bases and blunting of the costophrenic sulci. Cardiac silhouette is enlarged. There is atherosclerotic calcification of the thoracic aorta. A left subclavian pacemaker is unchanged. There is moderate thoracic spondylosis. No pneumothorax. There are surgical clips right upper abdomen.      Bilateral lower lobe atelectasis/pneumonia with improved pneumatization at the right lung base. Small bilateral pleural effusions, decreased on the right consistent with interval thoracentesis. No pneumothorax. Stable enlargement of the cardiac silhouette. XR CHEST PORTABLE    Result Date: 2/16/2023  EXAMINATION: ONE XRAY VIEW OF THE CHEST 2/16/2023 11:29 am COMPARISON: 02/04/2023 HISTORY: ORDERING SYSTEM PROVIDED HISTORY: Shortness of breath TECHNOLOGIST PROVIDED HISTORY: Reason for exam:->Shortness of breath FINDINGS: The heart is enlarged. Right perihilar and right lower lobe airspace disease is noted. There is a right pleural effusion probably moderate in size. There is a small left pleural effusion. 1. Right perihilar and right lower lobe airspace disease 2. Moderate right pleural effusion 3. Small left pleural effusion 4. Cardiomegaly     XR CHEST PORTABLE    Result Date: 2/4/2023  EXAMINATION: ONE XRAY VIEW OF THE CHEST 2/4/2023 9:47 am COMPARISON: Comparison studies of a December 15 through December 21, 2022 HISTORY: ORDERING SYSTEM PROVIDED HISTORY: resp. distress TECHNOLOGIST PROVIDED HISTORY: Reason for exam:->resp. distress What reading provider will be dictating this exam?->CRC FINDINGS: There is a permanent pacemaker with single wire placed through the left subclavian vein. Cardiac area is mild increased size. The CTR: 16.2/27.4 cm. There is no pneumothorax on the right on the left. There is no perihilar vascular congestion. Lungs are normally expanded. No infiltrates, consolidations or pleural effusions observed. No acute cardiopulmonary process. FL MODIFIED BARIUM SWALLOW W VIDEO    Result Date: 2/18/2023  EXAMINATION: MODIFIED BARIUM SWALLOW WAS PERFORMED IN CONJUNCTION WITH SPEECH PATHOLOGY SERVICES WITH CARMEN UNIT DICOM DISPLAY TECHNIQUE: Under fluoroscopic evaluation cineradiography/videoradiography recordings were performed in conjunction with the speech-language pathologist (SLP).  Various liquid, solid and/or semi-solid barium preparations were used to assess swallowing function. FLUOROSCOPY DOSE AND TYPE OR TIME AND EXPOSURES: Radiation Exposure Index: Images: 9 series of cine fluoroscopic loops Fluoroscopic time: 1.7 minutes Total dose: 19 mGy COMPARISON: None HISTORY: ORDERING SYSTEM PROVIDED HISTORY: COUGHNG WITH EATING TECHNOLOGIST PROVIDED HISTORY: Reason for exam:->COUGHNG WITH EATING What reading provider will be dictating this exam?->CRC FINDINGS: Intermittent swallowing delay. No significant barium residuals. Satisfactory pharyngeal phase of the swallow with the exception of transient laryngeal penetration with thin liquid barium. No barium aspiration. No barium aspiration or significant swallowing deficits. Transient laryngeal penetration with thin liquid barium. Please see separate speech pathology report for full discussion of findings and recommendations. IR GUIDED THORACENTESIS PLEURAL    Result Date: 2/17/2023  PROCEDURE: ULTRASOUNDGUIDED RIGHT THORACENTESIS 2/17/2023 HISTORY: ORDERING SYSTEM PROVIDED HISTORY: PLEURAL EFFUSION TECHNOLOGIST PROVIDED HISTORY: Reason for exam:->PLEURAL EFFUSION Which side should the procedure be performed?->Right What reading provider will be dictating this exam?->CRC TECHNIQUE: Informed consent was obtained after a detailed explanation of the procedure including risks, benefits, and alternatives. Universal protocol was performed. The right chest was prepped and draped in sterile fashion and local anesthesia was achieved with lidocaine. An 5 Cymraes needle sheath was advanced under ultrasound guidance into pleural effusion and thoracentesis was performed. The patient tolerated the procedure well. Total blood loss less than 5 cc FINDINGS: A total of 550 cc of yellow cloudy was removed. Successful ultrasound guided thoracentesis. The patient will follow-up with the referring clinical service.        Discharge Medications:      Medication List        START taking these medications      guaiFENesin 400 MG tablet  Take 1 tablet by mouth in the morning, at noon, and at bedtime            CONTINUE taking these medications      amLODIPine 5 MG tablet  Commonly known as: NORVASC  Take 1 tablet by mouth daily     apixaban 5 MG Tabs tablet  Commonly known as: ELIQUIS     donepezil 10 MG tablet  Commonly known as: ARICEPT     ipratropium-albuterol 0.5-2.5 (3) MG/3ML Soln nebulizer solution  Commonly known as: DUONEB     levothyroxine 75 MCG tablet  Commonly known as: SYNTHROID            STOP taking these medications      lisinopril 10 MG tablet  Commonly known as: PRINIVIL;ZESTRIL     metFORMIN 500 MG extended release tablet  Commonly known as: GLUCOPHAGE-XR     predniSONE 5 MG tablet  Commonly known as: Rexann Jerrica your doctor about these medications      carvedilol 12.5 MG tablet  Commonly known as: COREG  Take 1 tablet by mouth 2 times daily (with meals)               Where to Get Your Medications        These medications were sent to [facility], [address] [place] [address]      Phone: [phone]   guaiFENesin 400 MG tablet         Time Spent on discharge is 45 minutes in the review of medications and discharge plan  and EXAM.    +++++++++++++++++++++++++++++++++++++++++++++++++  Brennan Glass, DO  [facility]  +++++++++++++++++++++++++++++++++++++++++++++++++  NOTE: This report was transcribed using voice recognition software. Every effort was made to ensure accuracy; however, inadvertent computerized transcription errors may be present.

## 2023-02-24 NOTE — PROGRESS NOTES
Hospitalist Progress Note      PCP: Kan Vazquez MD    Date of Admission: 2/16/2023        Hospital Course:  YESTERDAY, HOME HEALTH NURSE CAME TODAY, AND SHE WAS HYPOXIC. SHE HAS A HISTORY OF HYPOXIA, BUT REFUSES BIPAP AS OF RECENT ADMISSION. WAS WAS SEEN BY DR DELAROSA FOR HYPONATREMIA, AND AND SHE WAS TREATED FOR A UTI WITH PROTEUS AND E COLI ** TO FINISH THE COURSE OF ABX FOR BOTH.   **   HAD A IR THORACENTESIS 550 REMOVED. STILL REFUSING BIPAP** WAS APPARENTLY CONFUSED THIS MORNING, DAUGHTER STATES HER ARICEPT DOSE IS 5 MG NOT 10   ** HAS NOT HAD A BM. DAUGHTER STATES SHE AMBULATES TO THE BATHROOM AT HOME, AND HER OXYGEN DROPS, SHE IS < 90 ON RA WHILE LYING IN THE BED .   WILL HAVE PT AMBULATE TOMORROW AND DO A PUSLE OX** PASSED SWALLOW EVAL ** HAD A BM LAST PM, , TO IN HOME TODAY WITH HOME OXYGEN              Subjective:  LEFT BEFORE SHE COULD BE SEEN           Medications:  Reviewed    Infusion Medications    dextrose       Scheduled Medications    lactulose  20 g Oral BID    donepezil  5 mg Oral Nightly    ipratropium-albuterol  1 ampule Inhalation 4x daily    apixaban  5 mg Oral BID    amLODIPine  5 mg Oral Daily    carvedilol  18.75 mg Oral BID WC    guaiFENesin  400 mg Oral TID    levothyroxine  75 mcg Oral Daily    furosemide  40 mg IntraVENous Daily    insulin lispro  0-10 Units SubCUTAneous 4x Daily AC & HS     PRN Meds: ondansetron, albuterol, ipratropium-albuterol, glucose, dextrose bolus **OR** dextrose bolus, glucagon (rDNA), dextrose, bisacodyl, acetaminophen, trimethobenzamide      Intake/Output Summary (Last 24 hours) at 2/24/2023 1428  Last data filed at 2/24/2023 0618  Gross per 24 hour   Intake 460 ml   Output 250 ml   Net 210 ml       Exam:    BP (!) 96/58   Pulse 80   Temp 97.4 °F (36.3 °C) (Axillary)   Resp 18   Ht 5' (1.524 m)   Wt 189 lb (85.7 kg)   SpO2 100%   BMI 36.91 kg/m²             Labs:   Recent Labs     02/23/23  0802   WBC 6.4   HGB 9.0*   HCT 29.1*    Recent Labs     02/22/23  0534 02/23/23  0802 02/24/23  0626    129* 131*   K 3.2* 4.2 3.6   CL 91* 91* 90*   CO2 36* 34* 29   BUN 17 16 13   CREATININE 0.7 0.8 0.8   CALCIUM 8.2* 7.9* 8.5*   PHOS  --   --  2.5     Recent Labs     02/23/23  0802   AST 25   ALT 26   BILITOT 0.5   ALKPHOS 102     Recent Labs     02/22/23  0534 02/23/23  0802 02/24/23  0626   INR 1.6 1.7 1.8     No results for input(s): Labdonna De Beque in the last 72 hours. Recent Labs     02/23/23  0802   AST 25   ALT 26   BILITOT 0.5   ALKPHOS 102     No results for input(s): LACTA in the last 72 hours.   Lab Results   Component Value Date    LABURIC 6.9 (H) 02/12/2023     Lab Results   Component Value Date    AMMONIA <10.0 (L) 09/08/2020    AMMONIA 28.3 03/05/2020    AMMONIA 24.2 01/17/2020       Assessment:    Active Hospital Problems    Diagnosis Date Noted    Hypoxia [R09.02] 02/21/2023     Priority: Medium    Acute respiratory failure with hypoxia ([address] [J96.01] 02/04/2023     Priority: Medium   CONSTIPATION  COUGH WHILE EATING  ANASARCA   IIDM   HYPOTHYROID   DEMENTIA   PACEMAKER   PAF  HYPONATREMIA  UTI(PROTEUS AND E COLI))   CEFTIN (COMPLETED)       Plan:DC HOME WITH [address], DO on 2/24/2023 at 2:28 PM Robson Cardoza

## 2023-02-24 NOTE — PROGRESS NOTES
Hospice consult noted. Agency choices reviewed with family, daughter chooses [facility].  Referral made to [facility]

## 2023-02-24 NOTE — PROGRESS NOTES
Patient resting after tylenol given. Was calling out previously. \"Help me Geovanni. \"  Small smear, urine around purewick. Skin cleansed with bathing cloths and foam cleanse. Repositioned to Rt side per request. Left side hurts her back.    Deon BSN

## 2023-02-24 NOTE — CARE COORDINATION
Patient to discharge home today. Ambulance transport has been tentatively set up for 12p via [facility]. Referral for home oxygen made to [facility] [place]. Telma with [facility] has been notified of patient's discharge; will need resumption of care orders. Ambulance form completed and envelope placed on the soft chart. Patient's daughterSimone informed of discharge. Charge nurse checking for discharge. The Plan for Transition of Care is related to the following treatment goals: discharge planning    The Patient and/or patient representative Yamil doll was provided with a choice of provider and agrees with the discharge plan. [x] Yes [] No    Freedom of choice list was provided with basic dialogue that supports the patient's individualized plan of care/goals, treatment preferences and shares the quality data associated with the providers.   [x]Yes [] No     Aline Pina MSW, LSW [phone]

## 2023-02-24 NOTE — PROGRESS NOTES
Reviewed discharge instructions with patients daughter at bedside. All questions answered and patients daughter confirmed understanding. Patient sent on 2L NC. IV and telemetry monitor removed. All paperwork sent with transport.

## 2023-02-27 NOTE — DISCHARGE SUMMARY
Hospitalist Discharge Summary    Patient ID: Sherman Mckeon   Patient : 1932  Patient's PCP: Laila Bejarano MD    Admit Date: 2023   Admitting Physician: Nicola Beauchamp DO    Discharge Date:  2023   Discharge Physician: Nicola Beauchamp DO   Discharge Condition: STABLE  Discharge Disposition: Home with  [facility] [address]      Discharge Diagnoses: Active Hospital Problems    Diagnosis Date Noted    Hypoxia [R09.02] 2023     Priority: Medium    Acute respiratory failure with hypoxia ([address] [J96.01] 2023     Priority: Medium           Hospital course in brief:    YESTERDAY, [address]. SHE HAS A HISTORY OF HYPOXIA, BUT REFUSES BIPAP AS OF RECENT ADMISSION. WAS WAS SEEN BY DR DELAROSA FOR HYPONATREMIA, AND AND SHE WAS TREATED FOR A UTI WITH PROTEUS AND E COLI ** TO FINISH THE COURSE OF ABX FOR BOTH.   **   HAD A IR THORACENTESIS 550 REMOVED. STILL REFUSING BIPAP** WAS APPARENTLY CONFUSED THIS MORNING, DAUGHTER STATES HER ARICEPT DOSE IS 5 MG NOT 10   ** HAS NOT HAD A BM. DAUGHTER STATES SHE AMBULATES TO THE BATHROOM AT HOME, AND HER OXYGEN DROPS, SHE IS < 90 ON RA WHILE LYING IN THE BED . WILL HAVE PT AMBULATE TOMORROW AND DO A PUSLE OX** PASSED SWALLOW EVAL ** HAD A BM LAST PM, , TO [place] HOME TODAY WITH HOME OXYGEN               PHYSICAL EXAM:    BP (!) 96/58   Pulse 80   Temp 97.4 °F (36.3 °C) (Axillary)   Resp 18   Ht 5' (1.524 m)   Wt 189 lb (85.7 kg)   SpO2 100%   BMI 36.91 kg/m²     LEFT BEFORE SHE COULD BE SEEN    Prior to Admission medications    Medication Sig Start Date End Date Taking? Authorizing Provider   lactulose (CHRONULAC) 10 GM/15ML solution Take 30 mLs by mouth nightly 23  Yes Darell Gustafson,    nateglinide (STARLIX) 60 MG tablet Take 1 tablet by mouth 3 times daily (before meals) Take with meals ONLY,   if she does not eat, do not give this medicine.   No more than 3 a day , this is for her II DM 23 2/24/24 Yes Lethaniel Frankel, DO   furosemide (LASIX) 40 MG tablet Take 1 tablet by mouth daily 2/24/23  Yes Lethaniel Frankel, DO   carvedilol (COREG) 12.5 MG tablet Take 18.75 mg by mouth 2 times daily (with meals)   Yes Historical Provider, MD   guaiFENesin 400 MG tablet Take 1 tablet by mouth in the morning, at noon, and at bedtime 2/15/23   Lethaniel Frankel, DO   donepezil (ARICEPT) 10 MG tablet Take 10 mg by mouth at bedtime 11/22/22   Historical Provider, MD   amLODIPine (NORVASC) 5 MG tablet Take 1 tablet by mouth daily 9/12/20   Domingo Green MD   ipratropium-albuterol (DUONEB) 0.5-2.5 (3) MG/3ML SOLN nebulizer solution Take 1 vial by nebulization every 6 hours as needed for Shortness of Breath    Historical Provider, MD   levothyroxine (SYNTHROID) 75 MCG tablet Take 75 mcg by mouth Daily    Historical Provider, MD   apixaban (ELIQUIS) 5 MG TABS tablet Take 5 mg by mouth 2 times daily    Historical Provider, MD       Consults:   IP CONSULT TO HOSPITALIST  IP CONSULT TO PULMONOLOGY  IP CONSULT TO PALLIATIVE CARE  IP CONSULT TO [address] [place] [address]            Discharge Instructions / Follow up:    No future appointments. Continued appropriate risk factor modification of blood pressure, diabetes and serum lipids will remain essential to reducing risk of future atherosclerotic development    Activity: activity as tolerated    Significant labs:  CBC:   No results for input(s): WBC, RBC, HGB, HCT, MCV, RDW, PLT in the last 72 hours. BMP:   Recent Labs     02/24/23  0626   *   K 3.6   CL 90*   CO2 29   BUN 13   CREATININE 0.8   PHOS 2.5     LFT:  No results for input(s): PROT, ALB, ALKPHOS, ALT, AST, BILITOT, AMYLASE, LIPASE in the last 72 hours. PT/INR:   Recent Labs     02/24/23  0626   INR 1.8     BNP: No results for input(s): BNP in the last 72 hours.   Hgb A1C:   Lab Results   Component Value Date    LABA1C 7.0 (H) 02/04/2023     Folate and B12:   Lab Results   Component Value Date    BHYRDNVW66 1736 (H) 02/08/2023   ,   Lab Results   Component Value Date    FOLATE 19.5 02/08/2023     Thyroid Studies:   Lab Results   Component Value Date    TSH 0.541 02/09/2023    Z0KHQVG 5.8 02/09/2023       Urinalysis:    Lab Results   Component Value Date/Time    NITRU Negative 02/11/2023 03:18 PM    WBCUA 10-20 02/11/2023 03:18 PM    BACTERIA MANY 02/11/2023 03:18 PM    RBCUA 10-20 02/11/2023 03:18 PM    BLOODU LARGE 02/11/2023 03:18 PM    SPECGRAV 1.010 02/11/2023 03:18 PM    GLUCOSEU 100 02/11/2023 03:18 PM       Imaging:  XR CHEST PORTABLE    Result Date: 2/23/2023  EXAMINATION: ONE XRAY VIEW OF THE CHEST 2/23/2023 3:01 pm COMPARISON: 02/17/2023 HISTORY: ORDERING SYSTEM PROVIDED HISTORY: pleural effusion TECHNOLOGIST PROVIDED HISTORY: Reason for exam:->pleural effusion What reading provider will be dictating this exam?->CRC FINDINGS: The lungs are without acute focal process. Bilateral pleural effusions. No pneumothorax. Cardiomegaly. The osseous structures are without acute process. Left-sided transvenous pacer device. Stable bilateral pleural effusions. Cardiomegaly. XR CHEST PORTABLE    Result Date: 2/17/2023  EXAMINATION: ONE XRAY VIEW OF THE CHEST 2/17/2023 10:32 am COMPARISON: 02/16/2023 chest radiograph. HISTORY: ORDERING SYSTEM PROVIDED HISTORY: R Thoracentesis post XRAY TECHNOLOGIST PROVIDED HISTORY: Reason for exam:->R Thoracentesis post XRAY Reason for exam:->pt is in angio bay 2 What reading provider will be dictating this exam?->CRC FINDINGS: The lungs are hypoinflated. There are airspace densities at the lung bases and blunting of the costophrenic sulci. Cardiac silhouette is enlarged. There is atherosclerotic calcification of the thoracic aorta. A left subclavian pacemaker is unchanged. There is moderate thoracic spondylosis. No pneumothorax. There are surgical clips right upper abdomen. Bilateral lower lobe atelectasis/pneumonia with improved pneumatization at the right lung base.  Small bilateral pleural effusions, decreased on the right consistent with interval thoracentesis. No pneumothorax. Stable enlargement of the cardiac silhouette. XR CHEST PORTABLE    Result Date: 2/16/2023  EXAMINATION: ONE XRAY VIEW OF THE CHEST 2/16/2023 11:29 am COMPARISON: 02/04/2023 HISTORY: ORDERING SYSTEM PROVIDED HISTORY: Shortness of breath TECHNOLOGIST PROVIDED HISTORY: Reason for exam:->Shortness of breath FINDINGS: The heart is enlarged. Right perihilar and right lower lobe airspace disease is noted. There is a right pleural effusion probably moderate in size. There is a small left pleural effusion. 1. Right perihilar and right lower lobe airspace disease 2. Moderate right pleural effusion 3. Small left pleural effusion 4. Cardiomegaly     XR CHEST PORTABLE    Result Date: 2/4/2023  EXAMINATION: ONE XRAY VIEW OF THE CHEST 2/4/2023 9:47 am COMPARISON: Comparison studies of a December 15 through December 21, 2022 HISTORY: ORDERING SYSTEM PROVIDED HISTORY: resp. distress TECHNOLOGIST PROVIDED HISTORY: Reason for exam:->resp. distress What reading provider will be dictating this exam?->CRC FINDINGS: There is a permanent pacemaker with single wire placed through the left subclavian vein. Cardiac area is mild increased size. The CTR: 16.2/27.4 cm. There is no pneumothorax on the right on the left. There is no perihilar vascular congestion. Lungs are normally expanded. No infiltrates, consolidations or pleural effusions observed. No acute cardiopulmonary process. FL MODIFIED BARIUM SWALLOW W VIDEO    Result Date: 2/18/2023  EXAMINATION: MODIFIED BARIUM SWALLOW WAS PERFORMED IN CONJUNCTION WITH SPEECH PATHOLOGY SERVICES WITH [facility]MEN [facility] DICOM DISPLAY TECHNIQUE: Under fluoroscopic evaluation cineradiography/videoradiography recordings were performed in conjunction with the speech-language pathologist (SLP).  Various liquid, solid and/or semi-solid barium preparations were used to assess swallowing function. FLUOROSCOPY DOSE AND TYPE OR TIME AND EXPOSURES: Radiation Exposure Index: Images: 9 series of cine fluoroscopic loops Fluoroscopic time: 1.7 minutes Total dose: 19 mGy COMPARISON: None HISTORY: ORDERING SYSTEM PROVIDED HISTORY: COUGHNG WITH EATING TECHNOLOGIST PROVIDED HISTORY: Reason for exam:->COUGHNG WITH EATING What reading provider will be dictating this exam?->CRC FINDINGS: Intermittent swallowing delay. No significant barium residuals. Satisfactory pharyngeal phase of the swallow with the exception of transient laryngeal penetration with thin liquid barium. No barium aspiration. No barium aspiration or significant swallowing deficits. Transient laryngeal penetration with thin liquid barium. Please see separate speech pathology report for full discussion of findings and recommendations. IR GUIDED THORACENTESIS PLEURAL    Result Date: 2/17/2023  PROCEDURE: ULTRASOUNDGUIDED RIGHT THORACENTESIS 2/17/2023 HISTORY: ORDERING SYSTEM PROVIDED HISTORY: PLEURAL EFFUSION TECHNOLOGIST PROVIDED HISTORY: Reason for exam:->PLEURAL EFFUSION Which side should the procedure be performed?->Right What reading provider will be dictating this exam?->CRC TECHNIQUE: Informed consent was obtained after a detailed explanation of the procedure including risks, benefits, and alternatives. Universal protocol was performed. The right chest was prepped and draped in sterile fashion and local anesthesia was achieved with lidocaine. An 5 Georgian needle sheath was advanced under ultrasound guidance into pleural effusion and thoracentesis was performed. The patient tolerated the procedure well. Total blood loss less than 5 cc FINDINGS: A total of 550 cc of yellow cloudy was removed. Successful ultrasound guided thoracentesis. The patient will follow-up with the referring clinical service.        Discharge Medications:      Medication List        START taking these medications      lactulose 10 GM/15ML solution  Commonly known as: CHRONULAC  Take 30 mLs by mouth nightly     nateglinide 60 MG tablet  Commonly known as: Starlix  Take 1 tablet by mouth 3 times daily (before meals) Take with meals ONLY,   if she does not eat, do not give this medicine. No more than 3 a day , this is for her II DM            CHANGE how you take these medications      furosemide 40 MG tablet  Commonly known as: LASIX  Take 1 tablet by mouth daily  What changed:   medication strength  how much to take            CONTINUE taking these medications      amLODIPine 5 MG tablet  Commonly known as: NORVASC  Take 1 tablet by mouth daily     apixaban 5 MG Tabs tablet  Commonly known as: ELIQUIS     carvedilol 12.5 MG tablet  Commonly known as: COREG     donepezil 10 MG tablet  Commonly known as: ARICEPT     guaiFENesin 400 MG tablet  Take 1 tablet by mouth in the morning, at noon, and at bedtime     ipratropium-albuterol 0.5-2.5 (3) MG/3ML Soln nebulizer solution  Commonly known as: DUONEB     levothyroxine 75 MCG tablet  Commonly known as: SYNTHROID            STOP taking these medications      cefUROXime 250 MG tablet  Commonly known as: CEFTIN     predniSONE 10 MG tablet  Commonly known as: Grettaslade Orr               Where to Get Your Medications        These medications were sent to [facility] [address] [address]      Phone: [phone]   furosemide 40 MG tablet  lactulose 10 GM/15ML solution  nateglinide 60 MG tablet         Time SpeNT ON discharge is 30 minutes in the review of medications and discharge    +++++++++++++++++++++++++++++++++++++++++++++++++  Brennan Olivas, DO  [address], [place]  +++++++++++++++++++++++++++++++++++++++++++++++++  NOTE: This report was transcribed using voice recognition software.  Every effort was made to ensure accuracy; however, inadvertent computerized transcription errors may be present.

## 2023-02-28 DIAGNOSIS — E40 KWASHIORKOR (HCC): ICD-10-CM

## 2023-02-28 RX ORDER — LORAZEPAM 1 MG/1
1 TABLET ORAL
Qty: 30 TABLET | Refills: 0 | Status: SHIPPED | OUTPATIENT
Start: 2023-02-28 | End: 2023-03-30

## 2023-02-28 RX ORDER — MORPHINE SULFATE 100 MG/5ML
5 SOLUTION ORAL
Qty: 30 ML | Refills: 0 | Status: SHIPPED | OUTPATIENT
Start: 2023-02-28 | End: 2023-03-03

## 2024-10-15 NOTE — PROGRESS NOTES
Physician Progress Note      PATIENT:               Kate Sierra  CSN #:                  [id number]  :                       1932  ADMIT DATE:       2022 5:44 PM  [address] DATE:        2022 7:00 PM  RESPONDING  PROVIDER #:        Melodie Phillip MD          QUERY TEXT:    Dear Provider,    Patient underwent a permanent pacemaker placement, noted to have atrial   fibrillation and is maintained on Eliquis. If possible, please document in   progress notes and discharge summary if you are evaluating and/or treating any   of the following: The medical record reflects the following:  Risk Factors: Atrial fibrillation maintained on Eliquis  Clinical Indicators: A pacemaker was placed and Eliquis resumed on ;   PT 14.1/INR 1.1, aPTT 32  Treatment: Eliquis , PT/INR    Thank you,  Hodan Mann RN  Clinical Documentation Improvement  [phone]  Options provided:  -- Secondary hypercoagulable state in a patient with atrial fibrillation  -- Other - I will add my own diagnosis  -- Disagree - Not applicable / Not valid  -- Disagree - Clinically unable to determine / Unknown  -- Refer to Clinical Documentation Reviewer    PROVIDER RESPONSE TEXT:    This patient has secondary hypercoagulable state in a patient with atrial   fibrillation. Query created by: [email or web address] P[employer] on 2022 1:02 PM      QUERY TEXT:    Dear Provider,    Pt admitted underwent a permanent pacemaker insertion for SSS. Noted    Cardiology consultant documentation of chronic atrial fibrillation per   reviewed telemetry. If possible, please document in progress notes and   discharge summary:    The medical record reflects the following:  Risk Factors: 79 yo w/ Atrial fibrillation on Eliquis, SSS  Clinical Indicators:  Cardiology note: Interim history:  No cardiac   issues. Denies chest pain and no shortness of breath lying  flat.  Telemetry reviewed: Chronic atrial fibrillation with CVR and   intermittent Pt here for C6 ritux/bendeka. Pt states she is still having 10/10 abd pain. Pt left AMA from ER when sent down after last visit to address abd pain. Dr Pickering unavailable, Dr Barry notified. Pt can skip treatment today and go down to be evaluated in ER or if pt refuses she can continue with treatment if she prefers. Pt declined to go down to ER. Amylase/lipase/lactate drawn for r/o pancreatitis. Dr Barry notified of lab results, plt 44 will hold treatment today. Amylase/lipase/lactate normal. Per Dr Barry pt can go to ER if pain is untolerable or worsens. Pt has Fuv with Dr Sage Thursday. Pt declined to go to ER. Pt instructed to go to ER if pain worsens. She is aware of plan of care, will call with further questions or concersn. Pt is rescheduled for next week, pending instructions from Dr Pickering.   ventricular pacing. Treatment: Eliquis, coreg, cardiology consult, permanent pacemaker placed    Thank you,  Hodan Mann RN  Clinical Documentation Improvement  [phone]  Options provided:  -- Chronic atrial fibrillation confirmed  -- Chronic atrial fibrillation ruled out  -- Other - I will add my own diagnosis  -- Disagree - Not applicable / Not valid  -- Disagree - Clinically unable to determine / Unknown  -- Refer to Clinical Documentation Reviewer    PROVIDER RESPONSE TEXT:    The diagnosis of chronic atrial fibrillation confirmed.     Query created by: Eri Whittington on 12/30/2022 1:27 PM      Electronically signed by:  Melodie Phillip MD 1/1/2023 8:50 AM

## 2024-11-18 NOTE — PROGRESS NOTES
Occupational Therapy  OT BEDSIDE TREATMENT NOTE      Date:2022  Patient Name: Anabel Cook  MRN: [id number]  : 1932  Room: [address] [place] [address]     Evaluating OT: Suzanne Pugh OTR/TEMI [id number]       Referring Provider: Ben Alaniz DO    Specific Provider Orders/Date:OT eval and treat 22       Diagnosis:  New onset a-fib ([facility] [I48.91]  Atrial fibrillation with RVR ([facility] [I48.91]  Urinary tract infection without hematuria, site unspecified [N39.0]  Atrial fibrillation, unspecified type ([address] [I48.91]      Pertinent Medical History: arthritis, DM, HTN       Precautions:  Fall Risk, O2      Assessment of current deficits    [x] Functional mobility            [x]ADLs           [x] Strength                  [x]Cognition    [x] Functional transfers          [x] IADLs         [x] Safety Awareness   [x]Endurance    [] Fine Coordination                         [x] Balance      [] Vision/perception   []Sensation      []Gross Motor Coordination             [] ROM           [] Delirium                   [] Motor Control      OT PLAN OF CARE   OT POC based on physician orders, patient diagnosis and results of clinical assessment     Frequency/Duration 2-4 days/wk for 2 weeks PRN   Specific OT Treatment Interventions to include:   * Instruction/training on adapted ADL techniques and AE recommendations to increase functional independence within precautions       * Training on energy conservation strategies, correct breathing pattern and techniques to improve independence/tolerance for self-care routine  * Functional transfer/mobility training/DME recommendations for increased independence, safety, and fall prevention  * Patient/Family education to increase follow through with safety techniques and functional independence  * Recommendation of environmental modifications for increased safety with functional transfers/mobility and ADLs  * Cognitive retraining/development of therapeutic activities to improve problem solving, judgement, memory, and attention for increased safety/participation in ADL/IADL tasks  * Therapeutic exercise to improve motor endurance, ROM, and functional strength for ADLs/functional transfers  * Therapeutic activities to facilitate/challenge dynamic balance, stand tolerance for increased safety and independence with ADLs  * Neuro-muscular re-education: facilitation of righting/equilibrium reactions, midline orientation, scapular stability/mobility, normalization of muscle tone, and facilitation of volitional active controled movement  * Positioning to improve skin integrity, interaction with environment and functional independence           Recommended Adaptive Equipment: TBD      Home Living: Pt lives with  in 1 story with 2 LACY. Bathroom setup: tub/shower, elevated commode; pt sponge bathes with 's assistance   Equipment owned: wheeled walker     Prior Level of Function: assist from  with ADLs , assist from  with IADLs; functional mobility: wheeled walker     Pain Level: pt did not report pain this date; pt agreeable to therapy with encouragement  Cognition: A&O: pt alert and oriented grossly; 2-3 step command follow demonstrated. Memory: Fair              Sequencing:  Fair              Problem solving:  Fair              Judgement/safety:  Fair                Functional Assessment:  AM-PAC Daily Activity Raw Score: 12/24    Initial Eval Status  Date: 12/13/22 Treatment Status  Date: 12/16/22 STGs = LTGs  Time frame: 10-14 days   Feeding Set up       Grooming Min A, seated    SBA   UB Dressing Mod A  For gown management Mod A  SBA   LB Dressing Dependent  To don socks  dependent   Mod A-with use of AD as appropriate/needed   Bathing Max A   Mod A -with use of AD as appropriate/needed   Toileting Dependent Dependent for hygiene, incontinent of urine, purewick  Mod A    Bed Mobility  Supine to sit:  Max A X2   Sit to supine: Max A X2  Log roll side to side mod A during hygiene. Max A x2 scooting to [facility] while supine  Mod A   Functional Transfers Mod A X2 with wheeled walker  Sit to stand from EOB  Stand to sit to chair  Stand pivot from bed to chair  Cues for hand placement and safe technique  NT d/t fatigue/SOB  Min A    Functional Mobility NT   Min A -with device as needed to maximize independence with ADLs and functional task completion   Balance Sitting:     Static:  SBA    Dynamic:CGA  Standing: Mod A X2 with wheeled walker Dependent, patient remained in bed  Sup for static/dynamic sitting balance to maximize independence with ADLs and functional task completion     CGA for standing balance to maximize independence with ADLs and functional task completion   Activity Tolerance Fair- with light activity. Limited by weakness and fatigue. poor. Patient fatigued with labored breathing and wheezing with minimal exertion. O2 sat upper 90s on nc O2, HR sustained around 115. Much time for resting required, patient still demonstrated labored breathing but reported improvement. Nursing notified. Fair+ with ADL activity. Pt will demonstrate good understanding of education provided on EC/WS techniques      Comments:  patient cleared with nursing and agreeable to therapy. Much time spent with  discussing rehab vs home,  states he hopes for patient to be up and more mobile once discharged that he can continue to take care of her. Session limited d/t SOB and decreased activity tolerance with bed mobility. Limited AROM demonstrated with BUEs this date due fatigue/weakness. UE exercise encouraged in the bed along with HOB elevated to improve breathing and strength for engagement in self care tasks/functional transfers and bed mobility. Patient remained in bed with call light in reach, alarm on, and family present    Education/treatment: ADL and functional tbed level activity performed to increase safety and independence during self care tasks.  Education provided on safety awareness, adl reeducation, bed mobility    Pt has made limited progress towards set goals.      Time In: 2:33  Time Out: 2:56     Min Units   Therapeutic Ex [id number]     Therapeutic Activities [id number]   ADL/Self Care [id number]     Orthotic Management [id number]     Neuro Re-Ed [id number]     Non-Billable Time     TOTAL TIMED TREATMENT 23 [id number] Mari Medina RAYMUNDO/L [id number] No